# Patient Record
Sex: FEMALE | Race: WHITE | Employment: FULL TIME | URBAN - METROPOLITAN AREA
[De-identification: names, ages, dates, MRNs, and addresses within clinical notes are randomized per-mention and may not be internally consistent; named-entity substitution may affect disease eponyms.]

---

## 2017-01-19 ENCOUNTER — OFFICE VISIT (OUTPATIENT)
Dept: ORTHOPEDICS | Facility: CLINIC | Age: 52
End: 2017-01-19
Payer: COMMERCIAL

## 2017-01-19 VITALS — BODY MASS INDEX: 35.16 KG/M2 | RESPIRATION RATE: 12 BRPM | HEIGHT: 65 IN | WEIGHT: 211 LBS

## 2017-01-19 DIAGNOSIS — Z98.890 S/P CARPAL TUNNEL RELEASE: ICD-10-CM

## 2017-01-19 DIAGNOSIS — G56.03 BILATERAL CARPAL TUNNEL SYNDROME: Primary | ICD-10-CM

## 2017-01-19 PROCEDURE — 99024 POSTOP FOLLOW-UP VISIT: CPT | Performed by: ORTHOPAEDIC SURGERY

## 2017-01-19 ASSESSMENT — PAIN SCALES - GENERAL: PAINLEVEL: MILD PAIN (3)

## 2017-01-19 NOTE — MR AVS SNAPSHOT
"              After Visit Summary   1/19/2017    Bria Haddad    MRN: [id number]           Patient Information     Date Of Birth          1965        Visit Information        Provider Department      1/19/2017 8:45 AM Colby Nobles MD [facility] [place]         Follow-ups after your visit        Who to contact     If you have questions or need follow up information about today's clinic visit or your schedule please contact [facility] [place] directly at [phone].  Normal or non-critical lab and imaging results will be communicated to you by ArcMailhart, letter or phone within 4 business days after the clinic has received the results. If you do not hear from us within 7 days, please contact the clinic through ArcMailhart or phone. If you have a critical or abnormal lab result, we will notify you by phone as soon as possible.  Submit refill requests through Streak or call your pharmacy and they will forward the refill request to us. Please allow 3 business days for your refill to be completed.          Additional Information About Your Visit        MyChart Information     Streak gives you secure access to your electronic health record. If you see a primary care provider, you can also send messages to your care team and make appointments. If you have questions, please call your primary care clinic.  If you do not have a primary care provider, please call [phone] and they will assist you.        Care EveryWhere ID     This is your Care EveryWhere ID. This could be used by other organizations to access your [place] medical records  [id number]        Your Vitals Were     Respirations Height BMI (Body Mass Index)             12 1.638 m (5' 4.5\") 35.67 kg/m2          Blood Pressure from Last 3 Encounters:   12/29/16 124/65   12/16/16 125/65   12/05/16 115/69    Weight from Last 3 Encounters:   01/19/17 95.709 kg (211 lb)   12/05/16 95.255 kg (210 lb)   11/16/16 97.07 kg (214 lb)            "   Today, you had the following     No orders found for display       Primary Care Provider Office Phone # Fax #    Stephanie Saldana [phone]       [facility] [address] [facility] [address]        Thank you!     Thank you for choosing [facility]  for your care. Our goal is always to provide you with excellent care. Hearing back from our patients is one way we can continue to improve our services. Please take a few minutes to complete the written survey that you may receive in the mail after your visit with us. Thank you!             Your Updated Medication List - Protect others around you: Learn how to safely use, store and throw away your medicines at www.disposemymeds.org.          This list is accurate as of: 1/19/17  8:52 AM.  Always use your most recent med list.                   Brand Name Dispense Instructions for use    HYDROcodone-acetaminophen 5-325 MG per tablet    NORCO    15 tablet    Take 1-2 tablets by mouth every 4 hours as needed for other (Moderate to Severe Pain)       NO ACTIVE MEDICATIONS          order for DME     2 Units    Equipment being ordered: compression stockings 1 pair, at 30mm Hg, to be worn during the day. For Bilateral leg edema [R60.0]

## 2017-01-19 NOTE — PROGRESS NOTES
"Bria Haddad is here for follow up after left carpal tunnel release on 12/16/16.  Numbness and tingling have decreased since surgery.  Pain: mild.   She has been doing home exercise program.     She had right CTR on 10/12/16 and is doing well.     Resp 12  Ht 1.638 m (5' 4.5\")  Wt 95.709 kg (211 lb)  BMI 35.67 kg/m2    Exam:  Wound looks good, no evidence of infection.  Good capillary refill.  Mild tenderness at the based of the surgical scar  Able to make a full fist   strength: slight weakness still on the left compared with the right.    Assessment/ Plan:  1. Doing well status post left CTR on 12/16/16 and right CTR on 10/12/16    Work: Able to return to work full duty 1/23, as planned.     Return to clinic PRN.    NAOMI Nobles MD  Dept. Orthopedic Surgery  [facility]    This document serves as a record of the services and decisions personally performed and made by Dr. NAOMI Nobles MD. It was created on his behalf by Genny Nugent, a trained medical scribe. The creation of this record is based on the provider's personal observations and the statements of the patient. This document has been checked and approved by the attending provider.   Genny Nugent 8:57 AM 1/19/2017  "

## 2017-02-02 ENCOUNTER — OFFICE VISIT (OUTPATIENT)
Dept: ORTHOPEDICS | Facility: CLINIC | Age: 52
End: 2017-02-02
Payer: COMMERCIAL

## 2017-02-02 VITALS — WEIGHT: 210.2 LBS | HEIGHT: 65 IN | BODY MASS INDEX: 35.02 KG/M2 | RESPIRATION RATE: 12 BRPM

## 2017-02-02 DIAGNOSIS — Z98.890 STATUS POST CARPAL TUNNEL RELEASE: Primary | ICD-10-CM

## 2017-02-02 PROCEDURE — 99024 POSTOP FOLLOW-UP VISIT: CPT | Performed by: ORTHOPAEDIC SURGERY

## 2017-02-02 ASSESSMENT — PAIN SCALES - GENERAL: PAINLEVEL: MODERATE PAIN (4)

## 2017-02-02 NOTE — MR AVS SNAPSHOT
After Visit Summary   2/2/2017    Bria Haddad    MRN: [id number]           Patient Information     Date Of Birth          1965        Visit Information        Provider Department      2/2/2017 10:45 AM Colby Nobles MD [facility] [place]        Care Instructions    Pain and Tenderness after Carpal Tunnel Surgery  Paresthesias and scar tenderness are common in the subacute recovery phase following carpal tunnel release. A flare or aggravation of symptoms is common in the period of two to six weeks after surgery. Several different situations may contribute, including the following, listed roughly in most-to-least common:   Normal scar tenderness with anxiety / awareness.   Normal scar adhesions to the perineural tissues. This may result in a sudden, brief electrical paresthesia, typically shooting from the palm out the middle finger tip. It may occur while reaching, gripping, or at rest. It may be alarming, but does not necessarily mean that there is a technical problem with the surgery or with the healing process. Adhesions by themselves would not explain constant pain.     Pillar pain (tenderness adjacent to the actual ligament release, where the prominences of the trapezial ridge and the hook of the hamate are closest to the skin. The transverse retinacular ligament, divided during carpal tunnel release, attaches to these structures, and the inflammatory reaction of normal wound healing is most obvious at these points, often more than the central area of the actual release.    Aggravation of preexisting asymptomatic basal joint, pisotriquetral or triquetrohamate arthritis due to altered isometric stresses on these joints.   Reinnervation hypersensitivity - most often occurs if there was constant tingling, numbness or altered sensibility before surgery.   Reflex sympathetic dystrophy.   Coexisting neruritis from cervical radiculopathy, pronator syndrome, diabetic or other peripheral  "neuropathy.   Direct nerve irritation of one of the palmar cutaneous sensory branches to the palm or of the median nerve itself.          Follow-ups after your visit        Who to contact     If you have questions or need follow up information about today's clinic visit or your schedule please contact [facility] directly at [phone].  Normal or non-critical lab and imaging results will be communicated to you by MyChart, letter or phone within 4 business days after the clinic has received the results. If you do not hear from us within 7 days, please contact the clinic through Chartboosthart or phone. If you have a critical or abnormal lab result, we will notify you by phone as soon as possible.  Submit refill requests through MetaFLO or call your pharmacy and they will forward the refill request to us. Please allow 3 business days for your refill to be completed.          Additional Information About Your Visit        MyChart Information     MetaFLO gives you secure access to your electronic health record. If you see a primary care provider, you can also send messages to your care team and make appointments. If you have questions, please call your primary care clinic.  If you do not have a primary care provider, please call [phone] and they will assist you.        Care EveryWhere ID     This is your Care EveryWhere ID. This could be used by other organizations to access your [place] medical records  [id number]        Your Vitals Were     Respirations Height BMI (Body Mass Index)             12 1.638 m (5' 4.5\") 35.54 kg/m2          Blood Pressure from Last 3 Encounters:   12/29/16 124/65   12/16/16 125/65   12/05/16 115/69    Weight from Last 3 Encounters:   02/02/17 95.346 kg (210 lb 3.2 oz)   01/19/17 95.709 kg (211 lb)   12/05/16 95.255 kg (210 lb)              Today, you had the following     No orders found for display       Primary Care Provider Office Phone # Fax #    Stephanie Kebede " MD [place] [phone]       [facility] [address] [address] [facility] [address]        Thank you!     Thank you for choosing [facility]  for your care. Our goal is always to provide you with excellent care. Hearing back from our patients is one way we can continue to improve our services. Please take a few minutes to complete the written survey that you may receive in the mail after your visit with us. Thank you!             Your Updated Medication List - Protect others around you: Learn how to safely use, store and throw away your medicines at www.disposemymeds.org.          This list is accurate as of: 2/2/17 10:55 AM.  Always use your most recent med list.                   Brand Name Dispense Instructions for use    HYDROcodone-acetaminophen 5-325 MG per tablet    NORCO    15 tablet    Take 1-2 tablets by mouth every 4 hours as needed for other (Moderate to Severe Pain)       NO ACTIVE MEDICATIONS          order for DME     2 Units    Equipment being ordered: compression stockings 1 pair, at 30mm Hg, to be worn during the day. For Bilateral leg edema [R60.0]

## 2017-02-02 NOTE — PROGRESS NOTES
"Bria Haddad is here for follow up after left carpal tunnel release on 12/16/16. Numbness and tingling have decreased since surgery. She has been having sharp pains around the incision site that has been present for 1 week. She returned to work 2 weeks ago. No fever or chills. No numbness or tingling.     Job description: keyboarding     Treatments: gel pad and brace wear for the last week at work.     She had right CTR on 10/12/16 and is doing well.     Resp 12  Ht 1.638 m (5' 4.5\")  Wt 95.346 kg (210 lb 3.2 oz)  BMI 35.54 kg/m2    Exam:  No evidence of infection.   Residual swelling in the carpal tunnel area  Tender: proximal aspect of the incision site, hypothenar area     Assessment:  1. Status post left CTR with inflammation at the incision site. It does not appear to be infected but it may not be declaring itself completely yet. I feel more likely that it is more normal pillar pain.      Plan:  I recommended hand therapy  for scar desensitization. We also discussed alternative ways to desensitize the scar.   Hand therapy ordered.   We discussed the signs to monitor for signs of infection. Return to the clinic/ER as soon as there are signs of infection.   Antiinflammatories as needed.     Return to clinic PRN.     NAOMI Nobles MD  Dept. Orthopedic Surgery  [facility]    This document serves as a record of the services and decisions personally performed and made by Dr. NAOMI Nobles MD. It was created on his behalf by Genny Nugent, a trained medical scribe. The creation of this record is based on the provider's personal observations and the statements of the patient. This document has been checked and approved by the attending provider.   Genny Nugent 11:09 AM 2/2/2017  "

## 2017-02-02 NOTE — PATIENT INSTRUCTIONS
Pain and Tenderness after Carpal Tunnel Surgery  Paresthesias and scar tenderness are common in the subacute recovery phase following carpal tunnel release. A flare or aggravation of symptoms is common in the period of two to six weeks after surgery. Several different situations may contribute, including the following, listed roughly in most-to-least common:   Normal scar tenderness with anxiety / awareness.   Normal scar adhesions to the perineural tissues. This may result in a sudden, brief electrical paresthesia, typically shooting from the palm out the middle finger tip. It may occur while reaching, gripping, or at rest. It may be alarming, but does not necessarily mean that there is a technical problem with the surgery or with the healing process. Adhesions by themselves would not explain constant pain.     Pillar pain (tenderness adjacent to the actual ligament release, where the prominences of the trapezial ridge and the hook of the hamate are closest to the skin. The transverse retinacular ligament, divided during carpal tunnel release, attaches to these structures, and the inflammatory reaction of normal wound healing is most obvious at these points, often more than the central area of the actual release.    Aggravation of preexisting asymptomatic basal joint, pisotriquetral or triquetrohamate arthritis due to altered isometric stresses on these joints.   Reinnervation hypersensitivity - most often occurs if there was constant tingling, numbness or altered sensibility before surgery.   Reflex sympathetic dystrophy.   Coexisting neruritis from cervical radiculopathy, pronator syndrome, diabetic or other peripheral neuropathy.   Direct nerve irritation of one of the palmar cutaneous sensory branches to the palm or of the median nerve itself.

## 2017-02-07 ENCOUNTER — THERAPY VISIT (OUTPATIENT)
Dept: OCCUPATIONAL THERAPY | Facility: CLINIC | Age: 52
End: 2017-02-07
Payer: COMMERCIAL

## 2017-02-07 DIAGNOSIS — G56.02 CARPAL TUNNEL SYNDROME OF LEFT WRIST: ICD-10-CM

## 2017-02-07 DIAGNOSIS — Z47.89 AFTERCARE FOLLOWING SURGERY OF THE MUSCULOSKELETAL SYSTEM: ICD-10-CM

## 2017-02-07 DIAGNOSIS — M25.632 STIFFNESS OF LEFT WRIST JOINT: Primary | ICD-10-CM

## 2017-02-07 PROCEDURE — 97110 THERAPEUTIC EXERCISES: CPT | Mod: GO | Performed by: OCCUPATIONAL THERAPIST

## 2017-02-07 PROCEDURE — 97165 OT EVAL LOW COMPLEX 30 MIN: CPT | Mod: GO | Performed by: OCCUPATIONAL THERAPIST

## 2017-02-07 PROCEDURE — 97140 MANUAL THERAPY 1/> REGIONS: CPT | Mod: GO | Performed by: OCCUPATIONAL THERAPIST

## 2017-02-07 NOTE — PROGRESS NOTES
Hand Therapy Initial Evaluation    Current Date:  2/7/2017    Subjective:  Bria Haddad is a 51 year old right hand dominant female.    Diagnosis:   Left CTS  DOS:  12/16/16  Procedure:  Left CTR  Post:  7w 4d    Patient reports symptoms of pain, stiffness/loss of motion, weakness/loss of strength and edema of the left wrist and which occurred due to gradual onset due to repetitive use over the past several years.  Patient also reports fall on left hand today on ice but was wearing prefab wrist splint. Since onset symptoms are gradually getting better.  Special tests:  EMG.  Previous treatment: See for therapy after right CTR November 2016.  General health as reported by patient is good.  Pertinent medical history includes:numbness/tingling, calf pain, swelling, warmth  Medical allergies:Aspirin.  Surgical history: orthopedic: B CTR.  Medication history: none.    Current occupation is    Currently working in normal job without restrictions  Job Tasks: prolonged sitting, repetitive tasks, computer work  Barriers include:none  Prior functional level:  no limitations    Additional Occupational Profile Information (patterns of daily living, interests, values and needs): None    Functional Outcome Measure:  Upper Extremity Functional Index  SCORE:   Column Totals: 64/80  (A lower score indicates greater disability.)    Objective:  ROM:  Wrist  2/7/17   AROM(PROM) Right Left   Extension 65 65   Flexion 55 55   RD 25 20   UD 40 35       Strength:   (Measured in pounds)    2/7/17   Trials Right Left   1  2  3 45  45  43 30+  35+  34+   Average: 44 33     Lat Pinch  2/7/17   Trials Right Left   1  2  3 14  14  14 10-  10-  11-   Average: 14 11     3 Pt Pinch  2/7/17   Trials Right Left   1  2  3 10  11  13 7-  10-  11-   Average: 11 9     Edema:  Mild to moderate volar wrist    Scar:  Sensitivity: mild to moderate hypersensitivity Quality:  Moderately adherent    Sensation:  WNL throughout all  nerve distributions; per patient report    Pain Report:  VAS(0-10) 2/7/17   At Rest: 0/10   With Use: 3-4/10   Location: volar wrist and hand  Pain Quality:  Shooting  Frequency: intermittent    Pain is worst:  daytime  Exacerbated by:  Overuse  Relieved by:  rest  Progression:  Gradually improving  Assessment:  Patient presents with symptoms consistent with diagnosis of Carpal Tunnel Syndrome, with surgical  intervention.   Patient's limitations or Problem List includes:  Pain, Decreased ROM/motion, Increased edema, Weakness, Adherent scarring, Decreased  and Decreased pinch of the left wrist and hand which interferes with the patient's ability to perform Self Care Tasks (eating, bathing), Work Tasks, Recreational Activities and Household Chores as compared to previous level of function.    Rehab Potential:  Excellent - Return to full activity, no limitations    Patient will benefit from skilled Occupational Therapy to increase ROM, flexibility, overall strength,  strength and pinch strength and decrease pain, edema and adherence of scarring to return to previous activity level and resume normal daily tasks and to reach their rehab potential.    Barriers to Learning:  No barrier    Communication Issues:  Patient appears to be able to clearly communicate and understand verbal and written communication and follow directions correctly.    Assessment of Occupational Performance:  3-5 Performance Deficits  Identified Performance Deficits: bathing/showering, home establishment and management, meal preparation and cleanup, work and leisure activities      Clinical Decision Making (Complexity): Low complexity    Treatment Explanation:  The following has been discussed with the patient:    RX ordered/plan of care  Anticipated outcomes  Possible risks and side effects    Plan:  Frequency:  1 X week, once daily  Duration:  for 6 weeks  Treatment Plan:  Modalities:  US, Fluidotherapy or Paraffin  Therapeutic Exercise:   AROM, Tendon Gliding and /Pinch Strengthening  Neuromuscular re-education:  Nerve Gliding, Desensitization and Kinesiotaping  Manual Techniques:  Scar mobilization  Orthotic Fabrication:  Static forearm based wrist orthotic  Self Care: Diagnostic education    Avoid activities that exacerbate median nerve symptoms    Avoid prolonged flexion or extension of the wrist    Discharge Plan:    Achieve all LTG.  Independent in home treatment program.  Reach maximal therapeutic benefit.    Home Exercise Program:  Warmth for stiffness  AROM wrist all planes  Tendon gliding with 3 fists and FDS  Distal median nerve gliding  Scar mobs circular and 3 vector  Scar pad with tubigrip sleeve sleeping  Wrist orthosis sleeping as needed    Next Visit:  See in 1 week  Continue US for scar softening  Assess response to HEP  Recommend MD f/u if increase in pain or swelling after FOOSH today

## 2017-02-13 ENCOUNTER — THERAPY VISIT (OUTPATIENT)
Dept: OCCUPATIONAL THERAPY | Facility: CLINIC | Age: 52
End: 2017-02-13
Payer: COMMERCIAL

## 2017-02-13 DIAGNOSIS — M25.632 STIFFNESS OF LEFT WRIST JOINT: ICD-10-CM

## 2017-02-13 DIAGNOSIS — G56.02 CARPAL TUNNEL SYNDROME OF LEFT WRIST: ICD-10-CM

## 2017-02-13 DIAGNOSIS — Z47.89 AFTERCARE FOLLOWING SURGERY OF THE MUSCULOSKELETAL SYSTEM: ICD-10-CM

## 2017-02-13 PROCEDURE — 97140 MANUAL THERAPY 1/> REGIONS: CPT | Mod: GO | Performed by: OCCUPATIONAL THERAPIST

## 2017-02-13 PROCEDURE — 97110 THERAPEUTIC EXERCISES: CPT | Mod: GO | Performed by: OCCUPATIONAL THERAPIST

## 2017-02-13 PROCEDURE — 97035 APP MDLTY 1+ULTRASOUND EA 15: CPT | Mod: GO | Performed by: OCCUPATIONAL THERAPIST

## 2017-02-13 NOTE — MR AVS SNAPSHOT
After Visit Summary   2/13/2017    Bria Haddad    MRN: [id number]           Patient Information     Date Of Birth          1965        Visit Information        Provider Department      2/13/2017 9:00 AM Mary Anne Diaz [facility] [place]        Today's Diagnoses     Stiffness of left wrist joint        Carpal tunnel syndrome of left wrist        Aftercare following surgery of the musculoskeletal system           Follow-ups after your visit        Your next 10 appointments already scheduled     Feb 22, 2017  9:30 AM CST   [facility] with Mary Anne Diaz   [facility] [place] ([facility] [place] [facility])    [address]  [place] [id number] [phone]              Who to contact     If you have questions or need follow up information about today's clinic visit or your schedule please contact [facility] directly at [phone].  Normal or non-critical lab and imaging results will be communicated to you by TrustRadiushart, letter or phone within 4 business days after the clinic has received the results. If you do not hear from us within 7 days, please contact the clinic through TrustRadiushart or phone. If you have a critical or abnormal lab result, we will notify you by phone as soon as possible.  Submit refill requests through PubliAtis or call your pharmacy and they will forward the refill request to us. Please allow 3 business days for your refill to be completed.          Additional Information About Your Visit        TrustRadiushart Information     PubliAtis gives you secure access to your electronic health record. If you see a primary care provider, you can also send messages to your care team and make appointments. If you have questions, please call your primary care clinic.  If you do not have a primary care provider, please call [phone] and they will assist you.        Care EveryWhere  ID     This is your Care EveryWhere ID. This could be used by other organizations to access your [place] medical records  [id number]         Blood Pressure from Last 3 Encounters:   12/29/16 124/65   12/16/16 125/65   12/05/16 115/69    Weight from Last 3 Encounters:   02/02/17 95.3 kg (210 lb 3.2 oz)   01/19/17 95.7 kg (211 lb)   12/05/16 95.3 kg (210 lb)              We Performed the Following     MANUAL THER TECH,1+REGIONS,EA 15 MIN     THERAPEUTIC EXERCISES     ULTRASOUND THERAPY        Primary Care Provider Office Phone # Fax #    Stephanie Saldana [phone]       [facility] [id number] [place] [address]        Thank you!     Thank you for choosing [place] [facility] [place]  for your care. Our goal is always to provide you with excellent care. Hearing back from our patients is one way we can continue to improve our services. Please take a few minutes to complete the written survey that you may receive in the mail after your visit with us. Thank you!             Your Updated Medication List - Protect others around you: Learn how to safely use, store and throw away your medicines at www.disposemymeds.org.          This list is accurate as of: 2/13/17  9:21 AM.  Always use your most recent med list.                   Brand Name Dispense Instructions for use    HYDROcodone-acetaminophen 5-325 MG per tablet    NORCO    15 tablet    Take 1-2 tablets by mouth every 4 hours as needed for other (Moderate to Severe Pain)       NO ACTIVE MEDICATIONS          order for DME     2 Units    Equipment being ordered: compression stockings 1 pair, at 30mm Hg, to be worn during the day. For Bilateral leg edema [R60.0]

## 2017-02-13 NOTE — PROGRESS NOTES
SOAP note objective information for 2/13/2017:    ROM:  Wrist  2/7/17 2/13/17   AROM(PROM) Right Left Left   Extension 65 65 70   Flexion 55 55 60   RD 25 20 25   UD 40 35 35       Strength:   (Measured in pounds)    2/7/17   Trials Right Left   1  2  3 45  45  43 30+  35+  34+   Average: 44 33     Pain Report:  VAS(0-10) 2/7/17 2/13/17   At Rest: 0/10    With Use: 3-4/10 1/10   Location: volar wrist and hand    Home Exercise Program:  Warmth for stiffness  AROM wrist all planes  Tendon gliding with 3 fists and FDS  Distal median nerve gliding  Gentle  and pinch strengthening  Scar mobs circular and 3 vector  Scar pad with tubigrip sleeve sleeping  Wrist orthosis sleeping as needed    Next Visit:  Continue US for scar softening    Please refer to the daily flowsheet for treatment today, total treatment time and time spent performing 1:1 timed codes.

## 2017-03-02 ENCOUNTER — THERAPY VISIT (OUTPATIENT)
Dept: OCCUPATIONAL THERAPY | Facility: CLINIC | Age: 52
End: 2017-03-02
Payer: COMMERCIAL

## 2017-03-02 DIAGNOSIS — M25.632 STIFFNESS OF LEFT WRIST JOINT: ICD-10-CM

## 2017-03-02 DIAGNOSIS — G56.02 CARPAL TUNNEL SYNDROME OF LEFT WRIST: ICD-10-CM

## 2017-03-02 DIAGNOSIS — Z47.89 AFTERCARE FOLLOWING SURGERY OF THE MUSCULOSKELETAL SYSTEM: ICD-10-CM

## 2017-03-02 PROCEDURE — 97110 THERAPEUTIC EXERCISES: CPT | Mod: GO | Performed by: OCCUPATIONAL THERAPIST

## 2017-03-02 PROCEDURE — 97035 APP MDLTY 1+ULTRASOUND EA 15: CPT | Mod: GO | Performed by: OCCUPATIONAL THERAPIST

## 2017-03-02 PROCEDURE — 97140 MANUAL THERAPY 1/> REGIONS: CPT | Mod: GO | Performed by: OCCUPATIONAL THERAPIST

## 2017-03-02 NOTE — PROGRESS NOTES
SOAP note objective information for 3/2/2017:    ROM:  Wrist  2/7/17 3/2/17   AROM(PROM) Right Left Left   Extension 65 65 65   Flexion 55 55 60   RD 25 20 25   UD 40 35 35     Strength:   (Measured in pounds)    2/7/17 3/2/17   Trials Right Left Left   1  2  3 45  45  43 30+  35+  34+ 37  42  43   Average: 44 33 41     Home Exercise Program:  Warmth for stiffness  AROM wrist all planes  Tendon gliding with 3 fists and FDS  Distal median nerve gliding   and pinch strengthening  Scar mobs circular and 3 vector  Scar pad with tubigrip sleeve sleeping  Wrist orthosis sleeping as needed    Next Visit:  Continue US for scar softening x 3 more sessions    Please refer to the daily flowsheet for treatment today, total treatment time and time spent performing 1:1 timed codes.

## 2017-03-02 NOTE — MR AVS SNAPSHOT
After Visit Summary   3/2/2017    Bria Haddad    MRN: [id number]           Patient Information     Date Of Birth          1965        Visit Information        Provider Department      3/2/2017 1:30 PM Mary Anne Diaz [facility] [place]        Today's Diagnoses     Stiffness of left wrist joint        Carpal tunnel syndrome of left wrist        Aftercare following surgery of the musculoskeletal system           Follow-ups after your visit        Your next 10 appointments already scheduled     Mar 10, 2017 10:00 AM CST   ETHAN Hand with Argelia Stallworth, OT   [facility] [place] ([facility]AN [facility] [place] [facility])    [address] [address]  [place] [address] [phone]              Who to contact     If you have questions or need follow up information about today's clinic visit or your schedule please contact [facility] directly at [phone].  Normal or non-critical lab and imaging results will be communicated to you by Spectrum[employer]hart, letter or phone within 4 business days after the clinic has received the results. If you do not hear from us within 7 days, please contact the clinic through [employer] or phone. If you have a critical or abnormal lab result, we will notify you by phone as soon as possible.  Submit refill requests through [employer] or call your pharmacy and they will forward the refill request to us. Please allow 3 business days for your refill to be completed.          Additional Information About Your Visit        MyChart Information     [employer] gives you secure access to your electronic health record. If you see a primary care provider, you can also send messages to your care team and make appointments. If you have questions, please call your primary care clinic.  If you do not have a primary care provider, please call [phone] and they will assist you.        Care  EveryWhere ID     This is your Care EveryWhere ID. This could be used by other organizations to access your [place] medical records  [id number]         Blood Pressure from Last 3 Encounters:   12/29/16 124/65   12/16/16 125/65   12/05/16 115/69    Weight from Last 3 Encounters:   02/02/17 95.3 kg (210 lb 3.2 oz)   01/19/17 95.7 kg (211 lb)   12/05/16 95.3 kg (210 lb)              We Performed the Following     MANUAL THER TECH,1+REGIONS,EA 15 MIN     THERAPEUTIC EXERCISES     ULTRASOUND THERAPY        Primary Care Provider Office Phone # Fax #    Stephanie Saldana [phone]       [facility] [address] [facility] [address]        Thank you!     Thank you for choosing [place] [facility] [place]  for your care. Our goal is always to provide you with excellent care. Hearing back from our patients is one way we can continue to improve our services. Please take a few minutes to complete the written survey that you may receive in the mail after your visit with us. Thank you!             Your Updated Medication List - Protect others around you: Learn how to safely use, store and throw away your medicines at www.disposemymeds.org.          This list is accurate as of: 3/2/17  1:55 PM.  Always use your most recent med list.                   Brand Name Dispense Instructions for use    HYDROcodone-acetaminophen 5-325 MG per tablet    NORCO    15 tablet    Take 1-2 tablets by mouth every 4 hours as needed for other (Moderate to Severe Pain)       NO ACTIVE MEDICATIONS          order for DME     2 Units    Equipment being ordered: compression stockings 1 pair, at 30mm Hg, to be worn during the day. For Bilateral leg edema [R60.0]

## 2017-03-10 ENCOUNTER — THERAPY VISIT (OUTPATIENT)
Dept: OCCUPATIONAL THERAPY | Facility: CLINIC | Age: 52
End: 2017-03-10
Payer: COMMERCIAL

## 2017-03-10 DIAGNOSIS — M25.632 STIFFNESS OF LEFT WRIST JOINT: ICD-10-CM

## 2017-03-10 DIAGNOSIS — G56.02 CARPAL TUNNEL SYNDROME OF LEFT WRIST: ICD-10-CM

## 2017-03-10 DIAGNOSIS — Z47.89 AFTERCARE FOLLOWING SURGERY OF THE MUSCULOSKELETAL SYSTEM: ICD-10-CM

## 2017-03-10 PROCEDURE — 97140 MANUAL THERAPY 1/> REGIONS: CPT | Mod: GO | Performed by: OCCUPATIONAL THERAPIST

## 2017-03-10 PROCEDURE — 97110 THERAPEUTIC EXERCISES: CPT | Mod: GO | Performed by: OCCUPATIONAL THERAPIST

## 2017-03-10 PROCEDURE — 97035 APP MDLTY 1+ULTRASOUND EA 15: CPT | Mod: GO | Performed by: OCCUPATIONAL THERAPIST

## 2017-03-10 NOTE — PROGRESS NOTES
SOAP note objective information for 3/10/2017:    ROM:  Wrist  2/7/17 3/2/17 3/10/17   AROM(PROM) Right Left Left Left   Extension 65 65 65 65   Flexion 55 55 60 62   RD 25 20 25 25   UD 40 35 35 39     Strength:   (Measured in pounds)    2/7/17 3/2/17 3/10/17   Trials Right Left Left Left   1  2  3 45  45  43 30+  35+  34+ 37  42  43 37  40  43   Average: 44 33 41 40     Home Exercise Program:  Warmth for stiffness  AROM wrist all planes  Tendon gliding with 3 fists and FDS  Distal median nerve gliding   and pinch strengthening  Scar mobs circular and 3 vector  Scar pad with tubigrip sleeve sleeping  Wrist orthosis sleeping as needed    Next Visit:  Continue US for scar softening x 3 more sessions    Please refer to the daily flowsheet for treatment today, total treatment time and time spent performing 1:1 timed codes.

## 2017-03-10 NOTE — MR AVS SNAPSHOT
After Visit Summary   3/10/2017    Bria Haddad    MRN: [id number]           Patient Information     Date Of Birth          1965        Visit Information        Provider Department      3/10/2017 10:00 AM Argelia Stallworth OT [facility] [place]        Today's Diagnoses     Stiffness of left wrist joint        Carpal tunnel syndrome of left wrist        Aftercare following surgery of the musculoskeletal system           Follow-ups after your visit        Your next 10 appointments already scheduled     Mar 20, 2017  8:30 AM CDT   ETHAN Hand with Mary Anne Diaz   [facility] [place] ([facility] [place] [facility])    [address] [address]  [place] [address] [phone]              Who to contact     If you have questions or need follow up information about today's clinic visit or your schedule please contact [facility] directly at [phone].  Normal or non-critical lab and imaging results will be communicated to you by TutorialTabhart, letter or phone within 4 business days after the clinic has received the results. If you do not hear from us within 7 days, please contact the clinic through [employer] or phone. If you have a critical or abnormal lab result, we will notify you by phone as soon as possible.  Submit refill requests through [employer] or call your pharmacy and they will forward the refill request to us. Please allow 3 business days for your refill to be completed.          Additional Information About Your Visit        MyChart Information     [employer] gives you secure access to your electronic health record. If you see a primary care provider, you can also send messages to your care team and make appointments. If you have questions, please call your primary care clinic.  If you do not have a primary care provider, please call [phone] and they will assist you.        Care  EveryWhere ID     This is your Care EveryWhere ID. This could be used by other organizations to access your [place] medical records  [id number]         Blood Pressure from Last 3 Encounters:   12/29/16 124/65   12/16/16 125/65   12/05/16 115/69    Weight from Last 3 Encounters:   02/02/17 95.3 kg (210 lb 3.2 oz)   01/19/17 95.7 kg (211 lb)   12/05/16 95.3 kg (210 lb)              We Performed the Following     MANUAL THER TECH,1+REGIONS,EA 15 MIN     THERAPEUTIC EXERCISES     ULTRASOUND THERAPY        Primary Care Provider Office Phone # Fax #    Stephanie Saldana [phone]       [facility] [id number] [place] [address]        Thank you!     Thank you for choosing [place] [facility] [place]  for your care. Our goal is always to provide you with excellent care. Hearing back from our patients is one way we can continue to improve our services. Please take a few minutes to complete the written survey that you may receive in the mail after your visit with us. Thank you!             Your Updated Medication List - Protect others around you: Learn how to safely use, store and throw away your medicines at www.disposemymeds.org.          This list is accurate as of: 3/10/17 10:31 AM.  Always use your most recent med list.                   Brand Name Dispense Instructions for use    HYDROcodone-acetaminophen 5-325 MG per tablet    NORCO    15 tablet    Take 1-2 tablets by mouth every 4 hours as needed for other (Moderate to Severe Pain)       NO ACTIVE MEDICATIONS          order for DME     2 Units    Equipment being ordered: compression stockings 1 pair, at 30mm Hg, to be worn during the day. For Bilateral leg edema [R60.0]

## 2017-03-15 ENCOUNTER — TELEPHONE (OUTPATIENT)
Dept: INTERNAL MEDICINE | Facility: CLINIC | Age: 52
End: 2017-03-15

## 2017-03-20 ENCOUNTER — THERAPY VISIT (OUTPATIENT)
Dept: OCCUPATIONAL THERAPY | Facility: CLINIC | Age: 52
End: 2017-03-20
Payer: COMMERCIAL

## 2017-03-20 DIAGNOSIS — M25.632 STIFFNESS OF LEFT WRIST JOINT: ICD-10-CM

## 2017-03-20 DIAGNOSIS — Z47.89 AFTERCARE FOLLOWING SURGERY OF THE MUSCULOSKELETAL SYSTEM: ICD-10-CM

## 2017-03-20 DIAGNOSIS — G56.02 CARPAL TUNNEL SYNDROME OF LEFT WRIST: ICD-10-CM

## 2017-03-20 PROCEDURE — 97035 APP MDLTY 1+ULTRASOUND EA 15: CPT | Mod: GO | Performed by: OCCUPATIONAL THERAPIST

## 2017-03-20 PROCEDURE — 97110 THERAPEUTIC EXERCISES: CPT | Mod: GO | Performed by: OCCUPATIONAL THERAPIST

## 2017-03-20 PROCEDURE — 97140 MANUAL THERAPY 1/> REGIONS: CPT | Mod: GO | Performed by: OCCUPATIONAL THERAPIST

## 2017-03-20 NOTE — PROGRESS NOTES
Hand Therapy Progress/Discharge Note    Current Date:  3/20/2017    Reporting period is 2/7/2017 to 3/20/2017    Diagnosis:   Left CTS  DOS:  12/16/16  Procedure:  Left CTR    Subjective:   Subjective changes noted by patient:  My hand is much better and the scar is much less sensitive.  Functional changes noted by patient:  Improvement in Household Chores  Patient has noted adverse reaction to:  None    Functional Outcome Measure:  Upper Extremity Functional Index  SCORE:   Column Totals: 74/80  (A lower score indicates greater disability.)    Objective:  ROM:  Wrist  2/7/17 3/20/17   AROM(PROM) Right Left Left   Extension 65 65 75   Flexion 55 55 60   RD 25 20 20   UD 40 35 35       Strength:   (Measured in pounds)    2/7/17 3/20/17   Trials Right Left Left   1  2  3 45  45  43 30+  35+  34+ 38  42  43   Average: 44 33 41     Lat Pinch  2/7/17 3/20/17   Trials Right Left Left   1  2  3 14  14  14 10-  10-  11- 13  14  13   Average: 14 11 13     3 Pt Pinch  2/7/17 3/20/17   Trials Right Left Left   1  2  3 10  11  13 7-  10-  11- 9  10  10   Average: 11 9 10     Edema:  Minimal volar wrist    Scar:  Sensitivity: mild hypersensitivity Quality:  Mildly adherent    Sensation:  WNL throughout all nerve distributions; per patient report    Pain Report:  VAS(0-10) 2/7/17 3/20/17   At Rest: 0/10    With Use: 3-4/10 1/10   Location: volar wrist and hand    Please refer to the daily flowsheet for treatment provided today.     Assessment:  Response to therapy has been improvement to:  ROM of Wrist:  Ext  and Flex  Flexibility:  improved excursion of involved muscles and scar less adherent   Strength:   and pinch  Edema:  Decreased volar wrist  Pain:  intensity of pain is decreased    Overall Assessment:  Patient's symptoms are resolving and patient is independent in home exercise program.  STG/LTG:  STGoals have been reviewed and progress or achievement has occurred;  see goal sheet for details and updates.  I have  re-evaluated this patient and find that the nature, scope, duration and intensity of the therapy is appropriate for the medical condition of the patient.    Plan:  Frequency/Duration:  Discharge from Hand Therapy; continue home program.    Recommendations for Continued Therapy  Home Exercise Program:  Warmth for stiffness  AROM wrist all planes  Tendon gliding with 3 fists and FDS  Distal median nerve gliding   and pinch strengthening  Scar mobs circular and 3 vector  Scar pad with tubigrip sleeve sleeping

## 2017-03-20 NOTE — MR AVS SNAPSHOT
After Visit Summary   3/20/2017    Bria Haddad    MRN: [id number]           Patient Information     Date Of Birth          1965        Visit Information        Provider Department      3/20/2017 8:30 AM Mary Anne Diaz [facility] [place]        Today's Diagnoses     Stiffness of left wrist joint        Carpal tunnel syndrome of left wrist        Aftercare following surgery of the musculoskeletal system           Follow-ups after your visit        Who to contact     If you have questions or need follow up information about today's clinic visit or your schedule please contact [facility] directly at [phone].  Normal or non-critical lab and imaging results will be communicated to you by [employer], letter or phone within 4 business days after the clinic has received the results. If you do not hear from us within 7 days, please contact the clinic through [employer] or phone. If you have a critical or abnormal lab result, we will notify you by phone as soon as possible.  Submit refill requests through [employer] or call your pharmacy and they will forward the refill request to us. Please allow 3 business days for your refill to be completed.          Additional Information About Your Visit        MyChart Information     [employer] gives you secure access to your electronic health record. If you see a primary care provider, you can also send messages to your care team and make appointments. If you have questions, please call your primary care clinic.  If you do not have a primary care provider, please call [phone] and they will assist you.        Care EveryWhere ID     This is your Care EveryWhere ID. This could be used by other organizations to access your [place] medical records  [id number]         Blood Pressure from Last 3 Encounters:   12/29/16 124/65   12/16/16 125/65   12/05/16 115/69    Weight from Last 3 Encounters:    02/02/17 95.3 kg (210 lb 3.2 oz)   01/19/17 95.7 kg (211 lb)   12/05/16 95.3 kg (210 lb)              We Performed the Following     MANUAL THER TECH,1+REGIONS,EA 15 MIN     THERAPEUTIC EXERCISES     ULTRASOUND THERAPY        Primary Care Provider Office Phone # Fax #    Stephanie Saldana [phone]       [address] [address]        Thank you!     Thank you for choosing [place] [facility] [place]  for your care. Our goal is always to provide you with excellent care. Hearing back from our patients is one way we can continue to improve our services. Please take a few minutes to complete the written survey that you may receive in the mail after your visit with us. Thank you!             Your Updated Medication List - Protect others around you: Learn how to safely use, store and throw away your medicines at www.disposemymeds.org.          This list is accurate as of: 3/20/17  9:10 AM.  Always use your most recent med list.                   Brand Name Dispense Instructions for use    HYDROcodone-acetaminophen 5-325 MG per tablet    NORCO    15 tablet    Take 1-2 tablets by mouth every 4 hours as needed for other (Moderate to Severe Pain)       NO ACTIVE MEDICATIONS          order for DME     2 Units    Equipment being ordered: compression stockings 1 pair, at 30mm Hg, to be worn during the day. For Bilateral leg edema [R60.0]

## 2017-06-19 ENCOUNTER — OFFICE VISIT (OUTPATIENT)
Dept: INTERNAL MEDICINE | Facility: CLINIC | Age: 52
End: 2017-06-19
Payer: COMMERCIAL

## 2017-06-19 VITALS
SYSTOLIC BLOOD PRESSURE: 115 MMHG | TEMPERATURE: 98.3 F | WEIGHT: 212.8 LBS | OXYGEN SATURATION: 97 % | DIASTOLIC BLOOD PRESSURE: 66 MMHG | HEART RATE: 75 BPM | BODY MASS INDEX: 35.96 KG/M2

## 2017-06-19 DIAGNOSIS — R05.8 DRY COUGH: ICD-10-CM

## 2017-06-19 DIAGNOSIS — R60.0 BILATERAL LEG EDEMA: Primary | ICD-10-CM

## 2017-06-19 PROCEDURE — 99214 OFFICE O/P EST MOD 30 MIN: CPT | Performed by: INTERNAL MEDICINE

## 2017-06-19 NOTE — PROGRESS NOTES
"  SUBJECTIVE:                                                    Bria Haddad is a 52 year old female who presents to clinic today for the following health issues:        Musculoskeletal problem/pain      Duration: ***    Description  Location: ***    Intensity:  {mild,moderate,severe:127280}    Accompanying signs and symptoms: {OTHER MS SYMPTOMS:290196::\"none\"}    History  Previous similar problem: { :926685}  Previous evaluation:  {PREVIOUS ms evaluation:273684::\"none\"}    Precipitating or alleviating factors:  Trauma or overuse: { :124889}  Aggravating factors include: {AGGRAVATING MS FACTORS CHRONIC PROB:993599::\"none\"}    Therapies tried and outcome: {MS RELIEF ITEMS:641486::\"nothing\"}       {additional problems for provider to add:437370}    Problem list and histories reviewed & adjusted, as indicated.  Additional history: {NONE - AS DOCUMENTED:204518::\"as documented\"}    {HIST REVIEW/ LINKS 2:806758}    Reviewed and updated as needed this visit by clinical staff       Reviewed and updated as needed this visit by Provider         {PROVIDER CHARTING PREFERENCE:580920}  "

## 2017-06-19 NOTE — NURSING NOTE
"Chief Complaint   Patient presents with     Edema     both ankles       Initial There were no vitals taken for this visit. Estimated body mass index is 35.52 kg/(m^2) as calculated from the following:    Height as of 2/2/17: 5' 4.5\" (1.638 m).    Weight as of 2/2/17: 210 lb 3.2 oz (95.3 kg).  Medication Reconciliation: complete    Angelica Azar CMA  "

## 2017-06-19 NOTE — PROGRESS NOTES
SUBJECTIVE:                                                    Bria Haddad is a 52 year old female who presents to clinic today for the following health issues:      Edema      Duration: has had edema for years but the last 2 weeks have been really bad.    Description (location/character/radiation): bilateral ankels    Intensity:  Legs get fatigued    Accompanying signs and symptoms: has had some shortness of breaths and coughing    History (similar episodes/previous evaluation): yes has had for years    Precipitating or alleviating factors: nothing that she noticed brings it on    Therapies tried and outcome: rest and elevation.     Edema appears to get worse at the end of the day, is especially around the ankles-she has photos on her smartphone which she shows today.  She wears compressions stockings pretty consistently in the daytime-they seemed to help more before, but appear to be less effective now.  No recent increase in prolonged periods of standing.  No recent increase in salt intake (pt reports limiting her salt always) or in fluid intake-drinks 70 oz fluids per day.  Current weight is ~210 pounds-stable over the past few months. Was around 206 two years ago and was 190 in 12/2014.  CMP and TSH in the past have been within normal limits.  Patient had a positive ROSEMARY in the Fall of 2015, along with an episode of rash of her legs-she was seen by Rheumatology at that time, had more labwork completed (as well as a focused exam) and deemed to not have current e/o AI, though she is at risk of developing AI disease.   She feels a pruritic sensation on the inner aspect of her left foot and points to two small, 0.3cm in diameter red macular lesions, spaced 5 cm apart-she feels that this may be reminiscent of how that previous rash started.  She denies over f/c. She denies arthralgias (other than her chronic, intermittent left knee pain).  Previous EKG with RBBB. Patient denies snoring. ECHO 2 years ago within  normal limits (but again, this was 2 yrs ago).  No chest pain, no orthopnea or PND, but the patient reports intermittent shortness of breath and fatigue, as well as a dry cough for the past 6 months. Sometimes has night sweats, but they sound as if they are relatively mild in intensity and the patient attributes them to her hot flushes. She does endorse intermittent heartburn which she treats with prn Tums. Rare post-nasal drip symptoms.    I curbsided our general surgeon half a year ago, who advised an U/S-based venous competence study. The patient has not yet completed this.             Problem list and histories reviewed & adjusted, as indicated.  Additional history: as documented    Patient Active Problem List   Diagnosis     CARDIOVASCULAR SCREENING; LDL GOAL LESS THAN 160     Endometriosis of uterus     Bilateral carpal tunnel syndrome     S/P carpal tunnel release     Past Surgical History:   Procedure Laterality Date     bunions       COLONOSCOPY N/A 11/9/2015    Procedure: COLONOSCOPY;  Surgeon: Andrew Adamson MD;  Location: [facility] OR     COLONOSCOPY WITH CO2 INSUFFLATION N/A 11/9/2015    Procedure: COLONOSCOPY WITH CO2 INSUFFLATION;  Surgeon: Andrew Adamson MD;  Location: [facility] OR     GYN SURGERY      endometryosis     RELEASE CARPAL TUNNEL Right 10/12/2016    Procedure: RELEASE CARPAL TUNNEL;  Surgeon: Colby Nobles MD;  Location: [facility] OR     RELEASE CARPAL TUNNEL Left 12/16/2016    Procedure: RELEASE CARPAL TUNNEL;  Surgeon: Colby Nobles MD;  Location: [facility] OR     TONSILLECTOMY & ADENOIDECTOMY         Social History   Substance Use Topics     Smoking status: Never Smoker     Smokeless tobacco: Never Used     Alcohol use No     Family History   Problem Relation Age of Onset     OSTEOPOROSIS Mother      Respiratory Mother      Thyroid Disease Mother      HEART DISEASE Father      Cancer - colorectal Maternal Grandmother      DIABETES Maternal Grandfather      HEART DISEASE Maternal  Grandfather      CANCER Paternal Grandmother      HEART DISEASE Paternal Grandfather      Respiratory Paternal Grandfather      Hypertension Brother      Thyroid Disease Sister      Thyroid Disease Sister      Neurologic Disorder Brother          Current Outpatient Prescriptions   Medication Sig Dispense Refill     order for DME Equipment being ordered: compression stockings 1 pair, at 30mm Hg, to be worn during the day. For Bilateral leg edema [R60.0] 2 Units 0     NO ACTIVE MEDICATIONS          Reviewed and updated as needed this visit by clinical staff  Tobacco  Allergies  Meds  Med Hx  Surg Hx  Fam Hx  Soc Hx      Reviewed and updated as needed this visit by Provider         ==============================================================  ROS:  Constitutional, HEENT, cardiovascular, pulmonary, GI, , musculoskeletal, neuro, skin, endocrine and psych systems are negative, except as otherwise noted.  .    OBJECTIVE:                                                    /66 (BP Location: Right arm, Patient Position: Chair, Cuff Size: Adult Large)  Pulse 75  Temp 98.3  F (36.8  C) (Oral)  Wt 212 lb 12.8 oz (96.5 kg)  SpO2 97%  BMI 35.96 kg/m2  Body mass index is 35.96 kg/(m^2).     GEN: not in acute distress  SKIN;  lower extremities:currently +1 pitting and non-pitting b/l lower extremity edema; medial lower aspect of the left leg with two 0.3cm in diameter red papular lesions, spaced 5cm apart. No NV compromise.      ASSESSMENT/PLAN:                                                        ICD-10-CM    1. Bilateral leg edema R60.0 US venous competency and vein lower ext bilateral (vascular lab)   2. Dry cough R05      Time spent coordinating care was approximately 30 minutes out of a total of approximately 40 minutes (which was the total duration of the appointment) including the diagnosis, prognosis and treatment of the above medical conditions.     (R60.0) Bilateral leg edema  (primary encounter  diagnosis)  ASSESSMENT: most c/w venous insufficiency made worse by gradual weight gain and perhaps the recent warm, humid weather.  Also on the differential diagnosis: development of autoimmune disease (given a history of + ROSEMARY and of a one-time episode of rash); vs right-sided heart dysfunction/Pulm hypertension (given the history of RBBB on an EKG, subacute dry cough and fatigue).  PLAN:  (aside from continuing regular exercise, trying to keep a healthy weight, judicious salt and fluid intake and compression stockings):   will do the U/S venous competence study. (for the dry cough and history of reflux, trial of an H2 blocker).  If the above is nonrevealing/noneffective, would repeat an ECHO, and repeat basic labwork (ROSEMARY, CBC, CMP,TSH).   If the ECHO+labs are nonrevealing/noneffective, would consider a Sleep Study.    US venous competency and vein lower ext         bilateral (vascular lab)            (R05) Dry cough  Comment: differential diagnosis: GERD vs postnasal drip vs right-sided heart dsfxn/pulm HTN  Plan:   As per orders above and patient instructions below.      Patient Instructions   Please call [phone] to schedule the ultrasound of the veins in your legs.    For your dry cough:   Please try taking Zantac 300mg at bedtime for at least 2 weeks and for up to 2 months and let us know if your dry cough is not not any better after that time (we will likely order another ultrasound of the heart at that point).    For your possible rash on the left foot:  Please let us know if the rash gets bigger or if you develop more joint aches than usual in the next few weeks-we will then repeat the blood test for autoimmune disease.    Please call in a few weeks to schedule your mammogram.                    Stephanie Saldana MD  [facility]

## 2017-06-19 NOTE — PATIENT INSTRUCTIONS
Please call [phone] to schedule the ultrasound of the veins in your legs.    For your dry cough:   Please try taking Zantac 300mg at bedtime for at least 2 weeks and for up to 2 months and let us know if your dry cough is not not any better after that time (we will likely order another ultrasound of the heart at that point).    For your possible rash on the left foot:  Please let us know if the rash gets bigger or if you develop more joint aches than usual in the next few weeks-we will then repeat the blood test for autoimmune disease.    Please call in a few weeks to schedule your mammogram.

## 2017-06-19 NOTE — MR AVS SNAPSHOT
After Visit Summary   6/19/2017    Bria Haddad    MRN: [id number]           Patient Information     Date Of Birth          1965        Visit Information        Provider Department      6/19/2017 7:30 AM Stephanie Saldana MD [facility]        Today's Diagnoses     Bilateral leg edema    -  1      Care Instructions    Please call [phone] to schedule the ultrasound of the veins in your legs.    For your dry cough:   Please try taking Zantac 300mg at bedtime for at least 2 weeks and for up to 2 months and let us know if your dry cough is not not any better after that time (we will likely order another ultrasound of the heart at that point).    For your possible rash on the left foot:  Please let us know if the rash gets bigger or if you develop more joint aches than usual in the next few weeks-we will then repeat the blood test for autoimmune disease.    Please call in a few weeks to schedule your mammogram.           Follow-ups after your visit        Future tests that were ordered for you today     Open Future Orders        Priority Expected Expires Ordered    US venous competency and vein lower ext bilateral (vascular lab) Routine  6/19/2018 6/19/2017            Who to contact     If you have questions or need follow up information about today's clinic visit or your schedule please contact [facility] directly at [phone].  Normal or non-critical lab and imaging results will be communicated to you by MyChart, letter or phone within 4 business days after the clinic has received the results. If you do not hear from us within 7 days, please contact the clinic through MyChart or phone. If you have a critical or abnormal lab result, we will notify you by phone as soon as possible.  Submit refill requests through [employer] or call your pharmacy and they will forward the refill request to us. Please allow 3 business days for your refill to be completed.           Additional Information About Your Visit        MyChart Information     [employer] gives you secure access to your electronic health record. If you see a primary care provider, you can also send messages to your care team and make appointments. If you have questions, please call your primary care clinic.  If you do not have a primary care provider, please call [phone] and they will assist you.        Care EveryWhere ID     This is your Care EveryWhere ID. This could be used by other organizations to access your [facility] medical records  [id number]        Your Vitals Were     Pulse Temperature Pulse Oximetry BMI (Body Mass Index)          75 98.3  F (36.8  C) (Oral) 97% 35.96 kg/m2         Blood Pressure from Last 3 Encounters:   06/19/17 115/66   12/29/16 124/65   12/16/16 125/65    Weight from Last 3 Encounters:   06/19/17 212 lb 12.8 oz (96.5 kg)   02/02/17 210 lb 3.2 oz (95.3 kg)   01/19/17 211 lb (95.7 kg)               Primary Care Provider Office Phone # Fax #    Stephanie Saldana [phone]       [facility] [address] [facility] [address]        Thank you!     Thank you for choosing [facility]  for your care. Our goal is always to provide you with excellent care. Hearing back from our patients is one way we can continue to improve our services. Please take a few minutes to complete the written survey that you may receive in the mail after your visit with us. Thank you!             Your Updated Medication List - Protect others around you: Learn how to safely use, store and throw away your medicines at www.disposemymeds.org.          This list is accurate as of: 6/19/17  8:25 AM.  Always use your most recent med list.                   Brand Name Dispense Instructions for use    NO ACTIVE MEDICATIONS          order for DME     2 Units    Equipment being ordered: compression stockings 1 pair, at 30mm Hg, to be worn during the day. For Bilateral  leg edema [R60.0]

## 2017-06-23 ENCOUNTER — RADIANT APPOINTMENT (OUTPATIENT)
Dept: ULTRASOUND IMAGING | Facility: CLINIC | Age: 52
End: 2017-06-23
Attending: INTERNAL MEDICINE
Payer: COMMERCIAL

## 2017-06-23 DIAGNOSIS — R60.0 BILATERAL LEG EDEMA: ICD-10-CM

## 2017-06-23 PROCEDURE — 93970 EXTREMITY STUDY: CPT

## 2017-06-27 DIAGNOSIS — R60.0 BILATERAL LEG EDEMA: Primary | ICD-10-CM

## 2017-06-27 NOTE — PROGRESS NOTES
Dear Bria,     Your test results are attached.    Your leg ultrasound did not show any significant incompetence of the veins in your legs.  I would advise you to repeat an ultrasound of the heart by calling [phone] and a lab test for autoimmune disease by using [employer] to make a (non-fasting) lab appointment.    If these tests are not helpful, we may consider referring you to a Vascular Surgeon at that time, to consider less common causes of leg edema.      Please notify me via [employer] or contact the clinic at [phone] if you have any questions.    Stephanie Saldana MD

## 2017-06-29 ENCOUNTER — TELEPHONE (OUTPATIENT)
Dept: INTERNAL MEDICINE | Facility: CLINIC | Age: 52
End: 2017-06-29

## 2017-06-29 NOTE — TELEPHONE ENCOUNTER
Patient has not logged on to her [employer] account.     Entered by Stephanie Saldana MD at 6/27/2017  3:29 PM   Dear Bria,     Your test results are attached.     Your leg ultrasound did not show any significant incompetence of the veins in your legs.   I would advise you to repeat an ultrasound of the heart by calling [phone] and a lab test for autoimmune disease by using [employer] to make a (non-fasting) lab appointment.     If these tests are not helpful, we may consider referring you to a Vascular Surgeon at that time, to consider less common causes of leg edema.       Please notify me via [employer] or contact the clinic at [phone] if you have any questions.       Stephanie Saldana MD

## 2017-06-30 NOTE — TELEPHONE ENCOUNTER
Called patient, informed pt of providers note as written below, pt verbalized good understanding.    She declined making lab only appointment. She will call to make this appointment.  CHEYANNE Stern RN

## 2017-07-03 ENCOUNTER — RADIANT APPOINTMENT (OUTPATIENT)
Dept: CARDIOLOGY | Facility: CLINIC | Age: 52
End: 2017-07-03
Attending: INTERNAL MEDICINE
Payer: COMMERCIAL

## 2017-07-03 DIAGNOSIS — R60.0 BILATERAL LEG EDEMA: ICD-10-CM

## 2017-07-03 PROCEDURE — 93306 TTE W/DOPPLER COMPLETE: CPT

## 2017-07-03 PROCEDURE — 86038 ANTINUCLEAR ANTIBODIES: CPT | Performed by: INTERNAL MEDICINE

## 2017-07-03 PROCEDURE — 36415 COLL VENOUS BLD VENIPUNCTURE: CPT | Performed by: INTERNAL MEDICINE

## 2017-07-05 LAB — ANA SER QL IA: 4.9

## 2017-07-06 NOTE — PROGRESS NOTES
Dear Bria,     Your test results are attached.    The ROSEMARY value (the test for autoimmune disease) has increased from its previous value 2 years ago.  I would advise you to contact Dr Resendiz's office and report your increased leg swelling (despite using compression stocking) and the increased value of the ROSEMARY test, to see if you need a reassessment by him.      Please notify me via [employer] or contact the clinic at [phone] if you have any questions.    Stephanie Saldana MD

## 2017-07-11 ENCOUNTER — TELEPHONE (OUTPATIENT)
Dept: INTERNAL MEDICINE | Facility: CLINIC | Age: 52
End: 2017-07-11

## 2017-07-11 NOTE — TELEPHONE ENCOUNTER
"Per patient for 2.5 days, patient has had a \"prickly\" rash on the tops of her thighs into her inner thighs. Please call to advise.   "

## 2017-07-11 NOTE — TELEPHONE ENCOUNTER
"Bria Haddad is a 52 year old female who calls with a rash.    NURSING ASSESSMENT:   The rash began  3 days ago.   Patient's rash is located on top of thigh and inner thighs. Few spots on arms, and spots from feet to the knees  Rash is described as raised and vesicles, with a color of red with Serous drainage.  Rash is itching and spreading. Feels like \"prickles\"   Patient is wondering if this is related to her leg swelling.   Advised patient to make appointment with provider. Patient hesitant. She wants Dr. Stephanie Saldana to review message and comment if this is worthy of an appointment.   Made an appointment tomorrow. Patient wants a call back if she Dr. Stephanie Saldana can advise on information here or needs appointment.  Writer informed patient that rashes are best assessed in clinic.  Had dermatitis on top of foot years ago. Has since resolved.   Now, leg and ankle swelling is not going down at night. Before last week, all leg swelling was relieved at night with legs elevated.   Associated symptoms: denies headache, fever, or new body aches.   Patient denies exposure to anything new.  Patient is not on any new medications.  Patient has not tried new soaps, detergents, perfumes or lotions.  Guideline used: Telephone Triage Protocols for Nurses, Fifth Edition, Bria Stevenson RN    "

## 2017-07-12 ENCOUNTER — OFFICE VISIT (OUTPATIENT)
Dept: INTERNAL MEDICINE | Facility: CLINIC | Age: 52
End: 2017-07-12
Payer: COMMERCIAL

## 2017-07-12 VITALS
SYSTOLIC BLOOD PRESSURE: 126 MMHG | DIASTOLIC BLOOD PRESSURE: 70 MMHG | HEIGHT: 65 IN | HEART RATE: 83 BPM | TEMPERATURE: 98.4 F | OXYGEN SATURATION: 97 % | BODY MASS INDEX: 35.75 KG/M2 | WEIGHT: 214.6 LBS

## 2017-07-12 DIAGNOSIS — R21 RASH: Primary | ICD-10-CM

## 2017-07-12 PROCEDURE — 99213 OFFICE O/P EST LOW 20 MIN: CPT | Performed by: INTERNAL MEDICINE

## 2017-07-12 RX ORDER — TRIAMCINOLONE ACETONIDE 1 MG/G
CREAM TOPICAL
Qty: 80 G | Refills: 0 | Status: SHIPPED | OUTPATIENT
Start: 2017-07-12 | End: 2017-08-14

## 2017-07-12 NOTE — PATIENT INSTRUCTIONS
Please use the steroid cream and let us know if the rash is not gone in 4 weeks, sooner if it gets worse.

## 2017-07-12 NOTE — NURSING NOTE
"Chief Complaint   Patient presents with     Rash       Initial /70 (BP Location: Right arm, Patient Position: Chair, Cuff Size: Adult Large)  Pulse 83  Temp 98.4  F (36.9  C) (Oral)  Ht 5' 4.75\" (1.645 m)  Wt 214 lb 9.6 oz (97.3 kg)  SpO2 97%  BMI 35.99 kg/m2 Estimated body mass index is 35.99 kg/(m^2) as calculated from the following:    Height as of this encounter: 5' 4.75\" (1.645 m).    Weight as of this encounter: 214 lb 9.6 oz (97.3 kg).  Medication Reconciliation: complete    Angelica Azar CMA  "

## 2017-07-12 NOTE — MR AVS SNAPSHOT
After Visit Summary   7/12/2017    Bria Haddad    MRN: [id number]           Patient Information     Date Of Birth          1965        Visit Information        Provider Department      7/12/2017 1:40 PM Stephanie Saldana MD [facility]        Today's Diagnoses     Rash    -  1      Care Instructions    Please use the steroid cream and let us know if the rash is not gone in 4 weeks, sooner if it gets worse.           Follow-ups after your visit        Your next 10 appointments already scheduled     Sep 18, 2017  8:20 AM CDT   Return Visit with Bill Resendiz MD   [facility] ([facility])    [address] [facility] [address] [phone]              Who to contact     If you have questions or need follow up information about today's clinic visit or your schedule please contact [facility] directly at [phone].  Normal or non-critical lab and imaging results will be communicated to you by MyChart, letter or phone within 4 business days after the clinic has received the results. If you do not hear from us within 7 days, please contact the clinic through [facility] or phone. If you have a critical or abnormal lab result, we will notify you by phone as soon as possible.  Submit refill requests through [employer] or call your pharmacy and they will forward the refill request to us. Please allow 3 business days for your refill to be completed.          Additional Information About Your Visit        MyChart Information     [employer] gives you secure access to your electronic health record. If you see a primary care provider, you can also send messages to your care team and make appointments. If you have questions, please call your primary care clinic.  If you do not have a primary care provider, please call [phone] and they will assist you.        Care EveryWhere ID     This is your Care EveryWhere ID. This could be used by  "other organizations to access your [facility] medical records  [id number]        Your Vitals Were     Pulse Temperature Height Pulse Oximetry BMI (Body Mass Index)       83 98.4  F (36.9  C) (Oral) 5' 4.75\" (1.645 m) 97% 35.99 kg/m2        Blood Pressure from Last 3 Encounters:   07/12/17 126/70   06/19/17 115/66   12/29/16 124/65    Weight from Last 3 Encounters:   07/12/17 214 lb 9.6 oz (97.3 kg)   06/19/17 212 lb 12.8 oz (96.5 kg)   02/02/17 210 lb 3.2 oz (95.3 kg)              Today, you had the following     No orders found for display         Today's Medication Changes          These changes are accurate as of: 7/12/17  2:19 PM.  If you have any questions, ask your nurse or doctor.               Start taking these medicines.        Dose/Directions    triamcinolone 0.1 % cream   Commonly known as:  KENALOG   Used for:  Rash   Started by:  Stephanie Saldana MD        Apply sparingly to affected area three times daily as needed for 2-4 weeks, until rash is gone.   Quantity:  80 g   Refills:  0            Where to get your medicines      These medications were sent to [facility] [address] [place] [address] [address] [facility] [address] [facility]  [address] [facility] [address]     Phone:  [phone]     triamcinolone 0.1 % cream                Primary Care Provider Office Phone # Fax #    Stephanie Saldana [phone]       [facility] [address] [facility] [address]        Equal Access to Services     DEBBIE VELIZ AH: Star Collins, heriberto foster, jaky kaalmada nhi, milton bourgeois. So [facility] [phone].    ATENCIÓN: Si habla español, tiene a lopez disposición servicios gratuitos de asistencia lingüística. Llame al [phone].    We comply with applicable federal civil rights laws and [place] laws. We do not discriminate on the basis of race, color, national origin, " age, disability sex, sexual orientation or gender identity.            Thank you!     Thank you for choosing [facility]  [facility]. Our goal is always to provide you with excellent care. Hearing back from our patients is one way we can continue to improve our services. Please take a few minutes to complete the written survey that you may receive in the mail after your visit with us. Thank you!             Your Updated Medication List - Protect others around you: Learn how to safely use, store and throw away your medicines at www.disposemymeds.org.          This list is accurate as of: 7/12/17  2:19 PM.  Always use your most recent med list.                   Brand Name Dispense Instructions for use Diagnosis    order for DME     2 Units    Equipment being ordered: compression stockings 1 pair, at 30mm Hg, to be worn during the day. For Bilateral leg edema [R60.0]    Bilateral leg edema       triamcinolone 0.1 % cream    KENALOG    80 g    Apply sparingly to affected area three times daily as needed for 2-4 weeks, until rash is gone.    Rash       ZANTAC 300 MG tablet   Generic drug:  ranitidine      Take 300 mg by mouth At Bedtime

## 2017-07-12 NOTE — PROGRESS NOTES
SUBJECTIVE:                                                    Bria Haddad is a 52 year old female who presents to clinic today for the following health issues:        Rash      Duration: 4 days    Description  Location: thighs, legs, arms  Itching: mild    Intensity:  moderate    Accompanying signs and symptoms: None    History (similar episodes/previous evaluation): had a rash a few years ago with edema of the feet-at that point, it was assessed by Dr Salter as contact dermatitis and resolved after several weeks on triamcinolone cream; the current rash is somewhat similar.    Precipitating or alleviating factors:  New exposures:  None  Recent travel: no      Therapies tried and outcome: none    Pruritic, and non-painful.  No noted new exposures.  No f/c, no recent UTIs.     New medications: she has been using Zantac for the last few weeks.   Recent ROSEMARY was increased as compared with previous ROSEMARY  >1 yr ago.               Problem list and histories reviewed & adjusted, as indicated.  Additional history: as documented    Patient Active Problem List   Diagnosis     CARDIOVASCULAR SCREENING; LDL GOAL LESS THAN 160     Endometriosis of uterus     Bilateral carpal tunnel syndrome     S/P carpal tunnel release     Past Surgical History:   Procedure Laterality Date     bunions       COLONOSCOPY N/A 11/9/2015    Procedure: COLONOSCOPY;  Surgeon: Andrew Adamson MD;  Location: [facility] OR     COLONOSCOPY WITH CO2 INSUFFLATION N/A 11/9/2015    Procedure: COLONOSCOPY WITH CO2 INSUFFLATION;  Surgeon: Andrew Adamson MD;  Location: [facility] OR     GYN SURGERY      endometryosis     RELEASE CARPAL TUNNEL Right 10/12/2016    Procedure: RELEASE CARPAL TUNNEL;  Surgeon: Colby Nobles MD;  Location: [facility] OR     RELEASE CARPAL TUNNEL Left 12/16/2016    Procedure: RELEASE CARPAL TUNNEL;  Surgeon: Colby Nobles MD;  Location: [facility] OR     TONSILLECTOMY & ADENOIDECTOMY         Social History   Substance Use Topics      "Smoking status: Never Smoker     Smokeless tobacco: Never Used     Alcohol use No     Family History   Problem Relation Age of Onset     OSTEOPOROSIS Mother      Respiratory Mother      Thyroid Disease Mother      HEART DISEASE Father      Cancer - colorectal Maternal Grandmother      DIABETES Maternal Grandfather      HEART DISEASE Maternal Grandfather      CANCER Paternal Grandmother      HEART DISEASE Paternal Grandfather      Respiratory Paternal Grandfather      Hypertension Brother      Thyroid Disease Sister      Thyroid Disease Sister      Neurologic Disorder Brother          Current Outpatient Prescriptions   Medication Sig Dispense Refill     ranitidine (ZANTAC) 300 MG tablet Take 300 mg by mouth At Bedtime       triamcinolone (KENALOG) 0.1 % cream Apply sparingly to affected area three times daily as needed for 2-4 weeks, until rash is gone. 80 g 0     order for DME Equipment being ordered: compression stockings 1 pair, at 30mm Hg, to be worn during the day. For Bilateral leg edema [R60.0] 2 Units 0       Reviewed and updated as needed this visit by clinical staff       Reviewed and updated as needed this visit by Provider           ==============================================================  ROS:  Constitutional, HEENT, cardiovascular, pulmonary, GI, , musculoskeletal, neuro, skin, endocrine and psych systems are negative, except as otherwise noted.       OBJECTIVE:                                                    /70 (BP Location: Right arm, Patient Position: Chair, Cuff Size: Adult Large)  Pulse 83  Temp 98.4  F (36.9  C) (Oral)  Ht 5' 4.75\" (1.645 m)  Wt 214 lb 9.6 oz (97.3 kg)  SpO2 97%  BMI 35.99 kg/m2  Body mass index is 35.99 kg/(m^2).     GEN: not in acute distress  SKIN:  there is a 0.6cm in diameter, papular, red, non-vesicular rash of the thighs, legs and of the arms; nonvasculitic-appearing rash.     ASSESSMENT/PLAN:                                                        " ICD-10-CM    1. Rash R21 triamcinolone (KENALOG) 0.1 % cream     ASSESSMENT: not a typical AI-associate rash-may well be a recurrence of contact dermatitis.  PLAN:  Patient Instructions   Please use the steroid cream and let us know if the rash is not gone in 4 weeks, sooner if it gets worse.                  Stephanie Saldana MD  [facility]

## 2017-07-12 NOTE — TELEPHONE ENCOUNTER
Called patient, informed pt of providers note as written below, pt verbalized good understanding.  Confirmed her appointment time.  CHEYANNE Stern RN

## 2017-07-12 NOTE — TELEPHONE ENCOUNTER
I would advise the patient to keep her appointment, as it's difficult to diagnosed a rash without seeing it.    Thank you,  Stephanie Saldana MD

## 2017-08-14 ENCOUNTER — OFFICE VISIT (OUTPATIENT)
Dept: URGENT CARE | Facility: URGENT CARE | Age: 52
End: 2017-08-14
Payer: COMMERCIAL

## 2017-08-14 ENCOUNTER — TELEPHONE (OUTPATIENT)
Dept: INTERNAL MEDICINE | Facility: CLINIC | Age: 52
End: 2017-08-14

## 2017-08-14 VITALS — WEIGHT: 217 LBS | OXYGEN SATURATION: 98 % | BODY MASS INDEX: 36.39 KG/M2 | TEMPERATURE: 97.6 F | HEART RATE: 72 BPM

## 2017-08-14 DIAGNOSIS — L30.9 DERMATITIS: Primary | ICD-10-CM

## 2017-08-14 PROCEDURE — 99213 OFFICE O/P EST LOW 20 MIN: CPT | Performed by: FAMILY MEDICINE

## 2017-08-14 RX ORDER — MOMETASONE FUROATE 1 MG/G
CREAM TOPICAL
Qty: 45 G | Refills: 0 | Status: SHIPPED | OUTPATIENT
Start: 2017-08-14 | End: 2018-09-13

## 2017-08-14 NOTE — MR AVS SNAPSHOT
After Visit Summary   8/14/2017    Bria Haddad    MRN: [id number]           Patient Information     Date Of Birth          1965        Visit Information        Provider Department      8/14/2017 6:45 PM Rigoberto Ferreira MD [facility]        Today's Diagnoses     Dermatitis    -  1      Care Instructions      Nonspecific Dermatitis  Dermatitis is a skin rash caused by something that touches the skin and makes it irritated and inflamed.  Your skin may be red, swollen, dry, and may be cracked. Blisters may form and ooze. The rash will itch.  Dermatitis can form on the face and neck, backs of hands, forearms, genitals, and lower legs. Dermatitis is not passed from person to person.  Talk with your health care provider about what may have caused the rash. Common things that cause skin allergies are metal in jewelry, plants like poison ivy or poison oak, and certain skin care products. You will need to avoid the source of your rash in the future to prevent it from coming back. In some cases, the cause of the dermatitis may not be found.  Treatment is done to relieve itching and prevent the rash from coming back. The rash should go away in a few days to a few weeks.  Home care  The health care provider may prescribe medications to relieve swelling and itching. Follow all instructions when using these medications.    Avoid anything that heats up your skin, such as hot showers or baths, or direct sunlight. This can make itching worse.    Stay away from whatever you think caused the rash.    Bathe in warm, not hot, water. Apply a moisturizing lotion after bathing to prevent dry skin.    Avoid skin irritants such as wool or silk clothing, grease, oils, harsh soaps, and detergents.    Apply cold compresses to soothe your sores to help relieve your symptoms. Do this for 30 minutes 3 to 4 times a day. You can make a cold compress by soaking a cloth in cold water. Squeeze out excess water.  You can add colloidal oatmeal to the water to help reduce itching. For severe itching in a small area, apply an ice pack wrapped in a thin towel. Do this for 20 minutes 3 to 4 times a day.    You can also help relieve large areas of itching by taking a lukewarm bath with colloidal oatmeal added to the water.    Use hydrocortisone cream for redness and irritation, unless another medicine was prescribed. You can also use benzocaine anesthetic cream or spray.    Use oral diphenhydramine to help reduce itching. This is an antihistamine you can buy at drug and grocery stores. It can make you sleepy, so use lower doses during the daytime. Or you can use loratadine. This is an antihistamine that will not make you sleepy. Don t use diphenhydramine if you have glaucoma or have trouble urinating because of an enlarged prostate.    Wash your hands or use an antibacterial gel often to prevent the spread of the rash.  Follow-up care  Follow up with your health care provider. Make an appointment with your health care provider if your symptoms do not get better in the next 1 to 2 weeks.  When to seek medical advice  Call your health care provider right away if any of these occur:    Spreading of the rash to other parts of your body    Severe swelling of your face, eyelids, mouth, throat or tongue    Trouble urinating due to swelling in the genital area    Fever of 100.4 F (38 C) or higher    Redness or swelling that gets worse    Pain that gets worse    Foul-smelling fluid leaking from the skin    Yellow-brown crusts on the open blisters    Joint pain   Date Last Reviewed: 7/23/2014 2000-2017 [employer] [address] [place] [address], [place] [address]. All rights reserved. This information is not intended as a substitute for professional medical care. Always follow your healthcare professional's instructions.                Follow-ups after your visit        Your next 10 appointments already scheduled     Sep 18, 2017   8:20 AM CDT   Return Visit with Bill Resendiz MD   [facility] [place] ([facility] [place]    [address]  [place] [address] [phone]              Who to contact     If you have questions or need follow up information about today's clinic visit or your schedule please contact [facility] directly at [phone].  Normal or non-critical lab and imaging results will be communicated to you by Media Redefinedhart, letter or phone within 4 business days after the clinic has received the results. If you do not hear from us within 7 days, please contact the clinic through Style Juke[employer] or phone. If you have a critical or abnormal lab result, we will notify you by phone as soon as possible.  Submit refill requests through [employer] or call your pharmacy and they will forward the refill request to us. Please allow 3 business days for your refill to be completed.          Additional Information About Your Visit        Media RedefinedharIzooble Information     [employer] gives you secure access to your electronic health record. If you see a primary care provider, you can also send messages to your care team and make appointments. If you have questions, please call your primary care clinic.  If you do not have a primary care provider, please call [phone] and they will assist you.        Care EveryWhere ID     This is your Care EveryWhere ID. This could be used by other organizations to access your [facility] medical records  [id number]        Your Vitals Were     Pulse Temperature Pulse Oximetry BMI (Body Mass Index)          72 97.6  F (36.4  C) (Oral) 98% 36.39 kg/m2         Blood Pressure from Last 3 Encounters:   07/12/17 126/70   06/19/17 115/66   12/29/16 124/65    Weight from Last 3 Encounters:   08/14/17 217 lb (98.4 kg)   07/12/17 214 lb 9.6 oz (97.3 kg)   06/19/17 212 lb 12.8 oz (96.5 kg)              Today, you had the following     No orders found for display         Today's Medication Changes           These changes are accurate as of: 8/14/17  7:17 PM.  If you have any questions, ask your nurse or doctor.               Start taking these medicines.        Dose/Directions    mometasone 0.1 % cream   Commonly known as:  ELOCON   Used for:  Dermatitis   Started by:  Rigoberto Ferreira MD        Apply sparingly to affected area twice daily as needed.  Do not apply to face.   Quantity:  45 g   Refills:  0            Where to get your medicines      These medications were sent to [facility] [id number] [place] [address] [address] [place] [address] AT SEC of [facility]  [address] [address]     Phone:  [phone]     mometasone 0.1 % cream                Primary Care Provider Office Phone # Fax #    Stephanie Saldana [phone] [address] [facility] [address]        Equal Access to Services     DEBBIE [employer] : Hadii niall ku hadasho Soomaali, waaxda luqadaha, qaybta kaalmada adeegyada, waxay mohiniin haychristofer nicholson . So [facility] [phone].    ATENCIÓN: Si allie mendez, tiene a lopez disposición servicios gratuitos de asistencia lingüística. Llame al [phone].    We comply with applicable federal civil rights laws and [place] laws. We do not discriminate on the basis of race, color, national origin, age, disability sex, sexual orientation or gender identity.            Thank you!     Thank you for choosing [facility]  for your care. Our goal is always to provide you with excellent care. Hearing back from our patients is one way we can continue to improve our services. Please take a few minutes to complete the written survey that you may receive in the mail after your visit with us. Thank you!             Your Updated Medication List - Protect others around you: Learn how to safely use, store and throw away your medicines at www.disposemymeds.org.          This list is accurate as of: 8/14/17  7:17 PM.  Always use  your most recent med list.                   Brand Name Dispense Instructions for use Diagnosis    mometasone 0.1 % cream    ELOCON    45 g    Apply sparingly to affected area twice daily as needed.  Do not apply to face.    Dermatitis       order for DME     2 Units    Equipment being ordered: compression stockings 1 pair, at 30mm Hg, to be worn during the day. For Bilateral leg edema [R60.0]    Bilateral leg edema

## 2017-08-14 NOTE — TELEPHONE ENCOUNTER
Reason for call:  Patient reporting a symptom    Symptom or request: Rash on both of her feet    Duration (how long have symptoms been present): 2 days    Have you been treated for this before? Yes    Additional comments: Cream that was given to her before isn't working, rash is on both feet and is super itchy.      Phone Number patient can be reached at:  Home number on file [phone] (home)    Best Time:  any    Can we leave a detailed message on this number:  YES    Call taken on 8/14/2017 at 2:55 PM by Lillie Urbina

## 2017-08-14 NOTE — TELEPHONE ENCOUNTER
Left message on answering machine for patient to call back.  direct line # [phone], until 7 pm  Tomorrow, [place] [phone]   Alie Wasserman RN

## 2017-08-14 NOTE — TELEPHONE ENCOUNTER
"Per patient, went to see Dr TAMMY Saldana 1 mos ago, was prescribed Triamcinolone for rash on both of her thighs and legs.  Took a long time for rash to go away, medication was ineffective.  About 4 years ago had a rash on her feet was prescribe Triamcinolone, which was effective at that time.  Now developed a rash on bilateral dorsal feet and ankles.  With itch.  Started yesterday am. Rash, right ankle, size of half dollar, not raised, top of feet red, raised bumps, random places, \"itch like crazy\".  Not in any new areas, .   Tried the Triamcinolone which was ineffective.  Has not tried Benadryl and Hydrocortisone.   Advise evaluation to rule out poisonous substance. The patient/parent agrees with the plan and verbalized good understanding.  Patient enroute to [facility].     Patient expressed frustration with .  Phone number to supervisor is provided.  Alie Wasserman RN        "

## 2017-08-15 NOTE — PROGRESS NOTES
SUBJECTIVE:  Bria Haddad is a 52 year old female who presents to the clinic today for a rash.  Onset of rash was 2 day(s) ago.   Rash is sudden onset.  Location of the rash: foot.  Quality/symptoms of rash: itching   Symptoms are moderate and rash seems to be worsening.  Previous history of a similar rash? Yes: a few years ago  Recent exposure history: none known    Associated symptoms include: Edema of feet.  This has been a long standing problem for her and she is going to be seeing rheum for further work up.  Last time her feet swelled like this she also developed small blistery rash that was very similar.    Denies any new exposures, topical factors    Past Medical History:   Diagnosis Date     NO ACTIVE PROBLEMS      Current Outpatient Prescriptions   Medication Sig Dispense Refill     mometasone (ELOCON) 0.1 % cream Apply sparingly to affected area twice daily as needed.  Do not apply to face. 45 g 0     order for DME Equipment being ordered: compression stockings 1 pair, at 30mm Hg, to be worn during the day. For Bilateral leg edema [R60.0] 2 Units 0     Social History   Substance Use Topics     Smoking status: Never Smoker     Smokeless tobacco: Never Used     Alcohol use No       ROS:  Review of systems negative except as stated above.    EXAM:   Pulse 72  Temp 97.6  F (36.4  C) (Oral)  Wt 217 lb (98.4 kg)  SpO2 98%  BMI 36.39 kg/m2  GENERAL: alert, no acute distress.  SKIN: Rash description:    Distribution: localized  Location: foot   bilat.  Color: skin color,  Lesion type: vesicular, scattered discrete lesions with no other findings  GENERAL APPEARANCE: healthy, alert and no distress  EYES: EOMI,  PERRL, conjunctiva clear  CV: bilat LE edema    ASSESSMENT:  Dermatitis -- unclear etiology;  Doesn't fit with insect bites.  Not good history for contact.  Could be due to stasis dermatitis developing.  Rheumatological cause also possible.    PLAN:  1) See today's orders.  2) Follow-up with primary  clinic if not improving  3) elevate legs with toes higher than nose to reduce edema.

## 2017-08-15 NOTE — PATIENT INSTRUCTIONS
Nonspecific Dermatitis  Dermatitis is a skin rash caused by something that touches the skin and makes it irritated and inflamed.  Your skin may be red, swollen, dry, and may be cracked. Blisters may form and ooze. The rash will itch.  Dermatitis can form on the face and neck, backs of hands, forearms, genitals, and lower legs. Dermatitis is not passed from person to person.  Talk with your health care provider about what may have caused the rash. Common things that cause skin allergies are metal in jewelry, plants like poison ivy or poison oak, and certain skin care products. You will need to avoid the source of your rash in the future to prevent it from coming back. In some cases, the cause of the dermatitis may not be found.  Treatment is done to relieve itching and prevent the rash from coming back. The rash should go away in a few days to a few weeks.  Home care  The health care provider may prescribe medications to relieve swelling and itching. Follow all instructions when using these medications.    Avoid anything that heats up your skin, such as hot showers or baths, or direct sunlight. This can make itching worse.    Stay away from whatever you think caused the rash.    Bathe in warm, not hot, water. Apply a moisturizing lotion after bathing to prevent dry skin.    Avoid skin irritants such as wool or silk clothing, grease, oils, harsh soaps, and detergents.    Apply cold compresses to soothe your sores to help relieve your symptoms. Do this for 30 minutes 3 to 4 times a day. You can make a cold compress by soaking a cloth in cold water. Squeeze out excess water. You can add colloidal oatmeal to the water to help reduce itching. For severe itching in a small area, apply an ice pack wrapped in a thin towel. Do this for 20 minutes 3 to 4 times a day.    You can also help relieve large areas of itching by taking a lukewarm bath with colloidal oatmeal added to the water.    Use hydrocortisone cream for redness  and irritation, unless another medicine was prescribed. You can also use benzocaine anesthetic cream or spray.    Use oral diphenhydramine to help reduce itching. This is an antihistamine you can buy at drug and grocery stores. It can make you sleepy, so use lower doses during the daytime. Or you can use loratadine. This is an antihistamine that will not make you sleepy. Don t use diphenhydramine if you have glaucoma or have trouble urinating because of an enlarged prostate.    Wash your hands or use an antibacterial gel often to prevent the spread of the rash.  Follow-up care  Follow up with your health care provider. Make an appointment with your health care provider if your symptoms do not get better in the next 1 to 2 weeks.  When to seek medical advice  Call your health care provider right away if any of these occur:    Spreading of the rash to other parts of your body    Severe swelling of your face, eyelids, mouth, throat or tongue    Trouble urinating due to swelling in the genital area    Fever of 100.4 F (38 C) or higher    Redness or swelling that gets worse    Pain that gets worse    Foul-smelling fluid leaking from the skin    Yellow-brown crusts on the open blisters    Joint pain   Date Last Reviewed: 7/23/2014 2000-2017 [employer]. [address] [place] [address], [place] [address]. All rights reserved. This information is not intended as a substitute for professional medical care. Always follow your healthcare professional's instructions.

## 2017-09-18 ENCOUNTER — OFFICE VISIT (OUTPATIENT)
Dept: RHEUMATOLOGY | Facility: CLINIC | Age: 52
End: 2017-09-18
Payer: COMMERCIAL

## 2017-09-18 ENCOUNTER — RADIANT APPOINTMENT (OUTPATIENT)
Dept: GENERAL RADIOLOGY | Facility: CLINIC | Age: 52
End: 2017-09-18
Attending: INTERNAL MEDICINE
Payer: COMMERCIAL

## 2017-09-18 VITALS
DIASTOLIC BLOOD PRESSURE: 84 MMHG | SYSTOLIC BLOOD PRESSURE: 136 MMHG | WEIGHT: 215 LBS | TEMPERATURE: 97.6 F | OXYGEN SATURATION: 96 % | HEART RATE: 71 BPM | HEIGHT: 65 IN | BODY MASS INDEX: 35.82 KG/M2

## 2017-09-18 DIAGNOSIS — R76.8 ANA POSITIVE: ICD-10-CM

## 2017-09-18 DIAGNOSIS — R21 RASH: ICD-10-CM

## 2017-09-18 DIAGNOSIS — M06.4 INFLAMMATORY POLYARTHROPATHY (H): Primary | ICD-10-CM

## 2017-09-18 DIAGNOSIS — M06.4 INFLAMMATORY POLYARTHROPATHY (H): ICD-10-CM

## 2017-09-18 DIAGNOSIS — Z79.899 HIGH RISK MEDICATION USE: ICD-10-CM

## 2017-09-18 DIAGNOSIS — M25.562 LEFT KNEE PAIN, UNSPECIFIED CHRONICITY: ICD-10-CM

## 2017-09-18 LAB
ALBUMIN SERPL-MCNC: 3.7 G/DL (ref 3.4–5)
ALBUMIN UR-MCNC: NEGATIVE MG/DL
ALP SERPL-CCNC: 71 U/L (ref 40–150)
ALT SERPL W P-5'-P-CCNC: 23 U/L (ref 0–50)
AMORPH CRY #/AREA URNS HPF: ABNORMAL /HPF
ANION GAP SERPL CALCULATED.3IONS-SCNC: 5 MMOL/L (ref 3–14)
APPEARANCE UR: ABNORMAL
AST SERPL W P-5'-P-CCNC: 16 U/L (ref 0–45)
BACTERIA #/AREA URNS HPF: ABNORMAL /HPF
BASOPHILS # BLD AUTO: 0 10E9/L (ref 0–0.2)
BASOPHILS NFR BLD AUTO: 0.5 %
BILIRUB SERPL-MCNC: 0.4 MG/DL (ref 0.2–1.3)
BILIRUB UR QL STRIP: NEGATIVE
BUN SERPL-MCNC: 14 MG/DL (ref 7–30)
CALCIUM SERPL-MCNC: 8.8 MG/DL (ref 8.5–10.1)
CHLORIDE SERPL-SCNC: 107 MMOL/L (ref 94–109)
CK SERPL-CCNC: 92 U/L (ref 30–225)
CO2 SERPL-SCNC: 29 MMOL/L (ref 20–32)
COLOR UR AUTO: YELLOW
CREAT SERPL-MCNC: 0.76 MG/DL (ref 0.52–1.04)
CREAT UR-MCNC: 129 MG/DL
CRP SERPL-MCNC: 4.8 MG/L (ref 0–8)
DIFFERENTIAL METHOD BLD: NORMAL
EOSINOPHIL # BLD AUTO: 0.2 10E9/L (ref 0–0.7)
EOSINOPHIL NFR BLD AUTO: 2.5 %
ERYTHROCYTE [DISTWIDTH] IN BLOOD BY AUTOMATED COUNT: 13.2 % (ref 10–15)
ERYTHROCYTE [SEDIMENTATION RATE] IN BLOOD BY WESTERGREN METHOD: 8 MM/H (ref 0–30)
GFR SERPL CREATININE-BSD FRML MDRD: 79 ML/MIN/1.7M2
GLUCOSE SERPL-MCNC: 96 MG/DL (ref 70–99)
GLUCOSE UR STRIP-MCNC: NEGATIVE MG/DL
HCT VFR BLD AUTO: 41.5 % (ref 35–47)
HGB BLD-MCNC: 14 G/DL (ref 11.7–15.7)
HGB UR QL STRIP: ABNORMAL
KETONES UR STRIP-MCNC: NEGATIVE MG/DL
LEUKOCYTE ESTERASE UR QL STRIP: NEGATIVE
LYMPHOCYTES # BLD AUTO: 1.7 10E9/L (ref 0.8–5.3)
LYMPHOCYTES NFR BLD AUTO: 28.2 %
MCH RBC QN AUTO: 30 PG (ref 26.5–33)
MCHC RBC AUTO-ENTMCNC: 33.7 G/DL (ref 31.5–36.5)
MCV RBC AUTO: 89 FL (ref 78–100)
MONOCYTES # BLD AUTO: 0.6 10E9/L (ref 0–1.3)
MONOCYTES NFR BLD AUTO: 9.8 %
NEUTROPHILS # BLD AUTO: 3.5 10E9/L (ref 1.6–8.3)
NEUTROPHILS NFR BLD AUTO: 59 %
NITRATE UR QL: NEGATIVE
NON-SQ EPI CELLS #/AREA URNS LPF: ABNORMAL /LPF
PH UR STRIP: 7.5 PH (ref 5–7)
PLATELET # BLD AUTO: 337 10E9/L (ref 150–450)
POTASSIUM SERPL-SCNC: 4 MMOL/L (ref 3.4–5.3)
PROT SERPL-MCNC: 7.4 G/DL (ref 6.8–8.8)
PROT UR-MCNC: 0.13 G/L
PROT/CREAT 24H UR: 0.1 G/G CR (ref 0–0.2)
RBC # BLD AUTO: 4.67 10E12/L (ref 3.8–5.2)
RBC #/AREA URNS AUTO: ABNORMAL /HPF
SODIUM SERPL-SCNC: 141 MMOL/L (ref 133–144)
SOURCE: ABNORMAL
SP GR UR STRIP: 1.01 (ref 1–1.03)
UROBILINOGEN UR STRIP-ACNC: 0.2 EU/DL (ref 0.2–1)
WBC # BLD AUTO: 6 10E9/L (ref 4–11)
WBC #/AREA URNS AUTO: ABNORMAL /HPF

## 2017-09-18 PROCEDURE — 86160 COMPLEMENT ANTIGEN: CPT | Performed by: INTERNAL MEDICINE

## 2017-09-18 PROCEDURE — 73130 X-RAY EXAM OF HAND: CPT | Mod: LT

## 2017-09-18 PROCEDURE — 99214 OFFICE O/P EST MOD 30 MIN: CPT | Performed by: INTERNAL MEDICINE

## 2017-09-18 PROCEDURE — 80053 COMPREHEN METABOLIC PANEL: CPT | Performed by: INTERNAL MEDICINE

## 2017-09-18 PROCEDURE — 86140 C-REACTIVE PROTEIN: CPT | Performed by: INTERNAL MEDICINE

## 2017-09-18 PROCEDURE — 85652 RBC SED RATE AUTOMATED: CPT | Performed by: INTERNAL MEDICINE

## 2017-09-18 PROCEDURE — 86235 NUCLEAR ANTIGEN ANTIBODY: CPT | Performed by: INTERNAL MEDICINE

## 2017-09-18 PROCEDURE — 86200 CCP ANTIBODY: CPT | Performed by: INTERNAL MEDICINE

## 2017-09-18 PROCEDURE — 86225 DNA ANTIBODY NATIVE: CPT | Performed by: INTERNAL MEDICINE

## 2017-09-18 PROCEDURE — 82550 ASSAY OF CK (CPK): CPT | Performed by: INTERNAL MEDICINE

## 2017-09-18 PROCEDURE — 86431 RHEUMATOID FACTOR QUANT: CPT | Performed by: INTERNAL MEDICINE

## 2017-09-18 PROCEDURE — 84156 ASSAY OF PROTEIN URINE: CPT | Performed by: INTERNAL MEDICINE

## 2017-09-18 PROCEDURE — 85025 COMPLETE CBC W/AUTO DIFF WBC: CPT | Performed by: INTERNAL MEDICINE

## 2017-09-18 PROCEDURE — 81001 URINALYSIS AUTO W/SCOPE: CPT | Performed by: INTERNAL MEDICINE

## 2017-09-18 PROCEDURE — 36415 COLL VENOUS BLD VENIPUNCTURE: CPT | Performed by: INTERNAL MEDICINE

## 2017-09-18 RX ORDER — HYDROXYCHLOROQUINE SULFATE 200 MG/1
200 TABLET, FILM COATED ORAL 2 TIMES DAILY
Qty: 60 TABLET | Refills: 3 | Status: SHIPPED | OUTPATIENT
Start: 2017-09-18 | End: 2017-11-08

## 2017-09-18 NOTE — NURSING NOTE
"Chief Complaint   Patient presents with     RECHECK     patient states she has swelling in her ankles, right hand ring finger is swollen and left knee feels like it wants to give out. Legs have been feeling tired. Also has been having unexplained rashes       Initial /84 (BP Location: Left arm, Patient Position: Chair, Cuff Size: Adult Large)  Pulse 71  Temp 97.6  F (36.4  C) (Oral)  Ht 1.645 m (5' 4.75\")  Wt 97.5 kg (215 lb)  SpO2 96%  BMI 36.05 kg/m2 Estimated body mass index is 36.05 kg/(m^2) as calculated from the following:    Height as of this encounter: 1.645 m (5' 4.75\").    Weight as of this encounter: 97.5 kg (215 lb).  BP completed using cuff size: large         RAPID3 (0-30) Cumulative Score  5.3          RAPID3 Weighted Score (divide #4 by 3 and that is the weighted score)  1.77         "

## 2017-09-18 NOTE — MR AVS SNAPSHOT
After Visit Summary   9/18/2017    Bria Haddad    MRN: [id number]           Patient Information     Date Of Birth          1965        Visit Information        Provider Department      9/18/2017 8:20 AM Bill Resendiz MD [facility]        Today's Diagnoses     Inflammatory polyarthropathy (H)    -  1    Rash        ROSEMARY positive        Left knee pain, unspecified chronicity        High risk medication use           Follow-ups after your visit        Additional Services     DERMATOLOGY REFERRAL       Your provider has referred you to: FMG: [facility] [phone]   [email or web address]/  FMG: [facility] [phone]   [email or web address]/[email or web address]/  UMP: [facility] [phone]   [email or web address]  UMP: [facility] [phone]   [email or web address]/[email or web address]-[place]/    Please be aware that coverage of these services is subject to the terms and limitations of your health insurance plan.  Call member services at your health plan with any benefit or coverage questions.      Please bring the following with you to your appointment:    (1) Any X-Rays, CTs or MRIs which have been performed.  Contact the facility where they were done to arrange for  prior to your scheduled appointment.    (2) List of current medications  (3) This referral request   (4) Any documents/labs given to you for this referral            [facility] PT, HAND, AND CHIROPRACTIC REFERRAL       **This order will print in the [facility] Scheduling Office**    Physical Therapy is available through:    *[facility]  *[facility]  *[facility]    Call one number to schedule at any of the above locations: [phone].    Your provider has referred you  to: Physical Therapy at [facility] or [facility]    Indication/Reason for Referral: Knee Pain, left  Onset of Illness: months  Therapy Orders: Evaluate and Treat  Special Programs: None  Special Request: None    Kvng Calderon      Additional Comments for the Therapist or Chiropractor: none    Please be aware that coverage of these services is subject to the terms and limitations of your health insurance plan.  Call member services at your health plan with any benefit or coverage questions.      Please bring the following to your appointment:    *Your personal calendar for scheduling future appointments  *Comfortable clothing            OPHTHALMOLOGY ADULT REFERRAL       Your provider has referred you to:    FMG: [facility] [phone]   [email or web address]/  UMP: [facility] [phone]   [email or web address]/    Reason for referral: Hydroxychloroquine (plaquenil) toxicity monitoring.     Please be aware that coverage of these services is subject to the terms and limitations of your health insurance plan.  Call member services at your health plan with any benefit or coverage questions.      Please bring the following to your appointment:  >>   Any x-rays, CTs or MRIs which have been performed.  Contact the facility where they were done to arrange for  prior to your scheduled appointment.  Any new CT, MRI or other procedures ordered by your specialist must be performed at a [place] facility or coordinated by your clinic's referral office.    >>   List of current medications   >>   This referral request   >>   Any documents/labs given to you for this referral                  Your next 10 appointments already scheduled     Dec 18, 2017  7:20 AM CST   [facility]   [facility]    [address]  [place] [address]  "  [phone]              Future tests that were ordered for you today     Open Future Orders        Priority Expected Expires Ordered    XR Hand Bilateral G/E 3 Views Routine 9/18/2017 9/18/2018 9/18/2017            Who to contact     If you have questions or need follow up information about today's clinic visit or your schedule please contact [facility] directly at [phone].  Normal or non-critical lab and imaging results will be communicated to you by EdPuzzlehart, letter or phone within 4 business days after the clinic has received the results. If you do not hear from us within 7 days, please contact the clinic through [employer] or phone. If you have a critical or abnormal lab result, we will notify you by phone as soon as possible.  Submit refill requests through [employer] or call your pharmacy and they will forward the refill request to us. Please allow 3 business days for your refill to be completed.          Additional Information About Your Visit        EdPuzzleharNext Jump Information     [employer] gives you secure access to your electronic health record. If you see a primary care provider, you can also send messages to your care team and make appointments. If you have questions, please call your primary care clinic.  If you do not have a primary care provider, please call [phone] and they will assist you.        Care EveryWhere ID     This is your Care EveryWhere ID. This could be used by other organizations to access your [facility] medical records  [id number]        Your Vitals Were     Pulse Temperature Height Pulse Oximetry BMI (Body Mass Index)       71 97.6  F (36.4  C) (Oral) 1.645 m (5' 4.75\") 96% 36.05 kg/m2        Blood Pressure from Last 3 Encounters:   09/18/17 136/84   07/12/17 126/70   06/19/17 115/66    Weight from Last 3 Encounters:   09/18/17 97.5 kg (215 lb)   08/14/17 98.4 kg (217 lb)   07/12/17 97.3 kg (214 lb 9.6 oz)              We Performed the Following     CBC with platelets " differential     CK total     Complement C3     Complement C4     Comprehensive metabolic panel     CRP inflammation     Cyclic Citrullinated Peptide Antibody IgG     DERMATOLOGY REFERRAL     DNA double stranded antibodies     JORGE antibody panel     Erythrocyte sedimentation rate auto     [facility] PT, HAND, AND CHIROPRACTIC REFERRAL     OPHTHALMOLOGY ADULT REFERRAL     Protein  random urine with Creat Ratio     Rheumatoid factor     UA with Microscopic reflex to Culture          Today's Medication Changes          These changes are accurate as of: 9/18/17  9:11 AM.  If you have any questions, ask your nurse or doctor.               Start taking these medicines.        Dose/Directions    hydroxychloroquine 200 MG tablet   Commonly known as:  PLAQUENIL   Used for:  Inflammatory polyarthropathy (H)   Started by:  Bill Resendiz MD        Dose:  200 mg   Take 1 tablet (200 mg) by mouth 2 times daily   Quantity:  60 tablet   Refills:  3            Where to get your medicines      These medications were sent to [facility] [id number] [place] [address] [address]  AT [facility]  [address] [address]     Phone:  [phone]     hydroxychloroquine 200 MG tablet                Primary Care Provider Office Phone # Fax #    Stephanie Saldana [phone] [address] [facility] [address] [facility] [address]        Equal Access to Services     FRANKI VELIZ AH: Hadii niall méndez hadasho Soomaali, waaxda luqadaha, qaybta kaalmada adeegyada, milton bourgeois. So [facility] [phone].    ATENCIÓN: Si habla español, tiene a lopez disposición servicios gratuitos de asistencia lingüística. Llame al [phone].    We comply with applicable federal civil rights laws and [place] laws. We do not discriminate on the basis of race, color, national origin, age, disability sex, sexual orientation or gender identity.            Thank you!     Thank you for  [facility]. Our goal is always to provide you with excellent care. Hearing back from our patients is one way we can continue to improve our services. Please take a few minutes to complete the written survey that you may receive in the mail after your visit with us. Thank you!             Your Updated Medication List - Protect others around you: Learn how to safely use, store and throw away your medicines at www.disposemymeds.org.          This list is accurate as of: 9/18/17  9:11 AM.  Always use your most recent med list.                   Brand Name Dispense Instructions for use Diagnosis    hydroxychloroquine 200 MG tablet    PLAQUENIL    60 tablet    Take 1 tablet (200 mg) by mouth 2 times daily    Inflammatory polyarthropathy (H)       mometasone 0.1 % cream    ELOCON    45 g    Apply sparingly to affected area twice daily as needed.  Do not apply to face.    Dermatitis       order for DME     2 Units    Equipment being ordered: compression stockings 1 pair, at 30mm Hg, to be worn during the day. For Bilateral leg edema [R60.0]    Bilateral leg edema

## 2017-09-18 NOTE — PROGRESS NOTES
Rheumatology Clinic Visit      Bria Haddad MRN# [id number]   YOB: 1965 Age: 52 year old      Date of visit: 9/18/17   PCP: Dr. Stephanie Saldana    Chief Complaint   Patient presents with:  RECHECK: patient states she has swelling in her ankles, right hand ring finger is swollen and left knee feels like it wants to give out. Legs have been feeling tired. Also has been having unexplained rashes      Assessment and Plan     1. Inflammatory Polyarthropathy: Ms. Haddad initially presented to this clinic in 2015 with dependent edema of the bilateral lower extremities, fatigue, and a one time episode of right toe pain in the setting of a positive ROSEMARY.  Today, she presents with synovitis of her bilateral PIPs, persistent fatigue, intermittent rash, and dependent edema. In the setting of a positive ROSEMARY, additional ROSEMARY associated labs will be repeated today in addition to RF, CCP, and hand x-rays.  HCQ will be started for the arthritis. I am referring to dermatology for evaluation of the rash.  - Start hydroxychloroquine 200mg BID  - Ophthalmology referral for hydroxychloroquine toxicity monitoring  - Dermatology referral for evaluation of rash  - X-rays today: bilateral hands  - Labs: CBC, CMP, ESR, CRP, JORGE, dsDNA, C3, C4, CK, RF, CCP, UA, Uprotein:creatinine    # Hydroxychloroquine (Plaquenil) Risks and Benefits:  The risks and benefits of hydroxychloroquine were discussed in detail and the patient verbalized understanding; the patient also verbalized agreement to get the required ophthalmologic toxicity monitoring.  The risks discussed include, but are not limited to, the risk for hypersensitivity, anaphylaxis, anaphylactoid reactions, irreversible retinal damage, rare hematologic reactions, and rare cardiomyopathy.  I encouraged reviewing the package insert and asking any questions about the medication.      2. Left knee pain: mechanical in nature.  Refer to PT.  - PT referral     Ms. Haddad verbalized  agreement with and understanding of the rational for the diagnosis and treatment plan.  All questions were answered to best of my ability and the patient's satisfaction. Ms. Haddad was advised to contact the clinic with any questions that may arise after the clinic visit.      Thank you for involving me in the care of the patient    Return to clinic: 3 months      HPI   Bria Haddad is a 52 year old female with a past medical history significant for endometriosis, s/p carpal tunnel release surgery who present for follow up of positive ROSEMARY.    Today, Ms. Haddad reports that she had a rash and was seen by her PCP who gave her a topical medication that was ineffective.  ROSEMARY was rechecked and positive and higher than the past so she says she was told to be re-evaluated here.      August 1, 2017: red raised rash on the medial aspect of her left ankles; and red dots on the top of her left foot; resolved after 3-4 weeks.  July thigh rash that resolved spontaneously.  Rashes were itchy and warm.    Foot swelling is worse at the end of the day.  Improved with compression stockings at first, but not any more. Symptoms stable for years.     All fingers feel stiff for the past few months; worse in the AM; resolves by mid-day.  She types at work.  Same stiffness on the weekdays as weekends.  Swelling and pain of her right 4th PIP.     Left knee pain occurs randomly.  Feels weak sometimes.  Occurs with activity. Sometimes associated with a  Sharp pain on the medial and lateral joint lines; may occur with activity or inactivity.     Right foot pain across the 2nd-4th MTPs based on where she was pointing.      Right 1st toenail discoloration without know preceding trauma - started August 29th.  She said that she woke up with the abnormality.     Denies fevers, chills, nausea, vomiting, constipation, diarrhea. No abdominal pain. No chest pain/pressure, palpitations, or shortness of breath. No neck pain. No oral or nasal sores. No sicca  symptoms. No photosensitivity or photophobia. No eye pain or redness. No history of inflammatory eye disease.  No history of DVT or pulmonary embolism.  No history of serositis.  No history of Raynaud's Phenomenon.  No seizure disorder.  No known renal disorder.      She had previously presented to this clinic in 2015 and at that time she had bilateral ankle swelling, dermatitis, and one episode of right 2nd and 3rd toe pain that resolved spontaneously after several minutes.  No joint stiffness.     Tobacco: none  EtOH: none  Drugs: none    ROS   GEN: No fevers, chills, night sweats, or weight change  SKIN: See HPI  HEENT: No epistaxis. No oral or nasal ulcers.  CV: No chest pain, pressure, palpitations, or dyspnea on exertion.  PULM: No SOB, wheeze, cough.  GI: No nausea, vomiting, constipation, diarrhea. No blood in stool. No abdominal pain.  : No blood in urine.  MSK: See HPI.  NEURO: No numbness or tingling.  ENDO: No heat/cold intolerance.  EXT: See HPI  PSYCH: Negative    Active Problem List     Patient Active Problem List   Diagnosis     CARDIOVASCULAR SCREENING; LDL GOAL LESS THAN 160     Endometriosis of uterus     Bilateral carpal tunnel syndrome     S/P carpal tunnel release     Past Medical History     Past Medical History:   Diagnosis Date     NO ACTIVE PROBLEMS      Past Surgical History     Past Surgical History:   Procedure Laterality Date     bunions       COLONOSCOPY N/A 11/9/2015    Procedure: COLONOSCOPY;  Surgeon: Andrew Adamson MD;  Location:  OR     COLONOSCOPY WITH CO2 INSUFFLATION N/A 11/9/2015    Procedure: COLONOSCOPY WITH CO2 INSUFFLATION;  Surgeon: Andrew Adamson MD;  Location:  OR     GYN SURGERY      endometryosis     RELEASE CARPAL TUNNEL Right 10/12/2016    Procedure: RELEASE CARPAL TUNNEL;  Surgeon: Colby Nobles MD;  Location: [facility] OR     RELEASE CARPAL TUNNEL Left 12/16/2016    Procedure: RELEASE CARPAL TUNNEL;  Surgeon: Colby Nobles MD;   "Location: [facility] OR     TONSILLECTOMY & ADENOIDECTOMY       Allergy     Allergies   Allergen Reactions     Asa [Aspirin] Hives     Current Medication List     Current Outpatient Prescriptions   Medication Sig     mometasone (ELOCON) 0.1 % cream Apply sparingly to affected area twice daily as needed.  Do not apply to face. (Patient not taking: Reported on 9/18/2017)     order for DME Equipment being ordered: compression stockings 1 pair, at 30mm Hg, to be worn during the day. For Bilateral leg edema [R60.0]     No current facility-administered medications for this visit.          Social History   See HPI    Family History     Family History   Problem Relation Age of Onset     OSTEOPOROSIS Mother      Respiratory Mother      Thyroid Disease Mother      HEART DISEASE Father      Cancer - colorectal Maternal Grandmother      DIABETES Maternal Grandfather      HEART DISEASE Maternal Grandfather      CANCER Paternal Grandmother      HEART DISEASE Paternal Grandfather      Respiratory Paternal Grandfather      Hypertension Brother      Thyroid Disease Sister      Thyroid Disease Sister      Neurologic Disorder Brother      Physical Exam     Temp Readings from Last 3 Encounters:   09/18/17 97.6  F (36.4  C) (Oral)   08/14/17 97.6  F (36.4  C) (Oral)   07/12/17 98.4  F (36.9  C) (Oral)     BP Readings from Last 5 Encounters:   09/18/17 136/84   07/12/17 126/70   06/19/17 115/66   12/29/16 124/65   12/16/16 125/65     Pulse Readings from Last 1 Encounters:   09/18/17 71     Resp Readings from Last 1 Encounters:   02/02/17 12     Estimated body mass index is 36.05 kg/(m^2) as calculated from the following:    Height as of this encounter: 1.645 m (5' 4.75\").    Weight as of this encounter: 97.5 kg (215 lb).    GEN: NAD, overweight  HEENT: MMM. No oral lesions. EOMI. Anicteric, noninjected sclera  CV: S1, S2. RRR. No m/r/g.  PULM: CTA bilaterally. No w/c.  ABD: +BS.  MSK: bilateral 2nd-5th MCPs tender to palpation but without " swelling.  Right 2nd and 4th PIP with swelling and tenderness to palpation.  Left 2nd-4th PIPs with swelling and tenderness to palpation.  Wrists, elbows, and shoulders without swelling or tenderness to palpation.  Bilateral hips nontender to palpation.  Knees, ankles, and MTPs without swelling or tenderness to palpation.    NEURO: UE and LE strengths 5/5 and equal bilaterally.   SKIN: blanchable erythematous lesion about 0.2cm in diameter on dorsal aspect of the proximal left foot  EXT: 1+ pitting edema of the bilateral lower extremities  PSYCH: Alert. Appropriate.    Labs / Imaging (select studies)     ROSEMARY/RNP/Sm/SSA/SSB  Recent Labs   Lab Test  07/03/17   1614  10/12/15   0923  10/02/15   0820   MONROE  4.9*   --   2.6*   ANAIGG   --   1:320  Reference range: <1:40  (Note)  Anti-Centromere pattern observed.  INTERPRETIVE INFORMATION: ROSEMARY by IFA, IgG  Anti-nuclear antibodies (ROSEMARY) are seen in a variety of  systemic rheumatic diseases and are determined by indirect  fluorescence assay (IFA) using HEp-2 substrate with an  IgG-specific conjugate. ROSEMARY titers less than or equal to  1:80 have variable relevance while titers greater than or  equal to 1:160 are considered clinically significant. These  antibodies may precede clinical disease onset; however,  healthy individuals and those with advanced age have been  reported to be positive for ROSEMARY. When observed, one of the  five basic patterns is reported: homogeneous,  peripheral/rim, speckled, centromere, or nucleolar. If  cytoplasmic fluorescence is observed, it is noted. IFA  methodology is subjective and has occasionally been shown  to lack sensitivity for anti-SSA/Ro antibodies.  Negative results do not necessarily rule out the presence  of SSc. If clinical suspicion r emains, consider further  testing for U3-RNP, PM/Scl, or Th/To antibodies associated  with SSc.  Performed by "[facility] [facility]  [address] [place] [address] [phone]  [email or web address], Ilan NARAYAN  MD Mariusz, Lab. Director  *   --    RNPIGG   --   <0.2  Negative   Antibody index (AI) values reflect qualitative changes in antibody   concentration that cannot be directly associated with clinical condition or   disease state.     --    SMIGG   --   <0.2  Negative   Antibody index (AI) values reflect qualitative changes in antibody   concentration that cannot be directly associated with clinical condition or   disease state.     --    SSAIGG   --   <0.2  Negative   Antibody index (AI) values reflect qualitative changes in antibody   concentration that cannot be directly associated with clinical condition or   disease state.     --    SSBIGG   --   <0.2  Negative   Antibody index (AI) values reflect qualitative changes in antibody   concentration that cannot be directly associated with clinical condition or   disease state.     --    SCLIGG   --   <0.2  Negative   Antibody index (AI) values reflect qualitative changes in antibody   concentration that cannot be directly associated with clinical condition or   disease state.     --      dsDNA  Recent Labs   Lab Test  10/12/15   0923   DNA  <15  Interpretation:  Negative       C3/C4  Recent Labs   Lab Test  10/12/15   0923   W3OALLK  124   W8FQSKO  24     Antiphospholipid Antibodies  Recent Labs   Lab Test  10/12/15   0923   B2IGG  1   B2IGM  4   CARG  <15.0  Interpretation:  Negative     JOSELITO  <12.5  Interpretation:  Negative     LUPINT  Negative  (Note)  COMMENTS:  The INR is normal.  APTT ratio is normal.  DRVVT Screen ratio is normal.  Thrombin time is normal.  NEGATIVE TEST; A LUPUS ANTICOAGULANT WAS NOT DETECTED IN THIS  SPECIMEN WITHIN THE LIMITS OF THE TESTING REPERTOIRE.  If the clinical picture is strongly suggestive of an antiphospholipid  syndrome, recommend anticardiolipin and beta-2-glycoprotein (IgG and  IgM) antibody tests.  Barbara Lerma M.D.  [phone]  10/13/2015    INR =  1.05    Reference range: 0.86-1.14  Thrombin Time= 15.4     Reference range: 13.0-19.0 sec    APTT:       Ratio  Patient  =  0.97  1:2 Mix  =  N/A  Reference:  Negative: Less than or equal to 1.16  Positive: Greater than or equal to 1.17     DILUTE BELKIS VIPER VENOM TEST:  Screen Ratio = 0.70   Normal is less than 1.21         CBC  Recent Labs   Lab Test  12/05/16   0811  10/07/16   0912  10/12/15   0923 09/25/12   0810   WBC   --    --   6.0  4.8   RBC   --    --   4.68  4.54   HGB  14.3  14.1  14.0  13.6   HCT   --    --   41.7  40.9   MCV   --    --   89  90   RDW   --    --   13.3  13.2   PLT   --    --   298  266   MCH   --    --   29.9  30.0   MCHC   --    --   33.6  33.4   NEUTROPHIL   --    --   58.2  52.2   LYMPH   --    --   30.5  36.3   MONOCYTE   --    --   8.7  9.7   EOSINOPHIL   --    --   2.3  1.3   BASOPHIL   --    --   0.3  0.5   ANEU   --    --   3.5  2.5   ALYM   --    --   1.8  1.7   MIKE   --    --   0.5  0.5   AEOS   --    --   0.1  0.1   ABAS   --    --   0.0  0.0     CMP  Recent Labs   Lab Test  12/05/16   0811  10/07/16   0912  04/27/15   1834   NA  142  143  139   POTASSIUM  4.0  4.2  3.7   CHLORIDE  105  107  106   CO2  29  29  29   ANIONGAP  8  7  4   GLC  104*  75  86   BUN  10  16  13   CR  0.73  0.74  0.77   GFRESTIMATED  83  82  79   GFRESTBLACK  >90   GFR Calc    >90   GFR Calc    >90   GFR Calc     AGUILAR  9.2  9.3  8.9   BILITOTAL   --    --   0.2   ALBUMIN   --    --   4.1   PROTTOTAL   --    --   7.5   ALKPHOS   --    --   67   AST   --    --   23   ALT   --    --   36     Calcium/VitaminD  Recent Labs   Lab Test  12/05/16   0811  10/07/16   0912  04/27/15   1834   AGUILAR  9.2  9.3  8.9     ESR/CRP  Recent Labs   Lab Test  10/12/15   0923   SED  7   CRP  <2.9     CK/Aldolase  Recent Labs   Lab Test  10/12/15   0923  10/02/15   0820   CKT   --   66   ALDOLASE  5.8   --      TSH/T4  Recent Labs   Lab Test  04/27/15   1834   TSH  1.79     Hepatitis B  Recent Labs   Lab Test  10/12/15   0923    AUSAB  0.18   HBCAB  Nonreactive   HEPBANG  Nonreactive     Hepatitis C  Recent Labs   Lab Test  10/07/16   0912   HCVAB  Nonreactive   Assay performance characteristics have not been established for newborns,   infants, and children       HIV Screening  Recent Labs   Lab Test  10/12/15   0923   HIAGAB  Nonreactive   HIV-1 p24 Ag & HIV-1/HIV-2 Ab Not Detected       UA  Recent Labs   Lab Test  10/02/15   0838   COLOR  Yellow   APPEARANCE  Slightly Cloudy   URINEGLC  Negative   URINEBILI  Negative   SG  1.025   URINEPH  5.0   PROTEIN  Negative   UROBILINOGEN  0.2   NITRITE  Negative   UBLD  Negative   LEUKEST  Negative   WBCU  O - 2   RBCU  O - 2   SQUAMOUSEPI  Moderate*     Urine Microscopic  Recent Labs   Lab Test  10/02/15   0838   WBCU  O - 2   RBCU  O - 2   SQUAMOUSEPI  Moderate*     PATH  Recent Labs   Lab Test  11/25/15   0000  11/09/15   1013  09/25/12   0845   PATH    Patient Name: TRENT GARNER  MR#: [id number]  Specimen #: [id number]  Collected: 11/25/2015  Received: 11/27/2015  Reported: 11/30/2015 15:57  Ordering Phy(s): NEYDA FISHMAN    SPECIMEN/STAIN PROCESS:  Pap imaged thin layer prep screening (Surepath, FocalPoint with guided  screening)       Pap-Cyto x 1, Reflex HPV if NIL/ASCUS/LSIL x 1    SOURCE: Cervical, endocervical  ----------------------------------------------------------------   Pap imaged thin layer prep screening (Surepath, FocalPoint with guided  screening)  SPECIMEN ADEQUACY:  Satisfactory for evaluation.  -Transformation zone component absent.    CYTOLOGIC INTERPRETATION:    Negative for Intraepithelial Lesion or Malignancy    Electronically signed out by:  Anastasiia Barbour    Processed and screened at [facility]    CLINICAL HISTORY:  LMP: 11/09/15  Previous normal pap  Date of Last Pap: 9/25/12,    Papanicolaou Test Limitations:  Cervical cytology is a scr eening test  with limited sensitivity; regular screening is  "critical for cancer  prevention; Pap tests are primarily effective for the  diagnosis/prevention of squamous cell carcinoma, not adenocarcinomas or  other cancers.    TESTING LAB LOCATION:  [address]  [phone]    COLLECTION SITE:  Client:  [facility]  Location: [facility] (B)    Patient Name: TRENT GARNER  MR#: [id number]  Specimen #: [id number]  Collected: 11/9/2015  Received: 11/10/2015  Reported: 11/11/2015 15:58  Ordering Phy(s): ELBA MARY    SPECIMEN(S):  Mid ascending polyp    FINAL DIAGNOSIS:  Colon, midascending, polypectomy:  -Sessile serrated adenoma  -No evidence of cytological dysplasia or invasive malignancy    I have personally reviewed all specimens and or slides, including the  listed special stains, and used them with my medical judgement to  determine the final diagnosis.    Electronically signed out by:    Dylon Grey M.D., [facility]    CLINICAL HISTORY:  50-year-old female, screening colonoscopy    GROSS:  The specimen is received in formalin with proper patient's  identification, labeled \"mid ascending colon polyp x1\".  The specimen  consists of four tan soft tissue fragments measuring in aggregate 1.0 x  0.3 x 0.2 cm.  Entirely submitted in one cassette. (Dictated by: Lia Rodriguez 11/10/2015 12:55 PM)    MICROSCOPIC:  M icroscopic examination is performed    CPT Codes:  A: 62134-EH1    TESTING LAB LOCATION:  [facility], [address]   [address]  [phone]    COLLECTION SITE:  Client: [facility]  Location: [facility] (B)     Patient Name: TRENT GARNER MR#: [id number] Specimen #: [id number] Collected: 9/25/2012 Received: 9/26/2012 Reported: 9/27/2012 11:52 Ordering Phy(s): SUSHILA HAMMOND     SPECIMEN/STAIN PROCESS: Pap imaged thin layer prep " screening (Surepath, FocalPoint with guided screening)      Pap-Cyto x 1, Reflex HPV x 1  SOURCE: Cervical, endocervical ----------------------------------------------------------------  Pap imaged thin layer prep screening (Surepath, FocalPoint with guided screening) SPECIMEN ADEQUACY: Satisfactory for evaluation. -Transformation zone component present.  CYTOLOGIC INTERPRETATION:  Negative for Intraepithelial Lesion or Malignancy       Electronically signed out by: GIANLUCA Mccord (ASCP)  Processed and screened at [facility]  CLINICAL HISTORY: LMP: 9/8/12 Abnormal Bleeding,   Papanicolaou Test Limitations:  Cervical cytology is a screening test with limited sensitivity; regular screening is critical for cancer prevention; Pap tests are primarily effective for the diagnosis/prevention of squamous cell carcinoma, not adenocarcinomas or other cancers.  TESTING LAB LOCATION: [address]  [address] [phone]  COLLECTION SITE: Client:  [facility] Location: [facility] (B)     Immunization History     Immunization History   Administered Date(s) Administered     TDAP Vaccine (Adacel) 09/25/2012          Chart documentation done in part with Dragon Voice recognition Software. Although reviewed after completion, some word and grammatical error may remain.    Bill Resendiz MD

## 2017-09-19 LAB
C3 SERPL-MCNC: 137 MG/DL (ref 76–169)
C4 SERPL-MCNC: 31 MG/DL (ref 15–50)
CCP AB SER IA-ACNC: 1 U/ML
DSDNA AB SER-ACNC: 1 IU/ML
ENA RNP IGG SER IA-ACNC: <0.2 AI (ref 0–0.9)
ENA SCL70 IGG SER IA-ACNC: <0.2 AI (ref 0–0.9)
ENA SM IGG SER-ACNC: <0.2 AI (ref 0–0.9)
ENA SS-A IGG SER IA-ACNC: <0.2 AI (ref 0–0.9)
ENA SS-B IGG SER IA-ACNC: <0.2 AI (ref 0–0.9)
RHEUMATOID FACT SER NEPH-ACNC: <20 IU/ML (ref 0–20)

## 2017-09-22 DIAGNOSIS — M06.4 INFLAMMATORY POLYARTHROPATHY (H): Primary | ICD-10-CM

## 2017-09-22 NOTE — PROGRESS NOTES
"[employer]Soft[employer] message sent:  \"Ms. Haddad,    Labs and x-rays are normal. This does not change the treatment plan.    Sincerely,  Bill Resendiz MD  9/22/2017 11:54 AM\""

## 2017-09-29 ENCOUNTER — THERAPY VISIT (OUTPATIENT)
Dept: PHYSICAL THERAPY | Facility: CLINIC | Age: 52
End: 2017-09-29
Payer: COMMERCIAL

## 2017-09-29 DIAGNOSIS — M25.562 ACUTE PAIN OF LEFT KNEE: Primary | ICD-10-CM

## 2017-09-29 PROCEDURE — 97161 PT EVAL LOW COMPLEX 20 MIN: CPT | Mod: GP | Performed by: PHYSICAL THERAPIST

## 2017-09-29 PROCEDURE — 97110 THERAPEUTIC EXERCISES: CPT | Mod: GP | Performed by: PHYSICAL THERAPIST

## 2017-09-29 ASSESSMENT — ACTIVITIES OF DAILY LIVING (ADL)
AS_A_RESULT_OF_YOUR_KNEE_INJURY,_HOW_WOULD_YOU_RATE_YOUR_CURRENT_LEVEL_OF_DAILY_ACTIVITY?: ABNORMAL
SQUAT: ACTIVITY IS MINIMALLY DIFFICULT
WEAKNESS: THE SYMPTOM AFFECTS MY ACTIVITY MODERATELY
STIFFNESS: THE SYMPTOM AFFECTS MY ACTIVITY MODERATELY
HOW_WOULD_YOU_RATE_THE_OVERALL_FUNCTION_OF_YOUR_KNEE_DURING_YOUR_USUAL_DAILY_ACTIVITIES?: ABNORMAL
RAW_SCORE: 45
HOW_WOULD_YOU_RATE_THE_CURRENT_FUNCTION_OF_YOUR_KNEE_DURING_YOUR_USUAL_DAILY_ACTIVITIES_ON_A_SCALE_FROM_0_TO_100_WITH_100_BEING_YOUR_LEVEL_OF_KNEE_FUNCTION_PRIOR_TO_YOUR_INJURY_AND_0_BEING_THE_INABILITY_TO_PERFORM_ANY_OF_YOUR_USUAL_DAILY_ACTIVITIES?: 80
STAND: ACTIVITY IS MINIMALLY DIFFICULT
GIVING WAY, BUCKLING OR SHIFTING OF KNEE: THE SYMPTOM AFFECTS MY ACTIVITY MODERATELY
PAIN: THE SYMPTOM AFFECTS MY ACTIVITY SLIGHTLY
SWELLING: THE SYMPTOM AFFECTS MY ACTIVITY MODERATELY
WALK: ACTIVITY IS MINIMALLY DIFFICULT
GO DOWN STAIRS: ACTIVITY IS SOMEWHAT DIFFICULT
GO UP STAIRS: ACTIVITY IS SOMEWHAT DIFFICULT
RISE FROM A CHAIR: ACTIVITY IS MINIMALLY DIFFICULT
LIMPING: THE SYMPTOM AFFECTS MY ACTIVITY MODERATELY
KNEEL ON THE FRONT OF YOUR KNEE: ACTIVITY IS NOT DIFFICULT
KNEE_ACTIVITY_OF_DAILY_LIVING_SCORE: 64.29
SIT WITH YOUR KNEE BENT: ACTIVITY IS NOT DIFFICULT
KNEE_ACTIVITY_OF_DAILY_LIVING_SUM: 45

## 2017-09-29 NOTE — MR AVS SNAPSHOT
After Visit Summary   9/29/2017    Bria Haddad    MRN: [id number]           Patient Information     Date Of Birth          1965        Visit Information        Provider Department      9/29/2017 7:40 AM Bill Perez PT [facility] [place] [facility]        Today's Diagnoses     Acute pain of left knee    -  1       Follow-ups after your visit        Your next 10 appointments already scheduled     Oct 06, 2017  7:40 AM CDT   [facility] Extremity with [facility] [place] [facility] ([facility] [place])    [address]  [address]  [place] [address]   [phone]            Oct 13, 2017  7:40 AM CDT   ETHAN Extremity with Bill Perez PT   [facility] [place] [facility] ([facility] [place])    [address]  [address]  [place] [address]   [phone]            Oct 27, 2017  7:00 AM CDT   ETHAN Extremity with Bill Perez PT   [facility] [place] [facility] ([facility] [place])    [address]  [address]  [place] [address]   [phone]            Dec 18, 2017  7:20 AM CST   Return Visit with Bill Resendiz MD   [facility] [place] ([facility] [place])    [address]  [place] [address]   [phone]              Who to contact     If you have questions or need follow up information about today's clinic visit or your schedule please contact [facility] directly at [phone].  Normal or non-critical lab and imaging results will be communicated to you by MyChart, letter or phone within 4 business days after the clinic has received the results. If you do not hear from us within 7 days, please contact the clinic through MyChart or phone. If you have a critical or abnormal lab result, we will notify you by phone as soon as possible.  Submit refill requests through [facility] or call your pharmacy and they will forward the refill request to us. Please  allow 3 business days for your refill to be completed.          Additional Information About Your Visit        MyChart Information     [employer] gives you secure access to your electronic health record. If you see a primary care provider, you can also send messages to your care team and make appointments. If you have questions, please call your primary care clinic.  If you do not have a primary care provider, please call [phone] and they will assist you.        Care EveryWhere ID     This is your Care EveryWhere ID. This could be used by other organizations to access your [place] medical records  [id number]         Blood Pressure from Last 3 Encounters:   09/18/17 136/84   07/12/17 126/70   06/19/17 115/66    Weight from Last 3 Encounters:   09/18/17 97.5 kg (215 lb)   08/14/17 98.4 kg (217 lb)   07/12/17 97.3 kg (214 lb 9.6 oz)              We Performed the Following     HC PT EVAL, LOW COMPLEXITY     ETHAN INITIAL EVAL REPORT     THERAPEUTIC EXERCISES        Primary Care Provider Office Phone # Fax #    Stephanie Saldana [phone] [address] [facility] [address]        Equal Access to Services     FRANKI VELIZ AH: Hadii niall lathamo Sogeovanna, waaxda luqadaha, qaybta kaalmada adeegyada, milton bourgeois. So [facility] [phone].    ATENCIÓN: Si habla español, tiene a lopez disposición servicios gratuitos de asistencia lingüística. Llame al [phone].    We comply with applicable federal civil rights laws and [place] laws. We do not discriminate on the basis of race, color, national origin, age, disability, sex, sexual orientation, or gender identity.            Thank you!     Thank you for choosing [facility]  for your care. Our goal is always to provide you with excellent care. Hearing back from our patients is one way we can continue to improve our services. Please take a few minutes to complete the written survey  that you may receive in the mail after your visit with us. Thank you!             Your Updated Medication List - Protect others around you: Learn how to safely use, store and throw away your medicines at www.disposemymeds.org.          This list is accurate as of: 9/29/17 12:43 PM.  Always use your most recent med list.                   Brand Name Dispense Instructions for use Diagnosis    hydroxychloroquine 200 MG tablet    PLAQUENIL    60 tablet    Take 1 tablet (200 mg) by mouth 2 times daily    Inflammatory polyarthropathy (H)       mometasone 0.1 % cream    ELOCON    45 g    Apply sparingly to affected area twice daily as needed.  Do not apply to face.    Dermatitis       order for DME     2 Units    Equipment being ordered: compression stockings 1 pair, at 30mm Hg, to be worn during the day. For Bilateral leg edema [R60.0]    Bilateral leg edema

## 2017-09-29 NOTE — PROGRESS NOTES
[facility] Initial Evaluation    Subjective:    Patient is a 52 year old female presenting with rehab left knee hpi. The history is provided by the patient. No  was used.   Bria Haddad is a 52 year old female with a left knee condition.  Condition occurred with:  Insidious onset.  Condition occurred: for unknown reasons.  This is a new condition  Patient reports onset of left knee pain beginning 3 months ago with no precipitating injury at onset. She reports she just started feeling the pain walking down steps. Most recently saw MD for this problem on 9/18/17  .    Patient reports pain:  Anterior and in the joint.    Pain is described as sharp and aching and is intermittent and reported as 2/10.  Associated symptoms:  Loss of strength. Pain is worse during the day.  Symptoms are exacerbated by ascending stairs, descending stairs and walking and relieved by rest.  Since onset symptoms are unchanged.        General health as reported by patient is fair.  Pertinent medical history includes:  Overweight.  Medical allergies: yes (Aspirin).  Other surgeries include:  None reported.  Current medications:  Anti-inflammatory.  Current occupation is   .  Patient is working in normal job without restrictions.  Primary job tasks include:  Prolonged sitting, repetitive tasks and other ([employer] work).    Barriers include:  None as reported by the patient.    Red flags:  None as reported by the patient.                        Objective:          Flexibility/Screens:       Lower Extremity:  Decreased left lower extremity flexibility:Quadriceps    Decreased right lower extremity flexibility:  Quadriceps                                                 Hip Evaluation    Hip Strength:    Flexion:   Left: 5-/5   -  Pain:  Right: 5-/5   -  Pain:                    Extension:  Left: 3+/5  -  Pain:Right: 3+/5    -  Pain:    Abduction:  Left: 4-/5    -   Pain:Right: 4-/5   -    Pain:                           Knee Evaluation:  ROM:    AROM    Hyperextension:  Left:  10    Right: 10  Extension:  Left: 0    Right:  0  Flexion: Left: 132    Right: 132        Strength:     Extension:  Left: 4+/5    Pain:-      Right: 5-/5    Pain:-  Flexion:  Left: 5-/5    Pain:-      Right: 5/5    Pain:-    Quad Set Left: Good    Pain:   Quad Set Right: Good    Pain:  Ligament Testing:  Normal                Special Tests:     Left knee negative for the following special tests:  Meninscal    Right knee negative for the following special tests:  Meniscal  Palpation:      Right knee tenderness present at:  Medial Joint Line  Right knee tenderness not present at:  Lateral Joint Line and Patellar Tendon      Functional Testing:          Quad:    Single Leg Squat:  Left:      Right:        Bilateral Leg Squat:   Mild loss of control and excessive anterior knee excursion              General     ROS    Assessment/Plan:      Patient is a 52 year old female with left side knee complaints.    Patient has the following significant findings with corresponding treatment plan.                Diagnosis 1:  Left knee pain    Pain -  self management, education, directional preference exercise and home program  Decreased ROM/flexibility - manual therapy, therapeutic exercise and home program  Decreased strength - therapeutic exercise, therapeutic activities and home program  Impaired muscle performance - neuro re-education and home program  Decreased function - therapeutic activities and home program    Therapy Evaluation Codes:   1) History comprised of:   Personal factors that impact the plan of care:      None.    Comorbidity factors that impact the plan of care are:      Overweight.     Medications impacting care: None.  2) Examination of Body Systems comprised of:   Body structures and functions that impact the plan of care:      Knee.   Activity limitations that impact the plan of care are:      Stairs and  Walking.  3) Clinical presentation characteristics are:   Stable/Uncomplicated.  4) Decision-Making    Low complexity using standardized patient assessment instrument and/or measureable assessment of functional outcome.  Cumulative Therapy Evaluation is: Low complexity.    Previous and current functional limitations:  (See Goal Flow Sheet for this information)    Short term and Long term goals: (See Goal Flow Sheet for this information)     Communication ability:  Patient appears to be able to clearly communicate and understand verbal and written communication and follow directions correctly.  Treatment Explanation - The following has been discussed with the patient:   RX ordered/plan of care  Anticipated outcomes  Possible risks and side effects  This patient would benefit from PT intervention to resume normal activities.   Rehab potential is good.    Frequency:  1 X week, once daily  Duration:  for 6 weeks  Discharge Plan:  Achieve all LTG.  Independent in home treatment program.  Reach maximal therapeutic benefit.    Please refer to the daily flowsheet for treatment today, total treatment time and time spent performing 1:1 timed codes.

## 2017-10-06 ENCOUNTER — THERAPY VISIT (OUTPATIENT)
Dept: PHYSICAL THERAPY | Facility: CLINIC | Age: 52
End: 2017-10-06
Payer: COMMERCIAL

## 2017-10-06 DIAGNOSIS — M25.562 ACUTE PAIN OF LEFT KNEE: ICD-10-CM

## 2017-10-06 PROCEDURE — 97530 THERAPEUTIC ACTIVITIES: CPT | Mod: GP | Performed by: PHYSICAL THERAPIST

## 2017-10-06 PROCEDURE — 97110 THERAPEUTIC EXERCISES: CPT | Mod: GP | Performed by: PHYSICAL THERAPIST

## 2017-10-30 ENCOUNTER — MYC MEDICAL ADVICE (OUTPATIENT)
Dept: RHEUMATOLOGY | Facility: CLINIC | Age: 52
End: 2017-10-30

## 2017-11-02 NOTE — TELEPHONE ENCOUNTER
Rheumatology team: please call pt.  She needs a sooner appointment to discuss other medications.    Bill Resendiz MD  11/2/2017 5:10 PM

## 2017-11-03 NOTE — TELEPHONE ENCOUNTER
Spoke with patient she is scheduled for 11/8/17 at 2:20 with Dr. Resendiz.  Zuleyma Liu, [facility] [address] 11/3/2017 11:58 AM

## 2017-11-08 ENCOUNTER — RADIANT APPOINTMENT (OUTPATIENT)
Dept: GENERAL RADIOLOGY | Facility: CLINIC | Age: 52
End: 2017-11-08
Attending: INTERNAL MEDICINE
Payer: COMMERCIAL

## 2017-11-08 ENCOUNTER — OFFICE VISIT (OUTPATIENT)
Dept: RHEUMATOLOGY | Facility: CLINIC | Age: 52
End: 2017-11-08
Payer: COMMERCIAL

## 2017-11-08 VITALS
WEIGHT: 218.4 LBS | SYSTOLIC BLOOD PRESSURE: 124 MMHG | HEIGHT: 65 IN | BODY MASS INDEX: 36.39 KG/M2 | OXYGEN SATURATION: 96 % | HEART RATE: 93 BPM | DIASTOLIC BLOOD PRESSURE: 88 MMHG

## 2017-11-08 DIAGNOSIS — Z79.899 HIGH RISK MEDICATIONS (NOT ANTICOAGULANTS) LONG-TERM USE: ICD-10-CM

## 2017-11-08 DIAGNOSIS — M06.4 INFLAMMATORY POLYARTHROPATHY (H): ICD-10-CM

## 2017-11-08 DIAGNOSIS — M06.4 INFLAMMATORY POLYARTHROPATHY (H): Primary | ICD-10-CM

## 2017-11-08 PROCEDURE — 71020 XR CHEST 2 VW: CPT

## 2017-11-08 PROCEDURE — 99214 OFFICE O/P EST MOD 30 MIN: CPT | Performed by: INTERNAL MEDICINE

## 2017-11-08 RX ORDER — METHOTREXATE 2.5 MG/1
15 TABLET ORAL WEEKLY
Qty: 24 TABLET | Refills: 3 | Status: SHIPPED | OUTPATIENT
Start: 2017-11-08 | End: 2018-02-07

## 2017-11-08 RX ORDER — FOLIC ACID 1 MG/1
1 TABLET ORAL DAILY
Qty: 100 TABLET | Refills: 3 | Status: SHIPPED | OUTPATIENT
Start: 2017-11-08 | End: 2018-08-06

## 2017-11-08 NOTE — MR AVS SNAPSHOT
After Visit Summary   11/8/2017    Bria Haddad    MRN: [id number]           Patient Information     Date Of Birth          1965        Visit Information        Provider Department      11/8/2017 2:20 PM Bill Resendiz MD [facility]        Today's Diagnoses     Inflammatory polyarthropathy (H)    -  1    High risk medications (not anticoagulants) long-term use           Follow-ups after your visit        Follow-up notes from your care team     Return in about 3 months (around 2/8/2018) for f/u arthritis.      Your next 10 appointments already scheduled     Dec 04, 2017  8:00 AM CST   LAB with AN LAB   [facility]    [address] [place] [address]   [phone]           Please do not eat 10-12 hours before your appointment if you are coming in fasting for labs on lipids, cholesterol, or glucose (sugar). This does not apply to pregnant women. Water, hot tea and black coffee (with nothing added) are okay. Do not drink other fluids, diet soda or chew gum.            Jan 08, 2018  7:45 AM CST   LAB with AN LAB   [facility]    [address] [place] [address]   [phone]           Please do not eat 10-12 hours before your appointment if you are coming in fasting for labs on lipids, cholesterol, or glucose (sugar). This does not apply to pregnant women. Water, hot tea and black coffee (with nothing added) are okay. Do not drink other fluids, diet soda or chew gum.            Feb 05, 2018  8:00 AM CST   LAB with AN LAB   [facility]    [address] [place] [address]   [phone]           Please do not eat 10-12 hours before your appointment if you are coming in fasting for labs on lipids, cholesterol, or glucose (sugar). This does not apply to pregnant women. Water, hot tea and black coffee (with nothing added) are okay. Do not  drink other fluids, diet soda or chew gum.            Feb 07, 2018  7:20 AM CST   Return Visit with Bill Resendiz MD   [facility] [place] [facility] [place]    [address]  [place] [address] [phone]              Future tests that were ordered for you today     Open Standing Orders        Priority Remaining Interval Expires Ordered    CBC with platelets differential Routine 2/2 Every 4 Weeks 5/7/2018 11/8/2017    Creatinine Routine 2/2 Every 4 Weeks 5/7/2018 11/8/2017    Hepatic panel Routine 2/2 Every 4 Weeks 5/7/2018 11/8/2017          Open Future Orders        Priority Expected Expires Ordered    CBC with platelets differential Routine 2/2/2018 2/21/2018 11/8/2017    Creatinine Routine 2/2/2018 2/21/2018 11/8/2017    Erythrocyte sedimentation rate auto Routine 2/2/2018 2/21/2018 11/8/2017    CRP inflammation Routine 2/2/2018 2/21/2018 11/8/2017    Hepatic panel Routine 2/2/2018 2/21/2018 11/8/2017    XR Chest 2 Views Routine 11/8/2017 11/8/2018 11/8/2017            Who to contact     If you have questions or need follow up information about today's clinic visit or your schedule please contact [facility] directly at [phone].  Normal or non-critical lab and imaging results will be communicated to you by PEERhart, letter or phone within 4 business days after the clinic has received the results. If you do not hear from us within 7 days, please contact the clinic through PEERhart or phone. If you have a critical or abnormal lab result, we will notify you by phone as soon as possible.  Submit refill requests through Armor5 or call your pharmacy and they will forward the refill request to us. Please allow 3 business days for your refill to be completed.          Additional Information About Your Visit        PEERharLocqus Information     [employer]or[employer] gives you secure access to your electronic health record. If you see a primary care provider, you can also send messages to your  "care team and make appointments. If you have questions, please call your primary care clinic.  If you do not have a primary care provider, please call [phone] and they will assist you.        Care EveryWhere ID     This is your Care EveryWhere ID. This could be used by other organizations to access your [facility] medical records  [id number]        Your Vitals Were     Pulse Height Pulse Oximetry BMI (Body Mass Index)          93 1.645 m (5' 4.75\") 96% 36.62 kg/m2         Blood Pressure from Last 3 Encounters:   11/08/17 124/88   09/18/17 136/84   07/12/17 126/70    Weight from Last 3 Encounters:   11/08/17 99.1 kg (218 lb 6.4 oz)   09/18/17 97.5 kg (215 lb)   08/14/17 98.4 kg (217 lb)                 Today's Medication Changes          These changes are accurate as of: 11/8/17  2:54 PM.  If you have any questions, ask your nurse or doctor.               Start taking these medicines.        Dose/Directions    folic acid 1 MG tablet   Commonly known as:  FOLVITE   Used for:  Inflammatory polyarthropathy (H)   Started by:  Bill Resendiz MD        Dose:  1 mg   Take 1 tablet (1 mg) by mouth daily   Quantity:  100 tablet   Refills:  3       methotrexate sodium 2.5 MG Tabs   Used for:  Inflammatory polyarthropathy (H)   Started by:  Bill Resendiz MD        Dose:  15 mg   Take 15 mg by mouth once a week . Take all 6 tablets on the same day of each week.   Quantity:  24 tablet   Refills:  3         Stop taking these medicines if you haven't already. Please contact your care team if you have questions.     hydroxychloroquine 200 MG tablet   Commonly known as:  PLAQUENIL   Stopped by:  Bill Resendiz MD                Where to get your medicines      These medications were sent to [facility] [id number] [place] [address] [address] [place] [address] AT [address]  [address] [address]     Phone:  [phone]     folic acid 1 MG tablet    methotrexate sodium 2.5 MG Tabs    "             Primary Care Provider Office Phone # Fax #    Stephanie Saldana [phone] [address] [address] [facility] [address]        Equal Access to Services     FRANKI VELIZ : Star méndez cristiano Sogeovanna, waaxda luqadaha, qaybta kaalmada adelisayada, milton matta donlisa melendez bharat bourgeois. So [facility] [phone].    ATENCIÓN: Si habla español, tiene a lopez disposición servicios gratuitos de asistencia lingüística. Llame al [phone].    We comply with applicable federal civil rights laws and [place] laws. We do not discriminate on the basis of race, color, national origin, age, disability, sex, sexual orientation, or gender identity.            Thank you!     Thank you for choosing [facility]  for your care. Our goal is always to provide you with excellent care. Hearing back from our patients is one way we can continue to improve our services. Please take a few minutes to complete the written survey that you may receive in the mail after your visit with us. Thank you!             Your Updated Medication List - Protect others around you: Learn how to safely use, store and throw away your medicines at www.disposemymeds.org.          This list is accurate as of: 11/8/17  2:54 PM.  Always use your most recent med list.                   Brand Name Dispense Instructions for use Diagnosis    folic acid 1 MG tablet    FOLVITE    100 tablet    Take 1 tablet (1 mg) by mouth daily    Inflammatory polyarthropathy (H)       methotrexate sodium 2.5 MG Tabs     24 tablet    Take 15 mg by mouth once a week . Take all 6 tablets on the same day of each week.    Inflammatory polyarthropathy (H)       mometasone 0.1 % cream    ELOCON    45 g    Apply sparingly to affected area twice daily as needed.  Do not apply to face.    Dermatitis       order for DME     2 Units    Equipment being ordered: compression stockings 1 pair, at 30mm Hg, to be worn during the day. For Bilateral leg  edema [R60.0]    Bilateral leg edema

## 2017-11-08 NOTE — NURSING NOTE
"Chief Complaint   Patient presents with     RECHECK     Patient states physical therapy has really helped. Here for new medication, stopped hydroxychloroquine       Initial /88 (BP Location: Left arm, Patient Position: Chair, Cuff Size: Adult Regular)  Pulse 93  Ht 1.645 m (5' 4.75\")  Wt 99.1 kg (218 lb 6.4 oz)  SpO2 96%  BMI 36.62 kg/m2 Estimated body mass index is 36.62 kg/(m^2) as calculated from the following:    Height as of this encounter: 1.645 m (5' 4.75\").    Weight as of this encounter: 99.1 kg (218 lb 6.4 oz).  BP completed using cuff size: regular         RAPID3 (0-30) Cumulative Score            RAPID3 Weighted Score (divide #4 by 3 and that is the weighted score)           "

## 2017-11-08 NOTE — PROGRESS NOTES
Rheumatology Clinic Visit      Bria Haddad MRN# [id number]   YOB: 1965 Age: 52 year old      Date of visit: 11/08/17   PCP: Dr. Stephanie Saldana    Chief Complaint   Patient presents with:  RECHECK: Patient states physical therapy has really helped. Here for new medication, stopped hydroxychloroquine      Assessment and Plan     1. Inflammatory Polyarthropathy: Ms. Haddad initially presented to this clinic in 2015 with dependent edema of the bilateral lower extremities, fatigue, and a one time episode of right toe pain in the setting of a positive ROSEMARY.  On 9/18/2017 she presented with synovitis of her bilateral PIPs, persistent fatigue, intermittent rash, and dependent edema. ROSEMARY positive but additional studies negative.  HCQ was started previously but stopped because of bloating.  She is here to discuss alternative therapies.  We discussed MTX and leflunomide.    - Start methotrexate 15mg once weekly  - Start folic acid 1mg daily  - Remove HCQ from the medication list  -- X-rays today: Chest  - Labs monthly ×2 months: CBC, creatinine, hepatic panel  - Labs 2-3 days prior to the next rheumatology clinic visit: CBC, Creatinine, Hepatic Panel, ESR, CRP    # Methotrexate Risks and Benefits: The risks and benefits of methotrexate were discussed in detail and the patient verbalized understanding.  The risks discussed include, but are not limited to, the risk for hypersensitivity, anaphylaxis, anaphylactoid reactions, infections, bone marrow suppression, renal toxicity, hepatotoxicity, pulmonary toxicity, malignancy, impaired fertility, GI upset, alopecia, and oral and nasal sores.  Folic acid supplementation is recommended during methotrexate therapy to help prevent some of the side effects. Pregnancy prevention and planning was discussed; it is recommended that women of childbearing potential use reliable contraception during therapy.  The risks of taking both methotrexate and alcohol were reviewed; complete  alcohol avoidance was discussed.  Routine laboratory monitoring is required during methotrexate therapy. Taking MTX once weekly, all within a 24 hour period was stressed and the patient verbalized this instruction back to me.  I encouraged reviewing the package insert and asking any questions about the medication.    # Leflunomide (Arava) Risks and Benefits: The risks and benefits of leflunomide were discussed in detail and the patient verbalized understanding.  The risks discussed include, but are not limited to, the risk for hypersensitivity, anaphylaxis, anaphylactoid reactions, hepatotoxicity, infections, interstitial lung disease, alopecia, rash, nausea, and diarrhea.  The impact on malignancies is not fully defined.   Alcohol should be avoided while taking leflunomide.  Pregnancy prevention and planning was discussed; it is recommended that women of childbearing potential use reliable contraception before, during, and for a period of 2 years after treatment with leflunomide; if pregnancy is planned within 2 years of discontinuing medication then women should undergo drug elimination procedures (i.e. cholestyramine). Routine laboratory monitoring is required during leflunomide therapy. I encouraged reviewing the package insert and asking any questions about the medication.      2. Left knee pain: mechanical in nature.  Significant improvement with PT     Ms. Haddad verbalized agreement with and understanding of the rational for the diagnosis and treatment plan.  All questions were answered to best of my ability and the patient's satisfaction. Ms. Haddad was advised to contact the clinic with any questions that may arise after the clinic visit.      Thank you for involving me in the care of the patient    Return to clinic: 3 months      HPI   Bria Haddad is a 52 year old female with a past medical history significant for endometriosis, s/p carpal tunnel release surgery who present for follow up of positive  ROSEMARY.    Today, Ms. Haddad reports that she went to PT for the left knee pain and had significant improvement.  Bloating with HCQ so she is here today to discuss a change in medication.  Still with pain in her PIPs and MCPs that is worse in the AM and better throughout the day and with more activity.      Denies fevers, chills, nausea, vomiting, constipation, diarrhea. No abdominal pain. No chest pain/pressure, palpitations, or shortness of breath. No neck pain. No oral or nasal sores. No sicca symptoms. No photosensitivity or photophobia. No eye pain or redness. No history of inflammatory eye disease.  No history of DVT or pulmonary embolism.  No history of serositis.  No history of Raynaud's Phenomenon.  No seizure disorder.  No known renal disorder.      Tobacco: none  EtOH: none  Drugs: none    ROS   GEN: No fevers, chills, night sweats, or weight change  SKIN: See HPI  HEENT: No epistaxis. No oral or nasal ulcers.  CV: No chest pain, pressure, palpitations, or dyspnea on exertion.  PULM: No SOB, wheeze, cough.  GI: No nausea, vomiting, constipation, diarrhea. No blood in stool. No abdominal pain.  : No blood in urine.  MSK: See HPI.  NEURO: No numbness or tingling.  ENDO: No heat/cold intolerance.  EXT: See HPI  PSYCH: Negative    Active Problem List     Patient Active Problem List   Diagnosis     CARDIOVASCULAR SCREENING; LDL GOAL LESS THAN 160     Endometriosis of uterus     Bilateral carpal tunnel syndrome     S/P carpal tunnel release     Inflammatory polyarthropathy (H)     Acute pain of left knee     Past Medical History     Past Medical History:   Diagnosis Date     NO ACTIVE PROBLEMS      Past Surgical History     Past Surgical History:   Procedure Laterality Date     bunions       COLONOSCOPY N/A 11/9/2015    Procedure: COLONOSCOPY;  Surgeon: Andrew Adamson MD;  Location: [facility]     COLONOSCOPY WITH CO2 INSUFFLATION N/A 11/9/2015    Procedure: COLONOSCOPY WITH CO2 INSUFFLATION;  Surgeon:  Andrew Adamson MD;  Location: [facility]     GYN SURGERY      endometryosis     RELEASE CARPAL TUNNEL Right 10/12/2016    Procedure: RELEASE CARPAL TUNNEL;  Surgeon: Colby Nobles MD;  Location: [facility]     RELEASE CARPAL TUNNEL Left 12/16/2016    Procedure: RELEASE CARPAL TUNNEL;  Surgeon: Colby Nobles MD;  Location: [facility]     TONSILLECTOMY & ADENOIDECTOMY       Allergy     Allergies   Allergen Reactions     Asa [Aspirin] Hives     Current Medication List     Current Outpatient Prescriptions   Medication Sig     order for DME Equipment being ordered: compression stockings 1 pair, at 30mm Hg, to be worn during the day. For Bilateral leg edema [R60.0]     hydroxychloroquine (PLAQUENIL) 200 MG tablet Take 1 tablet (200 mg) by mouth 2 times daily (Patient not taking: Reported on 11/8/2017)     mometasone (ELOCON) 0.1 % cream Apply sparingly to affected area twice daily as needed.  Do not apply to face. (Patient not taking: Reported on 9/18/2017)     No current facility-administered medications for this visit.          Social History   See HPI    Family History     Family History   Problem Relation Age of Onset     OSTEOPOROSIS Mother      Respiratory Mother      Thyroid Disease Mother      HEART DISEASE Father      Cancer - colorectal Maternal Grandmother      DIABETES Maternal Grandfather      HEART DISEASE Maternal Grandfather      CANCER Paternal Grandmother      HEART DISEASE Paternal Grandfather      Respiratory Paternal Grandfather      Hypertension Brother      Thyroid Disease Sister      Thyroid Disease Sister      Neurologic Disorder Brother      Physical Exam     Temp Readings from Last 3 Encounters:   09/18/17 97.6  F (36.4  C) (Oral)   08/14/17 97.6  F (36.4  C) (Oral)   07/12/17 98.4  F (36.9  C) (Oral)     BP Readings from Last 5 Encounters:   11/08/17 124/88   09/18/17 136/84   07/12/17 126/70   06/19/17 115/66   12/29/16 124/65     Pulse Readings from Last 1 Encounters:   11/08/17 93  "    Resp Readings from Last 1 Encounters:   02/02/17 12     Estimated body mass index is 36.62 kg/(m^2) as calculated from the following:    Height as of this encounter: 1.645 m (5' 4.75\").    Weight as of this encounter: 99.1 kg (218 lb 6.4 oz).    GEN: NAD, overweight  HEENT: MMM. No oral lesions. EOMI. Anicteric, noninjected sclera  CV: S1, S2. RRR. No m/r/g.  PULM: CTA bilaterally. No w/c.  MSK: bilateral 2nd-5th MCPs tender to palpation but without swelling.  Right 2nd and 4th PIP with swelling and tenderness to palpation.  Left 2nd-4th PIPs with swelling and tenderness to palpation.  Wrists, elbows, and shoulders without swelling or tenderness to palpation.  Bilateral hips nontender to palpation.  Knees, ankles, and MTPs without swelling or tenderness to palpation.    NEURO: UE and LE strengths 5/5 and equal bilaterally.   SKIN: No rash  PSYCH: Alert. Appropriate.    Labs / Imaging (select studies)   RF/CCP  Recent Labs   Lab Test  09/18/17   0913   CCPIGG  1   RHF  <20     ROSEMARY  Recent Labs   Lab Test  07/03/17   1614  10/12/15   0923  10/02/15   0820   MONROE  4.9*   --   2.6*   ANAIGG   --   1:320  Reference range: <1:40  (Note)  Anti-Centromere pattern observed.  INTERPRETIVE INFORMATION: ROSEMARY by IFA, IgG  Anti-nuclear antibodies (ROSEMARY) are seen in a variety of  systemic rheumatic diseases and are determined by indirect  fluorescence assay (IFA) using HEp-2 substrate with an  IgG-specific conjugate. ROSEMARY titers less than or equal to  1:80 have variable relevance while titers greater than or  equal to 1:160 are considered clinically significant. These  antibodies may precede clinical disease onset; however,  healthy individuals and those with advanced age have been  reported to be positive for ROSEMARY. When observed, one of the  five basic patterns is reported: homogeneous,  peripheral/rim, speckled, centromere, or nucleolar. If  cytoplasmic fluorescence is observed, it is noted. IFA  methodology is subjective and " has occasionally been shown  to lack sensitivity for anti-SSA/Ro antibodies.  Negative results do not necessarily rule out the presence  of SSc. If clinical suspicion r emains, consider further  testing for U3-RNP, PM/Scl, or Th/To antibodies associated  with SSc.  Performed by [employer],  [facility] [place] [address] [phone]  www."[email or web address]", Ilan Vee MD, Lab. Director  *   --      RNP/Sm/SSA/SSB  Recent Labs   Lab Test  09/18/17   0913  10/12/15   0923   RNPIGG  <0.2  <0.2  Negative   Antibody index (AI) values reflect qualitative changes in antibody   concentration that cannot be directly associated with clinical condition or   disease state.     SMIGG  <0.2  <0.2  Negative   Antibody index (AI) values reflect qualitative changes in antibody   concentration that cannot be directly associated with clinical condition or   disease state.     SSAIGG  <0.2  <0.2  Negative   Antibody index (AI) values reflect qualitative changes in antibody   concentration that cannot be directly associated with clinical condition or   disease state.     SSBIGG  <0.2  <0.2  Negative   Antibody index (AI) values reflect qualitative changes in antibody   concentration that cannot be directly associated with clinical condition or   disease state.     SCLIGG  <0.2  <0.2  Negative   Antibody index (AI) values reflect qualitative changes in antibody   concentration that cannot be directly associated with clinical condition or   disease state.       dsDNA  Recent Labs   Lab Test  09/18/17   0913  10/12/15   0923   DNA  1  <15  Interpretation:  Negative       C3/C4  Recent Labs   Lab Test  09/18/17   0913  10/12/15   0923   R1FOOFZ  137  124   H5FXXYC  31  24     Antiphospholipid Antibodies  Recent Labs   Lab Test  10/12/15   0923   B2IGG  1   B2IGM  4   CARG  <15.0  Interpretation:  Negative     JOSELITO  <12.5  Interpretation:  Negative     LUPINT  Negative  (Note)  COMMENTS:  The INR is normal.  APTT ratio is  normal.  DRVVT Screen ratio is normal.  Thrombin time is normal.  NEGATIVE TEST; A LUPUS ANTICOAGULANT WAS NOT DETECTED IN THIS  SPECIMEN WITHIN THE LIMITS OF THE TESTING REPERTOIRE.  If the clinical picture is strongly suggestive of an antiphospholipid  syndrome, recommend anticardiolipin and beta-2-glycoprotein (IgG and  IgM) antibody tests.  Barbara Lerma M.D.  [phone]  10/13/2015    INR =  1.05    Reference range: 0.86-1.14  Thrombin Time= 15.4    Reference range: 13.0-19.0 sec    APTT:       Ratio  Patient  =  0.97  1:2 Mix  =  N/A  Reference:  Negative: Less than or equal to 1.16  Positive: Greater than or equal to 1.17     DILUTE BELKIS VIPER VENOM TEST:  Screen Ratio = 0.70   Normal is less than 1.21         CBC  Recent Labs   Lab Test  09/18/17   0913  12/05/16   0811  10/07/16   0912  10/12/15   0923  09/25/12   0810   WBC  6.0   --    --   6.0  4.8   RBC  4.67   --    --   4.68  4.54   HGB  14.0  14.3  14.1  14.0  13.6   HCT  41.5   --    --   41.7  40.9   MCV  89   --    --   89  90   RDW  13.2   --    --   13.3  13.2   PLT  337   --    --   298  266   MCH  30.0   --    --   29.9  30.0   MCHC  33.7   --    --   33.6  33.4   NEUTROPHIL  59.0   --    --   58.2  52.2   LYMPH  28.2   --    --   30.5  36.3   MONOCYTE  9.8   --    --   8.7  9.7   EOSINOPHIL  2.5   --    --   2.3  1.3   BASOPHIL  0.5   --    --   0.3  0.5   ANEU  3.5   --    --   3.5  2.5   ALYM  1.7   --    --   1.8  1.7   MIKE  0.6   --    --   0.5  0.5   AEOS  0.2   --    --   0.1  0.1   ABAS  0.0   --    --   0.0  0.0     CMP  Recent Labs   Lab Test  09/18/17 0913 12/05/16   0811  10/07/16   0912  04/27/15   1834   NA  141  142  143  139   POTASSIUM  4.0  4.0  4.2  3.7   CHLORIDE  107  105  107  106   CO2  29  29  29  29   ANIONGAP  5  8  7  4   GLC  96  104*  75  86   BUN  14  10  16  13   CR  0.76  0.73  0.74  0.77   GFRESTIMATED  79  83  82  79   GFRESTBLACK  >90  >90  African American GFR Calc    >90    GFR Calc    >90   GFR Calc     AGUILAR  8.8  9.2  9.3  8.9   BILITOTAL  0.4   --    --   0.2   ALBUMIN  3.7   --    --   4.1   PROTTOTAL  7.4   --    --   7.5   ALKPHOS  71   --    --   67   AST  16   --    --   23   ALT  23   --    --   36       Calcium/VitaminD  Recent Labs   Lab Test  09/18/17   0913  12/05/16   0811  10/07/16   0912   AGUILAR  8.8  9.2  9.3     ESR/CRP  Recent Labs   Lab Test  09/18/17   0913  10/12/15   0923   SED  8  7   CRP  4.8  <2.9     CK/Aldolase  Recent Labs   Lab Test  09/18/17   0913  10/12/15   0923  10/02/15   0820   CKT  92   --   66   ALDOLASE   --   5.8   --      TSH/T4  Recent Labs   Lab Test  04/27/15   1834   TSH  1.79     Hepatitis B  Recent Labs   Lab Test  10/12/15   0923   AUSAB  0.18   HBCAB  Nonreactive   HEPBANG  Nonreactive     Hepatitis C  Recent Labs   Lab Test  10/07/16   0912   HCVAB  Nonreactive   Assay performance characteristics have not been established for newborns,   infants, and children       HIV Screening  Recent Labs   Lab Test  10/12/15   0923   HIAGAB  Nonreactive   HIV-1 p24 Ag & HIV-1/HIV-2 Ab Not Detected       UA  Recent Labs   Lab Test  09/18/17   0924  10/02/15   0838   COLOR  Yellow  Yellow   APPEARANCE  Slightly Cloudy  Slightly Cloudy   URINEGLC  Negative  Negative   URINEBILI  Negative  Negative   SG  1.015  1.025   URINEPH  7.5*  5.0   PROTEIN  Negative  Negative   UROBILINOGEN  0.2  0.2   NITRITE  Negative  Negative   UBLD  Large*  Negative   LEUKEST  Negative  Negative   WBCU  O - 2  O - 2   RBCU  2-5*  O - 2   SQUAMOUSEPI  Few  Moderate*   BACTERIA  Few*   --      Urine Microscopic  Recent Labs   Lab Test  09/18/17   0924  10/02/15   0838   WBCU  O - 2  O - 2   RBCU  2-5*  O - 2   SQUAMOUSEPI  Few  Moderate*   BACTERIA  Few*   --      Urine Protein  Recent Labs   Lab Test  09/18/17   0924   UTP  0.13   UTPG  0.10   UCRR  129     Immunization History     Immunization History   Administered Date(s) Administered     TDAP Vaccine  (Adacel) 09/25/2012          Chart documentation done in part with Dragon Voice recognition Software. Although reviewed after completion, some word and grammatical error may remain.    Bill Resendiz MD

## 2017-11-16 ENCOUNTER — TELEPHONE (OUTPATIENT)
Dept: INTERNAL MEDICINE | Facility: CLINIC | Age: 52
End: 2017-11-16

## 2017-12-04 DIAGNOSIS — M06.4 INFLAMMATORY POLYARTHROPATHY (H): ICD-10-CM

## 2017-12-04 LAB
ALBUMIN SERPL-MCNC: 3.7 G/DL (ref 3.4–5)
ALP SERPL-CCNC: 69 U/L (ref 40–150)
ALT SERPL W P-5'-P-CCNC: 27 U/L (ref 0–50)
AST SERPL W P-5'-P-CCNC: 16 U/L (ref 0–45)
BASOPHILS # BLD AUTO: 0 10E9/L (ref 0–0.2)
BASOPHILS NFR BLD AUTO: 0.3 %
BILIRUB DIRECT SERPL-MCNC: <0.1 MG/DL (ref 0–0.2)
BILIRUB SERPL-MCNC: 0.4 MG/DL (ref 0.2–1.3)
CREAT SERPL-MCNC: 0.8 MG/DL (ref 0.52–1.04)
DIFFERENTIAL METHOD BLD: NORMAL
EOSINOPHIL # BLD AUTO: 0.1 10E9/L (ref 0–0.7)
EOSINOPHIL NFR BLD AUTO: 1.7 %
ERYTHROCYTE [DISTWIDTH] IN BLOOD BY AUTOMATED COUNT: 13.7 % (ref 10–15)
GFR SERPL CREATININE-BSD FRML MDRD: 74 ML/MIN/1.7M2
HCT VFR BLD AUTO: 42.8 % (ref 35–47)
HGB BLD-MCNC: 14.2 G/DL (ref 11.7–15.7)
LYMPHOCYTES # BLD AUTO: 1.9 10E9/L (ref 0.8–5.3)
LYMPHOCYTES NFR BLD AUTO: 29 %
MCH RBC QN AUTO: 29.2 PG (ref 26.5–33)
MCHC RBC AUTO-ENTMCNC: 33.2 G/DL (ref 31.5–36.5)
MCV RBC AUTO: 88 FL (ref 78–100)
MONOCYTES # BLD AUTO: 0.5 10E9/L (ref 0–1.3)
MONOCYTES NFR BLD AUTO: 6.9 %
NEUTROPHILS # BLD AUTO: 4.1 10E9/L (ref 1.6–8.3)
NEUTROPHILS NFR BLD AUTO: 62.1 %
PLATELET # BLD AUTO: 307 10E9/L (ref 150–450)
PROT SERPL-MCNC: 7.1 G/DL (ref 6.8–8.8)
RBC # BLD AUTO: 4.87 10E12/L (ref 3.8–5.2)
WBC # BLD AUTO: 6.5 10E9/L (ref 4–11)

## 2017-12-04 PROCEDURE — 80076 HEPATIC FUNCTION PANEL: CPT | Performed by: INTERNAL MEDICINE

## 2017-12-04 PROCEDURE — 85025 COMPLETE CBC W/AUTO DIFF WBC: CPT | Performed by: INTERNAL MEDICINE

## 2017-12-04 PROCEDURE — 82565 ASSAY OF CREATININE: CPT | Performed by: INTERNAL MEDICINE

## 2017-12-04 PROCEDURE — 36415 COLL VENOUS BLD VENIPUNCTURE: CPT | Performed by: INTERNAL MEDICINE

## 2018-01-08 DIAGNOSIS — M06.4 INFLAMMATORY POLYARTHROPATHY (H): ICD-10-CM

## 2018-01-08 LAB
ALBUMIN SERPL-MCNC: 3.4 G/DL (ref 3.4–5)
ALP SERPL-CCNC: 62 U/L (ref 40–150)
ALT SERPL W P-5'-P-CCNC: 35 U/L (ref 0–50)
AST SERPL W P-5'-P-CCNC: 21 U/L (ref 0–45)
BASOPHILS # BLD AUTO: 0 10E9/L (ref 0–0.2)
BASOPHILS NFR BLD AUTO: 0.3 %
BILIRUB DIRECT SERPL-MCNC: 0.1 MG/DL (ref 0–0.2)
BILIRUB SERPL-MCNC: 0.5 MG/DL (ref 0.2–1.3)
CREAT SERPL-MCNC: 0.74 MG/DL (ref 0.52–1.04)
DIFFERENTIAL METHOD BLD: NORMAL
EOSINOPHIL # BLD AUTO: 0.2 10E9/L (ref 0–0.7)
EOSINOPHIL NFR BLD AUTO: 2.4 %
ERYTHROCYTE [DISTWIDTH] IN BLOOD BY AUTOMATED COUNT: 14.6 % (ref 10–15)
GFR SERPL CREATININE-BSD FRML MDRD: 81 ML/MIN/1.7M2
HCT VFR BLD AUTO: 42 % (ref 35–47)
HGB BLD-MCNC: 14.1 G/DL (ref 11.7–15.7)
LYMPHOCYTES # BLD AUTO: 1.9 10E9/L (ref 0.8–5.3)
LYMPHOCYTES NFR BLD AUTO: 25.1 %
MCH RBC QN AUTO: 30.1 PG (ref 26.5–33)
MCHC RBC AUTO-ENTMCNC: 33.6 G/DL (ref 31.5–36.5)
MCV RBC AUTO: 90 FL (ref 78–100)
MONOCYTES # BLD AUTO: 0.5 10E9/L (ref 0–1.3)
MONOCYTES NFR BLD AUTO: 6.6 %
NEUTROPHILS # BLD AUTO: 4.8 10E9/L (ref 1.6–8.3)
NEUTROPHILS NFR BLD AUTO: 65.6 %
PLATELET # BLD AUTO: 303 10E9/L (ref 150–450)
PROT SERPL-MCNC: 6.9 G/DL (ref 6.8–8.8)
RBC # BLD AUTO: 4.68 10E12/L (ref 3.8–5.2)
WBC # BLD AUTO: 7.4 10E9/L (ref 4–11)

## 2018-01-08 PROCEDURE — 36415 COLL VENOUS BLD VENIPUNCTURE: CPT | Performed by: INTERNAL MEDICINE

## 2018-01-08 PROCEDURE — 80076 HEPATIC FUNCTION PANEL: CPT | Performed by: INTERNAL MEDICINE

## 2018-01-08 PROCEDURE — 85025 COMPLETE CBC W/AUTO DIFF WBC: CPT | Performed by: INTERNAL MEDICINE

## 2018-01-08 PROCEDURE — 82565 ASSAY OF CREATININE: CPT | Performed by: INTERNAL MEDICINE

## 2018-02-05 DIAGNOSIS — M06.4 INFLAMMATORY POLYARTHROPATHY (H): ICD-10-CM

## 2018-02-05 LAB
ALBUMIN SERPL-MCNC: 3.5 G/DL (ref 3.4–5)
ALP SERPL-CCNC: 63 U/L (ref 40–150)
ALT SERPL W P-5'-P-CCNC: 24 U/L (ref 0–50)
AST SERPL W P-5'-P-CCNC: 20 U/L (ref 0–45)
BASOPHILS # BLD AUTO: 0 10E9/L (ref 0–0.2)
BASOPHILS NFR BLD AUTO: 0.3 %
BILIRUB DIRECT SERPL-MCNC: <0.1 MG/DL (ref 0–0.2)
BILIRUB SERPL-MCNC: 0.4 MG/DL (ref 0.2–1.3)
CREAT SERPL-MCNC: 0.6 MG/DL (ref 0.52–1.04)
CRP SERPL-MCNC: <2.9 MG/L (ref 0–8)
DIFFERENTIAL METHOD BLD: NORMAL
EOSINOPHIL # BLD AUTO: 0.2 10E9/L (ref 0–0.7)
EOSINOPHIL NFR BLD AUTO: 2.3 %
ERYTHROCYTE [DISTWIDTH] IN BLOOD BY AUTOMATED COUNT: 14.2 % (ref 10–15)
ERYTHROCYTE [SEDIMENTATION RATE] IN BLOOD BY WESTERGREN METHOD: 7 MM/H (ref 0–30)
GFR SERPL CREATININE-BSD FRML MDRD: >90 ML/MIN/1.7M2
HCT VFR BLD AUTO: 39.8 % (ref 35–47)
HGB BLD-MCNC: 13.4 G/DL (ref 11.7–15.7)
LYMPHOCYTES # BLD AUTO: 1.8 10E9/L (ref 0.8–5.3)
LYMPHOCYTES NFR BLD AUTO: 26.5 %
MCH RBC QN AUTO: 30.3 PG (ref 26.5–33)
MCHC RBC AUTO-ENTMCNC: 33.7 G/DL (ref 31.5–36.5)
MCV RBC AUTO: 90 FL (ref 78–100)
MONOCYTES # BLD AUTO: 0.5 10E9/L (ref 0–1.3)
MONOCYTES NFR BLD AUTO: 6.8 %
NEUTROPHILS # BLD AUTO: 4.4 10E9/L (ref 1.6–8.3)
NEUTROPHILS NFR BLD AUTO: 64.1 %
PLATELET # BLD AUTO: 285 10E9/L (ref 150–450)
PROT SERPL-MCNC: 6.8 G/DL (ref 6.8–8.8)
RBC # BLD AUTO: 4.42 10E12/L (ref 3.8–5.2)
WBC # BLD AUTO: 6.9 10E9/L (ref 4–11)

## 2018-02-05 PROCEDURE — 80076 HEPATIC FUNCTION PANEL: CPT | Performed by: INTERNAL MEDICINE

## 2018-02-05 PROCEDURE — 85652 RBC SED RATE AUTOMATED: CPT | Performed by: INTERNAL MEDICINE

## 2018-02-05 PROCEDURE — 36415 COLL VENOUS BLD VENIPUNCTURE: CPT | Performed by: INTERNAL MEDICINE

## 2018-02-05 PROCEDURE — 82565 ASSAY OF CREATININE: CPT | Performed by: INTERNAL MEDICINE

## 2018-02-05 PROCEDURE — 85025 COMPLETE CBC W/AUTO DIFF WBC: CPT | Performed by: INTERNAL MEDICINE

## 2018-02-05 PROCEDURE — 86140 C-REACTIVE PROTEIN: CPT | Performed by: INTERNAL MEDICINE

## 2018-02-07 ENCOUNTER — OFFICE VISIT (OUTPATIENT)
Dept: RHEUMATOLOGY | Facility: CLINIC | Age: 53
End: 2018-02-07
Payer: COMMERCIAL

## 2018-02-07 VITALS
SYSTOLIC BLOOD PRESSURE: 130 MMHG | HEART RATE: 81 BPM | TEMPERATURE: 97.3 F | WEIGHT: 226.8 LBS | HEIGHT: 65 IN | DIASTOLIC BLOOD PRESSURE: 72 MMHG | BODY MASS INDEX: 37.79 KG/M2 | OXYGEN SATURATION: 97 %

## 2018-02-07 DIAGNOSIS — M06.4 INFLAMMATORY POLYARTHROPATHY (H): Primary | ICD-10-CM

## 2018-02-07 DIAGNOSIS — Z79.899 HIGH RISK MEDICATIONS (NOT ANTICOAGULANTS) LONG-TERM USE: ICD-10-CM

## 2018-02-07 PROCEDURE — 99214 OFFICE O/P EST MOD 30 MIN: CPT | Performed by: INTERNAL MEDICINE

## 2018-02-07 RX ORDER — SULFASALAZINE 500 MG/1
500 TABLET, DELAYED RELEASE ORAL 2 TIMES DAILY
Qty: 60 TABLET | Refills: 3 | Status: SHIPPED | OUTPATIENT
Start: 2018-02-07 | End: 2018-05-23

## 2018-02-07 RX ORDER — METHOTREXATE 2.5 MG/1
20 TABLET ORAL WEEKLY
Qty: 32 TABLET | Refills: 3 | Status: SHIPPED | OUTPATIENT
Start: 2018-02-07 | End: 2018-05-23

## 2018-02-07 NOTE — PROGRESS NOTES
Rheumatology Clinic Visit      Bria Haddad MRN# [id number]   YOB: 1965 Age: 52 year old      Date of visit: 2/07/18   PCP: Dr. Stephanie Saldana    Chief Complaint   Patient presents with:  RECHECK: Patient states she is doing ok, except for swelling in her ankles, left is worse      Assessment and Plan     1. Inflammatory Polyarthropathy: Ms. Haddad initially presented to this clinic in 2015 with dependent edema of the bilateral lower extremities, fatigue, and a one time episode of right toe pain in the setting of a positive ROSEMARY.  On 9/18/2017 she presented with synovitis of her bilateral PIPs and pain without swelling of her MCPs, persistent fatigue, intermittent rash, and dependent edema. ROSEMARY positive but additional studies negative/normal including complement C3, complement C4, beta-2 glycoprotein IgM and IgG, cardiolipin IgM and IgG, lupus anticoagulant, RNP, Stuart, SSA, SSB, SCL 70.  Previously on HCQ (bloating).  Now on MTX 15mg once weekly (started 11/2017) with significant improvement of the synovitis.  Still having morning stiffness and pain in the PIPs.  Therefore, increase methotrexate to 20 mg once weekly and start low-dose sulfasalazine.  Note that she had hives with aspirin as a child but she does that she has taken aspirin since that time and done okay.    - Increase methotrexate to 20mg once weekly  - Continue folic acid 1mg daily  - Start sulfasalazine 500mg twice daily  - Labs monthly ×2 months: CBC, creatinine, hepatic panel  - Labs 2-3 days prior to the next rheumatology clinic visit: CBC, Creatinine, Hepatic Panel, ESR, CRP    # Sulfasalazine Risks and Benefits: The risks and benefits of sulfasalazine were discussed in detail and the patient verbalized understanding.  The risks discussed include, but are not limited to, the risk for hypersensitivity, anaphylaxis, anaphylactoid reactions, infections, bone marrow suppression,  hepatotoxicity, nausea, vomiting, and GI upset.   Oligospermia may occur in males.  I encouraged reviewing the package insert and asking any questions about the medication.      2. Left knee pain: mechanical in nature.  Significant improvement with PT and I encouraged her to do the physical therapy exercises on a daily basis    3.  Dependent edema: Worse in the evening and improves with leg elevation.  I encouraged her to continue wearing compression stockings and to follow-up with her PCP for this issue     Ms. Haddad verbalized agreement with and understanding of the rational for the diagnosis and treatment plan.  All questions were answered to best of my ability and the patient's satisfaction. Ms. Haddad was advised to contact the clinic with any questions that may arise after the clinic visit.      Thank you for involving me in the care of the patient    Return to clinic: 3 months      HPI   Bria Haddad is a 52 year old female with a past medical history significant for endometriosis, s/p carpal tunnel release surgery who present for follow up of positive ROSEMARY.    Today, Ms. Haddad reports that she still has bilateral ankle swelling, worse on the left, that is worse at the end of the day and improves with leg elevation.  Minimal to no leg swelling when she wakes up in the morning.  She wears compression stockings.  Has some pain in her knees, primarily left knee, that is worse with activities such as going up and down stairs or walking for longer duration.  Hand pain has significantly improved with less swelling except for her right fourth PIP that she says is still mildly swollen.  Overall she thinks that the medication is helping with her hands but not the rest of her body.       Denies fevers, chills, nausea, vomiting, constipation, diarrhea. No abdominal pain. No chest pain/pressure, palpitations, or shortness of breath. No neck pain. No oral or nasal sores. No sicca symptoms. No photosensitivity or photophobia. No eye pain or redness. No history of inflammatory  eye disease.  No history of DVT or pulmonary embolism.  No history of serositis.  No history of Raynaud's Phenomenon.  No seizure disorder.  No known renal disorder.      Tobacco: none  EtOH: none  Drugs: none    ROS   GEN: No fevers, chills, night sweats, or weight change  SKIN: See HPI  HEENT: No oral or nasal ulcers.  CV: No chest pain, pressure, palpitations, or dyspnea on exertion.  PULM: No SOB, wheeze, cough.  GI: No nausea, vomiting, constipation, diarrhea. No blood in stool. No abdominal pain.  : No blood in urine.  MSK: See HPI.  NEURO: No numbness or tingling.  EXT: See HPI  PSYCH: Negative    Active Problem List     Patient Active Problem List   Diagnosis     CARDIOVASCULAR SCREENING; LDL GOAL LESS THAN 160     Endometriosis of uterus     Bilateral carpal tunnel syndrome     S/P carpal tunnel release     Inflammatory polyarthropathy (H)     Acute pain of left knee     Past Medical History     Past Medical History:   Diagnosis Date     NO ACTIVE PROBLEMS      Past Surgical History     Past Surgical History:   Procedure Laterality Date     bunions       COLONOSCOPY N/A 11/9/2015    Procedure: COLONOSCOPY;  Surgeon: Andrew Adamson MD;  Location: [facility]     COLONOSCOPY WITH CO2 INSUFFLATION N/A 11/9/2015    Procedure: COLONOSCOPY WITH CO2 INSUFFLATION;  Surgeon: Andrew Adamson MD;  Location: [facility]     GYN SURGERY      endometryosis     RELEASE CARPAL TUNNEL Right 10/12/2016    Procedure: RELEASE CARPAL TUNNEL;  Surgeon: Colby Nobles MD;  Location: [facility]     RELEASE CARPAL TUNNEL Left 12/16/2016    Procedure: RELEASE CARPAL TUNNEL;  Surgeon: Colby Nobles MD;  Location: [facility]     TONSILLECTOMY & ADENOIDECTOMY       Allergy     Allergies   Allergen Reactions     Asa [Aspirin] Hives     Current Medication List     Current Outpatient Prescriptions   Medication Sig     methotrexate sodium 2.5 MG TABS Take 20 mg by mouth once a week . Take all 8 tablets on the same day of each  "week.     sulfaSALAzine ER (AZULFIDINE EN) 500 MG EC tablet Take 1 tablet (500 mg) by mouth 2 times daily     folic acid (FOLVITE) 1 MG tablet Take 1 tablet (1 mg) by mouth daily     [DISCONTINUED] methotrexate sodium 2.5 MG TABS Take 15 mg by mouth once a week . Take all 6 tablets on the same day of each week.     mometasone (ELOCON) 0.1 % cream Apply sparingly to affected area twice daily as needed.  Do not apply to face. (Patient not taking: Reported on 9/18/2017)     order for DME Equipment being ordered: compression stockings 1 pair, at 30mm Hg, to be worn during the day. For Bilateral leg edema [R60.0]     No current facility-administered medications for this visit.          Social History   See HPI    Family History     Family History   Problem Relation Age of Onset     OSTEOPOROSIS Mother      Respiratory Mother      Thyroid Disease Mother      HEART DISEASE Father      Cancer - colorectal Maternal Grandmother      DIABETES Maternal Grandfather      HEART DISEASE Maternal Grandfather      CANCER Paternal Grandmother      HEART DISEASE Paternal Grandfather      Respiratory Paternal Grandfather      Hypertension Brother      Thyroid Disease Sister      Thyroid Disease Sister      Neurologic Disorder Brother      Physical Exam     Temp Readings from Last 3 Encounters:   02/07/18 97.3  F (36.3  C) (Oral)   09/18/17 97.6  F (36.4  C) (Oral)   08/14/17 97.6  F (36.4  C) (Oral)     BP Readings from Last 5 Encounters:   02/07/18 130/72   11/08/17 124/88   09/18/17 136/84   07/12/17 126/70   06/19/17 115/66     Pulse Readings from Last 1 Encounters:   02/07/18 81     Resp Readings from Last 1 Encounters:   02/02/17 12     Estimated body mass index is 38.03 kg/(m^2) as calculated from the following:    Height as of this encounter: 1.645 m (5' 4.75\").    Weight as of this encounter: 102.9 kg (226 lb 12.8 oz).    GEN: NAD, overweight  HEENT: MMM. No oral lesions. EOMI. Anicteric, noninjected sclera  CV: S1, S2. RRR. No " m/r/g.  PULM: CTA bilaterally. No w/c.  MSK: MCPs without swelling or tenderness to palpation.  Mild swelling and tenderness palpation of the bilateral second-fourth PIPs, worse at the right fourth PIP.  Wrists, elbows, shoulders, knees, ankles, and MTPs without swelling or tenderness to palpation.  No dactylitis.  Hips nontender to direct palpation.     NEURO: UE and LE strengths 5/5 and equal bilaterally.   SKIN: No rash  PSYCH: Alert. Appropriate.    Labs / Imaging (select studies)   RF/CCP  Recent Labs   Lab Test  09/18/17   0913   CCPIGG  1   RHF  <20     ROSEMARY  Recent Labs   Lab Test  07/03/17   1614  10/12/15   0923  10/02/15   0820   MONROE  4.9*   --   2.6*   ANAIGG   --   1:320  Reference range: <1:40  (Note)  Anti-Centromere pattern observed.  INTERPRETIVE INFORMATION: ROSEMARY by IFA, IgG  Anti-nuclear antibodies (ROSEMARY) are seen in a variety of  systemic rheumatic diseases and are determined by indirect  fluorescence assay (IFA) using HEp-2 substrate with an  IgG-specific conjugate. ROSEMARY titers less than or equal to  1:80 have variable relevance while titers greater than or  equal to 1:160 are considered clinically significant. These  antibodies may precede clinical disease onset; however,  healthy individuals and those with advanced age have been  reported to be positive for ROSEMARY. When observed, one of the  five basic patterns is reported: homogeneous,  peripheral/rim, speckled, centromere, or nucleolar. If  cytoplasmic fluorescence is observed, it is noted. IFA  methodology is subjective and has occasionally been shown  to lack sensitivity for anti-SSA/Ro antibodies.  Negative results do not necessarily rule out the presence  of SSc. If clinical suspicion r emains, consider further  testing for U3-RNP, PM/Scl, or Th/To antibodies associated  with SSc.  Performed by [facility],  [address] [place] [id number] [phone]  [email or web address], Ilan Vee MD, Lab. Director  *   --       RNP/Sm/SSA/SSB  Recent Labs   Lab Test  09/18/17   0913  10/12/15   0923   RNPIGG  <0.2  <0.2  Negative   Antibody index (AI) values reflect qualitative changes in antibody   concentration that cannot be directly associated with clinical condition or   disease state.     SMIGG  <0.2  <0.2  Negative   Antibody index (AI) values reflect qualitative changes in antibody   concentration that cannot be directly associated with clinical condition or   disease state.     SSAIGG  <0.2  <0.2  Negative   Antibody index (AI) values reflect qualitative changes in antibody   concentration that cannot be directly associated with clinical condition or   disease state.     SSBIGG  <0.2  <0.2  Negative   Antibody index (AI) values reflect qualitative changes in antibody   concentration that cannot be directly associated with clinical condition or   disease state.     SCLIGG  <0.2  <0.2  Negative   Antibody index (AI) values reflect qualitative changes in antibody   concentration that cannot be directly associated with clinical condition or   disease state.       dsDNA  Recent Labs   Lab Test  09/18/17   0913  10/12/15   0923   DNA  1  <15  Interpretation:  Negative       C3/C4  Recent Labs   Lab Test  09/18/17   0913  10/12/15   0923   [id number]  137  124   [id number]  31  24     Antiphospholipid Antibodies  Recent Labs   Lab Test  10/12/15   0923   B2IGG  1   B2IGM  4   CARG  <15.0  Interpretation:  Negative     JOSELITO  <12.5  Interpretation:  Negative     LUPINT  Negative  (Note)  COMMENTS:  The INR is normal.  APTT ratio is normal.  DRVVT Screen ratio is normal.  Thrombin time is normal.  NEGATIVE TEST; A LUPUS ANTICOAGULANT WAS NOT DETECTED IN THIS  SPECIMEN WITHIN THE LIMITS OF THE TESTING REPERTOIRE.  If the clinical picture is strongly suggestive of an antiphospholipid  syndrome, recommend anticardiolipin and beta-2-glycoprotein (IgG and  IgM) antibody tests.  Barbara Lerma M.D.  [phone]  10/13/2015    INR =  1.05     Reference range: 0.86-1.14  Thrombin Time= 15.4    Reference range: 13.0-19.0 sec    APTT:       Ratio  Patient  =  0.97  1:2 Mix  =  N/A  Reference:  Negative: Less than or equal to 1.16  Positive: Greater than or equal to 1.17     DILUTE BELKIS VIPER VENOM TEST:  Screen Ratio = 0.70   Normal is less than 1.21         CBC  Recent Labs   Lab Test  02/05/18 0759 01/08/18 0753 12/04/17   0805   WBC  6.9  7.4  6.5   RBC  4.42  4.68  4.87   HGB  13.4  14.1  14.2   HCT  39.8  42.0  42.8   MCV  90  90  88   RDW  14.2  14.6  13.7   PLT  285  303  307   MCH  30.3  30.1  29.2   MCHC  33.7  33.6  33.2   NEUTROPHIL  64.1  65.6  62.1   LYMPH  26.5  25.1  29.0   MONOCYTE  6.8  6.6  6.9   EOSINOPHIL  2.3  2.4  1.7   BASOPHIL  0.3  0.3  0.3   ANEU  4.4  4.8  4.1   ALYM  1.8  1.9  1.9   MIKE  0.5  0.5  0.5   AEOS  0.2  0.2  0.1   ABAS  0.0  0.0  0.0     CMP  Recent Labs   Lab Test  02/05/18 0759 01/08/18 0753 12/04/17   0805  09/18/17   0913  12/05/16   0811  10/07/16   0912  04/27/15   1834   NA   --    --    --   141  142  143  139   POTASSIUM   --    --    --   4.0  4.0  4.2  3.7   CHLORIDE   --    --    --   107  105  107  106   CO2   --    --    --   29  29  29  29   ANIONGAP   --    --    --   5  8  7  4   GLC   --    --    --   96  104*  75  86   BUN   --    --    --   14  10  16  13   CR  0.60  0.74  0.80  0.76  0.73  0.74  0.77   GFRESTIMATED  >90  81  74  79  83  82  79   GFRESTBLACK  >90  >90  90  >90  >90   GFR Calc    >90   GFR Calc    >90   GFR Calc     AGUILAR   --    --    --   8.8  9.2  9.3  8.9   BILITOTAL  0.4  0.5  0.4  0.4   --    --   0.2   ALBUMIN  3.5  3.4  3.7  3.7   --    --   4.1   PROTTOTAL  6.8  6.9  7.1  7.4   --    --   7.5   ALKPHOS  63  62  69  71   --    --   67   AST  20  21  16  16   --    --   23   ALT  24  35  27  23   --    --   36     Calcium/VitaminD  Recent Labs   Lab Test  09/18/17   0913  12/05/16   0811  10/07/16   0912   AGUILAR  8.8   9.2  9.3     ESR/CRP  Recent Labs   Lab Test  02/05/18   0759  09/18/17   0913  10/12/15   0923   SED  7  8  7   CRP  <2.9  4.8  <2.9     CK/Aldolase  Recent Labs   Lab Test  09/18/17   0913  10/12/15   0923  10/02/15   0820   CKT  92   --   66   ALDOLASE   --   5.8   --      TSH/T4  Recent Labs   Lab Test  04/27/15   1834   TSH  1.79     Hepatitis B  Recent Labs   Lab Test  10/12/15   0923   AUSAB  0.18   HBCAB  Nonreactive   HEPBANG  Nonreactive     Hepatitis C  Recent Labs   Lab Test  10/07/16   0912   HCVAB  Nonreactive   Assay performance characteristics have not been established for newborns,   infants, and children       HIV Screening  Recent Labs   Lab Test  10/12/15   0923   HIAGAB  Nonreactive   HIV-1 p24 Ag & HIV-1/HIV-2 Ab Not Detected       UA  Recent Labs   Lab Test  09/18/17   0924  10/02/15   0838   COLOR  Yellow  Yellow   APPEARANCE  Slightly Cloudy  Slightly Cloudy   URINEGLC  Negative  Negative   URINEBILI  Negative  Negative   SG  1.015  1.025   URINEPH  7.5*  5.0   PROTEIN  Negative  Negative   UROBILINOGEN  0.2  0.2   NITRITE  Negative  Negative   UBLD  Large*  Negative   LEUKEST  Negative  Negative   WBCU  O - 2  O - 2   RBCU  2-5*  O - 2   SQUAMOUSEPI  Few  Moderate*   BACTERIA  Few*   --      Urine Microscopic  Recent Labs   Lab Test  09/18/17   0924  10/02/15   0838   WBCU  O - 2  O - 2   RBCU  2-5*  O - 2   SQUAMOUSEPI  Few  Moderate*   BACTERIA  Few*   --      Urine Protein  Recent Labs   Lab Test  09/18/17   0924   UTP  0.13   UTPG  0.10   UCRR  129       Chart documentation done in part with Dragon Voice recognition Software. Although reviewed after completion, some word and grammatical error may remain.    Bill Resendiz MD

## 2018-02-07 NOTE — MR AVS SNAPSHOT
After Visit Summary   2/7/2018    Bria Haddad    MRN: [id number]           Patient Information     Date Of Birth          1965        Visit Information        Provider Department      2/7/2018 7:20 AM Bill Resendiz MD [facility]        Today's Diagnoses     Inflammatory polyarthropathy (H)           Follow-ups after your visit        Your next 10 appointments already scheduled     Mar 05, 2018  7:30 AM CST   [facility]    [address] [facility] [address]   [phone]           Please do not eat 10-12 hours before your appointment if you are coming in fasting for labs on lipids, cholesterol, or glucose (sugar). This does not apply to pregnant women. Water, hot tea and black coffee (with nothing added) are okay. Do not drink other fluids, diet soda or chew gum.            Apr 09, 2018  7:45 AM CDT   [facility] with [facility] LAB   [facility]    [address] [address]   [phone]           Please do not eat 10-12 hours before your appointment if you are coming in fasting for labs on lipids, cholesterol, or glucose (sugar). This does not apply to pregnant women. Water, hot tea and black coffee (with nothing added) are okay. Do not drink other fluids, diet soda or chew gum.            May 21, 2018  7:30 AM CDT   LAB with AN LAB   [facility]    [address] [facility] [address]   [phone]           Please do not eat 10-12 hours before your appointment if you are coming in fasting for labs on lipids, cholesterol, or glucose (sugar). This does not apply to pregnant women. Water, hot tea and black coffee (with nothing added) are okay. Do not drink other fluids, diet soda or chew gum.            May 23, 2018  7:20 AM CDT   Return Visit with MD Jerrell Wild ([facility] [place])     [address] [facility]  [place] [address]   [phone]              Future tests that were ordered for you today     Open Standing Orders        Priority Remaining Interval Expires Ordered    CBC with platelets differential Routine 2/2 Every 4 Weeks 8/6/2018 2/7/2018    Creatinine Routine 2/2 Every 4 Weeks 8/6/2018 2/7/2018    Hepatic panel Routine 2/2 Every 4 Weeks 8/6/2018 2/7/2018          Open Future Orders        Priority Expected Expires Ordered    Creatinine Routine 5/4/2018 5/23/2018 2/7/2018    Erythrocyte sedimentation rate auto Routine 5/4/2018 5/23/2018 2/7/2018    CRP inflammation Routine 5/4/2018 5/23/2018 2/7/2018    Hepatic panel Routine 5/4/2018 5/23/2018 2/7/2018    CBC with platelets differential Routine 5/4/2018 5/23/2018 2/7/2018            Who to contact     If you have questions or need follow up information about today's clinic visit or your schedule please contact [facility] directly at [phone].  Normal or non-critical lab and imaging results will be communicated to you by [employer], letter or phone within 4 business days after the clinic has received the results. If you do not hear from us within 7 days, please contact the clinic through [employer] or phone. If you have a critical or abnormal lab result, we will notify you by phone as soon as possible.  Submit refill requests through [employer] or call your pharmacy and they will forward the refill request to us. Please allow 3 business days for your refill to be completed.          Additional Information About Your Visit        [employer] Information     [employer] gives you secure access to your electronic health record. If you see a primary care provider, you can also send messages to your care team and make appointments. If you have questions, please call your primary care clinic.  If you do not have a primary care provider, please call [phone] and they will assist you.        Care EveryWhere ID     This is your Care  "EveryWhere ID. This could be used by other organizations to access your [facility] medical records  [id number]        Your Vitals Were     Pulse Temperature Height Pulse Oximetry BMI (Body Mass Index)       81 97.3  F (36.3  C) (Oral) 1.645 m (5' 4.75\") 97% 38.03 kg/m2        Blood Pressure from Last 3 Encounters:   02/07/18 130/72   11/08/17 124/88   09/18/17 136/84    Weight from Last 3 Encounters:   02/07/18 102.9 kg (226 lb 12.8 oz)   11/08/17 99.1 kg (218 lb 6.4 oz)   09/18/17 97.5 kg (215 lb)                 Today's Medication Changes          These changes are accurate as of 2/7/18  8:18 AM.  If you have any questions, ask your nurse or doctor.               Start taking these medicines.        Dose/Directions    sulfaSALAzine  MG EC tablet   Commonly known as:  AZULFIDINE EN   Used for:  Inflammatory polyarthropathy (H)   Started by:  Bill Resendiz MD        Dose:  500 mg   Take 1 tablet (500 mg) by mouth 2 times daily   Quantity:  60 tablet   Refills:  3         These medicines have changed or have updated prescriptions.        Dose/Directions    methotrexate sodium 2.5 MG Tabs   This may have changed:    - how much to take  - additional instructions   Used for:  Inflammatory polyarthropathy (H)   Changed by:  Bill Resendiz MD        Dose:  20 mg   Take 20 mg by mouth once a week . Take all 8 tablets on the same day of each week.   Quantity:  32 tablet   Refills:  3            Where to get your medicines      These medications were sent to [id number] [address]  [place]  [address] [facility] [address]     Phone:  [phone]     methotrexate sodium 2.5 MG Tabs    sulfaSALAzine  MG EC tablet                Primary Care Provider Office Phone # Fax #    Stephanie Saldana [phone] [address] [place] [address]        Equal Access to Services     FRANKI VELIZ AH: Hadii " niall Collins, waazaredilia pérezlisa, qabrooketa kalidia hankins, milton leslie maheraleklin nicholson christelle. [facility] [phone].    ATENCIÓN: Si manjeetla andrea, tiene a lopez disposición servicios gratuitos de asistencia lingüística. Keoame al [phone].    We comply with applicable federal civil rights laws and [place] laws. We do not discriminate on the basis of race, color, national origin, age, disability, sex, sexual orientation, or gender identity.            Thank you!     Thank you for choosing [facility] [place]  for your care. Our goal is always to provide you with excellent care. Hearing back from our patients is one way we can continue to improve our services. Please take a few minutes to complete the written survey that you may receive in the mail after your visit with us. Thank you!             Your Updated Medication List - Protect others around you: Learn how to safely use, store and throw away your medicines at www.disposemymeds.org.          This list is accurate as of 2/7/18  8:18 AM.  Always use your most recent med list.                   Brand Name Dispense Instructions for use Diagnosis    folic acid 1 MG tablet    FOLVITE    100 tablet    Take 1 tablet (1 mg) by mouth daily    Inflammatory polyarthropathy (H)       methotrexate sodium 2.5 MG Tabs     32 tablet    Take 20 mg by mouth once a week . Take all 8 tablets on the same day of each week.    Inflammatory polyarthropathy (H)       mometasone 0.1 % cream    ELOCON    45 g    Apply sparingly to affected area twice daily as needed.  Do not apply to face.    Dermatitis       order for DME     2 Units    Equipment being ordered: compression stockings 1 pair, at 30mm Hg, to be worn during the day. For Bilateral leg edema [R60.0]    Bilateral leg edema       sulfaSALAzine  MG EC tablet    AZULFIDINE EN    60 tablet    Take 1 tablet (500 mg) by mouth 2 times daily    Inflammatory polyarthropathy (H)

## 2018-02-07 NOTE — NURSING NOTE
"Chief Complaint   Patient presents with     RECHECK     Patient states she is doing ok, except for swelling in her ankles, left is worse       Initial /72 (BP Location: Left arm, Patient Position: Chair, Cuff Size: Adult Large)  Pulse 81  Temp 97.3  F (36.3  C) (Oral)  Ht 1.645 m (5' 4.75\")  Wt 102.9 kg (226 lb 12.8 oz)  SpO2 97%  BMI 38.03 kg/m2 Estimated body mass index is 38.03 kg/(m^2) as calculated from the following:    Height as of this encounter: 1.645 m (5' 4.75\").    Weight as of this encounter: 102.9 kg (226 lb 12.8 oz).  BP completed using cuff size: large         RAPID3 (0-30) Cumulative Score  2.5          RAPID3 Weighted Score (divide #4 by 3 and that is the weighted score)  0.83         "

## 2018-02-15 PROBLEM — M25.562 ACUTE PAIN OF LEFT KNEE: Status: RESOLVED | Noted: 2017-09-29 | Resolved: 2018-02-15

## 2018-02-15 NOTE — PROGRESS NOTES
Subjective:  HPI                    Objective:  System    Physical Exam    General     ROS    Assessment/Plan:    DISCHARGE REPORT    Progress reporting period is from 9/29/17 to 2/15/18.     SUBJECTIVE  Subjective: Pt reports her knee has been ok the past week, but did have dififculty with quad stretch because she didn't have a long enough belt   Current Pain level: 2/10   Initial Pain level: 2/10   Changes in function: No changes noted in function since last SOAP note   Adverse reactions: None;   ,     Patient has failed to return to therapy so current objective findings are unknown.  The subjective and objective information are from the last SOAP note on this patient.    OBJECTIVE  Objective: decreased flexibility remains in left quads; pt demonstrates anterior knee excursion with partial squat      ASSESSMENT/PLAN  Updated problem list and treatment plan: Diagnosis 1:  Left knee pain      Pain -  home program  Decreased function - home program  STG/LTGs have been met or progress has been made towards goals:  Yes (See Goal flow sheet completed today.)  Assessment of Progress: The patient has not returned to therapy. Current status is unknown.  Self Management Plans:  Patient has been instructed in a home treatment program.  Patient is independent in a home treatment program.  Patient  has been instructed in self management of symptoms.  Patient is independent in self management of symptoms.  I have re-evaluated this patient and find that the nature, scope, duration and intensity of the therapy is appropriate for the medical condition of the patient.  Bria continues to require the following intervention to meet STG and LTG's: PT intervention is no longer required to meet STG/LTG.  The patient failed to complete scheduled/ordered appointments so current information is unknown.  We will discharge this patient from PT.    Recommendations:  This patient is ready to be discharged from therapy and continue their home  treatment program.    Please refer to the daily flowsheet for treatment today, total treatment time and time spent performing 1:1 timed codes.

## 2018-03-05 DIAGNOSIS — M06.4 INFLAMMATORY POLYARTHROPATHY (H): ICD-10-CM

## 2018-03-05 LAB
ALBUMIN SERPL-MCNC: 3.6 G/DL (ref 3.4–5)
ALP SERPL-CCNC: 70 U/L (ref 40–150)
ALT SERPL W P-5'-P-CCNC: 31 U/L (ref 0–50)
AST SERPL W P-5'-P-CCNC: 26 U/L (ref 0–45)
BASOPHILS # BLD AUTO: 0 10E9/L (ref 0–0.2)
BASOPHILS NFR BLD AUTO: 0.5 %
BILIRUB DIRECT SERPL-MCNC: <0.1 MG/DL (ref 0–0.2)
BILIRUB SERPL-MCNC: 0.3 MG/DL (ref 0.2–1.3)
CREAT SERPL-MCNC: 0.69 MG/DL (ref 0.52–1.04)
DIFFERENTIAL METHOD BLD: NORMAL
EOSINOPHIL # BLD AUTO: 0.1 10E9/L (ref 0–0.7)
EOSINOPHIL NFR BLD AUTO: 2.3 %
ERYTHROCYTE [DISTWIDTH] IN BLOOD BY AUTOMATED COUNT: 14 % (ref 10–15)
GFR SERPL CREATININE-BSD FRML MDRD: 88 ML/MIN/1.7M2
HCT VFR BLD AUTO: 42.8 % (ref 35–47)
HGB BLD-MCNC: 14.4 G/DL (ref 11.7–15.7)
LYMPHOCYTES # BLD AUTO: 1.8 10E9/L (ref 0.8–5.3)
LYMPHOCYTES NFR BLD AUTO: 29.3 %
MCH RBC QN AUTO: 31 PG (ref 26.5–33)
MCHC RBC AUTO-ENTMCNC: 33.6 G/DL (ref 31.5–36.5)
MCV RBC AUTO: 92 FL (ref 78–100)
MONOCYTES # BLD AUTO: 0.4 10E9/L (ref 0–1.3)
MONOCYTES NFR BLD AUTO: 6.9 %
NEUTROPHILS # BLD AUTO: 3.8 10E9/L (ref 1.6–8.3)
NEUTROPHILS NFR BLD AUTO: 61 %
PLATELET # BLD AUTO: 342 10E9/L (ref 150–450)
PROT SERPL-MCNC: 7.4 G/DL (ref 6.8–8.8)
RBC # BLD AUTO: 4.64 10E12/L (ref 3.8–5.2)
WBC # BLD AUTO: 6.2 10E9/L (ref 4–11)

## 2018-03-05 PROCEDURE — 82565 ASSAY OF CREATININE: CPT | Performed by: INTERNAL MEDICINE

## 2018-03-05 PROCEDURE — 80076 HEPATIC FUNCTION PANEL: CPT | Performed by: INTERNAL MEDICINE

## 2018-03-05 PROCEDURE — 85025 COMPLETE CBC W/AUTO DIFF WBC: CPT | Performed by: INTERNAL MEDICINE

## 2018-03-05 PROCEDURE — 36415 COLL VENOUS BLD VENIPUNCTURE: CPT | Performed by: INTERNAL MEDICINE

## 2018-04-09 DIAGNOSIS — M06.4 INFLAMMATORY POLYARTHROPATHY (H): ICD-10-CM

## 2018-04-09 LAB
ALBUMIN SERPL-MCNC: 4 G/DL (ref 3.4–5)
ALP SERPL-CCNC: 65 U/L (ref 40–150)
ALT SERPL W P-5'-P-CCNC: 28 U/L (ref 0–50)
AST SERPL W P-5'-P-CCNC: 24 U/L (ref 0–45)
BASOPHILS # BLD AUTO: 0 10E9/L (ref 0–0.2)
BASOPHILS NFR BLD AUTO: 0.5 %
BILIRUB DIRECT SERPL-MCNC: <0.1 MG/DL (ref 0–0.2)
BILIRUB SERPL-MCNC: 0.3 MG/DL (ref 0.2–1.3)
CREAT SERPL-MCNC: 0.8 MG/DL (ref 0.52–1.04)
DIFFERENTIAL METHOD BLD: NORMAL
EOSINOPHIL # BLD AUTO: 0.2 10E9/L (ref 0–0.7)
EOSINOPHIL NFR BLD AUTO: 2.7 %
ERYTHROCYTE [DISTWIDTH] IN BLOOD BY AUTOMATED COUNT: 13.9 % (ref 10–15)
GFR SERPL CREATININE-BSD FRML MDRD: 75 ML/MIN/1.7M2
HCT VFR BLD AUTO: 42.4 % (ref 35–47)
HGB BLD-MCNC: 14.2 G/DL (ref 11.7–15.7)
LYMPHOCYTES # BLD AUTO: 2.2 10E9/L (ref 0.8–5.3)
LYMPHOCYTES NFR BLD AUTO: 34.6 %
MCH RBC QN AUTO: 31.4 PG (ref 26.5–33)
MCHC RBC AUTO-ENTMCNC: 33.5 G/DL (ref 31.5–36.5)
MCV RBC AUTO: 94 FL (ref 78–100)
MONOCYTES # BLD AUTO: 0.4 10E9/L (ref 0–1.3)
MONOCYTES NFR BLD AUTO: 6.5 %
NEUTROPHILS # BLD AUTO: 3.5 10E9/L (ref 1.6–8.3)
NEUTROPHILS NFR BLD AUTO: 55.7 %
PLATELET # BLD AUTO: 315 10E9/L (ref 150–450)
PROT SERPL-MCNC: 7.4 G/DL (ref 6.8–8.8)
RBC # BLD AUTO: 4.52 10E12/L (ref 3.8–5.2)
WBC # BLD AUTO: 6.3 10E9/L (ref 4–11)

## 2018-04-09 PROCEDURE — 82565 ASSAY OF CREATININE: CPT | Performed by: INTERNAL MEDICINE

## 2018-04-09 PROCEDURE — 80076 HEPATIC FUNCTION PANEL: CPT | Performed by: INTERNAL MEDICINE

## 2018-04-09 PROCEDURE — 36415 COLL VENOUS BLD VENIPUNCTURE: CPT | Performed by: INTERNAL MEDICINE

## 2018-04-09 PROCEDURE — 85025 COMPLETE CBC W/AUTO DIFF WBC: CPT | Performed by: INTERNAL MEDICINE

## 2018-05-21 DIAGNOSIS — M06.4 INFLAMMATORY POLYARTHROPATHY (H): ICD-10-CM

## 2018-05-21 LAB
ALBUMIN SERPL-MCNC: 3.6 G/DL (ref 3.4–5)
ALP SERPL-CCNC: 62 U/L (ref 40–150)
ALT SERPL W P-5'-P-CCNC: 46 U/L (ref 0–50)
AST SERPL W P-5'-P-CCNC: 28 U/L (ref 0–45)
BASOPHILS # BLD AUTO: 0 10E9/L (ref 0–0.2)
BASOPHILS NFR BLD AUTO: 0.5 %
BILIRUB DIRECT SERPL-MCNC: <0.1 MG/DL (ref 0–0.2)
BILIRUB SERPL-MCNC: 0.3 MG/DL (ref 0.2–1.3)
CREAT SERPL-MCNC: 0.82 MG/DL (ref 0.52–1.04)
CRP SERPL-MCNC: 5.2 MG/L (ref 0–8)
DIFFERENTIAL METHOD BLD: NORMAL
EOSINOPHIL # BLD AUTO: 0.3 10E9/L (ref 0–0.7)
EOSINOPHIL NFR BLD AUTO: 5 %
ERYTHROCYTE [DISTWIDTH] IN BLOOD BY AUTOMATED COUNT: 13.9 % (ref 10–15)
ERYTHROCYTE [SEDIMENTATION RATE] IN BLOOD BY WESTERGREN METHOD: 8 MM/H (ref 0–30)
GFR SERPL CREATININE-BSD FRML MDRD: 72 ML/MIN/1.7M2
HCT VFR BLD AUTO: 41 % (ref 35–47)
HGB BLD-MCNC: 13.9 G/DL (ref 11.7–15.7)
LYMPHOCYTES # BLD AUTO: 1.8 10E9/L (ref 0.8–5.3)
LYMPHOCYTES NFR BLD AUTO: 31.1 %
MCH RBC QN AUTO: 32 PG (ref 26.5–33)
MCHC RBC AUTO-ENTMCNC: 33.9 G/DL (ref 31.5–36.5)
MCV RBC AUTO: 95 FL (ref 78–100)
MONOCYTES # BLD AUTO: 0.6 10E9/L (ref 0–1.3)
MONOCYTES NFR BLD AUTO: 10.5 %
NEUTROPHILS # BLD AUTO: 3.1 10E9/L (ref 1.6–8.3)
NEUTROPHILS NFR BLD AUTO: 52.9 %
PLATELET # BLD AUTO: 335 10E9/L (ref 150–450)
PROT SERPL-MCNC: 7.1 G/DL (ref 6.8–8.8)
RBC # BLD AUTO: 4.34 10E12/L (ref 3.8–5.2)
WBC # BLD AUTO: 5.8 10E9/L (ref 4–11)

## 2018-05-21 PROCEDURE — 85025 COMPLETE CBC W/AUTO DIFF WBC: CPT | Performed by: INTERNAL MEDICINE

## 2018-05-21 PROCEDURE — 36415 COLL VENOUS BLD VENIPUNCTURE: CPT | Performed by: INTERNAL MEDICINE

## 2018-05-21 PROCEDURE — 80076 HEPATIC FUNCTION PANEL: CPT | Performed by: INTERNAL MEDICINE

## 2018-05-21 PROCEDURE — 85652 RBC SED RATE AUTOMATED: CPT | Performed by: INTERNAL MEDICINE

## 2018-05-21 PROCEDURE — 86140 C-REACTIVE PROTEIN: CPT | Performed by: INTERNAL MEDICINE

## 2018-05-21 PROCEDURE — 82565 ASSAY OF CREATININE: CPT | Performed by: INTERNAL MEDICINE

## 2018-05-23 ENCOUNTER — OFFICE VISIT (OUTPATIENT)
Dept: RHEUMATOLOGY | Facility: CLINIC | Age: 53
End: 2018-05-23
Payer: COMMERCIAL

## 2018-05-23 VITALS
TEMPERATURE: 96.9 F | DIASTOLIC BLOOD PRESSURE: 74 MMHG | HEART RATE: 83 BPM | OXYGEN SATURATION: 95 % | BODY MASS INDEX: 37.49 KG/M2 | HEIGHT: 65 IN | WEIGHT: 225 LBS | RESPIRATION RATE: 14 BRPM | SYSTOLIC BLOOD PRESSURE: 126 MMHG

## 2018-05-23 DIAGNOSIS — M06.4 INFLAMMATORY POLYARTHROPATHY (H): Primary | ICD-10-CM

## 2018-05-23 DIAGNOSIS — Z79.899 HIGH RISK MEDICATIONS (NOT ANTICOAGULANTS) LONG-TERM USE: ICD-10-CM

## 2018-05-23 PROCEDURE — 99213 OFFICE O/P EST LOW 20 MIN: CPT | Performed by: INTERNAL MEDICINE

## 2018-05-23 RX ORDER — SULFASALAZINE 500 MG/1
500 TABLET, DELAYED RELEASE ORAL 2 TIMES DAILY
Qty: 60 TABLET | Refills: 3 | Status: SHIPPED | OUTPATIENT
Start: 2018-05-23 | End: 2018-09-19

## 2018-05-23 RX ORDER — METHOTREXATE 2.5 MG/1
20 TABLET ORAL WEEKLY
Qty: 32 TABLET | Refills: 3 | Status: SHIPPED | OUTPATIENT
Start: 2018-05-23 | End: 2018-09-19

## 2018-05-23 NOTE — NURSING NOTE
"Chief Complaint   Patient presents with     RECHECK     Patient has swelling in her ankles. Also has had a couple flares.       Initial /74  Pulse 83  Temp 96.9  F (36.1  C) (Oral)  Resp 14  Ht 1.645 m (5' 4.75\")  Wt 102.1 kg (225 lb)  SpO2 95%  BMI 37.73 kg/m2 Estimated body mass index is 37.73 kg/(m^2) as calculated from the following:    Height as of this encounter: 1.645 m (5' 4.75\").    Weight as of this encounter: 102.1 kg (225 lb).  BP completed using cuff size: large         RAPID3 (0-30) Cumulative Score  5.8          RAPID3 Weighted Score (divide #4 by 3 and that is the weighted score)  1.93         "

## 2018-05-23 NOTE — MR AVS SNAPSHOT
After Visit Summary   5/23/2018    Bria Haddad    MRN: [id number]           Patient Information     Date Of Birth          1965        Visit Information        Provider Department      5/23/2018 7:20 AM Bill Resendiz MD [facility]        Today's Diagnoses     Inflammatory polyarthropathy (H)          Care Instructions    Rheumatology    Dr. Bill Chu [facility]   (Monday)  [address]  [place] [address]       [facility]   (Tuesday)  [address] [place] [address]    [facility]   (Wed., Thurs., and Friday)  [address] [place] [address]    Phone number: [phone]  Thank you for choosing [facility].  Jolynn Angulo CMA            Follow-ups after your visit        Your next 10 appointments already scheduled     Sep 12, 2018  7:15 AM CDT   [facility]   [facility]    [address] [facility] [address] [facility] [address] [phone]           Please do not eat 10-12 hours before your appointment if you are coming in fasting for labs on lipids, cholesterol, or glucose (sugar). This does not apply to pregnant women. Water, hot tea and black coffee (with nothing added) are okay. Do not drink other fluids, diet soda or chew gum.            Sep 19, 2018  7:40 AM CDT   [facility]    [address] [facility] [id number] [phone]              Future tests that were ordered for you today     Open Future Orders        Priority Expected Expires Ordered    CBC with platelets differential Routine 8/17/2018 9/20/2018 5/23/2018    Creatinine Routine 8/17/2018 9/20/2018 5/23/2018    Erythrocyte sedimentation rate auto Routine 8/17/2018 9/20/2018 5/23/2018    CRP inflammation Routine 8/17/2018 9/20/2018 5/23/2018    Hepatic panel Routine 8/17/2018 9/20/2018 5/23/2018            Who to contact     If you have  "questions or need follow up information about today's clinic visit or your schedule please contact [facility] directly at [phone].  Normal or non-critical lab and imaging results will be communicated to you by MyChart, letter or phone within 4 business days after the clinic has received the results. If you do not hear from us within 7 days, please contact the clinic through [facility] or phone. If you have a critical or abnormal lab result, we will notify you by phone as soon as possible.  Submit refill requests through [facility] or call your pharmacy and they will forward the refill request to us. Please allow 3 business days for your refill to be completed.          Additional Information About Your Visit        HALKARharIRIS-RFID Information     [facility] gives you secure access to your electronic health record. If you see a primary care provider, you can also send messages to your care team and make appointments. If you have questions, please call your primary care clinic.  If you do not have a primary care provider, please call [phone] and they will assist you.        Care EveryWhere ID     This is your Care EveryWhere ID. This could be used by other organizations to access your [facility] medical records  [id number]        Your Vitals Were     Pulse Temperature Respirations Height Pulse Oximetry BMI (Body Mass Index)    83 96.9  F (36.1  C) (Oral) 14 1.645 m (5' 4.75\") 95% 37.73 kg/m2       Blood Pressure from Last 3 Encounters:   05/23/18 126/74   02/07/18 130/72   11/08/17 124/88    Weight from Last 3 Encounters:   05/23/18 102.1 kg (225 lb)   02/07/18 102.9 kg (226 lb 12.8 oz)   11/08/17 99.1 kg (218 lb 6.4 oz)                 Where to get your medicines      These medications were sent to [facility] [address] [facility] [address]  [address] [facility] [address]     Phone:  [phone]     methotrexate sodium 2.5 MG Tabs    " sulfaSALAzine  MG EC tablet          Primary Care Provider Office Phone # Fax #    Stephanie Saldana [phone] [address] [facility] [address]        Equal Access to Services     FRANKI VLEIZ : Hadjeanna niall méndez yeisono Soheberali, waaxda luqadaha, qaybta kaalmada adelisayada, milton mohiniin hayaan donlisa varelalin bourgeois. So [facility] [phone].    ATENCIÓN: Si habla español, tiene a lopez disposición servicios gratuitos de asistencia lingüística. Llame al [phone].    We comply with applicable federal civil rights laws and [place] laws. We do not discriminate on the basis of race, color, national origin, age, disability, sex, sexual orientation, or gender identity.            Thank you!     Thank you for choosing [facility]  for your care. Our goal is always to provide you with excellent care. Hearing back from our patients is one way we can continue to improve our services. Please take a few minutes to complete the written survey that you may receive in the mail after your visit with us. Thank you!             Your Updated Medication List - Protect others around you: Learn how to safely use, store and throw away your medicines at www.disposemymeds.org.          This list is accurate as of 5/23/18  7:58 AM.  Always use your most recent med list.                   Brand Name Dispense Instructions for use Diagnosis    folic acid 1 MG tablet    FOLVITE    100 tablet    Take 1 tablet (1 mg) by mouth daily    Inflammatory polyarthropathy (H)       methotrexate sodium 2.5 MG Tabs     32 tablet    Take 20 mg by mouth once a week . Take all 8 tablets on the same day of each week.    Inflammatory polyarthropathy (H)       mometasone 0.1 % cream    ELOCON    45 g    Apply sparingly to affected area twice daily as needed.  Do not apply to face.    Dermatitis       order for DME     2 Units    Equipment being ordered: compression stockings 1 pair, at 30mm Hg, to be worn during  the day. For Bilateral leg edema [R60.0]    Bilateral leg edema       sulfaSALAzine  MG EC tablet    AZULFIDINE EN    60 tablet    Take 1 tablet (500 mg) by mouth 2 times daily    Inflammatory polyarthropathy (H)

## 2018-05-23 NOTE — PROGRESS NOTES
Rheumatology Clinic Visit      Bria Haddad MRN# [id number]   YOB: 1965 Age: 53 year old      Date of visit: 5/23/18   PCP: Dr. Stephanie Saldana    Chief Complaint   Patient presents with:  RECHECK: Patient has swelling in her ankles. Also has had a couple flares.      Assessment and Plan     1. Inflammatory Polyarthropathy: Ms. Haddad initially presented to this clinic in 2015 with dependent edema of the bilateral lower extremities, fatigue, and a one time episode of right toe pain in the setting of a positive ROSEMARY.  On 9/18/2017 she presented with synovitis of her bilateral PIPs and pain without swelling of her MCPs, persistent fatigue, intermittent rash, and dependent edema. ROSEMARY positive but additional studies negative/normal including complement C3, complement C4, beta-2 glycoprotein IgM and IgG, cardiolipin IgM and IgG, lupus anticoagulant, RNP, Stuart, SSA, SSB, SCL 70.  Previously on HCQ (bloating).  Now on MTX 20mg once weekly (started 11/2017) and sulfasalazine 500 mg twice daily.  She is doing well today with no synovitis on exam.  Maintain on her current medications.      - Continue methotrexate 20mg once weekly  - Continue folic acid 1mg daily  - Continue sulfasalazine 500mg twice daily  - Labs in 3 months: CBC, Creatinine, Hepatic Panel, ESR, CRP    2. Left knee pain: mechanical in nature.  Significant improvement with PT and I encouraged her to do the physical therapy exercises on a daily basis.  More pain recently but she went on vacation stopped doing her physical therapy exercises.  Pain is manageable and she does not want to do anything more for it then restart her exercises.    3.  Left shoulder pain, impingement syndrome: Symptoms present for about 4 days and the pain has been improving.  We reviewed the diagnosis and other treatment options.  Monitor for now given the short duration of symptoms that are already improving.    4.  Dependent edema: Worse in the evening and improves with  leg elevation.  She has several questions about this and I advised that she follow-up with her PCP for this issue.      Ms. Haddad verbalized agreement with and understanding of the rational for the diagnosis and treatment plan.  All questions were answered to best of my ability and the patient's satisfaction. Ms. Haddad was advised to contact the clinic with any questions that may arise after the clinic visit.      Thank you for involving me in the care of the patient    Return to clinic: 3 months      HPI   Bria Haddad is a 53 year old female with a past medical history significant for endometriosis, s/p carpal tunnel release surgery who present for follow up of positive ROSEMARY.    Today, Ms. Haddad reports that her inflammatory arthritis is doing well.  No pain or swelling in her hands.   strength is normal.  Morning stiffness in her hands for no more than 5 minutes if at all.  Still with some dependent edema in her lower extremities that she has had for years and did not improve with arthritis treatment.  Some left shoulder pain for the past 4 days that is already improving, started when she was mowing the lawn, and hurts more when she does any over the activity.    Denies fevers, chills, nausea, vomiting, constipation, diarrhea. No abdominal pain. No chest pain/pressure, palpitations, or shortness of breath. No neck pain. No oral or nasal sores. No sicca symptoms. No photosensitivity or photophobia. No eye pain or redness. No history of inflammatory eye disease.  No history of DVT or pulmonary embolism.  No history of serositis.  No history of Raynaud's Phenomenon.  No seizure disorder.  No known renal disorder.      Tobacco: none  EtOH: none  Drugs: none    ROS   GEN: No fevers, chills, night sweats, or weight change  SKIN: See HPI  HEENT: No oral or nasal ulcers.  CV: No chest pain, pressure, palpitations, or dyspnea on exertion.  PULM: No SOB, wheeze, cough.  GI: No nausea, vomiting, constipation, diarrhea. No  blood in stool. No abdominal pain.  : No blood in urine.  MSK: See HPI.  NEURO: No numbness or tingling.  EXT: See HPI  PSYCH: Negative    Active Problem List     Patient Active Problem List   Diagnosis     CARDIOVASCULAR SCREENING; LDL GOAL LESS THAN 160     Endometriosis of uterus     Bilateral carpal tunnel syndrome     S/P carpal tunnel release     Inflammatory polyarthropathy (H)     Past Medical History     Past Medical History:   Diagnosis Date     NO ACTIVE PROBLEMS      Past Surgical History     Past Surgical History:   Procedure Laterality Date     bunions       COLONOSCOPY N/A 11/9/2015    Procedure: COLONOSCOPY;  Surgeon: Andrew Adamson MD;  Location: [facility]     COLONOSCOPY WITH CO2 INSUFFLATION N/A 11/9/2015    Procedure: COLONOSCOPY WITH CO2 INSUFFLATION;  Surgeon: Andrew Adamson MD;  Location: [facility]     GYN SURGERY      endometryosis     RELEASE CARPAL TUNNEL Right 10/12/2016    Procedure: RELEASE CARPAL TUNNEL;  Surgeon: Colby Nobles MD;  Location: [facility]     RELEASE CARPAL TUNNEL Left 12/16/2016    Procedure: RELEASE CARPAL TUNNEL;  Surgeon: Colby Nobles MD;  Location: [facility]     TONSILLECTOMY & ADENOIDECTOMY       Allergy     Allergies   Allergen Reactions     Asa [Aspirin] Hives     Current Medication List     Current Outpatient Prescriptions   Medication Sig     folic acid (FOLVITE) 1 MG tablet Take 1 tablet (1 mg) by mouth daily     methotrexate sodium 2.5 MG TABS Take 20 mg by mouth once a week . Take all 8 tablets on the same day of each week.     sulfaSALAzine ER (AZULFIDINE EN) 500 MG EC tablet Take 1 tablet (500 mg) by mouth 2 times daily     mometasone (ELOCON) 0.1 % cream Apply sparingly to affected area twice daily as needed.  Do not apply to face. (Patient not taking: Reported on 9/18/2017)     order for DME Equipment being ordered: compression stockings 1 pair, at 30mm Hg, to be worn during the day. For Bilateral leg edema [R60.0]     No current  "facility-administered medications for this visit.          Social History   See HPI    Family History     Family History   Problem Relation Age of Onset     OSTEOPOROSIS Mother      Respiratory Mother      Thyroid Disease Mother      HEART DISEASE Father      Cancer - colorectal Maternal Grandmother      DIABETES Maternal Grandfather      HEART DISEASE Maternal Grandfather      CANCER Paternal Grandmother      HEART DISEASE Paternal Grandfather      Respiratory Paternal Grandfather      Hypertension Brother      Thyroid Disease Sister      Thyroid Disease Sister      Neurologic Disorder Brother      Physical Exam     Temp Readings from Last 3 Encounters:   05/23/18 96.9  F (36.1  C) (Oral)   02/07/18 97.3  F (36.3  C) (Oral)   09/18/17 97.6  F (36.4  C) (Oral)     BP Readings from Last 5 Encounters:   05/23/18 126/74   02/07/18 130/72   11/08/17 124/88   09/18/17 136/84   07/12/17 126/70     Pulse Readings from Last 1 Encounters:   05/23/18 83     Resp Readings from Last 1 Encounters:   05/23/18 14     Estimated body mass index is 37.73 kg/(m^2) as calculated from the following:    Height as of this encounter: 1.645 m (5' 4.75\").    Weight as of this encounter: 102.1 kg (225 lb).    GEN: NAD  HEENT: MMM. No oral lesions. Anicteric, noninjected sclera  CV: S1, S2. RRR. No m/r/g.  PULM: CTA bilaterally. No w/c.  MSK: MCPs, PIPs, wrists, elbows, knees, and ankles without swelling or tenderness to palpation.  Right shoulder without swelling or tenderness palpation.  Left shoulder without swelling or tenderness palpation; no increased warmth; mild pain with abduction above approximately 110 .  Negative MCP and MTP squeeze.  Hips nontender to direct palpation.     NEURO: UE and LE strengths 5/5 and equal bilaterally.   SKIN: No rash  PSYCH: Alert. Appropriate.    Labs / Imaging (select studies)   RF/CCP  Recent Labs   Lab Test  09/18/17   0913   CCPIGG  1   RHF  <20     ROSEMARY  Recent Labs   Lab Test  07/03/17   1614  " 10/12/15   0923  10/02/15   0820   MONROE  4.9*   --   2.6*   ANAIGG   --   1:320  Reference range: <1:40  (Note)  Anti-Centromere pattern observed.  INTERPRETIVE INFORMATION: ROSEMARY by IFA, IgG  Anti-nuclear antibodies (ROSEMARY) are seen in a variety of  systemic rheumatic diseases and are determined by indirect  fluorescence assay (IFA) using HEp-2 substrate with an  IgG-specific conjugate. ROSEMARY titers less than or equal to  1:80 have variable relevance while titers greater than or  equal to 1:160 are considered clinically significant. These  antibodies may precede clinical disease onset; however,  healthy individuals and those with advanced age have been  reported to be positive for ROSEMARY. When observed, one of the  five basic patterns is reported: homogeneous,  peripheral/rim, speckled, centromere, or nucleolar. If  cytoplasmic fluorescence is observed, it is noted. IFA  methodology is subjective and has occasionally been shown  to lack sensitivity for anti-SSA/Ro antibodies.  Negative results do not necessarily rule out the presence  of SSc. If clinical suspicion r emains, consider further  testing for U3-RNP, PM/Scl, or Th/To antibodies associated  with SSc.  Performed by [facility],  [address] [place] [id number] [phone]  [email or web address], Ilan Vee MD, Lab. Director  *   --      RNP/Sm/SSA/SSB  Recent Labs   Lab Test  09/18/17   0913  10/12/15   0923   RNPIGG  <0.2  <0.2  Negative   Antibody index (AI) values reflect qualitative changes in antibody   concentration that cannot be directly associated with clinical condition or   disease state.     SMIGG  <0.2  <0.2  Negative   Antibody index (AI) values reflect qualitative changes in antibody   concentration that cannot be directly associated with clinical condition or   disease state.     SSAIGG  <0.2  <0.2  Negative   Antibody index (AI) values reflect qualitative changes in antibody   concentration that cannot be directly associated with clinical  condition or   disease state.     SSBIGG  <0.2  <0.2  Negative   Antibody index (AI) values reflect qualitative changes in antibody   concentration that cannot be directly associated with clinical condition or   disease state.     SCLIGG  <0.2  <0.2  Negative   Antibody index (AI) values reflect qualitative changes in antibody   concentration that cannot be directly associated with clinical condition or   disease state.       dsDNA  Recent Labs   Lab Test  09/18/17   0913  10/12/15   0923   DNA  1  <15  Interpretation:  Negative       C3/C4  Recent Labs   Lab Test  09/18/17   0913  10/12/15   0923   [id number]  137  124   [id number]  31  24     Antiphospholipid Antibodies  Recent Labs   Lab Test  10/12/15   0923   B2IGG  1   B2IGM  4   CARG  <15.0  Interpretation:  Negative     JOSELITO  <12.5  Interpretation:  Negative     LUPINT  Negative  (Note)  COMMENTS:  The INR is normal.  APTT ratio is normal.  DRVVT Screen ratio is normal.  Thrombin time is normal.  NEGATIVE TEST; A LUPUS ANTICOAGULANT WAS NOT DETECTED IN THIS  SPECIMEN WITHIN THE LIMITS OF THE TESTING REPERTOIRE.  If the clinical picture is strongly suggestive of an antiphospholipid  syndrome, recommend anticardiolipin and beta-2-glycoprotein (IgG and  IgM) antibody tests.  Barbara Lerma M.D.  [phone]  10/13/2015    INR =  1.05    Reference range: 0.86-1.14  Thrombin Time= 15.4    Reference range: 13.0-19.0 sec    APTT:       Ratio  Patient  =  0.97  1:2 Mix  =  N/A  Reference:  Negative: Less than or equal to 1.16  Positive: Greater than or equal to 1.17     DILUTE BELKIS VIPER VENOM TEST:  Screen Ratio = 0.70   Normal is less than 1.21           CBC  Recent Labs   Lab Test  05/21/18   0731  04/09/18   0730  03/05/18   0737   WBC  5.8  6.3  6.2   RBC  4.34  4.52  4.64   HGB  13.9  14.2  14.4   HCT  41.0  42.4  42.8   MCV  95  94  92   RDW  13.9  13.9  14.0   PLT  335  315  342   MCH  32.0  31.4  31.0   MCHC  33.9  33.5  33.6   NEUTROPHIL  52.9  55.7   61.0   LYMPH  31.1  34.6  29.3   MONOCYTE  10.5  6.5  6.9   EOSINOPHIL  5.0  2.7  2.3   BASOPHIL  0.5  0.5  0.5   ANEU  3.1  3.5  3.8   ALYM  1.8  2.2  1.8   MIKE  0.6  0.4  0.4   AEOS  0.3  0.2  0.1   ABAS  0.0  0.0  0.0     CMP  Recent Labs   Lab Test  05/21/18   0731  04/09/18   0730  03/05/18   0737  02/05/18   0759   09/18/17 0913  12/05/16   0811  10/07/16   0912  04/27/15   1834   NA   --    --    --    --    --   141  142  143  139   POTASSIUM   --    --    --    --    --   4.0  4.0  4.2  3.7   CHLORIDE   --    --    --    --    --   107  105  107  106   CO2   --    --    --    --    --   29  29  29  29   ANIONGAP   --    --    --    --    --   5  8  7  4   GLC   --    --    --    --    --   96  104*  75  86   BUN   --    --    --    --    --   14  10  16  13   CR  0.82  0.80  0.69  0.60   < >  0.76  0.73  0.74  0.77   GFRESTIMATED  72  75  88  >90   < >  79  83  82  79   GFRESTBLACK  87  >90  >90  >90   < >  >90  >90  African American GFR Calc    >90   GFR Calc    >90   GFR Calc     AGUILAR   --    --    --    --    --   8.8  9.2  9.3  8.9   BILITOTAL  0.3  0.3  0.3  0.4   < >  0.4   --    --   0.2   ALBUMIN  3.6  4.0  3.6  3.5   < >  3.7   --    --   4.1   PROTTOTAL  7.1  7.4  7.4  6.8   < >  7.4   --    --   7.5   ALKPHOS  62  65  70  63   < >  71   --    --   67   AST  28  24  26  20   < >  16   --    --   23   ALT  46  28  31  24   < >  23   --    --   36    < > = values in this interval not displayed.     Calcium/VitaminD  Recent Labs   Lab Test  09/18/17   0913  12/05/16   0811  10/07/16   0912   AGUILAR  8.8  9.2  9.3     ESR/CRP  Recent Labs   Lab Test  05/21/18   0731  02/05/18   0759  09/18/17   0913   SED  8  7  8   CRP  5.2  <2.9  4.8     CK/Aldolase  Recent Labs   Lab Test  09/18/17   0913  10/12/15   0923  10/02/15   0820   CKT  92   --   66   ALDOLASE   --   5.8   --      TSH/T4  Recent Labs   Lab Test  04/27/15   1834   TSH  1.79     Hepatitis B  Recent Labs   Lab  Test  10/12/15   0923   AUSAB  0.18   HBCAB  Nonreactive   HEPBANG  Nonreactive     Hepatitis C  Recent Labs   Lab Test  10/07/16   0912   HCVAB  Nonreactive   Assay performance characteristics have not been established for newborns,   infants, and children       HIV Screening  Recent Labs   Lab Test  10/12/15   0923   HIAGAB  Nonreactive   HIV-1 p24 Ag & HIV-1/HIV-2 Ab Not Detected       UA  Recent Labs   Lab Test  09/18/17   0924  10/02/15   0838   COLOR  Yellow  Yellow   APPEARANCE  Slightly Cloudy  Slightly Cloudy   URINEGLC  Negative  Negative   URINEBILI  Negative  Negative   SG  1.015  1.025   URINEPH  7.5*  5.0   PROTEIN  Negative  Negative   UROBILINOGEN  0.2  0.2   NITRITE  Negative  Negative   UBLD  Large*  Negative   LEUKEST  Negative  Negative   WBCU  O - 2  O - 2   RBCU  2-5*  O - 2   SQUAMOUSEPI  Few  Moderate*   BACTERIA  Few*   --      Urine Microscopic  Recent Labs   Lab Test  09/18/17   0924  10/02/15   0838   WBCU  O - 2  O - 2   RBCU  2-5*  O - 2   SQUAMOUSEPI  Few  Moderate*   BACTERIA  Few*   --      Urine Protein  Recent Labs   Lab Test  09/18/17   0924   UTP  0.13   UTPG  0.10   UCRR  129         Chart documentation done in part with Dragon Voice recognition Software. Although reviewed after completion, some word and grammatical error may remain.    Bill Resendiz MD

## 2018-05-29 DIAGNOSIS — M06.4 INFLAMMATORY POLYARTHROPATHY (H): ICD-10-CM

## 2018-05-29 RX ORDER — METHOTREXATE 2.5 MG/1
20 TABLET ORAL WEEKLY
Qty: 32 TABLET | Refills: 3 | Status: CANCELLED | OUTPATIENT
Start: 2018-05-29

## 2018-05-29 NOTE — TELEPHONE ENCOUNTER
Prescription was sent 5/23/2018, patient had called in wrong RX number. Pharmacy notified.  Jolynn Angulo CMA  5/29/2018 3:08 PM

## 2018-05-31 NOTE — PROGRESS NOTES
SUBJECTIVE:   Bria Haddad is a 53 year old female who presents to clinic today for the following health issues:      Edema in feet follow up , continues to swell    Bilateral leg edema, pitting, L>R: used to be improved with compression stockings (30mm Hg), but not anymore, not for the past month. The stockings are not old at all.   Also more shortness of breath in the past month, with walking.  Patient has not been standing up more.  No increase in salt intake.   She has gained ~ 4 pounds in the past 6 months (perhaps some of it is water wt/vs other wt gain).   No hypothyroid symptoms.   The swelling is present even in the morning and gets a bit worse at the end of the day.   No palpitations.    Patient has been on MTX since 11/2017 (coincides with the modest wt gain). Mtx increases the chances of a VTE/DVT.  And has been on sulfasalazine since 2/2018.   Mtx and sulfasalazine are not associated with BLE.        Reviewed and updated as needed this visit by clinical staff  Tobacco  Allergies  Meds  Problems  Med Hx  Surg Hx  Fam Hx  Soc Hx        Reviewed and updated as needed this visit by Provider  Meds  Problems         Patient Active Problem List   Diagnosis     CARDIOVASCULAR SCREENING; LDL GOAL LESS THAN 160     Endometriosis of uterus     Bilateral carpal tunnel syndrome     S/P carpal tunnel release     Inflammatory polyarthropathy (H)       Past Medical History:   Diagnosis Date     NO ACTIVE PROBLEMS        Past Surgical History:   Procedure Laterality Date     bunions       COLONOSCOPY N/A 11/9/2015    Procedure: COLONOSCOPY;  Surgeon: Andrew Adamson MD;  Location:  OR     COLONOSCOPY WITH CO2 INSUFFLATION N/A 11/9/2015    Procedure: COLONOSCOPY WITH CO2 INSUFFLATION;  Surgeon: Andrew Adamson MD;  Location:  OR     GYN SURGERY      endometryosis     RELEASE CARPAL TUNNEL Right 10/12/2016    Procedure: RELEASE CARPAL TUNNEL;  Surgeon: Colby Nobles MD;  Location:   "OR     RELEASE CARPAL TUNNEL Left 12/16/2016    Procedure: RELEASE CARPAL TUNNEL;  Surgeon: Colby Nobles MD;  Location: [facility]     TONSILLECTOMY & ADENOIDECTOMY         Family History   Problem Relation Age of Onset     OSTEOPOROSIS Mother      Respiratory Mother      Thyroid Disease Mother      HEART DISEASE Father      Cancer - colorectal Maternal Grandmother      DIABETES Maternal Grandfather      HEART DISEASE Maternal Grandfather      CANCER Paternal Grandmother      HEART DISEASE Paternal Grandfather      Respiratory Paternal Grandfather      Hypertension Brother      Thyroid Disease Sister      Thyroid Disease Sister      Neurologic Disorder Brother        Social History   Substance Use Topics     Smoking status: Never Smoker     Smokeless tobacco: Never Used     Alcohol use No       Current Outpatient Prescriptions   Medication     folic acid (FOLVITE) 1 MG tablet     methotrexate sodium 2.5 MG TABS     order for DME     sulfaSALAzine ER (AZULFIDINE EN) 500 MG EC tablet     mometasone (ELOCON) 0.1 % cream     No current facility-administered medications for this visit.          ROS:  Constitutional, HEENT, cardiovascular, pulmonary, GI, , musculoskeletal, neuro, skin, endocrine and psych systems are negative, except as otherwise noted.     OBJECTIVE:                                                    /67  Pulse 66  Temp 97.6  F (36.4  C) (Oral)  Resp 17  Ht 5' 4\" (1.626 m)  Wt 222 lb (100.7 kg)  LMP 05/30/2018  SpO2 97%  BMI 38.11 kg/m2     GENERAL APPEARANCE: healthy, alert and in no distress  EYES: Eyes grossly normal to inspection, and conjunctivae and sclerae normal  HENT: head normocephalic and atraumatic and mouth without ulcers or lesions, oropharynx clear and oral mucous membranes moist  NECK: no noticeable adenopathy, no asymmetry, masses, or scars   RESP: lungs clear to auscultation - no rales, rhonchi or wheezes  CV: regular rate and rhythm, normal S1 S2, no S3 or S4, no " murmur, click or rub, there is +2 pitting b/l leg edema.   ABDOMEN: soft, nontender, no hepatosplenomegaly, no masses and bowel sounds normal  MS: no musculoskeletal defects are noted and gait is age appropriate without ataxia  SKIN: no suspicious lesions or rashes  NEURO: mentation intact and speech normal  PSYCH: mentation appears normal and affect normal/bright.    Results for orders placed or performed in visit on 06/01/18   BNP-N terminal pro   Result Value Ref Range    N-Terminal Pro Bnp 27 0 - 125 pg/mL       No results found for this or any previous visit (from the past 744 hour(s)).      ASSESSMENT/PLAN:                                                        ICD-10-CM    1. Bilateral leg edema R60.0 BNP-N terminal pro     US Venous Competency Bilateral   2. Visit for screening mammogram Z12.31      BLE: apparently not related to the patient's arthritis. Neither of the new medications are associated with leg edema-but MTX can increase the chances of a DVT.  PLAN:      Patient Instructions   We will let you know your test results by [facility].  If your lab is within normal limits, we will order an ultrasound of your legs.  If the ultrasound does not show a clear reason for your worsening leg swelling, I would at that point refer you to a Vascular Surgeon for further workup.        Stephanie Saldana MD    [facility]  [address] [facility] [address] [phone]

## 2018-06-01 ENCOUNTER — OFFICE VISIT (OUTPATIENT)
Dept: INTERNAL MEDICINE | Facility: CLINIC | Age: 53
End: 2018-06-01
Payer: COMMERCIAL

## 2018-06-01 VITALS
WEIGHT: 222 LBS | DIASTOLIC BLOOD PRESSURE: 67 MMHG | OXYGEN SATURATION: 97 % | RESPIRATION RATE: 17 BRPM | HEART RATE: 66 BPM | TEMPERATURE: 97.6 F | HEIGHT: 64 IN | BODY MASS INDEX: 37.9 KG/M2 | SYSTOLIC BLOOD PRESSURE: 119 MMHG

## 2018-06-01 DIAGNOSIS — R60.0 BILATERAL LEG EDEMA: Primary | ICD-10-CM

## 2018-06-01 DIAGNOSIS — Z12.31 VISIT FOR SCREENING MAMMOGRAM: ICD-10-CM

## 2018-06-01 LAB — NT-PROBNP SERPL-MCNC: 27 PG/ML (ref 0–125)

## 2018-06-01 PROCEDURE — 36415 COLL VENOUS BLD VENIPUNCTURE: CPT | Performed by: INTERNAL MEDICINE

## 2018-06-01 PROCEDURE — 99213 OFFICE O/P EST LOW 20 MIN: CPT | Performed by: INTERNAL MEDICINE

## 2018-06-01 PROCEDURE — 83880 ASSAY OF NATRIURETIC PEPTIDE: CPT | Performed by: INTERNAL MEDICINE

## 2018-06-01 NOTE — PATIENT INSTRUCTIONS
We will [facility].  If your lab is within normal limits, we will order an ultrasound of your legs.  If the ultrasound does not show a clear reason for your worsening leg swelling, I would at that point refer you to a Vascular Surgeon for further workup.

## 2018-06-01 NOTE — MR AVS SNAPSHOT
After Visit Summary   6/1/2018    Bria Haddad    MRN: [id number]           Patient Information     Date Of Birth          1965        Visit Information        Provider Department      6/1/2018 7:30 AM Stephanie Saldana MD [facility]        Today's Diagnoses     Visit for screening mammogram    -  1    Bilateral leg edema          Care Instructions    We will let you know your test results by MyChart.  If your lab is within normal limits, we will order an ultrasound of your legs.  If the ultrasound does not show a clear reason for your worsening leg swelling, I would at that point refer you to a Vascular Surgeon for further workup.            Follow-ups after your visit        Your next 10 appointments already scheduled     Sep 12, 2018  7:15 AM CDT   LAB with AN LAB   [facility] ([facility])    [address] [facility] [address] [phone]           Please do not eat 10-12 hours before your appointment if you are coming in fasting for labs on lipids, cholesterol, or glucose (sugar). This does not apply to pregnant women. Water, hot tea and black coffee (with nothing added) are okay. Do not drink other fluids, diet soda or chew gum.            Sep 19, 2018  7:40 AM CDT   Return Visit with Bill Resendiz MD   [facility] [place] ([facility])    [address] [place] [address] [id number] [phone]              Who to contact     If you have questions or need follow up information about today's clinic visit or your schedule please contact [facility] directly at [phone].  Normal or non-critical lab and imaging results will be communicated to you by MyChart, letter or phone within 4 business days after the clinic has received the results. If you do not hear from us within 7 days, please contact the clinic through MyChart or phone. If you have a critical or abnormal lab result, we will notify  "you by phone as soon as possible.  Submit refill requests through [employer] or call your pharmacy and they will forward the refill request to us. Please allow 3 business days for your refill to be completed.          Additional Information About Your Visit        [employer] Information     [employer] gives you secure access to your electronic health record. If you see a primary care provider, you can also send messages to your care team and make appointments. If you have questions, please call your primary care clinic.  If you do not have a primary care provider, please call [phone] and they will assist you.        Care EveryWhere ID     This is your Care EveryWhere ID. This could be used by other organizations to access your [place] medical records  [id number]        Your Vitals Were     Pulse Temperature Respirations Height Last Period Pulse Oximetry    66 97.6  F (36.4  C) (Oral) 17 5' 4\" (1.626 m) 05/30/2018 97%    BMI (Body Mass Index)                   38.11 kg/m2            Blood Pressure from Last 3 Encounters:   06/01/18 119/67   05/23/18 126/74   02/07/18 130/72    Weight from Last 3 Encounters:   06/01/18 222 lb (100.7 kg)   05/23/18 225 lb (102.1 kg)   02/07/18 226 lb 12.8 oz (102.9 kg)              We Performed the Following     BNP-N terminal pro        Primary Care Provider Office Phone # Fax #    Stephanie Saldana [phone] [address] [facility] [address]        Equal Access to Services     [facility]: Hadii aad ku hadasho Soomaali, waaxda luqadaha, qaybta kaalmada adeegyada, milton nicholson . So [facility] [phone].    ATENCIÓN: Si habla español, tiene a lopez disposición servicios gratuitos de asistencia lingüística. Llame al [phone].    We comply with applicable federal civil rights laws and [place] laws. We do not discriminate on the basis of race, color, national origin, age, disability, sex, sexual orientation, or gender " identity.            Thank you!     Thank you for choosing [facility]. Our goal is always to provide you with excellent care. Hearing back from our patients is one way we can continue to improve our services. Please take a few minutes to complete the written survey that you may receive in the mail after your visit with us. Thank you!             Your Updated Medication List - Protect others around you: Learn how to safely use, store and throw away your medicines at www.disposemymeds.org.          This list is accurate as of 6/1/18  8:27 AM.  Always use your most recent med list.                   Brand Name Dispense Instructions for use Diagnosis    folic acid 1 MG tablet    FOLVITE    100 tablet    Take 1 tablet (1 mg) by mouth daily    Inflammatory polyarthropathy (H)       methotrexate sodium 2.5 MG Tabs     32 tablet    Take 20 mg by mouth once a week . Take all 8 tablets on the same day of each week.    Inflammatory polyarthropathy (H)       mometasone 0.1 % cream    ELOCON    45 g    Apply sparingly to affected area twice daily as needed.  Do not apply to face.    Dermatitis       order for DME     2 Units    Equipment being ordered: compression stockings 1 pair, at 30mm Hg, to be worn during the day. For Bilateral leg edema [R60.0]    Bilateral leg edema       sulfaSALAzine  MG EC tablet    AZULFIDINE EN    60 tablet    Take 1 tablet (500 mg) by mouth 2 times daily    Inflammatory polyarthropathy (H)

## 2018-06-03 NOTE — PROGRESS NOTES
Dear Bria,     Your test results are attached and your lab test does not show evidence of any heart failure.  Please call [phone] to schedule the ultrasound of your legs soon.    You may notify me via [employer] or contact the clinic at [phone] if you have any questions.    Stephanie Saldana MD

## 2018-06-13 ENCOUNTER — RADIANT APPOINTMENT (OUTPATIENT)
Dept: ULTRASOUND IMAGING | Facility: CLINIC | Age: 53
End: 2018-06-13
Attending: INTERNAL MEDICINE
Payer: COMMERCIAL

## 2018-06-13 DIAGNOSIS — R60.0 BILATERAL LEG EDEMA: ICD-10-CM

## 2018-06-13 PROCEDURE — 93970 EXTREMITY STUDY: CPT

## 2018-06-14 NOTE — PROGRESS NOTES
Sandra Fraser,       Your recent test results are attached, if you have any questions or concerns please feel free to contact me via e-mail or call [phone].  I am covering for Dr. Saldana while she is away.  Ultrasound showed no DVT.  You do have venous insufficiency shown on you right side, which means your veins are not pumping blood well back to your heart in that location. A copy of this will be sent to Dr. Saldana who will follow up with a plan when she returns, nothing of immediate concern is seen.       Sincerely,  Mary Anne Yee PA-C

## 2018-06-25 ENCOUNTER — MYC MEDICAL ADVICE (OUTPATIENT)
Dept: INTERNAL MEDICINE | Facility: CLINIC | Age: 53
End: 2018-06-25

## 2018-06-25 NOTE — TELEPHONE ENCOUNTER
To ANTHONY Yee PA-C to advise please.  Per 6/14 US Venous:    Notes Recorded by Mary Anne Yee PA-C on 6/14/2018 at 2:40 PM  Sandra Fraser,       Your recent test results are attached, if you have any questions or concerns please feel free to contact me via e-mail or call [phone].  I am covering for Dr. Saldana while she is away.  Ultrasound showed no DVT.  You do have venous insufficiency shown on you right side, which means your veins are not pumping blood well back to your heart in that location. A copy of this will be sent to Dr. Saldana who will follow up with a plan when she returns, nothing of immediate concern is seen.       Sincerely,  Mary Anne Yee PA-C

## 2018-06-27 DIAGNOSIS — I87.2 VENOUS (PERIPHERAL) INSUFFICIENCY: Primary | ICD-10-CM

## 2018-06-28 ENCOUNTER — TELEPHONE (OUTPATIENT)
Dept: OTHER | Facility: CLINIC | Age: 53
End: 2018-06-28

## 2018-06-28 NOTE — PROGRESS NOTES
Dear Bria,     Your test results are attached and shows venous insufficiency of the right leg. Please call [phone] to schedule an appointment with a Vascular surgeon, for optimal management.    Please notify me via cdream networkt or contact the clinic at [phone] if you have any questions.    Stephanie Saldana MD

## 2018-06-28 NOTE — TELEPHONE ENCOUNTER
Pt referred to [facility] by Dr. Saldana for venous insufficiency and bilateral leg edema.     Pt needs to be scheduled for consult with vascular medicine.  Will route to scheduling to coordinate an appointment at next available.    GAYLE ArayaN, RN

## 2018-06-28 NOTE — TELEPHONE ENCOUNTER
I have sent the patient a message with the next steps, as a results message (ie, Vascular surgery referral).

## 2018-07-05 ENCOUNTER — TELEPHONE (OUTPATIENT)
Dept: INTERNAL MEDICINE | Facility: CLINIC | Age: 53
End: 2018-07-05

## 2018-07-05 NOTE — TELEPHONE ENCOUNTER
7/5/2018    Call Regarding Preventive Health Screening Mammogram    Attempt 1    Message on voicemail    Comments:       Outreach   Dasia May

## 2018-07-05 NOTE — TELEPHONE ENCOUNTER
7/5/2018      Patient return call and declined scheduling due to other issues. She will give us a call when she is ready to schedule.             Outreach ,  Vale Conway

## 2018-09-13 ENCOUNTER — OFFICE VISIT (OUTPATIENT)
Dept: INTERNAL MEDICINE | Facility: CLINIC | Age: 53
End: 2018-09-13
Payer: COMMERCIAL

## 2018-09-13 ENCOUNTER — TELEPHONE (OUTPATIENT)
Dept: FAMILY MEDICINE | Facility: CLINIC | Age: 53
End: 2018-09-13

## 2018-09-13 VITALS
BODY MASS INDEX: 38.62 KG/M2 | OXYGEN SATURATION: 97 % | SYSTOLIC BLOOD PRESSURE: 131 MMHG | WEIGHT: 225 LBS | DIASTOLIC BLOOD PRESSURE: 74 MMHG | TEMPERATURE: 98 F | HEART RATE: 95 BPM

## 2018-09-13 DIAGNOSIS — M25.512 LEFT SHOULDER PAIN, UNSPECIFIED CHRONICITY: Primary | ICD-10-CM

## 2018-09-13 DIAGNOSIS — M06.4 INFLAMMATORY POLYARTHROPATHY (H): ICD-10-CM

## 2018-09-13 DIAGNOSIS — M25.512 LEFT SHOULDER PAIN, UNSPECIFIED CHRONICITY: ICD-10-CM

## 2018-09-13 LAB
ALBUMIN SERPL-MCNC: 3.7 G/DL (ref 3.4–5)
ALP SERPL-CCNC: 65 U/L (ref 40–150)
ALT SERPL W P-5'-P-CCNC: 51 U/L (ref 0–50)
AST SERPL W P-5'-P-CCNC: 29 U/L (ref 0–45)
BASOPHILS # BLD AUTO: 0 10E9/L (ref 0–0.2)
BASOPHILS NFR BLD AUTO: 0.4 %
BILIRUB DIRECT SERPL-MCNC: <0.1 MG/DL (ref 0–0.2)
BILIRUB SERPL-MCNC: 0.3 MG/DL (ref 0.2–1.3)
CREAT SERPL-MCNC: 0.89 MG/DL (ref 0.52–1.04)
CRP SERPL-MCNC: 5.7 MG/L (ref 0–8)
DIFFERENTIAL METHOD BLD: NORMAL
EOSINOPHIL # BLD AUTO: 0.2 10E9/L (ref 0–0.7)
EOSINOPHIL NFR BLD AUTO: 2.5 %
ERYTHROCYTE [DISTWIDTH] IN BLOOD BY AUTOMATED COUNT: 13.8 % (ref 10–15)
ERYTHROCYTE [SEDIMENTATION RATE] IN BLOOD BY WESTERGREN METHOD: 8 MM/H (ref 0–30)
GFR SERPL CREATININE-BSD FRML MDRD: 66 ML/MIN/1.7M2
HCT VFR BLD AUTO: 41.5 % (ref 35–47)
HGB BLD-MCNC: 13.8 G/DL (ref 11.7–15.7)
LYMPHOCYTES # BLD AUTO: 2.4 10E9/L (ref 0.8–5.3)
LYMPHOCYTES NFR BLD AUTO: 30.8 %
MCH RBC QN AUTO: 31.6 PG (ref 26.5–33)
MCHC RBC AUTO-ENTMCNC: 33.3 G/DL (ref 31.5–36.5)
MCV RBC AUTO: 95 FL (ref 78–100)
MONOCYTES # BLD AUTO: 0.6 10E9/L (ref 0–1.3)
MONOCYTES NFR BLD AUTO: 8.1 %
NEUTROPHILS # BLD AUTO: 4.6 10E9/L (ref 1.6–8.3)
NEUTROPHILS NFR BLD AUTO: 58.2 %
PLATELET # BLD AUTO: 332 10E9/L (ref 150–450)
PROT SERPL-MCNC: 7.2 G/DL (ref 6.8–8.8)
RBC # BLD AUTO: 4.37 10E12/L (ref 3.8–5.2)
WBC # BLD AUTO: 7.9 10E9/L (ref 4–11)

## 2018-09-13 PROCEDURE — 80076 HEPATIC FUNCTION PANEL: CPT | Performed by: INTERNAL MEDICINE

## 2018-09-13 PROCEDURE — 86140 C-REACTIVE PROTEIN: CPT | Performed by: INTERNAL MEDICINE

## 2018-09-13 PROCEDURE — 82565 ASSAY OF CREATININE: CPT | Performed by: INTERNAL MEDICINE

## 2018-09-13 PROCEDURE — 85652 RBC SED RATE AUTOMATED: CPT | Performed by: INTERNAL MEDICINE

## 2018-09-13 PROCEDURE — 36415 COLL VENOUS BLD VENIPUNCTURE: CPT | Performed by: INTERNAL MEDICINE

## 2018-09-13 PROCEDURE — 85025 COMPLETE CBC W/AUTO DIFF WBC: CPT | Performed by: INTERNAL MEDICINE

## 2018-09-13 PROCEDURE — 99207 ZZC NO CHARGE LOS: CPT | Performed by: INTERNAL MEDICINE

## 2018-09-13 RX ORDER — METHYLPREDNISOLONE 4 MG
TABLET, DOSE PACK ORAL
Qty: 21 TABLET | Refills: 0 | Status: SHIPPED | OUTPATIENT
Start: 2018-09-13 | End: 2018-09-13

## 2018-09-13 RX ORDER — METHYLPREDNISOLONE 4 MG
TABLET, DOSE PACK ORAL
Qty: 21 TABLET | Refills: 0 | Status: SHIPPED | OUTPATIENT
Start: 2018-09-13 | End: 2018-09-19

## 2018-09-13 ASSESSMENT — PAIN SCALES - GENERAL: PAINLEVEL: SEVERE PAIN (7)

## 2018-09-13 NOTE — PATIENT INSTRUCTIONS
For your left shoulder pain:  Take the medrol dosepack as per prescription instructions. Take in the morning, with food.   Use cooling or ice packs.  Contact the clinic if not better after a week of the above treatment (at that point, we will refer you to a sports medicine specialist, for further assessment and consideration of shoulder injections).  Avoid particularly aggravating activities for at least 2 weeks.  If further weakness of the affected area develops, please let us know.  If numbness of affected area develops or gets worse, please let us know.    Please complete your mammogram soon.

## 2018-09-13 NOTE — MR AVS SNAPSHOT
After Visit Summary   9/13/2018    Bria Haddad    MRN: [id number]           Patient Information     Date Of Birth          1965        Visit Information        Provider Department      9/13/2018 7:50 AM Stephanie Saldana MD [facility]        Today's Diagnoses     Left shoulder pain, unspecified chronicity    -  1      Care Instructions    For your left shoulder pain:  Take the medrol dosepack as per prescription instructions. Take in the morning, with food.   Use cooling or ice packs.  Contact the clinic if not better after a week of the above treatment (at that point, we will refer you to a sports medicine specialist, for further assessment and consideration of shoulder injections).  Avoid particularly aggravating activities for at least 2 weeks.  If further weakness of the affected area develops, please let us know.  If numbness of affected area develops or gets worse, please let us know.    Please complete your mammogram soon.             Follow-ups after your visit        Your next 10 appointments already scheduled     Sep 13, 2018  9:30 AM CDT   LAB with AN LAB   [facility]    [address] [facility] [address] [phone]           Please do not eat 10-12 hours before your appointment if you are coming in fasting for labs on lipids, cholesterol, or glucose (sugar). This does not apply to pregnant women. Water, hot tea and black coffee (with nothing added) are okay. Do not drink other fluids, diet soda or chew gum.            Sep 19, 2018  7:40 AM CDT   Return Visit with MD [facility] ([facility] [place])    [address]  [place] [address] [phone]              Who to contact     If you have questions or need follow up information about today's clinic visit or your schedule please contact [facility] directly at [phone].  Normal or  non-critical lab and imaging results will be communicated to you by [employer], letter or phone within 4 business days after the clinic has received the results. If you do not hear from us within 7 days, please contact the clinic through [employer] or phone. If you have a critical or abnormal lab result, we will notify you by phone as soon as possible.  Submit refill requests through [employer] or call your pharmacy and they will forward the refill request to us. Please allow 3 business days for your refill to be completed.          Additional Information About Your Visit        [employer] Information     [employer] gives you secure access to your electronic health record. If you see a primary care provider, you can also send messages to your care team and make appointments. If you have questions, please call your primary care clinic.  If you do not have a primary care provider, please call [phone] and they will assist you.        Care EveryWhere ID     This is your Care EveryWhere ID. This could be used by other organizations to access your [place] medical records  [id number]        Your Vitals Were     Pulse Temperature Pulse Oximetry Breastfeeding? BMI (Body Mass Index)       95 98  F (36.7  C) (Oral) 97% No 38.62 kg/m2        Blood Pressure from Last 3 Encounters:   09/13/18 131/74   06/01/18 119/67   05/23/18 126/74    Weight from Last 3 Encounters:   09/13/18 225 lb (102.1 kg)   06/01/18 222 lb (100.7 kg)   05/23/18 225 lb (102.1 kg)              Today, you had the following     No orders found for display         Today's Medication Changes          These changes are accurate as of 9/13/18  8:36 AM.  If you have any questions, ask your nurse or doctor.               Start taking these medicines.        Dose/Directions    methylPREDNISolone 4 MG tablet   Commonly known as:  MEDROL DOSEPAK   Used for:  Left shoulder pain, unspecified chronicity   Started by:  Stephanie Saldana MD        Follow package  instructions   Quantity:  21 tablet   Refills:  0         These medicines have changed or have updated prescriptions.        Dose/Directions    folic acid 1 MG tablet   Commonly known as:  FOLVITE   This may have changed:  how much to take   Used for:  Inflammatory polyarthropathy (H)        Dose:  3 mg   Take 3 tablets (3 mg) by mouth daily   Quantity:  300 tablet   Refills:  1            Where to get your medicines      These medications were sent to [facility] [place] - [place], [place] - [address], [address]  [address], [address], [facility] [address]     Phone:  [phone]     methylPREDNISolone 4 MG tablet                Primary Care Provider Office Phone # Fax #    Stephanie Saldana [phone] [address] [facility] [address]        Equal Access to Services     FRANKI VELIZ : Hadii aad ku hadasho Sogeovanna, waaxda luqadaha, qaybta kaalmada adelisayada, milton nicholson . So [facility] [phone].    ATENCIÓN: Si habla español, tiene a lopez disposición servicios gratuitos de asistencia lingüística. [facility] [phone].    We comply with applicable federal civil rights laws and [place] laws. We do not discriminate on the basis of race, color, national origin, age, disability, sex, sexual orientation, or gender identity.            Thank you!     Thank you for choosing [facility]  for your care. Our goal is always to provide you with excellent care. Hearing back from our patients is one way we can continue to improve our services. Please take a few minutes to complete the written survey that you may receive in the mail after your visit with us. Thank you!             Your Updated Medication List - Protect others around you: Learn how to safely use, store and throw away your medicines at www.disposemymeds.org.          This list is accurate as of 9/13/18  8:36 AM.  Always use your most recent med list.                    Brand Name Dispense Instructions for use Diagnosis    folic acid 1 MG tablet    FOLVITE    300 tablet    Take 3 tablets (3 mg) by mouth daily    Inflammatory polyarthropathy (H)       methotrexate sodium 2.5 MG Tabs     32 tablet    Take 20 mg by mouth once a week . Take all 8 tablets on the same day of each week.    Inflammatory polyarthropathy (H)       methylPREDNISolone 4 MG tablet    MEDROL DOSEPAK    21 tablet    Follow package instructions    Left shoulder pain, unspecified chronicity       order for DME     2 Units    Equipment being ordered: compression stockings 1 pair, at 30mm Hg, to be worn during the day. For Bilateral leg edema [R60.0]    Bilateral leg edema       sulfaSALAzine  MG EC tablet    AZULFIDINE EN    60 tablet    Take 1 tablet (500 mg) by mouth 2 times daily    Inflammatory polyarthropathy (H)

## 2018-09-13 NOTE — PROGRESS NOTES
SUBJECTIVE:   Bria Haddad is a 53 year old female who presents to clinic today for the following health issues:      Musculoskeletal problem/pain      Duration: 5 months, progressive    Description  Location: left shoulder pain     Intensity:  moderate, 8/10    Accompanying signs and symptoms: weakness of left arm; no numbness.    History  Previous similar problem: no   Previous evaluation:  none    Precipitating or alleviating factors:  Trauma or overuse: no   Aggravating factors include: lifting, exercise and overuse    Therapies tried and outcome: rest/inactivity, ice and chiropractor    No particular inciting event.   Patient has had this from mod-may brought this up to Rheum, Dr Resendiz, who advised to monitor as it was improving and had been for a short duration at that time (a few days).  Then she did about 9 sessions with chiropractor without a noted benefit.   Patient has a history of CTS b/l s/p CTS surgery 2 years ago.   Patient is on methotrexate and sulfaSALAzine for her inflammatory arthritis.       phys:   left upper extremity:      Shoulder ROM with pain on elevation above 90 degrees in all directions except towards the front.  Negative AC joint compression sign, no subacromial bursa tenderness, arm drop test within normal limits, but triggered some discomfort  There was preserved strength but there was pain on external rotation of the shoulder, with the patient's arms at their sides and the elbows in 90 degrees of flexion. Spurling's test negative.  biceps tendon tenderness-pushing on the prox aspect causing pain in the distal aspect.    Biceps inflammation.  Rotator cuff tendinopathy.

## 2018-09-13 NOTE — NURSING NOTE
"Chief Complaint   Patient presents with     Shoulder Pain       Initial /74  Pulse 95  Temp 98  F (36.7  C) (Oral)  Wt 225 lb (102.1 kg)  SpO2 97%  Breastfeeding? No  BMI 38.62 kg/m2 Estimated body mass index is 38.62 kg/(m^2) as calculated from the following:    Height as of 6/1/18: 5' 4\" (1.626 m).    Weight as of this encounter: 225 lb (102.1 kg).  Medication Reconciliation: complete    Michelle Altman CMA      "

## 2018-09-18 ENCOUNTER — OFFICE VISIT (OUTPATIENT)
Dept: OTHER | Facility: CLINIC | Age: 53
End: 2018-09-18
Attending: INTERNAL MEDICINE
Payer: COMMERCIAL

## 2018-09-18 VITALS
WEIGHT: 223.2 LBS | OXYGEN SATURATION: 96 % | SYSTOLIC BLOOD PRESSURE: 128 MMHG | BODY MASS INDEX: 38.31 KG/M2 | DIASTOLIC BLOOD PRESSURE: 75 MMHG | HEART RATE: 89 BPM

## 2018-09-18 DIAGNOSIS — M06.4 INFLAMMATORY POLYARTHROPATHY (H): ICD-10-CM

## 2018-09-18 DIAGNOSIS — M79.89 LEG SWELLING: Primary | ICD-10-CM

## 2018-09-18 DIAGNOSIS — E66.9 OBESITY, UNSPECIFIED CLASSIFICATION, UNSPECIFIED OBESITY TYPE, UNSPECIFIED WHETHER SERIOUS COMORBIDITY PRESENT: ICD-10-CM

## 2018-09-18 PROCEDURE — G0463 HOSPITAL OUTPT CLINIC VISIT: HCPCS

## 2018-09-18 PROCEDURE — 99204 OFFICE O/P NEW MOD 45 MIN: CPT | Mod: ZP | Performed by: INTERNAL MEDICINE

## 2018-09-18 NOTE — PROGRESS NOTES
[facility] INITIAL VASCULAR MEDICINE CONSULT      PRIMARY HEALTH CARE PROVIDER:  Stephanie Saldana MD      REFERRING HEALTH CARE PROVIDER;  Stephanie Gaines MD      REASON FOR CONSULT: Evaluation and management of bilateral lower extremity swelling left more than right side for more than 5 years with intermittent redness and erythema.  She is obese with known history of inflammatory polyarthritis currently taking methotrexate and also sulfasalazine.  She has a daytime somnolence, fatigue tiredness.  No improvement with compression stockings knee-high.      HPI: Bria Haddad is a 53 year old very pleasant female accompanied by her  today for this visit and complaining of bilateral lower extremity swelling worse and of the day she is  mostly sitting job she is also obese daytime somnolence fatigue and tiredness.  No history of a cellulitis in the legs.  Today her legs almost appears normal but she brought smart phone pictures of leg swelling with redness and erythema.  No previous history of DVT.  No known varicose veins and no leg surgeries in the past.  Recent laboratory tests are completely normal.  She underwent extensive rheumatological evaluation in the past ROSEMARY was positive but complement levels, a LATHA, RNP, Stuart, SSA, SSB, SCL 70 negative.  Recently increased methotrexate due to morning stiffness and pain in her PIP joints.  She underwent echocardiogram in July 2017 which showed normal LV function, RV function and ejection fraction was normal no PA pressure elevation.  She also underwent bilateral lower extremity venous competency studies only abnormality is right-sided femoral vein incompetence but no superficial vein incompetence in no deep venous thrombosis.      PAST MEDICAL HISTORY  Past Medical History:   Diagnosis Date     NO ACTIVE PROBLEMS      Obesity      Polyarthritis     inflammatory poly arthritis.       CURRENT  MEDICATIONS    Current Outpatient Prescriptions on File Prior to Visit:  folic acid (FOLVITE) 1 MG tablet Take 3 tablets (3 mg) by mouth daily (Patient taking differently: Take 2 mg by mouth daily )   methotrexate sodium 2.5 MG TABS Take 20 mg by mouth once a week . Take all 8 tablets on the same day of each week.   methylPREDNISolone (MEDROL DOSEPAK) 4 MG tablet Follow package instructions   order for DME Equipment being ordered: compression stockings 1 pair, at 30mm Hg, to be worn during the day. For Bilateral leg edema [R60.0]   sulfaSALAzine ER (AZULFIDINE EN) 500 MG EC tablet Take 1 tablet (500 mg) by mouth 2 times daily     No current facility-administered medications on file prior to visit.     PAST SURGICAL HISTORY:  Past Surgical History:   Procedure Laterality Date     bunions       COLONOSCOPY N/A 11/9/2015    Procedure: COLONOSCOPY;  Surgeon: Andrew Adamson MD;  Location: [facility] OR     COLONOSCOPY WITH CO2 INSUFFLATION N/A 11/9/2015    Procedure: COLONOSCOPY WITH CO2 INSUFFLATION;  Surgeon: Andrew Adamson MD;  Location: [facility] OR     GYN SURGERY      endometryosis     RELEASE CARPAL TUNNEL Right 10/12/2016    Procedure: RELEASE CARPAL TUNNEL;  Surgeon: Colby Nobles MD;  Location: [facility] OR     RELEASE CARPAL TUNNEL Left 12/16/2016    Procedure: RELEASE CARPAL TUNNEL;  Surgeon: Colby Nobles MD;  Location: [facility] OR     TONSILLECTOMY & ADENOIDECTOMY         ALLERGIES     Allergies   Allergen Reactions     Asa [Aspirin] Hives       FAMILY HISTORY  Family History   Problem Relation Age of Onset     Osteoporosis Mother      Respiratory Mother      Thyroid Disease Mother      HEART DISEASE Father      Cancer - colorectal Maternal Grandmother      Diabetes Maternal Grandfather      HEART DISEASE Maternal Grandfather      Cancer Paternal Grandmother      HEART DISEASE Paternal Grandfather      Respiratory Paternal Grandfather      Hypertension Brother      Thyroid Disease Sister      Thyroid  Disease Sister      Neurologic Disorder Brother        VASCULAR FAMILY HISTORY  1st order relative with atherosclerotic PAD: No  1st order relative with AAA: No  Family history of Familial Hyperlipidemia No  Family History of Hypercoagulable state:Yes ( PGM)  VASCULAR RISK FACTORS  1. Diabetes:No   2. Smoking: has never smoked.  3. HTN: normotensive  4.Hyperlipidemia: No      SOCIAL HISTORY  Social History     Social History     Marital status:      Spouse name: N/A     Number of children: N/A     Years of education: N/A     Occupational History           sitting job     Social History Main Topics     Smoking status: Never Smoker     Smokeless tobacco: Never Used     Alcohol use No     Drug use: No     Sexual activity: No     Other Topics Concern     Not on file     Social History Narrative       ROS:   General: No change in weight, sleep or appetite.  Normal energy.  No fever or chills  Eyes: Negative for vision changes or eye problems  ENT: No problems with ears, nose or throat.  No difficulty swallowing.  Resp: No coughing, wheezing or shortness of breath  CV: No chest pains or palpitations  GI: No nausea, vomiting,  heartburn, abdominal pain, diarrhea, constipation or change in bowel habits  : No urinary frequency or dysuria, bladder or kidney problems  Musculoskeletal: No significant muscle or joint pains  Neurologic: No headaches, numbness, tingling, weakness, problems with balance or coordination  Psychiatric: No problems with anxiety, depression or mental health  Heme/immune/allergy: No history of bleeding or clotting problems or anemia.  No allergies or immune system problems  Endocrine: No history of thyroid disease, diabetes or other endocrine disorders  Skin: No rashes,worrisome lesions or skin problems  Vascular: Bilateral lower extremity swelling worsened of the day with redness and not responding with compression stockings.  No claudication and no previous history of venous or  arterial intervention.  No foot ulcers or leg ulcers    EXAM:  /75 (BP Location: Left arm, Patient Position: Chair, Cuff Size: Adult Large)  Pulse 89  Wt 223 lb 3.2 oz (101.2 kg)  SpO2 96%  Breastfeeding? No  BMI 38.31 kg/m2  In general, the patient is a pleasant female in no apparent distress.    HEENT: NC/AT.  PERRLA.  EOMI.  Sclerae white, not injected.  Nares clear.  Pharynx without erythema or exudate.  Dentition intact.    Neck: No adenopathy.  No thyromegaly. Carotids +2/2 bilaterally without bruits.  No jugular venous distension.   Heart: RRR. Normal S1, S2 splits physiologically. No murmur, rub, click, or gallop. The PMI is in the 5th ICS in the midclavicular line. There is no heave.    Lungs: CTA.  No ronchi, wheezes, rales.  No dullness to percussion.   Abdomen: Soft, nontender, nondistended. No organomegaly. No AAA.  No bruits.   Extremities:Vascular:  She has a palpable bilateral pedal pulses and also dopplerable biphasic and triphasic DP and PT.  No visible varicose veins  Legs are normal today but reviewed her smart phone pictures which revealed red erythematous swollen both legs with dorsum of the foot was also swollen.  No foot ulcers or leg ulcers  Previous history of bilateral bunion repair when she was a teenager.      Labs:    LIVER ENZYME RESULTS:  Lab Results   Component Value Date    AST 29 09/13/2018    ALT 51 (H) 09/13/2018       CBC RESULTS:  Lab Results   Component Value Date    WBC 7.9 09/13/2018    RBC 4.37 09/13/2018    HGB 13.8 09/13/2018    HCT 41.5 09/13/2018    MCV 95 09/13/2018    MCH 31.6 09/13/2018    MCHC 33.3 09/13/2018    RDW 13.8 09/13/2018     09/13/2018       BMP RESULTS:  Lab Results   Component Value Date     09/18/2017    POTASSIUM 4.0 09/18/2017    CHLORIDE 107 09/18/2017    CO2 29 09/18/2017    ANIONGAP 5 09/18/2017    GLC 96 09/18/2017    BUN 14 09/18/2017    CR 0.89 09/13/2018    GFRESTIMATED 66 09/13/2018    GFRESTBLACK 79 09/13/2018    AGUILAR  "8.8 09/18/2017        A1C RESULTS:  No results found for: A1C    THYROID RESULTS:  Lab Results   Component Value Date    TSH 1.79 04/27/2015       Procedures:     ECHO 7/2017    \"Interpretation Summary     Global and regional left ventricular function is normal with an EF of 60-65%.  Right ventricular function, chamber size, wall motion, and thickness are  normal.  Both atria appear normal.  Pulmonary artery systolic pressure is normal.  The inferior vena cava is normal.  No cardiac cause for edema identified\"    VENOUS ULTRASOUND BILATERAL LEG(S)  6/13/2018 8:54 AM      HISTORY: Worsening bilateral leg edema. Please recheck for venous  incompetence and also check for deep vein thrombosis (patient recently  started methotrexate). Bilateral leg edema.     COMPARISON: 6/23/2017.     FINDINGS: Examination of the deep veins with graded compression and  color flow Doppler with spectral wave form analysis was performed.  Images show no evidence of thrombus in the bilateral common femoral  vein, femoral vein, popliteal vein or calf veins.     There is deep venous insufficiency in the right common femoral vein.  No left deep venous insufficiency.     No superficial venous insufficiency in either lower extremity.         IMPRESSION:  1. No deep vein thrombosis in either lower extremity.  2. Deep venous insufficiency at the level of the right common femoral  vein.  3. No left deep venous insufficiency.  4. No superficial venous insufficiency bilaterally.     CURTIS BRODY, DO      Assessment and Plan:    1. Leg swelling, BLE left >rt side, dependent worse end of the day.    2. Obesity    3. Inflammatory polyarthropathy (H)    This is a very pleasant female with bilateral lower extremity swelling for many years and lately it is worsening initially responded with compression stockings and not anymore mostly dependent edema with redness and erythema.  She has a known history of inflammatory polyarthropathy and currently " taking sulfasalazine and also methotrexate dose was adjusted and increased recently.  She denies any chest pain, shortness of breath.  No signs of heart failure on exam  She does give a history of snoring with fatigue tiredness and leg heaviness  She has no signs of lymphedema or lipedema today.  No varicose veins  Reviewed recent bilateral lower extremity venous competency studies no superficial venous insufficiency bilaterally no deep venous thrombosis only abnormality was right common femoral vein insufficiency.  Today she complained left calf pain leg pain.  Legs look symmetrical    I will arrange left lower extremity venous duplex to rule out DVT on the particular leg  As far as of bilateral lower extremity dependent edema with redness and edema concerned she is obese taking sulfasalazine which can cause some redness and erythema/hypersensitivity type reaction.  Avoid NSAIDs  She also gives history of leg fatigue with the snoring and obese consider seeing sleep clinic for evaluation of sleep apnea  Lose weight  Elevate the legs and take breaks every 2 hours and consider alternating with the sitting and standing desk  Utilize compression stockings 20-30 mmHg thigh-high daytime and elevate the leg at nighttime with 1-2 pillows    - US Lower Extremity Venous Duplex Left; Future    Return to clinic in 1 month or sooner if any problems  This note was dictated by utilizing dragon software  Thank you for the consultation    Total patient care time spent 60 minutes face-to-face and more than 50% of the time spent counseling of the above-mentioned multiple medical issues and she is new to this clinic reviewed outside records and updated chart.  She had a lot of questions and all of them were answered    Copy to primary care physician    Val Barrera MD,[facility]  Vascular Medicine

## 2018-09-18 NOTE — NURSING NOTE
"Bria Haddad is a 53 year old female who presents for:  Chief Complaint   Patient presents with     Consult     Pt referred by Dr. Saldana for venous insufficiency and bilateral leg edema. Patient had US Venous Comp done on 6/13/18, records in [facility].        Vitals:    Vitals:    09/18/18 1519 09/18/18 1520   BP: 131/83 128/75   BP Location: Right arm Left arm   Patient Position: Chair Chair   Cuff Size: Adult Large Adult Large   Pulse: 90 89   SpO2: 96%    Weight: 223 lb 3.2 oz (101.2 kg)        BMI:  Estimated body mass index is 38.31 kg/(m^2) as calculated from the following:    Height as of 6/1/18: 5' 4\" (1.626 m).    Weight as of this encounter: 223 lb 3.2 oz (101.2 kg).    Pain Score:  Data Unavailable        Sophie Chen MA       "

## 2018-09-18 NOTE — MR AVS SNAPSHOT
After Visit Summary   9/18/2018    Bria Haddad    MRN: [id number]           Patient Information     Date Of Birth          1965        Visit Information        Provider Department      9/18/2018 3:30 PM Val Barrera MD [facility]ascular [facility] at  Vascular Center      Today's Diagnoses     Leg swelling, BLE left >rt side, dependent worse end of the day.    -  1    Obesity        Inflammatory polyarthropathy (H)          Care Instructions    1. Please go for left leg ultrasound    2. Use thigh high compression 20-30 mm hg, elevate legs     3. Lose weight    4. Please ask your primary to refer to  sleep clinic.              Follow-ups after your visit        Your next 10 appointments already scheduled     Sep 19, 2018  7:40 AM CDT   Return Visit with Bill Resendiz MD   [facility]    [address] [place] [id number] [phone]              Future tests that were ordered for you today     Open Future Orders        Priority Expected Expires Ordered    US Lower Extremity Venous Duplex Left Routine 9/18/2018 10/18/2018 9/18/2018            Who to contact     If you have questions or need follow up information about today's clinic visit or your schedule please contact [facility] directly at [phone].  Normal or non-critical lab and imaging results will be communicated to you by MyChart, letter or phone within 4 business days after the clinic has received the results. If you do not hear from us within 7 days, please contact the clinic through MyChart or phone. If you have a critical or abnormal lab result, we will notify you by phone as soon as possible.  Submit refill requests through [employer] or call your pharmacy and they will forward the refill request to us. Please allow 3 business days for your refill to be completed.          Additional Information About Your Visit         [employer] Information     [employer] gives you secure access to your electronic health record. If you see a primary care provider, you can also send messages to your care team and make appointments. If you have questions, please call your primary care clinic.  If you do not have a primary care provider, please call [phone] and they will assist you.        Care EveryWhere ID     This is your Care EveryWhere ID. This could be used by other organizations to access your [facility] medical records  [id number]        Your Vitals Were     Pulse Pulse Oximetry Breastfeeding? BMI (Body Mass Index)          89 96% No 38.31 kg/m2         Blood Pressure from Last 3 Encounters:   09/18/18 128/75   09/13/18 131/74   06/01/18 119/67    Weight from Last 3 Encounters:   09/18/18 223 lb 3.2 oz (101.2 kg)   09/13/18 225 lb (102.1 kg)   06/01/18 222 lb (100.7 kg)                 Today's Medication Changes          These changes are accurate as of 9/18/18  4:43 PM.  If you have any questions, ask your nurse or doctor.               These medicines have changed or have updated prescriptions.        Dose/Directions    folic acid 1 MG tablet   Commonly known as:  FOLVITE   This may have changed:  how much to take   Used for:  Inflammatory polyarthropathy (H)        Dose:  3 mg   Take 3 tablets (3 mg) by mouth daily   Quantity:  300 tablet   Refills:  1                Primary Care Provider Office Phone # Fax #    Stephanie Saldana [phone] [id number] [facility] [address]        Equal Access to Services     FRANKI VELIZ : Hadii niall lathamo Sogeovanna, waaxda luqadaha, qaybta kaalmada nhi, milton bourgeois. So [facility] [phone].    ATENCIÓN: Si habla español, tiene a lopez disposición servicios gratuitos de asistencia lingüística. Llame al [phone].    We comply with applicable federal civil rights laws and [place] laws. We do not discriminate on the basis of  race, color, national origin, age, disability, sex, sexual orientation, or gender identity.            Thank you!     Thank you for choosing [facility] VASCULAR [place]  for your care. Our goal is always to provide you with excellent care. Hearing back from our patients is one way we can continue to improve our services. Please take a few minutes to complete the written survey that you may receive in the mail after your visit with us. Thank you!             Your Updated Medication List - Protect others around you: Learn how to safely use, store and throw away your medicines at www.disposemymeds.org.          This list is accurate as of 9/18/18  4:43 PM.  Always use your most recent med list.                   Brand Name Dispense Instructions for use Diagnosis    folic acid 1 MG tablet    FOLVITE    300 tablet    Take 3 tablets (3 mg) by mouth daily    Inflammatory polyarthropathy (H)       methotrexate sodium 2.5 MG Tabs     32 tablet    Take 20 mg by mouth once a week . Take all 8 tablets on the same day of each week.    Inflammatory polyarthropathy (H)       methylPREDNISolone 4 MG tablet    MEDROL DOSEPAK    21 tablet    Follow package instructions    Left shoulder pain, unspecified chronicity       order for DME     2 Units    Equipment being ordered: compression stockings 1 pair, at 30mm Hg, to be worn during the day. For Bilateral leg edema [R60.0]    Bilateral leg edema       sulfaSALAzine  MG EC tablet    AZULFIDINE EN    60 tablet    Take 1 tablet (500 mg) by mouth 2 times daily    Inflammatory polyarthropathy (H)

## 2018-09-18 NOTE — PATIENT INSTRUCTIONS
1. Please go for left leg ultrasound    2. Use thigh high compression 20-30 mm hg, elevate legs     3. Lose weight    4. Please ask your primary to refer to  sleep clinic.

## 2018-09-19 ENCOUNTER — OFFICE VISIT (OUTPATIENT)
Dept: RHEUMATOLOGY | Facility: CLINIC | Age: 53
End: 2018-09-19
Payer: COMMERCIAL

## 2018-09-19 ENCOUNTER — RADIANT APPOINTMENT (OUTPATIENT)
Dept: ULTRASOUND IMAGING | Facility: CLINIC | Age: 53
End: 2018-09-19
Attending: INTERNAL MEDICINE
Payer: COMMERCIAL

## 2018-09-19 VITALS
WEIGHT: 226 LBS | SYSTOLIC BLOOD PRESSURE: 131 MMHG | HEART RATE: 96 BPM | TEMPERATURE: 99 F | DIASTOLIC BLOOD PRESSURE: 73 MMHG | OXYGEN SATURATION: 96 % | BODY MASS INDEX: 38.79 KG/M2

## 2018-09-19 DIAGNOSIS — M75.42 IMPINGEMENT SYNDROME OF LEFT SHOULDER: ICD-10-CM

## 2018-09-19 DIAGNOSIS — M06.4 INFLAMMATORY POLYARTHROPATHY (H): Primary | ICD-10-CM

## 2018-09-19 DIAGNOSIS — M79.89 LEG SWELLING: ICD-10-CM

## 2018-09-19 PROCEDURE — 93971 EXTREMITY STUDY: CPT | Mod: LT

## 2018-09-19 PROCEDURE — 99214 OFFICE O/P EST MOD 30 MIN: CPT | Mod: 25 | Performed by: INTERNAL MEDICINE

## 2018-09-19 PROCEDURE — 20610 DRAIN/INJ JOINT/BURSA W/O US: CPT | Mod: LT | Performed by: INTERNAL MEDICINE

## 2018-09-19 RX ORDER — METHYLPREDNISOLONE ACETATE 40 MG/ML
40 INJECTION, SUSPENSION INTRA-ARTICULAR; INTRALESIONAL; INTRAMUSCULAR; SOFT TISSUE ONCE
Qty: 1 ML | Refills: 0 | OUTPATIENT
Start: 2018-09-19 | End: 2018-09-19

## 2018-09-19 RX ORDER — SULFASALAZINE 500 MG/1
500 TABLET, DELAYED RELEASE ORAL 2 TIMES DAILY
Qty: 60 TABLET | Refills: 3 | Status: SHIPPED | OUTPATIENT
Start: 2018-09-19 | End: 2019-01-31

## 2018-09-19 RX ORDER — LEFLUNOMIDE 20 MG/1
20 TABLET ORAL DAILY
Qty: 30 TABLET | Refills: 4 | Status: SHIPPED | OUTPATIENT
Start: 2018-09-19 | End: 2018-11-27

## 2018-09-19 ASSESSMENT — PAIN SCALES - GENERAL: PAINLEVEL: MILD PAIN (3)

## 2018-09-19 NOTE — NURSING NOTE
"Chief Complaint   Patient presents with     RECHECK     3 month follow up, Pt states her shoulder is still sore just finished up medication for the rotator cuff issue.       Initial /73 (BP Location: Right arm, Patient Position: Sitting, Cuff Size: Adult Large)  Pulse 96  Temp 99  F (37.2  C) (Oral)  Wt 102.5 kg (226 lb)  SpO2 96%  BMI 38.79 kg/m2 Estimated body mass index is 38.79 kg/(m^2) as calculated from the following:    Height as of 6/1/18: 1.626 m (5' 4\").    Weight as of this encounter: 102.5 kg (226 lb).  BP completed using cuff size: large         RAPID3 (0-30) Cumulative Score  9.5          RAPID3 Weighted Score (divide #4 by 3 and that is the weighted score)       Faye Soria CMA  9/19/2018  7:51 AM        "

## 2018-09-19 NOTE — PROGRESS NOTES
Rheumatology Clinic Visit      Bria Haddad MRN# [id number]   YOB: 1965 Age: 53 year old      Date of visit: 9/19/18   PCP: Dr. Stephanie Saldana    Chief Complaint   Patient presents with:  RECHECK: 3 month follow up, Pt states her shoulder is still sore just finished up medication for the rotator cuff issue.      Assessment and Plan     1. Inflammatory Polyarthropathy: Ms. Haddad initially presented to this clinic in 2015 with dependent edema of the bilateral lower extremities, fatigue, and a one time episode of right toe pain in the setting of a positive ROSEMARY.  On 9/18/2017 she presented with synovitis of her bilateral PIPs and pain without swelling of her MCPs, persistent fatigue, intermittent rash, and dependent edema. ROSEMARY positive but additional studies negative/normal including complement C3, complement C4, beta-2 glycoprotein IgM and IgG, cardiolipin IgM and IgG, lupus anticoagulant, RNP, Stuart, SSA, SSB, Scl-70.  Previously on HCQ (bloating).  Now on MTX 20mg once weekly (started 11/2017) and sulfasalazine 500 mg twice daily.  Liver enzymes trending up; headache and hair thinning with MTX; therefore will change MTX to alternative agent.  We discussed TNFi and leflunomide; she prefers to try leflunomide first. Note that her inflammatory arthritis appears well controlled at this time; medication change because of side effects.   - Discontinue methotrexate 20mg once weekly  - Discontinue folic acid 1mg daily  - Continue sulfasalazine 500mg twice daily  - Start Leflunomide 20mg daily  - Labs monthly [id number]: CBC, Cr, Hepatic Panel     # Leflunomide (Arava) Risks and Benefits: The risks and benefits of leflunomide were discussed in detail and the patient verbalized understanding.  The risks discussed include, but are not limited to, the risk for hypersensitivity, anaphylaxis, anaphylactoid reactions, hepatotoxicity, infections, interstitial lung disease, alopecia, rash, nausea, and diarrhea.  The  impact on malignancies is not fully defined.   Alcohol should be avoided while taking leflunomide.  Pregnancy prevention and planning was discussed; it is recommended that women of childbearing potential use reliable contraception before, during, and for a period of 2 years after treatment with leflunomide; if pregnancy is planned within 2 years of discontinuing medication then women should undergo drug elimination procedures (i.e. cholestyramine). Routine laboratory monitoring is required during leflunomide therapy. I encouraged reviewing the package insert and asking any questions about the medication.      # Adalimumab (Humira) Risks and Benefits: The risks and benefits of adalimumab were discussed in detail and the patient verbalized understanding.  The risks discussed include, but are not limited to, the risk for hypersensitivity, anaphylaxis, anaphylactoid reactions, an increased risk for serious infections leading to hospitalization or death, a possible increased risk for lymphoma and other malignancies, a possible worsening of demyelinating diseases, a possible worsening of heart failure, risk for cytopenias, risk for drug induced lupus, possible reactivation of hepatitis B, and possible reactivation of latent tuberculosis.  Subcutaneous injections may result in injection site reactions and/or pain at the site of injection.  The most common adverse reactions are infections, injection site reactions, headache, and rash.  It was discussed that the medication would need to be discontinued if a serious infection develops.  It was discussed that live vaccinations should not be received while using adalimumab or within 30 days prior to starting adalimumab.  I encouraged reviewing the package insert and asking any questions about the medication.      2. Left knee pain: mechanical in nature.  Significant improvement with PT and I encouraged her to do the physical therapy exercises on a daily basis.  More pain  recently but she went on vacation stopped doing her physical therapy exercises.  Pain is manageable and she does not want to do anything more for it than continue her exercises.     3.  Left shoulder pain, impingement syndrome: Symptoms present since May, ROM intact, consistent with impingement syndrome; medrol dosepak helped but did not completely relieve the pain. Chiropractor helped some.  Discussed additional diagnostic workup with MRI versus first trying steroid injection and PT; she opted for the later.  - Steroid injection as documented in the procedure section  - PT referral     4.  Dependent edema: Worse in the evening and improves with leg elevation. Has seen vascular surgery and also follows with her PCP for this issue.      Ms. Haddad verbalized agreement with and understanding of the rational for the diagnosis and treatment plan.  All questions were answered to best of my ability and the patient's satisfaction. Ms. Haddad was advised to contact the clinic with any questions that may arise after the clinic visit.      Thank you for involving me in the care of the patient    Return to clinic: 3-4 months      HPI   Bria Haddad is a 53 year old female with a past medical history significant for endometriosis, s/p carpal tunnel release surgery who present for follow up of positive ROSEMARY.    Today, Ms. Haddad reports that her arthritis is not so great.  Left shoulder issue persisting.  Went to a chiropractor (heat and adjustments) that was brief slight improvement.  Then went to Dr. Saldana who gave a medrol dosepack with improvement but not resolution at any point.  Other joints are doing well.  Left knee better but now with some hip pain.  Swelling reduced since using prednisone.  Went to vascular surgeon who is planning to get an ultrasound of her left leg but otherwise reportedly said to lose weight, elevate legs, and compression stockings. Headaches since on methotrexate.     Denies fevers, chills, nausea,  vomiting, constipation, diarrhea. No abdominal pain. No chest pain/pressure, palpitations, or shortness of breath. No neck pain. No oral or nasal sores. No sicca symptoms. No photosensitivity or photophobia. No eye pain or redness. No history of inflammatory eye disease.  No history of DVT or pulmonary embolism.  No history of serositis.  No history of Raynaud's Phenomenon.  No seizure disorder.  No known renal disorder.      Tobacco: none  EtOH: none  Drugs: none    ROS   GEN: No fevers, chills, night sweats, or weight change  SKIN: See HPI  HEENT: No oral or nasal ulcers.  CV: No chest pain, pressure, palpitations, or dyspnea on exertion.  PULM: No SOB, wheeze, cough.  GI: No nausea, vomiting, constipation, diarrhea. No blood in stool. No abdominal pain.  : No blood in urine.  MSK: See HPI.  NEURO: No numbness or tingling.  EXT: See HPI  PSYCH: Negative    Active Problem List     Patient Active Problem List   Diagnosis     CARDIOVASCULAR SCREENING; LDL GOAL LESS THAN 160     Endometriosis of uterus     Bilateral carpal tunnel syndrome     S/P carpal tunnel release     Inflammatory polyarthropathy (H)     Past Medical History     Past Medical History:   Diagnosis Date     NO ACTIVE PROBLEMS      Obesity      Polyarthritis     inflammatory poly arthritis.     Past Surgical History     Past Surgical History:   Procedure Laterality Date     bunions       COLONOSCOPY N/A 11/9/2015    Procedure: COLONOSCOPY;  Surgeon: Andrew Adamson MD;  Location:  OR     COLONOSCOPY WITH CO2 INSUFFLATION N/A 11/9/2015    Procedure: COLONOSCOPY WITH CO2 INSUFFLATION;  Surgeon: Andrew Adamson MD;  Location:  OR     GYN SURGERY      endometryosis     RELEASE CARPAL TUNNEL Right 10/12/2016    Procedure: RELEASE CARPAL TUNNEL;  Surgeon: Colby Nobles MD;  Location: [facility] OR     RELEASE CARPAL TUNNEL Left 12/16/2016    Procedure: RELEASE CARPAL TUNNEL;  Surgeon: Colby Nobles MD;  Location:  OR      "TONSILLECTOMY & ADENOIDECTOMY       Allergy     Allergies   Allergen Reactions     Asa [Aspirin] Hives     Current Medication List     Current Outpatient Prescriptions   Medication Sig     folic acid (FOLVITE) 1 MG tablet Take 3 tablets (3 mg) by mouth daily (Patient taking differently: Take 2 mg by mouth daily )     methotrexate sodium 2.5 MG TABS Take 20 mg by mouth once a week . Take all 8 tablets on the same day of each week.     methylPREDNISolone (MEDROL DOSEPAK) 4 MG tablet Follow package instructions     order for DME Equipment being ordered: compression stockings 1 pair, at 30mm Hg, to be worn during the day. For Bilateral leg edema [R60.0]     sulfaSALAzine ER (AZULFIDINE EN) 500 MG EC tablet Take 1 tablet (500 mg) by mouth 2 times daily     No current facility-administered medications for this visit.          Social History   See HPI    Family History     Family History   Problem Relation Age of Onset     Osteoporosis Mother      Respiratory Mother      Thyroid Disease Mother      HEART DISEASE Father      Cancer - colorectal Maternal Grandmother      Diabetes Maternal Grandfather      HEART DISEASE Maternal Grandfather      Cancer Paternal Grandmother      HEART DISEASE Paternal Grandfather      Respiratory Paternal Grandfather      Hypertension Brother      Thyroid Disease Sister      Thyroid Disease Sister      Neurologic Disorder Brother      Physical Exam     Temp Readings from Last 3 Encounters:   09/19/18 99  F (37.2  C) (Oral)   09/13/18 98  F (36.7  C) (Oral)   06/01/18 97.6  F (36.4  C) (Oral)     BP Readings from Last 5 Encounters:   09/19/18 131/73   09/18/18 128/75   09/13/18 131/74   06/01/18 119/67   05/23/18 126/74     Pulse Readings from Last 1 Encounters:   09/19/18 96     Resp Readings from Last 1 Encounters:   06/01/18 17     Estimated body mass index is 38.79 kg/(m^2) as calculated from the following:    Height as of 6/1/18: 1.626 m (5' 4\").    Weight as of this encounter: 102.5 kg " (226 lb).    GEN: NAD  HEENT: MMM. No oral lesions. Anicteric, noninjected sclera  CV: S1, S2. RRR. No m/r/g.  PULM: CTA bilaterally. No w/c.  MSK: MCPs, PIPs, wrists, elbows, knees, and ankles without swelling or tenderness to palpation.  Right shoulder without swelling or tenderness palpation.  Left shoulder without swelling, but was tender to palpation on the most lateral aspect; ROM intact; no swelling or increased warmth Negative MCP and MTP squeeze.  Hips nontender to direct palpation.     NEURO: UE and LE strengths 5/5 and equal bilaterally.   SKIN: No rash  LYMPH: 1+ pitting edema distal to the knees bilaterally  PSYCH: Alert. Appropriate.    Labs / Imaging (select studies)   RF/CCP  Recent Labs   Lab Test  09/18/17   0913   CCPIGG  1   RHF  <20     ROSEMARY  Recent Labs   Lab Test  07/03/17   1614  10/12/15   0923  10/02/15   0820   MONROE  4.9*   --   2.6*   ANAIGG   --   1:320  Reference range: <1:40  (Note)  Anti-Centromere pattern observed.  INTERPRETIVE INFORMATION: ROSEMARY by IFA, IgG  Anti-nuclear antibodies (ROSEMARY) are seen in a variety of  systemic rheumatic diseases and are determined by indirect  fluorescence assay (IFA) using HEp-2 substrate with an  IgG-specific conjugate. ROSEMARY titers less than or equal to  1:80 have variable relevance while titers greater than or  equal to 1:160 are considered clinically significant. These  antibodies may precede clinical disease onset; however,  healthy individuals and those with advanced age have been  reported to be positive for ROSEMARY. When observed, one of the  five basic patterns is reported: homogeneous,  peripheral/rim, speckled, centromere, or nucleolar. If  cytoplasmic fluorescence is observed, it is noted. IFA  methodology is subjective and has occasionally been shown  to lack sensitivity for anti-SSA/Ro antibodies.  Negative results do not necessarily rule out the presence  of SSc. If clinical suspicion r emains, consider further  testing for U3-RNP, PM/Scl, or  Th/To antibodies associated  with SSc.  Performed by [facility],  [address] [address] [phone]  [email or web address], Ilan Vee MD, Lab. Director  *   --      RNP/Sm/SSA/SSB  Recent Labs   Lab Test  09/18/17   0913  10/12/15   0923   RNPIGG  <0.2  <0.2  Negative   Antibody index (AI) values reflect qualitative changes in antibody   concentration that cannot be directly associated with clinical condition or   disease state.     SMIGG  <0.2  <0.2  Negative   Antibody index (AI) values reflect qualitative changes in antibody   concentration that cannot be directly associated with clinical condition or   disease state.     SSAIGG  <0.2  <0.2  Negative   Antibody index (AI) values reflect qualitative changes in antibody   concentration that cannot be directly associated with clinical condition or   disease state.     SSBIGG  <0.2  <0.2  Negative   Antibody index (AI) values reflect qualitative changes in antibody   concentration that cannot be directly associated with clinical condition or   disease state.     SCLIGG  <0.2  <0.2  Negative   Antibody index (AI) values reflect qualitative changes in antibody   concentration that cannot be directly associated with clinical condition or   disease state.       dsDNA  Recent Labs   Lab Test  09/18/17   0913  10/12/15   0923   DNA  1  <15  Interpretation:  Negative       C3/C4  Recent Labs   Lab Test  09/18/17   0913  10/12/15   0923   H2RCOMT  137  124   [id number]  31  24     Antiphospholipid Antibodies  Recent Labs   Lab Test  10/12/15   0923   B2IGG  1   B2IGM  4   CARG  <15.0  Interpretation:  Negative     JOSELITO  <12.5  Interpretation:  Negative     LUPINT  Negative  (Note)  COMMENTS:  The INR is normal.  APTT ratio is normal.  DRVVT Screen ratio is normal.  Thrombin time is normal.  NEGATIVE TEST; A LUPUS ANTICOAGULANT WAS NOT DETECTED IN THIS  SPECIMEN WITHIN THE LIMITS OF THE TESTING REPERTOIRE.  If the clinical picture is strongly suggestive of an  antiphospholipid  syndrome, recommend anticardiolipin and beta-2-glycoprotein (IgG and  IgM) antibody tests.  Barbara Lerma M.D.  [phone]  10/13/2015    INR =  1.05    Reference range: 0.86-1.14  Thrombin Time= 15.4    Reference range: 13.0-19.0 sec    APTT:       Ratio  Patient  =  0.97  1:2 Mix  =  N/A  Reference:  Negative: Less than or equal to 1.16  Positive: Greater than or equal to 1.17     DILUTE BELKIS VIPER VENOM TEST:  Screen Ratio = 0.70   Normal is less than 1.21         CBC  Recent Labs   Lab Test  09/13/18   0741  05/21/18   0731  04/09/18   0730   WBC  7.9  5.8  6.3   RBC  4.37  4.34  4.52   HGB  13.8  13.9  14.2   HCT  41.5  41.0  42.4   MCV  95  95  94   RDW  13.8  13.9  13.9   PLT  332  335  315   MCH  31.6  32.0  31.4   MCHC  33.3  33.9  33.5   NEUTROPHIL  58.2  52.9  55.7   LYMPH  30.8  31.1  34.6   MONOCYTE  8.1  10.5  6.5   EOSINOPHIL  2.5  5.0  2.7   BASOPHIL  0.4  0.5  0.5   ANEU  4.6  3.1  3.5   ALYM  2.4  1.8  2.2   MIKE  0.6  0.6  0.4   AEOS  0.2  0.3  0.2   ABAS  0.0  0.0  0.0     CMP  Recent Labs   Lab Test  09/13/18   0741  05/21/18   0731  04/09/18   0730  03/05/18   0737   09/18/17   0913  12/05/16   0811  10/07/16   0912  04/27/15   1834   NA   --    --    --    --    --   141  142  143  139   POTASSIUM   --    --    --    --    --   4.0  4.0  4.2  3.7   CHLORIDE   --    --    --    --    --   107  105  107  106   CO2   --    --    --    --    --   29  29  29  29   ANIONGAP   --    --    --    --    --   5  8  7  4   GLC   --    --    --    --    --   96  104*  75  86   BUN   --    --    --    --    --   14  10  16  13   CR  0.89  0.82  0.80  0.69   < >  0.76  0.73  0.74  0.77   GFRESTIMATED  66  72  75  88   < >  79  83  82  79   GFRESTBLACK  79  87  >90  >90   < >  >90  >90  African American GFR Calc    >90   GFR Calc    >90   GFR Calc     AGUILAR   --    --    --    --    --   8.8  9.2  9.3  8.9   BILITOTAL  0.3  0.3  0.3  0.3   < >  0.4    --    --   0.2   ALBUMIN  3.7  3.6  4.0  3.6   < >  3.7   --    --   4.1   PROTTOTAL  7.2  7.1  7.4  7.4   < >  7.4   --    --   7.5   ALKPHOS  65  62  65  70   < >  71   --    --   67   AST  29  28  24  26   < >  16   --    --   23   ALT  51*  46  28  31   < >  23   --    --   36    < > = values in this interval not displayed.     Calcium/VitaminD  Recent Labs   Lab Test  09/18/17   0913  12/05/16   0811  10/07/16   0912   AGUILAR  8.8  9.2  9.3     ESR/CRP  Recent Labs   Lab Test  09/13/18   0741  05/21/18   0731  02/05/18   0759   SED  8  8  7   CRP  5.7  5.2  <2.9     CK/Aldolase  Recent Labs   Lab Test  09/18/17   0913  10/12/15   0923  10/02/15   0820   CKT  92   --   66   ALDOLASE   --   5.8   --      TSH/T4  Recent Labs   Lab Test  04/27/15   1834   TSH  1.79     Hepatitis B  Recent Labs   Lab Test  10/12/15   0923   AUSAB  0.18   HBCAB  Nonreactive   HEPBANG  Nonreactive     Hepatitis C  Recent Labs   Lab Test  10/07/16   0912   HCVAB  Nonreactive   Assay performance characteristics have not been established for newborns,   infants, and children       HIV Screening  Recent Labs   Lab Test  10/12/15   0923   HIAGAB  Nonreactive   HIV-1 p24 Ag & HIV-1/HIV-2 Ab Not Detected       UA  Recent Labs   Lab Test  09/18/17   0924  10/02/15   0838   COLOR  Yellow  Yellow   APPEARANCE  Slightly Cloudy  Slightly Cloudy   URINEGLC  Negative  Negative   URINEBILI  Negative  Negative   SG  1.015  1.025   URINEPH  7.5*  5.0   PROTEIN  Negative  Negative   UROBILINOGEN  0.2  0.2   NITRITE  Negative  Negative   UBLD  Large*  Negative   LEUKEST  Negative  Negative   WBCU  O - 2  O - 2   RBCU  2-5*  O - 2   SQUAMOUSEPI  Few  Moderate*   BACTERIA  Few*   --      Urine Microscopic  Recent Labs   Lab Test  09/18/17   0924  10/02/15   0838   WBCU  O - 2  O - 2   RBCU  2-5*  O - 2   SQUAMOUSEPI  Few  Moderate*   BACTERIA  Few*   --      Urine Protein  Recent Labs   Lab Test  09/18/17   0924   UTP  0.13   UTPG  0.10   UCRR  129        Procedure     Procedure: Steroid injections of the left shoulder  Indication: Pain, impingement syndrome of the left shoulder    The procedure was explained in detail. Risks including infection, pain, structural damage such as cartilage damage and tendon rupture, fat atrophy, skin hyper-/hypo-pigmentation, and medication reaction was explained. The need for rest of the affected joint for one week after the procedure was explained.  The option of not doing the procedure was also provided. All questions were answered and the patient consented to the procedure.     A time-out was performed and the correct patient, procedure, and laterality were verified.    The left shoulder was examined and location for injection was identified - posterior approach. The area was cleaned with chlorhexidine, twice.  Ethyl chloride was then used for topical anaesthetic.  Then a mixture of lidocaine 1% 2 mL and Depo-Medrol 40mg was injected into the intra-articular space.     The patient tolerated the procedure well. No complications.    MEDICATION: Depo-Medrol  SITE: left shoulder  DOSE: 40mg  LOT #: [id number]  :  CPM [employer]  EXPIRATION DATE:  02/2019  NDC#: 0208-4346-44    1% Lidocaine  : Hospira  Lot #: [id number]  EXPIRATION DATE: 1JUN2020  NDC: 8704-5662-16               Chart documentation done in part with Dragon Voice recognition Software. Although reviewed after completion, some word and grammatical error may remain.    Bill Resendiz MD

## 2018-09-19 NOTE — MR AVS SNAPSHOT
After Visit Summary   9/19/2018    Bria Haddad    MRN: [id number]           Patient Information     Date Of Birth          1965        Visit Information        Provider Department      9/19/2018 7:40 AM Bill Resendiz MD [facility]        Today's Diagnoses     Impingement syndrome of left shoulder    -  1    Inflammatory polyarthropathy (H)           Follow-ups after your visit        Additional Services     ETHAN PT, HAND, AND CHIROPRACTIC REFERRAL       Physical Therapy, Hand Therapy and Chiropractic Care are available through:  *[facility]  *Hand Therapy (Occupational Therapy or Physical Therapy)  *[facility]    Call one number to schedule at any of the above locations: [phone].    Physical therapy, Hand therapy and/or Chiropractic care has been recommended by your physician as an excellent treatment option to reduce pain and help people return to normal activities, including sports.  Therapy and/or chiropractic care services are a great complement or alternative to expensive and invasive surgery, injections, or long-term use of prescription medications. The primary goal is to identify the underlying problem and provide you the tools to manage your condition on your own.     Please be aware that coverage of these services is subject to the terms and limitations of your health insurance plan.  Call member services at your health plan with any benefit or coverage questions.      Please bring the following to your appointment:  *Your personal calendar for scheduling future appointments  *Comfortable clothing                  Your next 10 appointments already scheduled     Sep 19, 2018 10:30 AM CDT   US LOWER EXTREMITY VENOUS DUPLEX LEFT with MGUS1, MG US TECH   [facility]    [address] [place] [address] [phone]           How do I prepare for my exam? (Food  and drink instructions) No Food and Drink Restrictions.  How do I prepare for my exam? (Other instructions) You do not need to do anything special to prepare for your exam.  What should I wear: Wear comfortable clothes.  How long does the exam take: Most ultrasounds take 30 to 60 minutes.  What should I bring: Bring a list of your medicines, including vitamins, minerals and over-the-counter drugs. It is safest to leave personal items at home.  Do I need a :  No  is needed.  What do I need to tell my doctor: Tell your doctor about any allergies you may have.  What should I do after the exam: No restrictions, You may resume normal activities.  What is this test: An ultrasound uses sound waves to make pictures of the body. Sound waves do not cause pain. The only discomfort may be the pressure of the wand against your skin or full bladder.  Who should I call with questions: If you have any questions, please call the Imaging Department where you will have your exam. Directions, parking instructions, and other information is available on our website, [employer]/imaging.            Oct 16, 2018  7:30 AM CDT   LAB with AN LAB   [facility]    [address] [facility] [address]   [phone]           Please do not eat 10-12 hours before your appointment if you are coming in fasting for labs on lipids, cholesterol, or glucose (sugar). This does not apply to pregnant women. Water, hot tea and black coffee (with nothing added) are okay. Do not drink other fluids, diet soda or chew gum.            Nov 13, 2018  7:15 AM CST   LAB with AN LAB   [facility]    [address] [address]   [phone]           Please do not eat 10-12 hours before your appointment if you are coming in fasting for labs on lipids, cholesterol, or glucose (sugar). This does not apply to pregnant women. Water, hot tea and black  coffee (with nothing added) are okay. Do not drink other fluids, diet soda or chew gum.            Dec 18, 2018  7:15 AM CST   LAB with AN LAB   [facility]    [address] [facility] [address]   [phone]           Please do not eat 10-12 hours before your appointment if you are coming in fasting for labs on lipids, cholesterol, or glucose (sugar). This does not apply to pregnant women. Water, hot tea and black coffee (with nothing added) are okay. Do not drink other fluids, diet soda or chew gum.            Jan 22, 2019  7:15 AM CST   LAB with AN LAB   [facility] ([facility]    [address] [address]   [phone]           Please do not eat 10-12 hours before your appointment if you are coming in fasting for labs on lipids, cholesterol, or glucose (sugar). This does not apply to pregnant women. Water, hot tea and black coffee (with nothing added) are okay. Do not drink other fluids, diet soda or chew gum.            Jan 31, 2019  7:40 AM CST   Return Visit with Bill Resendiz MD   [facility] ([facility])    [address] [facility] [address] [phone]              Future tests that were ordered for you today     Open Standing Orders        Priority Remaining Interval Expires Ordered    CBC with platelets differential Routine 7/7 Every 4 Weeks 4/27/2019 9/19/2018    Creatinine Routine 7/7 Every 4 Weeks 4/27/2019 9/19/2018    Hepatic panel Routine 7/7 Every 4 Weeks 4/27/2019 9/19/2018          Open Future Orders        Priority Expected Expires Ordered    ETHAN PT, HAND, AND CHIROPRACTIC REFERRAL Routine  9/19/2019 9/19/2018     Lower Extremity Venous Duplex Left Routine 9/18/2018 10/18/2018 9/18/2018            Who to contact     If you have questions or need follow up information about today's clinic visit or your schedule please contact [facility]  directly at [phone].  Normal or non-critical lab and imaging results will be communicated to you by Luminescenthart, letter or phone within 4 business days after the clinic has received the results. If you do not hear from us within 7 days, please contact the clinic through Lumines[employer] or phone. If you have a critical or abnormal lab result, we will notify you by phone as soon as possible.  Submit refill requests through [employer] or call your pharmacy and they will forward the refill request to us. Please allow 3 business days for your refill to be completed.          Additional Information About Your Visit        Lumines[employer] Information     [employer] gives you secure access to your electronic health record. If you see a primary care provider, you can also send messages to your care team and make appointments. If you have questions, please call your primary care clinic.  If you do not have a primary care provider, please call [phone] and they will assist you.        Care EveryWhere ID     This is your Care EveryWhere ID. This could be used by other organizations to access your [facility] medical records  [id number]        Your Vitals Were     Pulse Temperature Pulse Oximetry BMI (Body Mass Index)          96 99  F (37.2  C) (Oral) 96% 38.79 kg/m2         Blood Pressure from Last 3 Encounters:   09/19/18 131/73   09/18/18 128/75   09/13/18 131/74    Weight from Last 3 Encounters:   09/19/18 102.5 kg (226 lb)   09/18/18 101.2 kg (223 lb 3.2 oz)   09/13/18 102.1 kg (225 lb)                 Today's Medication Changes          These changes are accurate as of 9/19/18  8:21 AM.  If you have any questions, ask your nurse or doctor.               Start taking these medicines.        Dose/Directions    leflunomide 20 MG tablet   Commonly known as:  ARAVA   Used for:  Inflammatory polyarthropathy (H)   Started by:  Bill Resendiz MD        Dose:  20 mg   Take 1 tablet (20 mg) by mouth daily   Quantity:  30 tablet   Refills:  4          Stop taking these medicines if you haven't already. Please contact your care team if you have questions.     folic acid 1 MG tablet   Commonly known as:  FOLVITE   Stopped by:  Bill Resendiz MD           methotrexate sodium 2.5 MG Tabs   Stopped by:  Bill Resendiz MD           methylPREDNISolone 4 MG tablet   Commonly known as:  MEDROL DOSEPAK   Stopped by:  Bill Resendiz MD                Where to get your medicines      These medications were sent to [facility] [id number] [place] [address] [address] [place] [address] AT [address]  [address] [facility] [address]     Phone:  [phone]     leflunomide 20 MG tablet    sulfaSALAzine  MG EC tablet                Primary Care Provider Office Phone # Fax #    Stephanie Saldana [phone] [address] [facility] [address]        Equal Access to Services     [facility]: Hadii niall méndez hadasho Soomaali, waaxda luqadaha, qaybta kaalmada adeegyada, milton nicholson . So [facility] [phone].    ATENCIÓN: Si allie mendez, tiene a lopez disposición servicios gratuitos de asistencia lingüística. [facility] [phone].    We comply with applicable federal civil rights laws and [place] laws. We do not discriminate on the basis of race, color, national origin, age, disability, sex, sexual orientation, or gender identity.            Thank you!     Thank you for choosing [facility]  for your care. Our goal is always to provide you with excellent care. Hearing back from our patients is one way we can continue to improve our services. Please take a few minutes to complete the written survey that you may receive in the mail after your visit with us. Thank you!             Your Updated Medication List - Protect others around you: Learn how to safely use, store and throw away your medicines at www.disposemymeds.org.          This list is accurate as of 9/19/18  8:21  AM.  Always use your most recent med list.                   Brand Name Dispense Instructions for use Diagnosis    leflunomide 20 MG tablet    ARAVA    30 tablet    Take 1 tablet (20 mg) by mouth daily    Inflammatory polyarthropathy (H)       order for DME     2 Units    Equipment being ordered: compression stockings 1 pair, at 30mm Hg, to be worn during the day. For Bilateral leg edema [R60.0]    Bilateral leg edema       sulfaSALAzine  MG EC tablet    AZULFIDINE EN    60 tablet    Take 1 tablet (500 mg) by mouth 2 times daily    Inflammatory polyarthropathy (H)

## 2018-09-21 ENCOUNTER — MYC MEDICAL ADVICE (OUTPATIENT)
Dept: INTERNAL MEDICINE | Facility: CLINIC | Age: 53
End: 2018-09-21

## 2018-09-21 DIAGNOSIS — R06.83 SNORING: Primary | ICD-10-CM

## 2018-09-21 DIAGNOSIS — E66.9 OBESITY: ICD-10-CM

## 2018-09-21 NOTE — TELEPHONE ENCOUNTER
Notes from vascular provider that patient saw at [facility] 9/18/18:    She also gives history of leg fatigue with the snoring and obese consider seeing sleep clinic for evaluation of sleep apnea  Lose weight  Elevate the legs and take breaks every 2 hours and consider alternating with the sitting and standing desk  Utilize compression stockings 20-30 mmHg thigh-high daytime and elevate the leg at nighttime with 1-2 pillows      RN signed order.  fabrooms message sent to patient.   See message for details.    GAYLE MuirN RN          CHEYANNE Muir RN

## 2018-09-25 ENCOUNTER — THERAPY VISIT (OUTPATIENT)
Dept: PHYSICAL THERAPY | Facility: CLINIC | Age: 53
End: 2018-09-25
Payer: COMMERCIAL

## 2018-09-25 ENCOUNTER — TELEPHONE (OUTPATIENT)
Dept: RHEUMATOLOGY | Facility: CLINIC | Age: 53
End: 2018-09-25

## 2018-09-25 DIAGNOSIS — M25.512 ACUTE PAIN OF LEFT SHOULDER: Primary | ICD-10-CM

## 2018-09-25 DIAGNOSIS — M75.42 IMPINGEMENT SYNDROME OF LEFT SHOULDER: ICD-10-CM

## 2018-09-25 PROCEDURE — 97161 PT EVAL LOW COMPLEX 20 MIN: CPT | Mod: GP | Performed by: PHYSICAL THERAPIST

## 2018-09-25 PROCEDURE — 97110 THERAPEUTIC EXERCISES: CPT | Mod: GP | Performed by: PHYSICAL THERAPIST

## 2018-09-25 NOTE — PROGRESS NOTES
[facility] Initial Evaluation  Subjective:  Patient is a 53 year old female presenting with rehab left shoulder hpi. The history is provided by the patient. No  was used.   Bria Haddad is a 53 year old female with a left shoulder condition.  Condition occurred with:  Unknown cause.  Condition occurred: for unknown reasons.  This is a new condition  Date of MD order for this episode was 9-19-18. Bria reports waking up with L shoulder pain May 2018, no known reason. Since that time she has seen a chiro without improvement(ROM ex). She returned to MD and tried the steroid pack without change. Last week had a cortisone shot. Today is sore again.  Bria is R handed. .    Patient reports pain:  Posterior and anterior.    Pain is described as aching and is constant and reported as 3/10.  Associated symptoms:  Loss of strength and loss of motion/stiffness (popping clicking). Pain is the same all the time.  Symptoms are exacerbated by lying on extremity, other, using arm behind back, using arm overhead and certain positions (pulling cover up) and relieved by rest.  Since onset symptoms are unchanged.        General health as reported by patient is fair.  Pertinent medical history includes:  Overweight and rheumatoid arthritis.  Medical allergies: yes (aspirin).  Other surgeries include:  Orthopedic surgery (B CTS).  Current medications:  Anti-inflammatory.  Current occupation is .  Patient is working in normal job without restrictions.  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    Barriers include:  None as reported by the patient.    Red flags:  None as reported by the patient.                        Objective:  Standing Alignment:                  General deviations alignment: elevated upper traps, tight upper back posture.      Flexibility/Screens:         Spine:  Decreased left spine flexibility:  Upper Trap and Levator    Decreased right spine flexibility:   Upper Trap and Levator                       Shoulder Evaluation:  ROM:  AROM:    Flexion:  Left:  150    Right:  153    Abduction:  Left: 75   Right:  Wnl    Internal Rotation:  Left:  75    Right:  Wnl  External Rotation:  Left:  77    Right:  Wnl                PROM:    Flexion:  Left:  155    Right: 158      Abduction:  Left:  Unable due to pain        Internal Rotation:  Left:  80      External Rotation:  Left:  80                        Strength:    Flexion: Left:/5 weak/painful   Pain:    Right: 4/5     Pain:   Extension:  Left: 5/5    Pain:    Right: 5/5    Pain:  Abduction:  Left:/5 Weak/painful  Pain:    Right: 4+/5     Pain:  Adduction:  Left: 5/5    Pain:    Right: 5/5     Pain:  Internal Rotation:  Left:5/5     Pain:    Right: 5/5     Pain:  External Rotation:   Left:4/5     Pain:   Right:4/5     Pain:        Elbow Flexion:  Left:5/5     Pain:    Right:5/5     Pain:  Elbow Extension:  Left:5/5     Pain:    Right:5/5     Pain:    Special Tests:  Special tests assessed shoulder: unable to assess due to pain.      Palpation:    Left shoulder tenderness present at:  Supraspinatus; Deltoid; Upper Trap and Bicipital Groove  Left shoulder tenderness not present at: Clavicle; Sternoclavicular; Acrimioclavicular; Biceps; Triceps; Infraspinatus; Teres Minor or Subscapularis  Right shoulder tenderness present at: Upper Trap  Mobility Tests:  Mobility wnl shoulder: did not tolerate GHJ assessment.          Scapulothoracic left:  Hypermobile  Scapulothoracic right:  Hypermobile                                       General     ROS    Assessment/Plan:    Patient is a 53 year old female with left side shoulder complaints.    Patient has the following significant findings with corresponding treatment plan.                Diagnosis 1:  L shoulder pain  Pain -  manual therapy, self management, education and home program  Decreased ROM/flexibility - manual therapy, therapeutic exercise and home program  Decreased joint  mobility(assumed, did not tolerate) - manual therapy, therapeutic exercise and home program  Decreased strength - therapeutic exercise, therapeutic activities and home program  Decreased proprioception(scap stabilizers and UT's) - neuro re-education, therapeutic activities and home program  Impaired muscle performance - neuro re-education and home program  Decreased function - therapeutic activities and home program  Impaired posture - neuro re-education and home program    Therapy Evaluation Codes:   1) History comprised of:   Personal factors that impact the plan of care:      Coping style, Profession and Time since onset of symptoms.    Comorbidity factors that impact the plan of care are:      Rheumatoid arthritis.     Medications impacting care: Anti-inflammatory.  2) Examination of Body Systems comprised of:   Body structures and functions that impact the plan of care:      Shoulder and upper back.   Activity limitations that impact the plan of care are:      Bathing, Cooking, Driving, Dressing, Lifting, Reading/Computer work, Sitting, Working, Sleeping, Laying down and reaching.  3) Clinical presentation characteristics are:   Stable/Uncomplicated.  4) Decision-Making    Low complexity using standardized patient assessment instrument and/or measureable assessment of functional outcome.  Cumulative Therapy Evaluation is: Low complexity.    Previous and current functional limitations:  (See Goal Flow Sheet for this information)    Short term and Long term goals: (See Goal Flow Sheet for this information)     Communication ability:  Patient appears to be able to clearly communicate and understand verbal and written communication and follow directions correctly.  Treatment Explanation - The following has been discussed with the patient:   RX ordered/plan of care  Anticipated outcomes  Possible risks and side effects  This patient would benefit from PT intervention to resume normal activities.   Rehab potential is  good.    Frequency:  1 X week, once daily  Duration:  for 6 weeks  Discharge Plan:  Achieve all LTG.  Independent in home treatment program.  Reach maximal therapeutic benefit.    Please refer to the daily flowsheet for treatment today, total treatment time and time spent performing 1:1 timed codes.

## 2018-09-25 NOTE — MR AVS SNAPSHOT
After Visit Summary   9/25/2018    Bria Haddad    MRN: [id number]           Patient Information     Date Of Birth          1965        Visit Information        Provider Department      9/25/2018 6:40 AM Rola Dasilva PT [facility] [place] [facility]        Today's Diagnoses     Acute pain of left shoulder    -  1    Impingement syndrome of left shoulder           Follow-ups after your visit        Your next 10 appointments already scheduled     Oct 02, 2018  6:40 AM CDT   ETHAN Extremity with Rola Dasilva PT   [facility] [place] [facility] ([facility] [place])    [address]  [place] [address]   [phone]            Oct 08, 2018  7:20 AM CDT   ETHAN Extremity with Rola Dasilva PT   [facility] ([facility] [place])    [address]  [place] [address]   [phone]            Oct 16, 2018  7:30 AM CDT   LAB with AN LAB   [facility]    [address] [facility] [address]   [phone]           Please do not eat 10-12 hours before your appointment if you are coming in fasting for labs on lipids, cholesterol, or glucose (sugar). This does not apply to pregnant women. Water, hot tea and black coffee (with nothing added) are okay. Do not drink other fluids, diet soda or chew gum.            Nov 13, 2018  7:15 AM CST   LAB with AN LAB   [facility] ([facility])    [address] [facility] [address]   [phone]           Please do not eat 10-12 hours before your appointment if you are coming in fasting for labs on lipids, cholesterol, or glucose (sugar). This does not apply to pregnant women. Water, hot tea and black coffee (with nothing added) are okay. Do not drink other fluids, diet soda or chew gum.            Dec 18, 2018  7:15 AM CST   LAB with AN LAB   [facility] ([facility])    [address] [facility] [address]   [phone]           Please do not eat 10-12 hours before your appointment if you are coming in fasting for labs on lipids, cholesterol, or glucose (sugar). This does not apply to pregnant women. Water, hot tea and black coffee (with nothing added) are okay. Do not drink other fluids, diet soda or chew gum.            Jan 22, 2019  7:15 AM CST   LAB with AN LAB   [facility]    [address] [facility] [facility] [address]   [phone]           Please do not eat 10-12 hours before your appointment if you are coming in fasting for labs on lipids, cholesterol, or glucose (sugar). This does not apply to pregnant women. Water, hot tea and black coffee (with nothing added) are okay. Do not drink other fluids, diet soda or chew gum.            Jan 31, 2019  7:40 AM CST   Return Visit with Bill Resendiz MD   [facility]    [address] [facility] [address] [phone]              Who to contact     If you have questions or need follow up information about today's clinic visit or your schedule please contact [facility] directly at [phone].  Normal or non-critical lab and imaging results will be communicated to you by Transporeonhart, letter or phone within 4 business days after the clinic has received the results. If you do not hear from us within 7 days, please contact the clinic through Transporeonhart or phone. If you have a critical or abnormal lab result, we will notify you by phone as soon as possible.  Submit refill requests through [employer] or call your pharmacy and they will forward the refill request to us. Please allow 3 business days for your refill to be completed.          Additional Information About Your Visit        Your[employer] Information     [employer] gives you secure access to your electronic health record. If you see a primary care  provider, you can also send messages to your care team and make appointments. If you have questions, please call your primary care clinic.  If you do not have a primary care provider, please call [phone] and they will assist you.        Care EveryWhere ID     This is your Care EveryWhere ID. This could be used by other organizations to access your [facility] medical records  [id number]         Blood Pressure from Last 3 Encounters:   09/19/18 131/73   09/18/18 128/75   09/13/18 131/74    Weight from Last 3 Encounters:   09/19/18 102.5 kg (226 lb)   09/18/18 101.2 kg (223 lb 3.2 oz)   09/13/18 102.1 kg (225 lb)              We Performed the Following     ETHAN Inital Eval Report     [facility] PT, HAND, AND CHIROPRACTIC REFERRAL     PT Eval, Low Complexity ([id number])     Therapeutic Exercises        Primary Care Provider Office Phone # Fax #    Stephanie Saldana [phone] [address] [facility] [address]        Equal Access to Services     : Hadii aad ku hadasho Soomaali, waaxda luqadaha, qaybta kaalmada nhi, milton nicholson . So [facility] [phone].    ATENCIÓN: Si habla español, tiene a lopez disposición servicios gratuitos de asistencia lingüística. [facility] [phone].    We comply with applicable federal civil rights laws and [place] laws. We do not discriminate on the basis of race, color, national origin, age, disability, sex, sexual orientation, or gender identity.            Thank you!     Thank you for choosing [facility]  for your care. Our goal is always to provide you with excellent care. Hearing back from our patients is one way we can continue to improve our services. Please take a few minutes to complete the written survey that you may receive in the mail after your visit with us. Thank you!             Your Updated Medication List - Protect others around you: Learn how to safely use, store and  throw away your medicines at www.disposemymeds.org.          This list is accurate as of 9/25/18  9:03 AM.  Always use your most recent med list.                   Brand Name Dispense Instructions for use Diagnosis    leflunomide 20 MG tablet    ARAVA    30 tablet    Take 1 tablet (20 mg) by mouth daily    Inflammatory polyarthropathy (H)       order for DME     2 Units    Equipment being ordered: compression stockings 1 pair, at 30mm Hg, to be worn during the day. For Bilateral leg edema [R60.0]    Bilateral leg edema       sulfaSALAzine  MG EC tablet    AZULFIDINE EN    60 tablet    Take 1 tablet (500 mg) by mouth 2 times daily    Inflammatory polyarthropathy (H)

## 2018-09-25 NOTE — TELEPHONE ENCOUNTER
Methotrexate       Last Written Prescription Date:  5/23/18  Last Fill Quantity: 32,   # refills: 3  Last Office Visit: 9/19/18  Future Office visit:       Routing refill request to provider for review/approval because:  Drug not active on patient's medication list    Judy Barakat RN

## 2018-09-26 NOTE — TELEPHONE ENCOUNTER
Rheumatology team: Methotrexate was discontinued at the last clinic visit.    Bill Resendiz MD  9/26/2018 6:10 AM

## 2018-09-26 NOTE — TELEPHONE ENCOUNTER
Spoke with patient who is aware that Methotrexate was discontinued and states it must have been an automated refill from the pharmacy. She also mentions that she has noticed a significant improvement with her symptoms on Leflunomide.    Miguel Loving RN....9/26/2018 11:47 AM

## 2018-10-02 ENCOUNTER — THERAPY VISIT (OUTPATIENT)
Dept: PHYSICAL THERAPY | Facility: CLINIC | Age: 53
End: 2018-10-02
Payer: COMMERCIAL

## 2018-10-02 DIAGNOSIS — M25.512 ACUTE PAIN OF LEFT SHOULDER: ICD-10-CM

## 2018-10-02 DIAGNOSIS — M75.42 IMPINGEMENT SYNDROME OF LEFT SHOULDER: ICD-10-CM

## 2018-10-02 PROCEDURE — 97112 NEUROMUSCULAR REEDUCATION: CPT | Mod: GP | Performed by: PHYSICAL THERAPIST

## 2018-10-02 PROCEDURE — 97140 MANUAL THERAPY 1/> REGIONS: CPT | Mod: GP | Performed by: PHYSICAL THERAPIST

## 2018-10-02 PROCEDURE — 97110 THERAPEUTIC EXERCISES: CPT | Mod: GP | Performed by: PHYSICAL THERAPIST

## 2018-10-03 ENCOUNTER — OFFICE VISIT (OUTPATIENT)
Dept: FAMILY MEDICINE | Facility: CLINIC | Age: 53
End: 2018-10-03
Payer: COMMERCIAL

## 2018-10-03 VITALS
WEIGHT: 224 LBS | SYSTOLIC BLOOD PRESSURE: 142 MMHG | RESPIRATION RATE: 18 BRPM | HEART RATE: 87 BPM | OXYGEN SATURATION: 97 % | TEMPERATURE: 98.6 F | DIASTOLIC BLOOD PRESSURE: 98 MMHG | BODY MASS INDEX: 38.45 KG/M2

## 2018-10-03 DIAGNOSIS — J01.90 ACUTE NON-RECURRENT SINUSITIS, UNSPECIFIED LOCATION: Primary | ICD-10-CM

## 2018-10-03 DIAGNOSIS — J40 BRONCHITIS: ICD-10-CM

## 2018-10-03 PROCEDURE — 99213 OFFICE O/P EST LOW 20 MIN: CPT | Performed by: FAMILY MEDICINE

## 2018-10-03 RX ORDER — CEFUROXIME AXETIL 500 MG/1
500 TABLET ORAL 2 TIMES DAILY
Qty: 20 TABLET | Refills: 0 | Status: SHIPPED | OUTPATIENT
Start: 2018-10-03 | End: 2018-10-17

## 2018-10-03 ASSESSMENT — PAIN SCALES - GENERAL: PAINLEVEL: NO PAIN (0)

## 2018-10-03 NOTE — NURSING NOTE
"Chief Complaint   Patient presents with     URI     cough x 7 day       Initial BP (!) 142/98  Pulse 87  Temp 98.6  F (37  C) (Oral)  Resp 18  Wt 224 lb (101.6 kg)  LMP 08/03/2018  SpO2 97%  BMI 38.45 kg/m2 Estimated body mass index is 38.45 kg/(m^2) as calculated from the following:    Height as of 6/1/18: 5' 4\" (1.626 m).    Weight as of this encounter: 224 lb (101.6 kg).  Medication Reconciliation: complete  Izzy Ledesma M.A.    "

## 2018-10-03 NOTE — PROGRESS NOTES
CHIEF COMPLAINT    Cough      HISTORY    She has developed cough X 8-9 days. Non productive. Hasn't noticed fever.  She does have some head congestion and some postnasal drainage.    She has h/o inflammatory arthritis. Switched from Methotrexate to Arava 9-19.  She wondered if possibly the Arava could be responsible but her rheumatologist did not think this was the case.    No history of asthma or other breathing problems.  Non-smoker.        Patient Active Problem List   Diagnosis     CARDIOVASCULAR SCREENING; LDL GOAL LESS THAN 160     Endometriosis of uterus     Bilateral carpal tunnel syndrome     S/P carpal tunnel release     Inflammatory polyarthropathy (H)     Impingement syndrome of left shoulder     Acute pain of left shoulder     Current Outpatient Prescriptions   Medication Sig Dispense Refill     leflunomide (ARAVA) 20 MG tablet Take 1 tablet (20 mg) by mouth daily 30 tablet 4     order for DME Equipment being ordered: compression stockings 1 pair, at 30mm Hg, to be worn during the day. For Bilateral leg edema [R60.0] 2 Units 0     sulfaSALAzine ER (AZULFIDINE EN) 500 MG EC tablet Take 1 tablet (500 mg) by mouth 2 times daily 60 tablet 3       REVIEW OF SYSTEMS    No fever.  No sore throat or ear pain.  No chest pain.  No S OB.  No edema.      Past Medical History:   Diagnosis Date     NO ACTIVE PROBLEMS      Obesity      Polyarthritis     inflammatory poly arthritis.       EXAM  BP (!) 142/98  Pulse 87  Temp 98.6  F (37  C) (Oral)  Resp 18  Wt 224 lb (101.6 kg)  LMP 08/03/2018  SpO2 97%  BMI 38.45 kg/m2    Pharynx appears normal.  Tympanic membranes normal.  No cervical adenopathy.  Chest clear.      (J01.90) Acute non-recurrent sinusitis, unspecified location  (primary encounter diagnosis)  Comment:   Plan: cefuroxime (CEFTIN) 500 MG tablet            (J40) Bronchitis  Comment:   Plan: cefuroxime (CEFTIN) 500 MG tablet    Patient advised to return for worsening or persistent symptoms.

## 2018-10-03 NOTE — MR AVS SNAPSHOT
After Visit Summary   10/3/2018    Bria Haddad    MRN: [id number]           Patient Information     Date Of Birth          1965        Visit Information        Provider Department      10/3/2018 3:40 PM Duncan Pham MD [facility]        Today's Diagnoses     Acute non-recurrent sinusitis, unspecified location    -  1    Bronchitis          Care Instructions      Take prescribed medication as directed.    Tea with lemon and honey.    Cough Drops.          Follow-ups after your visit        Your next 10 appointments already scheduled     Oct 08, 2018  7:20 AM CDT   ETHAN Extremity with Rola Dasilva PT   [facility] [place] [facility] ([facility] [place])    [address]  [place] [address]   [phone]            Oct 16, 2018  7:30 AM CDT   LAB with AN LAB   [facility] ([facility]    [address] [address] [phone]           Please do not eat 10-12 hours before your appointment if you are coming in fasting for labs on lipids, cholesterol, or glucose (sugar). This does not apply to pregnant women. Water, hot tea and black coffee (with nothing added) are okay. Do not drink other fluids, diet soda or chew gum.            Oct 16, 2018  8:00 AM CDT   ETHAN Extremity with Rola Dasilva PT   [facility] [place] [facility] ([facility] [place])    [address]  [place] [address]   [phone]            Oct 23, 2018  6:40 AM CDT   ETHAN Extremity with Rola Dasilva PT   [facility] [place] [facility] ([facility] [place])    [address]  [place] [address]   [phone]            Oct 30, 2018  8:00 AM CDT   ETHAN Extremity with Rola Dasilva PT   [facility] [place] [facility] ([facility] [place])    [address]  [place] [address]   [phone]            Nov 13, 2018  7:15 AM CST    LAB with AN LAB   [facility] ([facility]    [address] [address]   [phone]           Please do not eat 10-12 hours before your appointment if you are coming in fasting for labs on lipids, cholesterol, or glucose (sugar). This does not apply to pregnant women. Water, hot tea and black coffee (with nothing added) are okay. Do not drink other fluids, diet soda or chew gum.            Dec 18, 2018  7:15 AM CST   LAB with AN LAB   [facility] ([facility])    [address] [address]   [phone]           Please do not eat 10-12 hours before your appointment if you are coming in fasting for labs on lipids, cholesterol, or glucose (sugar). This does not apply to pregnant women. Water, hot tea and black coffee (with nothing added) are okay. Do not drink other fluids, diet soda or chew gum.            Jan 22, 2019  7:15 AM CST   LAB with AN LAB   [facility] ([facility])    [address] [address]   [phone]           Please do not eat 10-12 hours before your appointment if you are coming in fasting for labs on lipids, cholesterol, or glucose (sugar). This does not apply to pregnant women. Water, hot tea and black coffee (with nothing added) are okay. Do not drink other fluids, diet soda or chew gum.            Jan 31, 2019  7:40 AM CST   Return Visit with Bill Resendiz MD   [facility] [place] ([facility] [place])    [address]  [place] [address] [phone]              Who to contact     If you have questions or need follow up information about today's clinic visit or your schedule please contact [facility] directly at [phone].  Normal or non-critical lab and imaging results will be communicated to you by MyChart, letter or phone within 4 business days after the clinic has received the results. If you do not hear from  us within 7 days, please contact the clinic through [employer] or phone. If you have a critical or abnormal lab result, we will notify you by phone as soon as possible.  Submit refill requests through [employer] or call your pharmacy and they will forward the refill request to us. Please allow 3 business days for your refill to be completed.          Additional Information About Your Visit        [employer] Information     [employer] gives you secure access to your electronic health record. If you see a primary care provider, you can also send messages to your care team and make appointments. If you have questions, please call your primary care clinic.  If you do not have a primary care provider, please call [phone] and they will assist you.        Care EveryWhere ID     This is your Care EveryWhere ID. This could be used by other organizations to access your [facility] medical records  [id number]        Your Vitals Were     Pulse Temperature Respirations Last Period Pulse Oximetry BMI (Body Mass Index)    87 98.6  F (37  C) (Oral) 18 08/03/2018 97% 38.45 kg/m2       Blood Pressure from Last 3 Encounters:   10/03/18 (!) 142/98   09/19/18 131/73   09/18/18 128/75    Weight from Last 3 Encounters:   10/03/18 224 lb (101.6 kg)   09/19/18 226 lb (102.5 kg)   09/18/18 223 lb 3.2 oz (101.2 kg)              Today, you had the following     No orders found for display         Today's Medication Changes          These changes are accurate as of 10/3/18  4:11 PM.  If you have any questions, ask your nurse or doctor.               Start taking these medicines.        Dose/Directions    cefuroxime 500 MG tablet   Commonly known as:  CEFTIN   Used for:  Bronchitis, Acute non-recurrent sinusitis, unspecified location   Started by:  Duncan Pham MD        Dose:  500 mg   Take 1 tablet (500 mg) by mouth 2 times daily   Quantity:  20 tablet   Refills:  0            Where to get your medicines      These medications were sent to  [id number] - [facility] [address]  [address] [facility] [address]     Phone:  [phone]     cefuroxime 500 MG tablet                Primary Care Provider Fax #    Physician No Ref-Primary [phone]       No address on file        Equal Access to Services     [facility] : Hadii aad ku hadasho Soomaali, waaxda luqadaha, qaybta kaalmada adeegyada, waxay idiin hayaan adeeg khisabel lalayo . So [facility] [phone].    ATENCIÓN: Si habla español, tiene a lopez disposición servicios gratuitos de asistencia lingüística. Sly al [phone].    We comply with applicable federal civil rights laws and [place] laws. We do not discriminate on the basis of race, color, national origin, age, disability, sex, sexual orientation, or gender identity.            Thank you!     Thank you for choosing [facility]  for your care. Our goal is always to provide you with excellent care. Hearing back from our patients is one way we can continue to improve our services. Please take a few minutes to complete the written survey that you may receive in the mail after your visit with us. Thank you!             Your Updated Medication List - Protect others around you: Learn how to safely use, store and throw away your medicines at www.disposemymeds.org.          This list is accurate as of 10/3/18  4:11 PM.  Always use your most recent med list.                   Brand Name Dispense Instructions for use Diagnosis    cefuroxime 500 MG tablet    CEFTIN    20 tablet    Take 1 tablet (500 mg) by mouth 2 times daily    Bronchitis, Acute non-recurrent sinusitis, unspecified location       leflunomide 20 MG tablet    ARAVA    30 tablet    Take 1 tablet (20 mg) by mouth daily    Inflammatory polyarthropathy (H)       order for DME     2 Units    Equipment being ordered: compression stockings 1 pair, at 30mm Hg, to be worn during the day. For Bilateral leg  edema [R60.0]    Bilateral leg edema       sulfaSALAzine  MG EC tablet    AZULFIDINE EN    60 tablet    Take 1 tablet (500 mg) by mouth 2 times daily    Inflammatory polyarthropathy (H)

## 2018-10-08 ENCOUNTER — THERAPY VISIT (OUTPATIENT)
Dept: PHYSICAL THERAPY | Facility: CLINIC | Age: 53
End: 2018-10-08
Payer: COMMERCIAL

## 2018-10-08 DIAGNOSIS — M25.512 ACUTE PAIN OF LEFT SHOULDER: ICD-10-CM

## 2018-10-08 DIAGNOSIS — M75.42 IMPINGEMENT SYNDROME OF LEFT SHOULDER: ICD-10-CM

## 2018-10-08 PROCEDURE — 97112 NEUROMUSCULAR REEDUCATION: CPT | Mod: GP | Performed by: PHYSICAL THERAPIST

## 2018-10-08 PROCEDURE — 97110 THERAPEUTIC EXERCISES: CPT | Mod: GP | Performed by: PHYSICAL THERAPIST

## 2018-10-08 PROCEDURE — 97140 MANUAL THERAPY 1/> REGIONS: CPT | Mod: GP | Performed by: PHYSICAL THERAPIST

## 2018-10-16 ENCOUNTER — THERAPY VISIT (OUTPATIENT)
Dept: PHYSICAL THERAPY | Facility: CLINIC | Age: 53
End: 2018-10-16
Payer: COMMERCIAL

## 2018-10-16 DIAGNOSIS — M75.42 IMPINGEMENT SYNDROME OF LEFT SHOULDER: ICD-10-CM

## 2018-10-16 DIAGNOSIS — M25.512 ACUTE PAIN OF LEFT SHOULDER: ICD-10-CM

## 2018-10-16 DIAGNOSIS — M06.4 INFLAMMATORY POLYARTHROPATHY (H): ICD-10-CM

## 2018-10-16 LAB
ALBUMIN SERPL-MCNC: 3.5 G/DL (ref 3.4–5)
ALP SERPL-CCNC: 77 U/L (ref 40–150)
ALT SERPL W P-5'-P-CCNC: 40 U/L (ref 0–50)
AST SERPL W P-5'-P-CCNC: 25 U/L (ref 0–45)
BASOPHILS # BLD AUTO: 0 10E9/L (ref 0–0.2)
BASOPHILS NFR BLD AUTO: 0.3 %
BILIRUB DIRECT SERPL-MCNC: 0.1 MG/DL (ref 0–0.2)
BILIRUB SERPL-MCNC: 0.4 MG/DL (ref 0.2–1.3)
CREAT SERPL-MCNC: 0.8 MG/DL (ref 0.52–1.04)
DIFFERENTIAL METHOD BLD: NORMAL
EOSINOPHIL # BLD AUTO: 0.1 10E9/L (ref 0–0.7)
EOSINOPHIL NFR BLD AUTO: 1.7 %
ERYTHROCYTE [DISTWIDTH] IN BLOOD BY AUTOMATED COUNT: 13.1 % (ref 10–15)
GFR SERPL CREATININE-BSD FRML MDRD: 74 ML/MIN/1.7M2
HCT VFR BLD AUTO: 43.1 % (ref 35–47)
HGB BLD-MCNC: 14.3 G/DL (ref 11.7–15.7)
LYMPHOCYTES # BLD AUTO: 1.9 10E9/L (ref 0.8–5.3)
LYMPHOCYTES NFR BLD AUTO: 33.3 %
MCH RBC QN AUTO: 31.1 PG (ref 26.5–33)
MCHC RBC AUTO-ENTMCNC: 33.2 G/DL (ref 31.5–36.5)
MCV RBC AUTO: 94 FL (ref 78–100)
MONOCYTES # BLD AUTO: 0.6 10E9/L (ref 0–1.3)
MONOCYTES NFR BLD AUTO: 10.5 %
NEUTROPHILS # BLD AUTO: 3.1 10E9/L (ref 1.6–8.3)
NEUTROPHILS NFR BLD AUTO: 54.2 %
PLATELET # BLD AUTO: 297 10E9/L (ref 150–450)
PROT SERPL-MCNC: 7.1 G/DL (ref 6.8–8.8)
RBC # BLD AUTO: 4.6 10E12/L (ref 3.8–5.2)
WBC # BLD AUTO: 5.8 10E9/L (ref 4–11)

## 2018-10-16 PROCEDURE — 80076 HEPATIC FUNCTION PANEL: CPT | Performed by: INTERNAL MEDICINE

## 2018-10-16 PROCEDURE — 85025 COMPLETE CBC W/AUTO DIFF WBC: CPT | Performed by: INTERNAL MEDICINE

## 2018-10-16 PROCEDURE — 82565 ASSAY OF CREATININE: CPT | Performed by: INTERNAL MEDICINE

## 2018-10-16 PROCEDURE — 97110 THERAPEUTIC EXERCISES: CPT | Mod: GP | Performed by: PHYSICAL THERAPIST

## 2018-10-16 PROCEDURE — 36415 COLL VENOUS BLD VENIPUNCTURE: CPT | Performed by: INTERNAL MEDICINE

## 2018-10-16 PROCEDURE — 97112 NEUROMUSCULAR REEDUCATION: CPT | Mod: GP | Performed by: PHYSICAL THERAPIST

## 2018-10-16 NOTE — MR AVS SNAPSHOT
After Visit Summary   10/16/2018    Bria Haddad    MRN: [id number]           Patient Information     Date Of Birth          1965        Visit Information        Provider Department      10/16/2018 8:00 AM Rola Dasilva PT [facility] [place] [facility]        Today's Diagnoses     Impingement syndrome of left shoulder        Acute pain of left shoulder           Follow-ups after your visit        Your next 10 appointments already scheduled     Oct 23, 2018  6:40 AM CDT   ETHAN Extremity with Rola Dasilva PT   [facility] [place] [facility] ([facility] [place])    [address]  [place] [address]   [phone]            Oct 30, 2018  8:00 AM CDT   ETHAN Extremity with Rola Dasilva PT   [facility] [place] [facility] ([facility] [place])    [address]  [place] [address]   [phone]            Nov 13, 2018  7:15 AM CST   LAB with AN LAB   [facility] ([facility])    [address] [address]   [phone]           Please do not eat 10-12 hours before your appointment if you are coming in fasting for labs on lipids, cholesterol, or glucose (sugar). This does not apply to pregnant women. Water, hot tea and black coffee (with nothing added) are okay. Do not drink other fluids, diet soda or chew gum.            Dec 18, 2018  7:15 AM CST   LAB with AN LAB   [facility] ([facility])    [address] [address]   [phone]           Please do not eat 10-12 hours before your appointment if you are coming in fasting for labs on lipids, cholesterol, or glucose (sugar). This does not apply to pregnant women. Water, hot tea and black coffee (with nothing added) are okay. Do not drink other fluids, diet soda or chew gum.            Jan 22, 2019  7:15 AM CST   LAB with AN LAB   [facility] ([facility])    [address] [address] [phone]           Please do not eat 10-12 hours before your appointment if you are coming in fasting for labs on lipids, cholesterol, or glucose (sugar). This does not apply to pregnant women. Water, hot tea and black coffee (with nothing added) are okay. Do not drink other fluids, diet soda or chew gum.            Jan 31, 2019  7:40 AM CST   Return Visit with [place] Resendiz MD   [facility] ([facility])    [address] [address] [phone]              Who to contact     If you have questions or need follow up information about today's clinic visit or your schedule please contact [facility] directly at [phone].  Normal or non-critical lab and imaging results will be communicated to you by MyChart, letter or phone within 4 business days after the clinic has received the results. If you do not hear from us within 7 days, please contact the clinic through [employer] or phone. If you have a critical or abnormal lab result, we will notify you by phone as soon as possible.  Submit refill requests through [employer] or call your pharmacy and they will forward the refill request to us. Please allow 3 business days for your refill to be completed.          Additional Information About Your Visit        MyChart Information     [employer] gives you secure access to your electronic health record. If you see a primary care provider, you can also send messages to your care team and make appointments. If you have questions, please call your primary care clinic.  If you do not have a primary care provider, please call [phone] and they will assist you.        Care EveryWhere ID     This is your Care EveryWhere ID. This could be used by other organizations to access your [facility] medical records  [id number]         Blood Pressure from Last 3 Encounters:   10/03/18 (!) 142/98   09/19/18  131/73   09/18/18 128/75    Weight from Last 3 Encounters:   10/03/18 101.6 kg (224 lb)   09/19/18 102.5 kg (226 lb)   09/18/18 101.2 kg (223 lb 3.2 oz)              We Performed the Following     Neuromuscular Re-Education     Therapeutic Exercises        Primary Care Provider Fax #    Physician No Ref-Primary [phone]       No address on file        Equal Access to Services     DEBBIE VELIZ : Hadii aad ku hadasho Soomaali, waaxda luqadaha, qaybta kaalmada adeegyada, waxay mohiniin briannan adelisa melendez lajennaritesh . So [facility] [phone].    ATENCIÓN: Si allie mendez, tiene a lopez disposición servicios gratuitos de asistencia lingüística. Llame al [phone].    We comply with applicable federal civil rights laws and [place] laws. We do not discriminate on the basis of race, color, national origin, age, disability, sex, sexual orientation, or gender identity.            Thank you!     Thank you for choosing [facility]   for your care. Our goal is always to provide you with excellent care. Hearing back from our patients is one way we can continue to improve our services. Please take a few minutes to complete the written survey that you may receive in the mail after your visit with us. Thank you!             Your Updated Medication List - Protect others around you: Learn how to safely use, store and throw away your medicines at www.disposemymeds.org.          This list is accurate as of 10/16/18  8:36 AM.  Always use your most recent med list.                   Brand Name Dispense Instructions for use Diagnosis    cefuroxime 500 MG tablet    CEFTIN    20 tablet    Take 1 tablet (500 mg) by mouth 2 times daily    Bronchitis, Acute non-recurrent sinusitis, unspecified location       leflunomide 20 MG tablet    ARAVA    30 tablet    Take 1 tablet (20 mg) by mouth daily    Inflammatory polyarthropathy (H)       order for DME     2 Units    Equipment being ordered: compression stockings 1  pair, at 30mm Hg, to be worn during the day. For Bilateral leg edema [R60.0]    Bilateral leg edema       sulfaSALAzine  MG EC tablet    AZULFIDINE EN    60 tablet    Take 1 tablet (500 mg) by mouth 2 times daily    Inflammatory polyarthropathy (H)

## 2018-10-17 ENCOUNTER — OFFICE VISIT (OUTPATIENT)
Dept: FAMILY MEDICINE | Facility: CLINIC | Age: 53
End: 2018-10-17
Payer: COMMERCIAL

## 2018-10-17 VITALS
RESPIRATION RATE: 18 BRPM | DIASTOLIC BLOOD PRESSURE: 78 MMHG | SYSTOLIC BLOOD PRESSURE: 153 MMHG | BODY MASS INDEX: 38.45 KG/M2 | TEMPERATURE: 97.9 F | HEART RATE: 117 BPM | WEIGHT: 224 LBS | OXYGEN SATURATION: 97 %

## 2018-10-17 DIAGNOSIS — R05.9 COUGH: Primary | ICD-10-CM

## 2018-10-17 DIAGNOSIS — R09.82 POST-NASAL DRAINAGE: ICD-10-CM

## 2018-10-17 PROCEDURE — 99213 OFFICE O/P EST LOW 20 MIN: CPT | Performed by: FAMILY MEDICINE

## 2018-10-17 RX ORDER — PREDNISONE 10 MG/1
TABLET ORAL
Qty: 18 TABLET | Refills: 0 | Status: SHIPPED | OUTPATIENT
Start: 2018-10-17 | End: 2019-05-15

## 2018-10-17 NOTE — PROGRESS NOTES
CHIEF COMPLAINT    Persistent cough.      HISTORY    Bria took a course of Ceftin for presumed sinusitis.  At the time she presented with head congestion and postnasal drainage.  Unfortunately her symptoms have remained in the sense that she is still coughing.  She is not having significant sputum production however.  No fevers.  No headache.  No wheezing or S OB.      Patient Active Problem List   Diagnosis     CARDIOVASCULAR SCREENING; LDL GOAL LESS THAN 160     Endometriosis of uterus     Bilateral carpal tunnel syndrome     S/P carpal tunnel release     Inflammatory polyarthropathy (H)     Impingement syndrome of left shoulder     Acute pain of left shoulder       REVIEW OF SYSTEMS    No fever.  No earache or sore throat.  No chest pain.  No swelling.      Past Medical History:   Diagnosis Date     NO ACTIVE PROBLEMS      Obesity      Polyarthritis     inflammatory poly arthritis.       EXAM  /78  Pulse 117  Temp 97.9  F (36.6  C) (Oral)  Resp 18  Wt 224 lb (101.6 kg)  SpO2 97%  BMI 38.45 kg/m2    Nasal membranes are congested and somewhat thickened.  Pharynx no redness or swelling  Neck without adenopathy or thyromegaly.  Chest clear.      (R05) Cough  (primary encounter diagnosis)  Comment:     Probably unlikely that her symptoms are from her recent switch to leflunomide.  I am still suspicious of postnasal symptoms.  I do not believe additional antibiotics will be too helpful.  Recommended steroid and antihistamine/decongestant.    Plan: predniSONE (DELTASONE) 10 MG tablet            (R09.82) Post-nasal drainage  Comment:   Plan: predniSONE (DELTASONE) 10 MG tablet

## 2018-10-17 NOTE — MR AVS SNAPSHOT
After Visit Summary   10/17/2018    Bria Haddad    MRN: [id number]           Patient Information     Date Of Birth          1965        Visit Information        Provider Department      10/17/2018 2:20 PM Duncan Pham MD [facility]        Today's Diagnoses     Cough    -  1    Post-nasal drainage          Care Instructions      Take prescribed medication as directed.    Try generic Claritin D, Allegra D or Zyrtec D. Ask Pharmacist for these.            Follow-ups after your visit        Your next 10 appointments already scheduled     Oct 23, 2018  6:40 AM CDT   ETHAN Extremity with Rola Dasilva PT   [facility] [place] [facility] ([facility] [place])    [address]  [place] [address]   [phone]            Oct 30, 2018  8:00 AM CDT   ETHAN Extremity with Rola Dasilva PT   [facility] [place] [facility] ([facility] [place])    [address]  [place] [address]   [phone]            Nov 13, 2018  7:15 AM CST   LAB with AN LAB   [facility]    [address] [facility] [address] [address]   [phone]           Please do not eat 10-12 hours before your appointment if you are coming in fasting for labs on lipids, cholesterol, or glucose (sugar). This does not apply to pregnant women. Water, hot tea and black coffee (with nothing added) are okay. Do not drink other fluids, diet soda or chew gum.            Dec 18, 2018  7:15 AM CST   LAB with AN LAB   [facility]    [address] [facility] [address] [address]   [phone]           Please do not eat 10-12 hours before your appointment if you are coming in fasting for labs on lipids, cholesterol, or glucose (sugar). This does not apply to pregnant women. Water, hot tea and black coffee (with nothing added) are okay. Do not drink other fluids, diet soda or chew gum.             Jan 22, 2019  7:15 AM CST   LAB with AN LAB   [facility]    [address] [facility] [address] [phone]           Please do not eat 10-12 hours before your appointment if you are coming in fasting for labs on lipids, cholesterol, or glucose (sugar). This does not apply to pregnant women. Water, hot tea and black coffee (with nothing added) are okay. Do not drink other fluids, diet soda or chew gum.            Jan 31, 2019  7:40 AM CST   Return Visit with Bill Resendiz MD   [facility]    [address] [address] [phone]              Who to contact     If you have questions or need follow up information about today's clinic visit or your schedule please contact [facility] directly at [phone].  Normal or non-critical lab and imaging results will be communicated to you by [employer], letter or phone within 4 business days after the clinic has received the results. If you do not hear from us within 7 days, please contact the clinic through [employer] or phone. If you have a critical or abnormal lab result, we will notify you by phone as soon as possible.  Submit refill requests through [employer] or call your pharmacy and they will forward the refill request to us. Please allow 3 business days for your refill to be completed.          Additional Information About Your Visit        [employer] Information     [employer] gives you secure access to your electronic health record. If you see a primary care provider, you can also send messages to your care team and make appointments. If you have questions, please call your primary care clinic.  If you do not have a primary care provider, please call [phone] and they will assist you.        Care EveryWhere ID     This is your Care EveryWhere ID. This could be used by other organizations to access your [facility] medical records  [id number]         Your Vitals Were     Pulse Temperature Respirations Pulse Oximetry BMI (Body Mass Index)       117 97.9  F (36.6  C) (Oral) 18 97% 38.45 kg/m2        Blood Pressure from Last 3 Encounters:   10/17/18 153/78   10/03/18 (!) 142/98   09/19/18 131/73    Weight from Last 3 Encounters:   10/17/18 224 lb (101.6 kg)   10/03/18 224 lb (101.6 kg)   09/19/18 226 lb (102.5 kg)              Today, you had the following     No orders found for display         Today's Medication Changes          These changes are accurate as of 10/17/18  2:50 PM.  If you have any questions, ask your nurse or doctor.               Start taking these medicines.        Dose/Directions    predniSONE 10 MG tablet   Commonly known as:  DELTASONE   Used for:  Cough, Post-nasal drainage   Started by:  Duncan Pham MD        3 daily for 3 days, then 2 daily for 3 days, then 1 daily for 3 days. Take with food.   Quantity:  18 tablet   Refills:  0            Where to get your medicines      These medications were sent to [facility] [id number] [place] [address] [address] [place] [address] AT [address]  [address] [facility] [address]     Phone:  [phone]     predniSONE 10 MG tablet                Primary Care Provider Office Phone # Fax #    [facility] [phone] [address] [facility] [address]        Equal Access to Services     FRANKI VELIZ AH: Hadii niall méndez hadasho Soomaali, waaxda luqadaha, qaybta kaalmada adeegyada, waxay leslie bourgeois. So [facility] [phone].    ATENCIÓN: Si habla español, tiene a lopez disposición servicios gratuitos de asistencia lingüística. Llame al [phone].    We comply with applicable federal civil rights laws and [place] laws. We do not discriminate on the basis of race, color, national origin, age, disability, sex, sexual orientation, or gender identity.            Thank you!     Thank you for choosing [facility]  [facility]  for your care. Our goal is always to provide you with excellent care. Hearing back from our patients is one way we can continue to improve our services. Please take a few minutes to complete the written survey that you may receive in the mail after your visit with us. Thank you!             Your Updated Medication List - Protect others around you: Learn how to safely use, store and throw away your medicines at www.disposemymeds.org.          This list is accurate as of 10/17/18  2:50 PM.  Always use your most recent med list.                   Brand Name Dispense Instructions for use Diagnosis    leflunomide 20 MG tablet    ARAVA    30 tablet    Take 1 tablet (20 mg) by mouth daily    Inflammatory polyarthropathy (H)       order for DME     2 Units    Equipment being ordered: compression stockings 1 pair, at 30mm Hg, to be worn during the day. For Bilateral leg edema [R60.0]    Bilateral leg edema       predniSONE 10 MG tablet    DELTASONE    18 tablet    3 daily for 3 days, then 2 daily for 3 days, then 1 daily for 3 days. Take with food.    Cough, Post-nasal drainage       sulfaSALAzine  MG EC tablet    AZULFIDINE EN    60 tablet    Take 1 tablet (500 mg) by mouth 2 times daily    Inflammatory polyarthropathy (H)

## 2018-10-23 PROBLEM — M25.512 ACUTE PAIN OF LEFT SHOULDER: Status: RESOLVED | Noted: 2018-09-25 | Resolved: 2018-10-23

## 2018-10-23 PROBLEM — M75.42 IMPINGEMENT SYNDROME OF LEFT SHOULDER: Status: RESOLVED | Noted: 2018-09-25 | Resolved: 2018-10-23

## 2018-10-23 NOTE — PROGRESS NOTES
Subjective:  HPI                    Objective:  System    Physical Exam    General     ROS    Assessment/Plan:    DISCHARGE REPORT    Progress reporting period is from 9-28-18 to 10-16-18, 4 visits.       SUBJECTIVE  Subjective changes noted by patient:  The following is from last visit seen.  Subjective: States she is feeling better with less pain and more ROM in her shoulder Sleep is better and can lay on L side for a short time now.      Current Pain level: 1/10.      Initial Pain level:  (3/10, increases to 9/10).   Changes in function:  Yes (See Goal flowsheet attached for changes in current functional level)  Adverse reaction to treatment or activity: None    OBJECTIVE  Changes noted in objective findings:  Yes, the following is from last visit seen  Objective: L shoulder strength flx 4/5 with pain, abd 4+/5, others 5/5 with ER painful.  Supine L shoulder AROM flx 155, abd 140, IR/ER wnl. Progressed strength ex per flow and laureen well. Low tolerance for passive stretching     ASSESSMENT/PLAN  Updated problem list and treatment plan: Diagnosis 1:  L shoulder pain-continue with HEP    STG/LTGs have been met or progress has been made towards goals:  Yes (See Goal flow sheet completed today.)  Assessment of Progress: The patient's condition is improving.  Self Management Plans:  Patient has been instructed in a home treatment program.  Patient  has been instructed in self management of symptoms.  I have re-evaluated this patient and find that the nature, scope, duration and intensity of the therapy is appropriate for the medical condition of the patient.  Bria continues to require the following intervention to meet STG and LTG's:  PT    Recommendations:  This patient is ready to be discharged from therapy and continue their home treatment program.  Bria sent a my chart message cancelling her next PT visit as she was pain free and her ROM was good.     Please refer to the daily flowsheet for treatment today, total  treatment time and time spent performing 1:1 timed codes.

## 2018-11-11 ENCOUNTER — MYC MEDICAL ADVICE (OUTPATIENT)
Dept: RHEUMATOLOGY | Facility: CLINIC | Age: 53
End: 2018-11-11

## 2018-11-11 DIAGNOSIS — M06.4 INFLAMMATORY POLYARTHROPATHY (H): Primary | ICD-10-CM

## 2018-11-13 DIAGNOSIS — M06.4 INFLAMMATORY POLYARTHROPATHY (H): ICD-10-CM

## 2018-11-13 LAB
ALBUMIN SERPL-MCNC: 3.6 G/DL (ref 3.4–5)
ALP SERPL-CCNC: 99 U/L (ref 40–150)
ALT SERPL W P-5'-P-CCNC: 266 U/L (ref 0–50)
AST SERPL W P-5'-P-CCNC: 125 U/L (ref 0–45)
BASOPHILS # BLD AUTO: 0 10E9/L (ref 0–0.2)
BASOPHILS NFR BLD AUTO: 0.7 %
BILIRUB DIRECT SERPL-MCNC: 0.1 MG/DL (ref 0–0.2)
BILIRUB SERPL-MCNC: 0.5 MG/DL (ref 0.2–1.3)
CREAT SERPL-MCNC: 0.76 MG/DL (ref 0.52–1.04)
DIFFERENTIAL METHOD BLD: NORMAL
EOSINOPHIL # BLD AUTO: 0.2 10E9/L (ref 0–0.7)
EOSINOPHIL NFR BLD AUTO: 3.3 %
ERYTHROCYTE [DISTWIDTH] IN BLOOD BY AUTOMATED COUNT: 13 % (ref 10–15)
GFR SERPL CREATININE-BSD FRML MDRD: 79 ML/MIN/1.7M2
HCT VFR BLD AUTO: 44 % (ref 35–47)
HGB BLD-MCNC: 14.5 G/DL (ref 11.7–15.7)
LYMPHOCYTES # BLD AUTO: 1.6 10E9/L (ref 0.8–5.3)
LYMPHOCYTES NFR BLD AUTO: 33.8 %
MCH RBC QN AUTO: 30.8 PG (ref 26.5–33)
MCHC RBC AUTO-ENTMCNC: 33 G/DL (ref 31.5–36.5)
MCV RBC AUTO: 93 FL (ref 78–100)
MONOCYTES # BLD AUTO: 0.6 10E9/L (ref 0–1.3)
MONOCYTES NFR BLD AUTO: 12.1 %
NEUTROPHILS # BLD AUTO: 2.3 10E9/L (ref 1.6–8.3)
NEUTROPHILS NFR BLD AUTO: 50.1 %
PLATELET # BLD AUTO: 281 10E9/L (ref 150–450)
PROT SERPL-MCNC: 7.4 G/DL (ref 6.8–8.8)
RBC # BLD AUTO: 4.71 10E12/L (ref 3.8–5.2)
WBC # BLD AUTO: 4.6 10E9/L (ref 4–11)

## 2018-11-13 PROCEDURE — 85025 COMPLETE CBC W/AUTO DIFF WBC: CPT | Performed by: INTERNAL MEDICINE

## 2018-11-13 PROCEDURE — 36415 COLL VENOUS BLD VENIPUNCTURE: CPT | Performed by: INTERNAL MEDICINE

## 2018-11-13 PROCEDURE — 80076 HEPATIC FUNCTION PANEL: CPT | Performed by: INTERNAL MEDICINE

## 2018-11-13 PROCEDURE — 82565 ASSAY OF CREATININE: CPT | Performed by: INTERNAL MEDICINE

## 2018-11-27 NOTE — TELEPHONE ENCOUNTER
Rheumatology Telephone Note:    I called and spoke with Ms. Haddad.  Cough has persisted.  She has discontinued leflunomide since she associated the cough with leflunomide.  Additionally, liver enzymes are elevated and could be due to methotrexate but also leflunomide is likely contributing.  We discussed other treatment options.  Will start Humira if tuberculosis screening is negative.  Chest x-ray and QuantiFERON TB to be done on Monday when she returns to [place] from [place].  She says that she will make a lab appointment.  Recheck labs in 1 month to reassess for CBC, creatinine, and hepatic panel.    - Stop leflunomide  - Start Humira if TB screening is okay  - CXR and quantiferon TB lab on Monday  - Labs in 1 mo: CBC, Cr, Hepatic panel    # Adalimumab (Humira) Risks and Benefits: The risks and benefits of adalimumab were discussed in detail and the patient verbalized understanding.  The risks discussed include, but are not limited to, the risk for hypersensitivity, anaphylaxis, anaphylactoid reactions, an increased risk for serious infections leading to hospitalization or death, a possible increased risk for lymphoma and other malignancies, a possible worsening of demyelinating diseases, a possible worsening of heart failure, risk for cytopenias, risk for drug induced lupus, possible reactivation of hepatitis B, and possible reactivation of latent tuberculosis.  Subcutaneous injections may result in injection site reactions and/or pain at the site of injection.  The most common adverse reactions are infections, injection site reactions, headache, and rash.  It was discussed that the medication would need to be discontinued if a serious infection develops.  It was discussed that live vaccinations should not be received while using adalimumab or within 30 days prior to starting adalimumab.  I encouraged reviewing the package insert and asking any questions about the medication.      All questions were  answered and she thanked me for the call.     Bill Resendiz MD  11/27/2018 6:04 PM

## 2018-12-03 ENCOUNTER — RADIANT APPOINTMENT (OUTPATIENT)
Dept: GENERAL RADIOLOGY | Facility: CLINIC | Age: 53
End: 2018-12-03
Attending: INTERNAL MEDICINE
Payer: COMMERCIAL

## 2018-12-03 DIAGNOSIS — M06.4 INFLAMMATORY POLYARTHROPATHY (H): ICD-10-CM

## 2018-12-03 LAB
ALBUMIN SERPL-MCNC: 3.6 G/DL (ref 3.4–5)
ALP SERPL-CCNC: 78 U/L (ref 40–150)
ALT SERPL W P-5'-P-CCNC: 73 U/L (ref 0–50)
AST SERPL W P-5'-P-CCNC: 40 U/L (ref 0–45)
BASOPHILS # BLD AUTO: 0 10E9/L (ref 0–0.2)
BASOPHILS NFR BLD AUTO: 0.6 %
BILIRUB DIRECT SERPL-MCNC: 0.1 MG/DL (ref 0–0.2)
BILIRUB SERPL-MCNC: 0.5 MG/DL (ref 0.2–1.3)
CREAT SERPL-MCNC: 0.73 MG/DL (ref 0.52–1.04)
DIFFERENTIAL METHOD BLD: NORMAL
EOSINOPHIL # BLD AUTO: 0.3 10E9/L (ref 0–0.7)
EOSINOPHIL NFR BLD AUTO: 5.4 %
ERYTHROCYTE [DISTWIDTH] IN BLOOD BY AUTOMATED COUNT: 12.7 % (ref 10–15)
GFR SERPL CREATININE-BSD FRML MDRD: 82 ML/MIN/1.7M2
HCT VFR BLD AUTO: 42.2 % (ref 35–47)
HGB BLD-MCNC: 14 G/DL (ref 11.7–15.7)
LYMPHOCYTES # BLD AUTO: 1.7 10E9/L (ref 0.8–5.3)
LYMPHOCYTES NFR BLD AUTO: 34.1 %
MCH RBC QN AUTO: 30.6 PG (ref 26.5–33)
MCHC RBC AUTO-ENTMCNC: 33.2 G/DL (ref 31.5–36.5)
MCV RBC AUTO: 92 FL (ref 78–100)
MONOCYTES # BLD AUTO: 0.8 10E9/L (ref 0–1.3)
MONOCYTES NFR BLD AUTO: 15.5 %
NEUTROPHILS # BLD AUTO: 2.2 10E9/L (ref 1.6–8.3)
NEUTROPHILS NFR BLD AUTO: 44.4 %
PLATELET # BLD AUTO: 273 10E9/L (ref 150–450)
PROT SERPL-MCNC: 7.1 G/DL (ref 6.8–8.8)
RBC # BLD AUTO: 4.57 10E12/L (ref 3.8–5.2)
WBC # BLD AUTO: 5 10E9/L (ref 4–11)

## 2018-12-03 PROCEDURE — 82565 ASSAY OF CREATININE: CPT | Performed by: INTERNAL MEDICINE

## 2018-12-03 PROCEDURE — 85025 COMPLETE CBC W/AUTO DIFF WBC: CPT | Performed by: INTERNAL MEDICINE

## 2018-12-03 PROCEDURE — 80076 HEPATIC FUNCTION PANEL: CPT | Performed by: INTERNAL MEDICINE

## 2018-12-03 PROCEDURE — 71046 X-RAY EXAM CHEST 2 VIEWS: CPT | Mod: FY

## 2018-12-03 PROCEDURE — 36415 COLL VENOUS BLD VENIPUNCTURE: CPT | Performed by: INTERNAL MEDICINE

## 2018-12-06 NOTE — TELEPHONE ENCOUNTER
Contacted Pt, reviewed lab work. Pt also has made a 7:30 AM lab appointment to have the TB Gold test performed.  Pt had no questions or concerns, agrees and understands.    Faye Soria CMA  12/6/2018  11:26 AM

## 2018-12-06 NOTE — TELEPHONE ENCOUNTER
Rheumatology team: Please call to notify Ms. Haddad that liver enzymes have significantly improved; AST is now in the normal range and ALT is just above the normal range.  Chest x-ray was normal.  For some reason the lab did not run the blood test for tuberculosis screening which is needed before Humira can be started.  Advised that she return to the lab for QuantiFERON TB Gold plus testing    CMP  Recent Labs   Lab Test  12/03/18   0953  11/13/18   0715  10/16/18   0734   09/18/17   0913  12/05/16   0811  10/07/16   0912   NA   --    --    --    --   141  142  143   POTASSIUM   --    --    --    --   4.0  4.0  4.2   CHLORIDE   --    --    --    --   107  105  107   CO2   --    --    --    --   29  29  29   ANIONGAP   --    --    --    --   5  8  7   GLC   --    --    --    --   96  104*  75   BUN   --    --    --    --   14  10  16   CR  0.73  0.76  0.80   < >  0.76  0.73  0.74   GFRESTIMATED  82  79  74   < >  79  83  82   GFRESTBLACK  >90  >90  89   < >  >90  >90  African American GFR Calc    >90   GFR Calc     AGUILAR   --    --    --    --   8.8  9.2  9.3   BILITOTAL  0.5  0.5  0.4   < >  0.4   --    --    ALBUMIN  3.6  3.6  3.5   < >  3.7   --    --    PROTTOTAL  7.1  7.4  7.1   < >  7.4   --    --    ALKPHOS  78  99  77   < >  71   --    --    AST  40  125*  25   < >  16   --    --    ALT  73*  266*  40   < >  23   --    --     < > = values in this interval not displayed.

## 2018-12-06 NOTE — PROGRESS NOTES
"ICU Metrixt message sent:  \"Ms. Haddad,    Chest x-ray was normal.  The blood test for tuberculosis is still needed; it was not done when you were at the lab for some reason so please return for this test (quantiferon TB gold plus).  Liver enzymes significantly improved.    Sincerely,  Bill Rseendiz MD  12/6/2018 7:01 AM\""

## 2018-12-06 NOTE — TELEPHONE ENCOUNTER
Attempted to contact Pt, l/m having Pt return call to clinic @ [phone].    Faye Soria CMA  12/6/2018  10:48 AM

## 2018-12-07 DIAGNOSIS — M06.4 INFLAMMATORY POLYARTHROPATHY (H): ICD-10-CM

## 2018-12-07 PROCEDURE — 86480 TB TEST CELL IMMUN MEASURE: CPT | Performed by: INTERNAL MEDICINE

## 2018-12-07 PROCEDURE — 36415 COLL VENOUS BLD VENIPUNCTURE: CPT | Performed by: INTERNAL MEDICINE

## 2018-12-10 LAB
GAMMA INTERFERON BACKGROUND BLD IA-ACNC: 0.04 IU/ML
M TB IFN-G BLD-IMP: NEGATIVE
M TB IFN-G CD4+ BCKGRND COR BLD-ACNC: 6.55 IU/ML
MITOGEN IGNF BCKGRD COR BLD-ACNC: 0.02 IU/ML
MITOGEN IGNF BCKGRD COR BLD-ACNC: 0.02 IU/ML

## 2018-12-17 ENCOUNTER — TELEPHONE (OUTPATIENT)
Dept: RHEUMATOLOGY | Facility: CLINIC | Age: 53
End: 2018-12-17

## 2018-12-17 DIAGNOSIS — M06.4 INFLAMMATORY POLYARTHROPATHY (H): Primary | ICD-10-CM

## 2018-12-17 NOTE — TELEPHONE ENCOUNTER
Called patient however there was no answer and no option to leave detailed vm.  Left brief message for her to return call to [phone].    Miguel Loving RN....12/17/2018 4:44 PM

## 2018-12-17 NOTE — TELEPHONE ENCOUNTER
Patient would like the results from recent labs     Patient also needs to know if she still has to have the lab drawn for tomorrow.     Thank you

## 2018-12-17 NOTE — TELEPHONE ENCOUNTER
Rheumatology team: Please call to notify Ms. Haddad that the tuberculosis test was negative so Humira has been prescribed to the specialty pharmacy.  Please provide her with the phone number to schedule injection teaching; alternatively she may be taught how to do injections by the Humira Ambassador Program that she can sign up for on Humira.We Tribute    Bill Resendiz MD  12/17/2018 4:40 PM

## 2018-12-18 NOTE — TELEPHONE ENCOUNTER
Spoke with patient and discussed the message below. Provided her with rheumatology phone number if she would like to come in to clinic for Humira injection training.    Miguel Loving RN....12/18/2018 11:26 AM

## 2018-12-20 ENCOUNTER — TELEPHONE (OUTPATIENT)
Dept: RHEUMATOLOGY | Facility: CLINIC | Age: 53
End: 2018-12-20

## 2018-12-26 NOTE — TELEPHONE ENCOUNTER
PA Initiation    Medication: Humira - PA Initiated  Insurance Company: [employer] - Phone [phone] Fax [phone]  Pharmacy Filling the Rx: [facility] - [address] [place] [address]  Filling Pharmacy Phone:    Filling Pharmacy Fax:    Start Date: 12/20/2018

## 2018-12-27 ENCOUNTER — TELEPHONE (OUTPATIENT)
Dept: RHEUMATOLOGY | Facility: CLINIC | Age: 53
End: 2018-12-27

## 2018-12-27 NOTE — TELEPHONE ENCOUNTER
Patient is calling to check the status on medication for Humira stated she thought this was going to be mailed to her hasn't received any notice     Please call to discuss  Thank you

## 2018-12-28 NOTE — TELEPHONE ENCOUNTER
Called and informed patient that Humira prescription was sent to  mail order pharmacy on 12/17/18. Discussed that it can take some time to arrive especially during the holiday week. Advised her to call us if it does not arrive within the next week. Patient verbalized understanding and has no additional questions.    Miguel Loving RN....12/28/2018 8:17 AM

## 2019-01-15 ENCOUNTER — DOCUMENTATION ONLY (OUTPATIENT)
Dept: LAB | Facility: CLINIC | Age: 54
End: 2019-01-15

## 2019-01-15 DIAGNOSIS — Z79.899 HIGH RISK MEDICATION USE: Primary | ICD-10-CM

## 2019-01-15 NOTE — PROGRESS NOTES
Patient is scheduled for a Lab appointment on 1/22/2019 for labs for Dr. Resendiz.  Please enter future orders, or call to advise patient to cancel appointment if labs are not needed.  Thank you.

## 2019-01-17 NOTE — TELEPHONE ENCOUNTER
Prior Authorization Approval    Authorization Effective Date: 1/17/2019  Authorization Expiration Date: 1/17/2020  Medication: Humira - PA Approved  Approved Dose/Quantity:  Reference #:     Insurance Company: [employer] - Phone [phone] Fax [phone]  Expected CoPay:       CoPay Card Available: Yes    Foundation Assistance Needed:    Which Pharmacy is filling the prescription (Not needed for infusion/clinic administered): [address]  Pharmacy Notified: Yes  Patient Notified: Yes

## 2019-01-22 DIAGNOSIS — Z79.899 HIGH RISK MEDICATION USE: ICD-10-CM

## 2019-01-22 LAB
ALBUMIN SERPL-MCNC: 3.6 G/DL (ref 3.4–5)
ALP SERPL-CCNC: 79 U/L (ref 40–150)
ALT SERPL W P-5'-P-CCNC: 38 U/L (ref 0–50)
AST SERPL W P-5'-P-CCNC: 21 U/L (ref 0–45)
BASOPHILS # BLD AUTO: 0 10E9/L (ref 0–0.2)
BASOPHILS NFR BLD AUTO: 0.5 %
BILIRUB DIRECT SERPL-MCNC: <0.1 MG/DL (ref 0–0.2)
BILIRUB SERPL-MCNC: 0.3 MG/DL (ref 0.2–1.3)
CREAT SERPL-MCNC: 0.74 MG/DL (ref 0.52–1.04)
CRP SERPL-MCNC: 3.9 MG/L (ref 0–8)
DIFFERENTIAL METHOD BLD: NORMAL
EOSINOPHIL # BLD AUTO: 0.3 10E9/L (ref 0–0.7)
EOSINOPHIL NFR BLD AUTO: 5.5 %
ERYTHROCYTE [DISTWIDTH] IN BLOOD BY AUTOMATED COUNT: 12.5 % (ref 10–15)
ERYTHROCYTE [SEDIMENTATION RATE] IN BLOOD BY WESTERGREN METHOD: 8 MM/H (ref 0–30)
GFR SERPL CREATININE-BSD FRML MDRD: 90 ML/MIN/{1.73_M2}
HCT VFR BLD AUTO: 43.1 % (ref 35–47)
HGB BLD-MCNC: 14.2 G/DL (ref 11.7–15.7)
LYMPHOCYTES # BLD AUTO: 1.7 10E9/L (ref 0.8–5.3)
LYMPHOCYTES NFR BLD AUTO: 29 %
MCH RBC QN AUTO: 29.9 PG (ref 26.5–33)
MCHC RBC AUTO-ENTMCNC: 32.9 G/DL (ref 31.5–36.5)
MCV RBC AUTO: 91 FL (ref 78–100)
MONOCYTES # BLD AUTO: 0.7 10E9/L (ref 0–1.3)
MONOCYTES NFR BLD AUTO: 11.4 %
NEUTROPHILS # BLD AUTO: 3 10E9/L (ref 1.6–8.3)
NEUTROPHILS NFR BLD AUTO: 53.6 %
PLATELET # BLD AUTO: 278 10E9/L (ref 150–450)
PROT SERPL-MCNC: 7.2 G/DL (ref 6.8–8.8)
RBC # BLD AUTO: 4.75 10E12/L (ref 3.8–5.2)
WBC # BLD AUTO: 5.7 10E9/L (ref 4–11)

## 2019-01-22 PROCEDURE — 85652 RBC SED RATE AUTOMATED: CPT | Performed by: INTERNAL MEDICINE

## 2019-01-22 PROCEDURE — 86140 C-REACTIVE PROTEIN: CPT | Performed by: INTERNAL MEDICINE

## 2019-01-22 PROCEDURE — 80076 HEPATIC FUNCTION PANEL: CPT | Performed by: INTERNAL MEDICINE

## 2019-01-22 PROCEDURE — 82565 ASSAY OF CREATININE: CPT | Performed by: INTERNAL MEDICINE

## 2019-01-22 PROCEDURE — 85025 COMPLETE CBC W/AUTO DIFF WBC: CPT | Performed by: INTERNAL MEDICINE

## 2019-01-22 PROCEDURE — 36415 COLL VENOUS BLD VENIPUNCTURE: CPT | Performed by: INTERNAL MEDICINE

## 2019-01-31 ENCOUNTER — OFFICE VISIT (OUTPATIENT)
Dept: RHEUMATOLOGY | Facility: CLINIC | Age: 54
End: 2019-01-31
Payer: COMMERCIAL

## 2019-01-31 VITALS
SYSTOLIC BLOOD PRESSURE: 135 MMHG | WEIGHT: 225.8 LBS | BODY MASS INDEX: 38.76 KG/M2 | TEMPERATURE: 96.8 F | HEART RATE: 84 BPM | DIASTOLIC BLOOD PRESSURE: 77 MMHG | OXYGEN SATURATION: 97 %

## 2019-01-31 DIAGNOSIS — Z79.899 HIGH RISK MEDICATIONS (NOT ANTICOAGULANTS) LONG-TERM USE: ICD-10-CM

## 2019-01-31 DIAGNOSIS — M06.4 INFLAMMATORY POLYARTHROPATHY (H): Primary | ICD-10-CM

## 2019-01-31 PROCEDURE — 99214 OFFICE O/P EST MOD 30 MIN: CPT | Performed by: INTERNAL MEDICINE

## 2019-01-31 RX ORDER — SULFASALAZINE 500 MG/1
500 TABLET, DELAYED RELEASE ORAL 2 TIMES DAILY
Qty: 180 TABLET | Refills: 1 | Status: SHIPPED | OUTPATIENT
Start: 2019-01-31 | End: 2019-05-15

## 2019-01-31 RX ORDER — PREDNISONE 5 MG/1
2.5 TABLET ORAL DAILY PRN
Qty: 30 TABLET | Refills: 2 | Status: SHIPPED | OUTPATIENT
Start: 2019-01-31 | End: 2020-01-07

## 2019-01-31 NOTE — NURSING NOTE
RAPID3 (0-30) Cumulative Score  6.7          RAPID3 Weighted Score (divide #4 by 3 and that is the weighted score)  2.2

## 2019-01-31 NOTE — PROGRESS NOTES
Rheumatology Clinic Visit      Bria Haddad MRN# [id number]   YOB: 1965 Age: 53 year old      Date of visit: 1/31/19   PCP: Dr. Stephanie Saldana    Chief Complaint   Patient presents with:  RECHECK: inflammatory polyarthropathy-numbness and tingling in feet. Shooting pains off and on in ankle more than left.      Assessment and Plan     1.  Rheumatoid arthritis: Ms. Haddad initially presented to this clinic in 2015 with dependent edema of the bilateral lower extremities, fatigue, and a one time episode of right toe pain in the setting of a positive ROSEMARY.  On 9/18/2017 she presented with synovitis of her bilateral PIPs and pain without swelling of her MCPs, persistent fatigue, intermittent rash, and dependent edema. ROSEMARY positive but additional studies negative/normal including complement C3, complement C4, beta-2 glycoprotein IgM and IgG, cardiolipin IgM and IgG, lupus anticoagulant, RNP, Stuart, SSA, SSB, Scl-70.  Overall joint exam is most consistent with rheumatoid arthritis; she does not have a rash at this time.  Previously on HCQ (bloating), MTX (LFT elevation, alopecia, headache), leflunomide (LFT elevation). Currently on SSZ and Humira (x1 dose).  She also has prednisone to use as needed.  Continue on current medication regimen  - Continue sulfasalazine 500mg twice daily  - Continue Humira 40mg SQ every 14 days  - Continue prednisone 2.5 mg daily as needed for rheumatoid arthritis symptoms  - Labs in 3 months: CBC, Creatinine, Hepatic Panel, ESR, CRP     2. Left knee pain: mechanical in nature.  Significant improvement with PT and encouraged that she continue these exercises.  When she stopped the exercises in the past she had worsening left knee pain.      3.  Left shoulder pain, impingement syndrome: Resolved with combination of steroid injection and physical therapy.      4.  Dependent edema: Worse in the evening and improves with leg elevation. Has seen vascular surgery and also follows with her  PCP for this issue.  Interestingly the edema improved after the steroid injection given to her left shoulder, raising question of systemic absorption of steroids affecting the edema.     5.  Vaccinations: Vaccinations reviewed with Ms. Haddad.  Risks and benefits of vaccinations were discussed.CDC stance on shingrix when on moderate to high immunosuppression reviewed.   - Influenza: refused by patient  - [id number]: refused by patient  - [id number]: refused by patient  - Shingrix: refused by patient      Ms. Haddad verbalized agreement with and understanding of the rational for the diagnosis and treatment plan.  All questions were answered to best of my ability and the patient's satisfaction. Ms. Haddad was advised to contact the clinic with any questions that may arise after the clinic visit.      More than 50% of the face-to-face time was spent counseling the patient.  Total face-to-face time was at least 25 minutes.    Thank you for involving me in the care of the patient    Return to clinic: 3-4 months      HPI   Bria Haddad is a 53 year old female with a past medical history significant for endometriosis, s/p carpal tunnel release surgery who present for follow up of positive ROSEMARY.    Today, Ms. Haddad reports that her arthritis is doing okay.  Still with pain in her ankles and feet.  She reports that her bilateral lower extremity edema resolved for about 45 days after the left shoulder steroid injection, then recurred to the same level it was at before.  Left shoulder pain resolved with the combination of steroid injection and physical therapy.  Knee pain is not an issue since she is doing the physical therapy exercises she was taught previously.  Morning stiffness for approximately 1-2 hours.  Joint pain in her ankles and feet improve with activity and worsens with inactivity    Denies fevers, chills, nausea, vomiting, constipation, diarrhea. No abdominal pain. No chest pain/pressure, palpitations, or shortness of  breath. No neck pain. No oral or nasal sores. No sicca symptoms. No photosensitivity or photophobia. No eye pain or redness. No history of inflammatory eye disease.  No history of DVT or pulmonary embolism.  No history of serositis.  No history of Raynaud's Phenomenon.  No seizure disorder.  No known renal disorder.      Tobacco: none  EtOH: none  Drugs: none    ROS   GEN: No fevers, chills, night sweats, or weight change  SKIN: See HPI  HEENT: No oral or nasal ulcers.  CV: No chest pain, pressure, palpitations, or dyspnea on exertion.  PULM: No SOB, wheeze, cough.  GI: No nausea, vomiting, constipation, diarrhea. No blood in stool. No abdominal pain.  : No blood in urine.  MSK: See HPI.  NEURO: No numbness or tingling.  EXT: See HPI  PSYCH: Negative    Active Problem List     Patient Active Problem List   Diagnosis     CARDIOVASCULAR SCREENING; LDL GOAL LESS THAN 160     Endometriosis of uterus     Inflammatory polyarthropathy (H)     Past Medical History     Past Medical History:   Diagnosis Date     Bilateral carpal tunnel syndrome 12/20/2015     NO ACTIVE PROBLEMS      Obesity      Polyarthritis     inflammatory poly arthritis.     S/P carpal tunnel release 12/29/2016     Past Surgical History     Past Surgical History:   Procedure Laterality Date     bunions       COLONOSCOPY N/A 11/9/2015    Procedure: COLONOSCOPY;  Surgeon: Andrew Adamson MD;  Location: [facility]     COLONOSCOPY WITH CO2 INSUFFLATION N/A 11/9/2015    Procedure: COLONOSCOPY WITH CO2 INSUFFLATION;  Surgeon: Andrew Adamson MD;  Location: [facility]     GYN SURGERY      endometryosis     RELEASE CARPAL TUNNEL Right 10/12/2016    Procedure: RELEASE CARPAL TUNNEL;  Surgeon: Colby Nobles MD;  Location: [facility]     RELEASE CARPAL TUNNEL Left 12/16/2016    Procedure: RELEASE CARPAL TUNNEL;  Surgeon: Colby Nobles MD;  Location: [facility]     TONSILLECTOMY & ADENOIDECTOMY       Allergy     Allergies   Allergen Reactions     Asa  "[Aspirin] Hives     Current Medication List     Current Outpatient Medications   Medication Sig     adalimumab (HUMIRA *CF*) 40 MG/0.4ML pen kit Inject 0.4 mLs (40 mg) Subcutaneous every 14 days     order for DME Equipment being ordered: compression stockings 1 pair, at 30mm Hg, to be worn during the day. For Bilateral leg edema [R60.0]     sulfaSALAzine ER (AZULFIDINE EN) 500 MG EC tablet Take 1 tablet (500 mg) by mouth 2 times daily     predniSONE (DELTASONE) 10 MG tablet 3 daily for 3 days, then 2 daily for 3 days, then 1 daily for 3 days. Take with food. (Patient not taking: Reported on 1/31/2019.)     No current facility-administered medications for this visit.          Social History   See HPI    Family History     Family History   Problem Relation Age of Onset     Osteoporosis Mother      Respiratory Mother      Thyroid Disease Mother      Heart Disease Father      Cancer - colorectal Maternal Grandmother      Diabetes Maternal Grandfather      Heart Disease Maternal Grandfather      Cancer Paternal Grandmother      Heart Disease Paternal Grandfather      Respiratory Paternal Grandfather      Hypertension Brother      Thyroid Disease Sister      Thyroid Disease Sister      Neurologic Disorder Brother      Physical Exam     Temp Readings from Last 3 Encounters:   01/31/19 96.8  F (36  C) (Oral)   10/17/18 97.9  F (36.6  C) (Oral)   10/03/18 98.6  F (37  C) (Oral)     BP Readings from Last 5 Encounters:   01/31/19 135/77   10/17/18 153/78   10/03/18 (!) 142/98   09/19/18 131/73   09/18/18 128/75     Pulse Readings from Last 1 Encounters:   01/31/19 84     Resp Readings from Last 1 Encounters:   10/17/18 18     Estimated body mass index is 38.76 kg/m  as calculated from the following:    Height as of 6/1/18: 1.626 m (5' 4\").    Weight as of this encounter: 102.4 kg (225 lb 12.8 oz).    GEN: NAD  HEENT: MMM. No oral lesions. Anicteric, noninjected sclera  CV: S1, S2. RRR. No m/r/g.  PULM: CTA bilaterally. No " w/c.  MSK: MCPs, PIPs, wrists, elbows, knees, and ankles without swelling or tenderness to palpation.  Right shoulder without swelling or tenderness palpation.  Left shoulder without swelling or tenderness to palpation; normal range of motion.  MTPs tender to palpation diffusely.  No dactylitis.     NEURO: UE and LE strengths 5/5 and equal bilaterally.   SKIN: No rash  LYMPH: nonpitting edema distal to the knees bilaterally  PSYCH: Alert. Appropriate.    Labs / Imaging (select studies)   RF/CCP  Recent Labs   Lab Test 09/18/17  0913   CCPIGG 1   RHF <20     ROSEMARY  Recent Labs   Lab Test 07/03/17  1614 10/12/15  0923 10/02/15  0820   MONROE 4.9*  --  2.6*   ANAIGG  --  1:320  Reference range: <1:40  (Note)  Anti-Centromere pattern observed.  INTERPRETIVE INFORMATION: ROSEMARY by IFA, IgG  Anti-nuclear antibodies (ROSEMARY) are seen in a variety of  systemic rheumatic diseases and are determined by indirect  fluorescence assay (IFA) using HEp-2 substrate with an  IgG-specific conjugate. ROSEMARY titers less than or equal to  1:80 have variable relevance while titers greater than or  equal to 1:160 are considered clinically significant. These  antibodies may precede clinical disease onset; however,  healthy individuals and those with advanced age have been  reported to be positive for ROSEMARY. When observed, one of the  five basic patterns is reported: homogeneous,  peripheral/rim, speckled, centromere, or nucleolar. If  cytoplasmic fluorescence is observed, it is noted. IFA  methodology is subjective and has occasionally been shown  to lack sensitivity for anti-SSA/Ro antibodies.  Negative results do not necessarily rule out the presence  of SSc. If clinical suspicion r emains, consider further  testing for U3-RNP, PM/Scl, or Th/To antibodies associated  with SSc.  Performed by [facility],  [address] [place] [id number] [phone]  [email or web address] Ilan Vee MD, Lab. Director  *  --      RNP/Sm/SSA/SSB  Recent Labs   Lab  Test 09/18/17  0913 10/12/15  0923   RNPIGG <0.2 <0.2  Negative   Antibody index (AI) values reflect qualitative changes in antibody   concentration that cannot be directly associated with clinical condition or   disease state.     SMIGG <0.2 <0.2  Negative   Antibody index (AI) values reflect qualitative changes in antibody   concentration that cannot be directly associated with clinical condition or   disease state.     SSAIGG <0.2 <0.2  Negative   Antibody index (AI) values reflect qualitative changes in antibody   concentration that cannot be directly associated with clinical condition or   disease state.     SSBIGG <0.2 <0.2  Negative   Antibody index (AI) values reflect qualitative changes in antibody   concentration that cannot be directly associated with clinical condition or   disease state.     SCLIGG <0.2 <0.2  Negative   Antibody index (AI) values reflect qualitative changes in antibody   concentration that cannot be directly associated with clinical condition or   disease state.       dsDNA  Recent Labs   Lab Test 09/18/17  0913 10/12/15  0923   DNA 1 <15  Interpretation:  Negative       C3/C4  Recent Labs   Lab Test 09/18/17  0913 10/12/15  0923   U2WNDWE 137 124   C3UFWNM 31 24     Antiphospholipid Antibodies  Recent Labs   Lab Test 10/12/15  0923   B2IGG 1   B2IGM 4   CARG <15.0  Interpretation:  Negative     JOSELITO <12.5  Interpretation:  Negative     LUPINT Negative  (Note)  COMMENTS:  The INR is normal.  APTT ratio is normal.  DRVVT Screen ratio is normal.  Thrombin time is normal.  NEGATIVE TEST; A LUPUS ANTICOAGULANT WAS NOT DETECTED IN THIS  SPECIMEN WITHIN THE LIMITS OF THE TESTING REPERTOIRE.  If the clinical picture is strongly suggestive of an antiphospholipid  syndrome, recommend anticardiolipin and beta-2-glycoprotein (IgG and  IgM) antibody tests.  Barbara Lerma M.D.  [phone]  10/13/2015    INR =  1.05    Reference range: 0.86-1.14  Thrombin Time= 15.4    Reference range:  13.0-19.0 sec    APTT:       Ratio  Patient  =  0.97  1:2 Mix  =  N/A  Reference:  Negative: Less than or equal to 1.16  Positive: Greater than or equal to 1.17     DILUTE BELKIS VIPER VENOM TEST:  Screen Ratio = 0.70   Normal is less than 1.21         CBC  Recent Labs   Lab Test 01/22/19 0724 12/03/18  0953 11/13/18  0715   WBC 5.7 5.0 4.6   RBC 4.75 4.57 4.71   HGB 14.2 14.0 14.5   HCT 43.1 42.2 44.0   MCV 91 92 93   RDW 12.5 12.7 13.0    273 281   MCH 29.9 30.6 30.8   MCHC 32.9 33.2 33.0   NEUTROPHIL 53.6 44.4 50.1   LYMPH 29.0 34.1 33.8   MONOCYTE 11.4 15.5 12.1   EOSINOPHIL 5.5 5.4 3.3   BASOPHIL 0.5 0.6 0.7   ANEU 3.0 2.2 2.3   ALYM 1.7 1.7 1.6   MIKE 0.7 0.8 0.6   AEOS 0.3 0.3 0.2   ABAS 0.0 0.0 0.0     CMP  Recent Labs   Lab Test 01/22/19 0724 12/03/18  0953 11/13/18  0715 10/16/18  0734  09/18/17  0913 12/05/16  0811 10/07/16  0912 04/27/15  1834   NA  --   --   --   --   --  141 142 143 139   POTASSIUM  --   --   --   --   --  4.0 4.0 4.2 3.7   CHLORIDE  --   --   --   --   --  107 105 107 106   CO2  --   --   --   --   --  29 29 29 29   ANIONGAP  --   --   --   --   --  5 8 7 4   GLC  --   --   --   --   --  96 104* 75 86   BUN  --   --   --   --   --  14 10 16 13   CR 0.74 0.73 0.76 0.80   < > 0.76 0.73 0.74 0.77   GFRESTIMATED 90 82 79 74   < > 79 83 82 79   GFRESTBLACK >90 >90 >90 89   < > >90 >90   GFR Calc   >90   GFR Calc   >90   GFR Calc     AGUILAR  --   --   --   --   --  8.8 9.2 9.3 8.9   BILITOTAL 0.3 0.5 0.5 0.4   < > 0.4  --   --  0.2   ALBUMIN 3.6 3.6 3.6 3.5   < > 3.7  --   --  4.1   PROTTOTAL 7.2 7.1 7.4 7.1   < > 7.4  --   --  7.5   ALKPHOS 79 78 99 77   < > 71  --   --  67   AST 21 40 125* 25   < > 16  --   --  23   ALT 38 73* 266* 40   < > 23  --   --  36    < > = values in this interval not displayed.     Calcium/VitaminD  Recent Labs   Lab Test 09/18/17  0913 12/05/16  0811 10/07/16  0912   AGUILAR 8.8 9.2 9.3     ESR/CRP  Recent Labs    Lab Test 01/22/19  0724 09/13/18  0741 05/21/18  0731   SED 8 8 8   CRP 3.9 5.7 5.2     CK/Aldolase  Recent Labs   Lab Test 09/18/17  0913 10/12/15  0923 10/02/15  0820   CKT 92  --  66   ALDOLASE  --  5.8  --      TSH/T4  Recent Labs   Lab Test 04/27/15  1834   TSH 1.79     Hepatitis B  Recent Labs   Lab Test 10/12/15  0923   AUSAB 0.18   HBCAB Nonreactive   HEPBANG Nonreactive     Hepatitis C  Recent Labs   Lab Test 10/07/16  0912   HCVAB Nonreactive   Assay performance characteristics have not been established for newborns,   infants, and children       Tuberculosis Screening  Recent Labs   Lab Test 12/07/18  0724   TBRES Negative     HIV Screening  Recent Labs   Lab Test 10/12/15  0923   HIAGAB Nonreactive   HIV-1 p24 Ag & HIV-1/HIV-2 Ab Not Detected       UA  Recent Labs   Lab Test 09/18/17  0924 10/02/15  0838   COLOR Yellow Yellow   APPEARANCE Slightly Cloudy Slightly Cloudy   URINEGLC Negative Negative   URINEBILI Negative Negative   SG 1.015 1.025   URINEPH 7.5* 5.0   PROTEIN Negative Negative   UROBILINOGEN 0.2 0.2   NITRITE Negative Negative   UBLD Large* Negative   LEUKEST Negative Negative   WBCU O - 2 O - 2   RBCU 2-5* O - 2   SQUAMOUSEPI Few Moderate*   BACTERIA Few*  --      Urine Microscopic  Recent Labs   Lab Test 09/18/17  0924 10/02/15  0838   WBCU O - 2 O - 2   RBCU 2-5* O - 2   SQUAMOUSEPI Few Moderate*   BACTERIA Few*  --      Urine Protein  Recent Labs   Lab Test 09/18/17  0924   UTP 0.13   UTPG 0.10   UCRR 129          Chart documentation done in part with Dragon Voice recognition Software. Although reviewed after completion, some word and grammatical error may remain.    Bill Resendiz MD

## 2019-02-14 ENCOUNTER — TELEPHONE (OUTPATIENT)
Dept: RHEUMATOLOGY | Facility: CLINIC | Age: 54
End: 2019-02-14

## 2019-02-14 DIAGNOSIS — M06.4 INFLAMMATORY POLYARTHROPATHY (H): ICD-10-CM

## 2019-02-14 RX ORDER — SULFASALAZINE 500 MG/1
500 TABLET, DELAYED RELEASE ORAL 2 TIMES DAILY
Qty: 180 TABLET | Refills: 1 | Status: CANCELLED | OUTPATIENT
Start: 2019-02-14

## 2019-02-14 NOTE — TELEPHONE ENCOUNTER
6 month supply of sulfaSALAzine ER (AZULFIDINE EN) 500 MG EC tablet was sent to the pharmacy 2 weeks ago.   Please call pharmacy to verify    Caden Bustos RN

## 2019-05-08 DIAGNOSIS — M06.4 INFLAMMATORY POLYARTHROPATHY (H): ICD-10-CM

## 2019-05-08 LAB
ALBUMIN SERPL-MCNC: 4 G/DL (ref 3.4–5)
ALP SERPL-CCNC: 73 U/L (ref 40–150)
ALT SERPL W P-5'-P-CCNC: 31 U/L (ref 0–50)
ANION GAP SERPL CALCULATED.3IONS-SCNC: 3 MMOL/L (ref 3–14)
AST SERPL W P-5'-P-CCNC: 15 U/L (ref 0–45)
BASOPHILS # BLD AUTO: 0 10E9/L (ref 0–0.2)
BASOPHILS NFR BLD AUTO: 0.5 %
BILIRUB SERPL-MCNC: 0.3 MG/DL (ref 0.2–1.3)
BUN SERPL-MCNC: 14 MG/DL (ref 7–30)
CALCIUM SERPL-MCNC: 9.3 MG/DL (ref 8.5–10.1)
CHLORIDE SERPL-SCNC: 109 MMOL/L (ref 94–109)
CO2 SERPL-SCNC: 30 MMOL/L (ref 20–32)
CREAT SERPL-MCNC: 0.74 MG/DL (ref 0.52–1.04)
DIFFERENTIAL METHOD BLD: NORMAL
EOSINOPHIL # BLD AUTO: 0.2 10E9/L (ref 0–0.7)
EOSINOPHIL NFR BLD AUTO: 4.2 %
ERYTHROCYTE [DISTWIDTH] IN BLOOD BY AUTOMATED COUNT: 12.9 % (ref 10–15)
GFR SERPL CREATININE-BSD FRML MDRD: >90 ML/MIN/{1.73_M2}
GLUCOSE SERPL-MCNC: 99 MG/DL (ref 70–99)
HBA1C MFR BLD: 5.3 % (ref 0–5.6)
HCT VFR BLD AUTO: 45.9 % (ref 35–47)
HGB BLD-MCNC: 15.3 G/DL (ref 11.7–15.7)
LYMPHOCYTES # BLD AUTO: 2.1 10E9/L (ref 0.8–5.3)
LYMPHOCYTES NFR BLD AUTO: 37.2 %
MCH RBC QN AUTO: 30.1 PG (ref 26.5–33)
MCHC RBC AUTO-ENTMCNC: 33.3 G/DL (ref 31.5–36.5)
MCV RBC AUTO: 90 FL (ref 78–100)
MONOCYTES # BLD AUTO: 0.5 10E9/L (ref 0–1.3)
MONOCYTES NFR BLD AUTO: 8.8 %
NEUTROPHILS # BLD AUTO: 2.8 10E9/L (ref 1.6–8.3)
NEUTROPHILS NFR BLD AUTO: 49.3 %
PLATELET # BLD AUTO: 304 10E9/L (ref 150–450)
POTASSIUM SERPL-SCNC: 4 MMOL/L (ref 3.4–5.3)
PROT SERPL-MCNC: 7.5 G/DL (ref 6.8–8.8)
RBC # BLD AUTO: 5.08 10E12/L (ref 3.8–5.2)
SODIUM SERPL-SCNC: 142 MMOL/L (ref 133–144)
WBC # BLD AUTO: 5.7 10E9/L (ref 4–11)

## 2019-05-08 PROCEDURE — 85025 COMPLETE CBC W/AUTO DIFF WBC: CPT | Performed by: FAMILY MEDICINE

## 2019-05-08 PROCEDURE — 80053 COMPREHEN METABOLIC PANEL: CPT | Performed by: FAMILY MEDICINE

## 2019-05-08 PROCEDURE — 36415 COLL VENOUS BLD VENIPUNCTURE: CPT | Performed by: FAMILY MEDICINE

## 2019-05-08 PROCEDURE — 83036 HEMOGLOBIN GLYCOSYLATED A1C: CPT | Performed by: FAMILY MEDICINE

## 2019-05-15 ENCOUNTER — OFFICE VISIT (OUTPATIENT)
Dept: RHEUMATOLOGY | Facility: CLINIC | Age: 54
End: 2019-05-15
Payer: COMMERCIAL

## 2019-05-15 VITALS
TEMPERATURE: 96.5 F | SYSTOLIC BLOOD PRESSURE: 136 MMHG | DIASTOLIC BLOOD PRESSURE: 82 MMHG | HEART RATE: 78 BPM | OXYGEN SATURATION: 99 % | BODY MASS INDEX: 37.62 KG/M2 | WEIGHT: 225.8 LBS | HEIGHT: 65 IN

## 2019-05-15 DIAGNOSIS — M06.4 INFLAMMATORY POLYARTHROPATHY (H): Primary | ICD-10-CM

## 2019-05-15 DIAGNOSIS — M75.42 IMPINGEMENT SYNDROME OF LEFT SHOULDER: ICD-10-CM

## 2019-05-15 DIAGNOSIS — Z79.899 HIGH RISK MEDICATIONS (NOT ANTICOAGULANTS) LONG-TERM USE: ICD-10-CM

## 2019-05-15 PROCEDURE — 99214 OFFICE O/P EST MOD 30 MIN: CPT | Performed by: INTERNAL MEDICINE

## 2019-05-15 RX ORDER — SULFASALAZINE 500 MG/1
500 TABLET, DELAYED RELEASE ORAL 2 TIMES DAILY
Qty: 180 TABLET | Refills: 2 | Status: SHIPPED | OUTPATIENT
Start: 2019-05-15 | End: 2020-01-07

## 2019-05-15 ASSESSMENT — MIFFLIN-ST. JEOR: SCORE: 1611.97

## 2019-05-15 ASSESSMENT — PAIN SCALES - GENERAL: PAINLEVEL: MILD PAIN (3)

## 2019-05-15 NOTE — NURSING NOTE
RAPID3 (0-30) Cumulative Score  5.2          RAPID3 Weighted Score (divide #4 by 3 and that is the weighted score)  1.7

## 2019-05-15 NOTE — PROGRESS NOTES
Rheumatology Clinic Visit      Bria Haddad MRN# [id number]   YOB: 1965 Age: 54 year old      Date of visit: 5/15/19   PCP: Dr. Stephanie Saldana    Chief Complaint   Patient presents with:  RECHECK:   Rheumatoid arthritis-still having left shoulder pain.       Assessment and Plan     1.  Rheumatoid arthritis: Ms. Haddad initially presented to this clinic in 2015 with dependent edema of the bilateral lower extremities, fatigue, and a one time episode of right toe pain in the setting of a positive ROSEMARY.  On 9/18/2017 she presented with synovitis of her bilateral PIPs and pain without swelling of her MCPs, persistent fatigue, intermittent rash, and dependent edema. ROSEMARY positive but additional studies negative/normal including complement C3, complement C4, beta-2 glycoprotein IgM and IgG, cardiolipin IgM and IgG, lupus anticoagulant, RNP, Stuart, SSA, SSB, Scl-70.  Overall joint exam is most consistent with rheumatoid arthritis; she does not have a rash at this time.  Previously on HCQ (bloating), MTX (LFT elevation, alopecia, headache), leflunomide (LFT elevation). Currently on SSZ and Humira.  RA is doing well.   - Continue sulfasalazine 500mg twice daily  - Continue Humira 40mg SQ every 14 days  - Continue prednisone 2.5 mg daily as needed for rheumatoid arthritis symptoms (she is using this sparingly)  - Labs in 3 months: CBC, Creatinine, Hepatic Panel, ESR, CRP     2. Left knee pain: Not an issue currently.   Significant improvement with PT in the past and encouraged that she continue these exercises.  When she stopped the exercises in the past she had worsening left knee pain.      3.  Left shoulder pain, impingement syndrome: Resolved with combination of steroid injection and physical therapy.  Since stopping PT exercises the shoulder pain has come back.  Advised starting exercises again and she prefers to do this on her own at home, then if needed will have an appointment on 6/5/2019 to consider  injection if needed.     4.  Dependent edema: Worse in the evening and improves with leg elevation. Has seen vascular surgery and also follows with her PCP for this issue.  Interestingly the edema improved after the steroid injection given to her left shoulder, raising question of systemic absorption of steroids affecting the edema.     5.  Vaccinations: Vaccinations reviewed with Ms. Haddad.  Risks and benefits of vaccinations were discussed.CDC stance on shingrix when on moderate to high immunosuppression reviewed.   - Influenza: refused by patient  - [id number]: refused by patient  - [id number]: refused by patient  - Shingrix: refused by patient      Ms. Haddad verbalized agreement with and understanding of the rational for the diagnosis and treatment plan.  All questions were answered to best of my ability and the patient's satisfaction. Ms. Haddad was advised to contact the clinic with any questions that may arise after the clinic visit.      Thank you for involving me in the care of the patient    Return to clinic: 3-4 months, and 6/5/2019      HPI   Bria Haddad is a 54 year old female with a past medical history significant for endometriosis, s/p carpal tunnel release surgery who present for follow up of positive ROSEMARY.    Today, Ms. Haddad reports that her RA is doing well.  Tolerating SSZ and Humira.  Morning stiffness for <10 min.  Issue today is left shoulder pain that resolved previously with steroid injection + PT; she stopped doing the PT exercises.  Left shoulder pain with abduction >90 degrees and when laying on the left side.     Denies fevers, chills, nausea, vomiting, constipation, diarrhea. No abdominal pain. No chest pain/pressure, palpitations, or shortness of breath. No neck pain. No oral or nasal sores. No sicca symptoms. No photosensitivity or photophobia. No eye pain or redness. No history of inflammatory eye disease.  No history of DVT or pulmonary embolism.  No history of serositis.  No history of  Raynaud's Phenomenon.  No seizure disorder.  No known renal disorder.      Tobacco: none  EtOH: none  Drugs: none    ROS   GEN: No fevers, chills, night sweats, or weight change  SKIN: See HPI  HEENT: No oral or nasal ulcers.  CV: No chest pain, pressure, palpitations, or dyspnea on exertion.  PULM: No SOB, wheeze, cough.  GI: No nausea, vomiting, constipation, diarrhea. No blood in stool. No abdominal pain.  : No blood in urine.  MSK: See HPI.  NEURO: No numbness or tingling.  EXT: See HPI  PSYCH: Negative    Active Problem List     Patient Active Problem List   Diagnosis     CARDIOVASCULAR SCREENING; LDL GOAL LESS THAN 160     Endometriosis of uterus     Inflammatory polyarthropathy (H)     Past Medical History     Past Medical History:   Diagnosis Date     Bilateral carpal tunnel syndrome 12/20/2015     NO ACTIVE PROBLEMS      Obesity      Polyarthritis     inflammatory poly arthritis.     S/P carpal tunnel release 12/29/2016     Past Surgical History     Past Surgical History:   Procedure Laterality Date     bunions       COLONOSCOPY N/A 11/9/2015    Procedure: COLONOSCOPY;  Surgeon: Andrew Adamson MD;  Location: [facility]     COLONOSCOPY WITH CO2 INSUFFLATION N/A 11/9/2015    Procedure: COLONOSCOPY WITH CO2 INSUFFLATION;  Surgeon: Andrew Adamson MD;  Location: [facility]     GYN SURGERY      endometryosis     RELEASE CARPAL TUNNEL Right 10/12/2016    Procedure: RELEASE CARPAL TUNNEL;  Surgeon: Colby Nobles MD;  Location: [facility]     RELEASE CARPAL TUNNEL Left 12/16/2016    Procedure: RELEASE CARPAL TUNNEL;  Surgeon: Colby Nboles MD;  Location: [facility]     TONSILLECTOMY & ADENOIDECTOMY       Allergy     Allergies   Allergen Reactions     Asa [Aspirin] Hives     Current Medication List     Current Outpatient Medications   Medication Sig     adalimumab (HUMIRA *CF*) 40 MG/0.4ML pen kit Inject 0.4 mLs (40 mg) Subcutaneous every 14 days     order for DME Equipment being ordered: compression  "stockings 1 pair, at 30mm Hg, to be worn during the day. For Bilateral leg edema [R60.0]     predniSONE (DELTASONE) 5 MG tablet Take 2.5 mg by mouth daily as needed for rheumatoid arthritis symptoms.     sulfaSALAzine ER (AZULFIDINE EN) 500 MG EC tablet Take 1 tablet (500 mg) by mouth 2 times daily     predniSONE (DELTASONE) 10 MG tablet 3 daily for 3 days, then 2 daily for 3 days, then 1 daily for 3 days. Take with food. (Patient not taking: Reported on 5/15/2019.)     No current facility-administered medications for this visit.          Social History   See HPI    Family History     Family History   Problem Relation Age of Onset     Osteoporosis Mother      Respiratory Mother      Thyroid Disease Mother      Heart Disease Father      Cancer - colorectal Maternal Grandmother      Diabetes Maternal Grandfather      Heart Disease Maternal Grandfather      Cancer Paternal Grandmother      Heart Disease Paternal Grandfather      Respiratory Paternal Grandfather      Hypertension Brother      Thyroid Disease Sister      Thyroid Disease Sister      Neurologic Disorder Brother      Physical Exam     Temp Readings from Last 3 Encounters:   05/15/19 96.5  F (35.8  C) (Oral)   01/31/19 96.8  F (36  C) (Oral)   10/17/18 97.9  F (36.6  C) (Oral)     BP Readings from Last 5 Encounters:   05/15/19 136/82   01/31/19 135/77   10/17/18 153/78   10/03/18 (!) 142/98   09/19/18 131/73     Pulse Readings from Last 1 Encounters:   05/15/19 78     Resp Readings from Last 1 Encounters:   10/17/18 18     Estimated body mass index is 37.8 kg/m  as calculated from the following:    Height as of this encounter: 1.646 m (5' 4.8\").    Weight as of this encounter: 102.4 kg (225 lb 12.8 oz).    GEN: NAD  HEENT: MMM. No oral lesions. Anicteric, noninjected sclera  CV: S1, S2. RRR. No m/r/g.  PULM: CTA bilaterally. No w/c.  MSK: MCPs, PIPs, wrists, elbows, knees, and ankles without swelling or tenderness to palpation.  Right shoulder without " swelling or tenderness palpation.  Left shoulder without swelling or tenderness to palpation; normal range of motion; pain with abduction >90 degrees.  MTPs nontender to palpation.  No dactylitis.     NEURO: UE and LE strengths 5/5 and equal bilaterally.   SKIN: No rash  LYMPH: nonpitting edema distal to the knees bilaterally  PSYCH: Alert. Appropriate.    Labs / Imaging (select studies)   RF/CCP  Recent Labs   Lab Test 09/18/17 0913   CCPIGG 1   RHF <20     ROSEMARY  Recent Labs   Lab Test 07/03/17  1614 10/12/15  0923 10/02/15  0820   MONROE 4.9*  --  2.6*   ANAIGG  --  1:320  Reference range: <1:40  (Note)  Anti-Centromere pattern observed.  INTERPRETIVE INFORMATION: ROSEMARY by IFA, IgG  Anti-nuclear antibodies (ROSEMARY) are seen in a variety of  systemic rheumatic diseases and are determined by indirect  fluorescence assay (IFA) using HEp-2 substrate with an  IgG-specific conjugate. ROSEMARY titers less than or equal to  1:80 have variable relevance while titers greater than or  equal to 1:160 are considered clinically significant. These  antibodies may precede clinical disease onset; however,  healthy individuals and those with advanced age have been  reported to be positive for ROSEMARY. When observed, one of the  five basic patterns is reported: homogeneous,  peripheral/rim, speckled, centromere, or nucleolar. If  cytoplasmic fluorescence is observed, it is noted. IFA  methodology is subjective and has occasionally been shown  to lack sensitivity for anti-SSA/Ro antibodies.  Negative results do not necessarily rule out the presence  of SSc. If clinical suspicion r emains, consider further  testing for U3-RNP, PM/Scl, or Th/To antibodies associated  with SSc.  Performed by [facility],  [address] [place] [id number] [phone]  [email or web address], Ilan Vee MD, Lab. Director  *  --      RNP/Sm/SSA/SSB  Recent Labs   Lab Test 09/18/17  0913 10/12/15  0923   RNPIGG <0.2 <0.2  Negative   Antibody index (AI) values reflect  qualitative changes in antibody   concentration that cannot be directly associated with clinical condition or   disease state.     SMIGG <0.2 <0.2  Negative   Antibody index (AI) values reflect qualitative changes in antibody   concentration that cannot be directly associated with clinical condition or   disease state.     SSAIGG <0.2 <0.2  Negative   Antibody index (AI) values reflect qualitative changes in antibody   concentration that cannot be directly associated with clinical condition or   disease state.     SSBIGG <0.2 <0.2  Negative   Antibody index (AI) values reflect qualitative changes in antibody   concentration that cannot be directly associated with clinical condition or   disease state.     SCLIGG <0.2 <0.2  Negative   Antibody index (AI) values reflect qualitative changes in antibody   concentration that cannot be directly associated with clinical condition or   disease state.       dsDNA  Recent Labs   Lab Test 09/18/17  0913 10/12/15  0923   DNA 1 <15  Interpretation:  Negative       C3/C4  Recent Labs   Lab Test 09/18/17  0913 10/12/15  0923   V0HNCCU 137 124   B1VDSFY 31 24     RNA Polymerase III Antibody  No results for input(s): RNAPG in the last 91892 hours.  Histone Antibody  No results for input(s): HSTO in the last 12078 hours.  Send-out Labs  No results for input(s): SRESLT, STSTNM, STSTCD, SSPTYP in the last 72136 hours.  Antiphospholipid Antibodies  Recent Labs   Lab Test 10/12/15  0923   B2IGG 1   B2IGM 4   CARG <15.0  Interpretation:  Negative     JOSELITO <12.5  Interpretation:  Negative     LUPINT Negative  (Note)  COMMENTS:  The INR is normal.  APTT ratio is normal.  DRVVT Screen ratio is normal.  Thrombin time is normal.  NEGATIVE TEST; A LUPUS ANTICOAGULANT WAS NOT DETECTED IN THIS  SPECIMEN WITHIN THE LIMITS OF THE TESTING REPERTOIRE.  If the clinical picture is strongly suggestive of an antiphospholipid  syndrome, recommend anticardiolipin and beta-2-glycoprotein (IgG and  IgM)  antibody tests.  Barbara Lerma M.D.  [phone]  10/13/2015    INR =  1.05    Reference range: 0.86-1.14  Thrombin Time= 15.4    Reference range: 13.0-19.0 sec    APTT:       Ratio  Patient  =  0.97  1:2 Mix  =  N/A  Reference:  Negative: Less than or equal to 1.16  Positive: Greater than or equal to 1.17     DILUTE BELKIS VIPER VENOM TEST:  Screen Ratio = 0.70   Normal is less than 1.21         CBC  Recent Labs   Lab Test 05/08/19  0745 01/22/19  0724 12/03/18  0953   WBC 5.7 5.7 5.0   RBC 5.08 4.75 4.57   HGB 15.3 14.2 14.0   HCT 45.9 43.1 42.2   MCV 90 91 92   RDW 12.9 12.5 12.7    278 273   MCH 30.1 29.9 30.6   MCHC 33.3 32.9 33.2   NEUTROPHIL 49.3 53.6 44.4   LYMPH 37.2 29.0 34.1   MONOCYTE 8.8 11.4 15.5   EOSINOPHIL 4.2 5.5 5.4   BASOPHIL 0.5 0.5 0.6   ANEU 2.8 3.0 2.2   ALYM 2.1 1.7 1.7   MIKE 0.5 0.7 0.8   AEOS 0.2 0.3 0.3   ABAS 0.0 0.0 0.0     CMP  Recent Labs   Lab Test 05/08/19  0745 01/22/19  0724 12/03/18  0953 11/13/18  0715  09/18/17  0913 12/05/16  0811 10/07/16  0912 04/27/15  1834     --   --   --   --  141 142 143 139   POTASSIUM 4.0  --   --   --   --  4.0 4.0 4.2 3.7   CHLORIDE 109  --   --   --   --  107 105 107 106   CO2 30  --   --   --   --  29 29 29 29   ANIONGAP 3  --   --   --   --  5 8 7 4   GLC 99  --   --   --   --  96 104* 75 86   BUN 14  --   --   --   --  14 10 16 13   CR 0.74 0.74 0.73 0.76   < > 0.76 0.73 0.74 0.77   GFRESTIMATED >90 90 82 79   < > 79 83 82 79   GFRESTBLACK >90 >90 >90 >90   < > >90 >90   GFR Calc   >90   GFR Calc   >90   GFR Calc     AGUILAR 9.3  --   --   --   --  8.8 9.2 9.3 8.9   BILITOTAL 0.3 0.3 0.5 0.5   < > 0.4  --   --  0.2   ALBUMIN 4.0 3.6 3.6 3.6   < > 3.7  --   --  4.1   PROTTOTAL 7.5 7.2 7.1 7.4   < > 7.4  --   --  7.5   ALKPHOS 73 79 78 99   < > 71  --   --  67   AST 15 21 40 125*   < > 16  --   --  23   ALT 31 38 73* 266*   < > 23  --   --  36    < > = values in this interval not  displayed.     HgA1c  Recent Labs   Lab Test 05/08/19  0745   A1C 5.3     Calcium/VitaminD  Recent Labs   Lab Test 05/08/19  0745 09/18/17  0913 12/05/16  0811   AGUILAR 9.3 8.8 9.2     ESR/CRP  Recent Labs   Lab Test 01/22/19  0724 09/13/18  0741 05/21/18  0731   SED 8 8 8   CRP 3.9 5.7 5.2     CK/Aldolase  Recent Labs   Lab Test 09/18/17  0913 10/12/15  0923 10/02/15  0820   CKT 92  --  66   ALDOLASE  --  5.8  --      TSH/T4  Recent Labs   Lab Test 04/27/15  1834   TSH 1.79     Hepatitis B  Recent Labs   Lab Test 10/12/15  0923   AUSAB 0.18   HBCAB Nonreactive   HEPBANG Nonreactive     Hepatitis C  Recent Labs   Lab Test 10/07/16  0912   HCVAB Nonreactive   Assay performance characteristics have not been established for newborns,   infants, and children       Tuberculosis Screening  Recent Labs   Lab Test 12/07/18  0724   TBRES Negative     HIV Screening  Recent Labs   Lab Test 10/12/15  0923   HIAGAB Nonreactive   HIV-1 p24 Ag & HIV-1/HIV-2 Ab Not Detected       UA  Recent Labs   Lab Test 09/18/17  0924 10/02/15  0838   COLOR Yellow Yellow   APPEARANCE Slightly Cloudy Slightly Cloudy   URINEGLC Negative Negative   URINEBILI Negative Negative   SG 1.015 1.025   URINEPH 7.5* 5.0   PROTEIN Negative Negative   UROBILINOGEN 0.2 0.2   NITRITE Negative Negative   UBLD Large* Negative   LEUKEST Negative Negative   WBCU O - 2 O - 2   RBCU 2-5* O - 2   SQUAMOUSEPI Few Moderate*   BACTERIA Few*  --      Urine Microscopic  Recent Labs   Lab Test 09/18/17  0924 10/02/15  0838   WBCU O - 2 O - 2   RBCU 2-5* O - 2   SQUAMOUSEPI Few Moderate*   BACTERIA Few*  --      Urine Protein  Recent Labs   Lab Test 09/18/17  0924   UTP 0.13   UTPG 0.10   UCRR 129            Chart documentation done in part with Dragon Voice recognition Software. Although reviewed after completion, some word and grammatical error may remain.    Bill Resendiz MD

## 2019-07-03 ENCOUNTER — OFFICE VISIT (OUTPATIENT)
Dept: RHEUMATOLOGY | Facility: CLINIC | Age: 54
End: 2019-07-03
Payer: COMMERCIAL

## 2019-07-03 VITALS
HEART RATE: 79 BPM | WEIGHT: 223 LBS | SYSTOLIC BLOOD PRESSURE: 148 MMHG | HEIGHT: 65 IN | BODY MASS INDEX: 37.15 KG/M2 | DIASTOLIC BLOOD PRESSURE: 86 MMHG | OXYGEN SATURATION: 96 %

## 2019-07-03 DIAGNOSIS — G89.29 CHRONIC LEFT SHOULDER PAIN: Primary | ICD-10-CM

## 2019-07-03 DIAGNOSIS — M25.512 CHRONIC LEFT SHOULDER PAIN: Primary | ICD-10-CM

## 2019-07-03 PROCEDURE — 20610 DRAIN/INJ JOINT/BURSA W/O US: CPT | Mod: LT | Performed by: INTERNAL MEDICINE

## 2019-07-03 PROCEDURE — 99213 OFFICE O/P EST LOW 20 MIN: CPT | Mod: 25 | Performed by: INTERNAL MEDICINE

## 2019-07-03 ASSESSMENT — MIFFLIN-ST. JEOR: SCORE: 1599.22

## 2019-07-03 NOTE — NURSING NOTE
RAPID3 (0-30) Cumulative Score  14.8          RAPID3 Weighted Score (divide #4 by 3 and that is the weighted score)  4.9       Jolynn Angulo CMA Rheumatology  7/3/2019 2:50 PM        The following medication was given:     MEDICATION: Methylprednisolone  SITE: Left shoulder  DOSE: 1 ml  LOT #: [id number]  :  [employer]  EXPIRATION DATE:  04/2020  NDC#: 6767-9475-49  Time consent signed:  3:09 PM    1% Lidocaine  :  [employer]  Lot #: [id number]  Expiration date:  08/2020  NDC: 2334-5690-78

## 2019-07-05 NOTE — PROGRESS NOTES
Rheumatology Clinic Visit      Bria Haddad MRN# [id number]   YOB: 1965 Age: 54 year old      Date of visit: 7/03/19   PCP: Dr. Stehpanie Saldana    Chief Complaint   Patient presents with:  Arthritis: Inflammatory polyarthropathy. Patient is having left shoulder pain, can not raise arm up.      Assessment and Plan     1.  Rheumatoid arthritis: Ms. Haddad initially presented to this clinic in 2015 with dependent edema of the bilateral lower extremities, fatigue, and a one time episode of right toe pain in the setting of a positive ROSEMARY.  On 9/18/2017 she presented with synovitis of her bilateral PIPs and pain without swelling of her MCPs, persistent fatigue, intermittent rash, and dependent edema. ROSEMARY positive but additional studies negative/normal including complement C3, complement C4, beta-2 glycoprotein IgM and IgG, cardiolipin IgM and IgG, lupus anticoagulant, RNP, Stuart, SSA, SSB, Scl-70.  Overall joint exam is most consistent with rheumatoid arthritis; she does not have a rash at this time.  Previously on HCQ (bloating), MTX (LFT elevation, alopecia, headache), leflunomide (LFT elevation). Currently on SSZ and Humira.  RA is doing well.   - Continue sulfasalazine 500mg twice daily  - Continue Humira 40mg SQ every 14 days  - Continue prednisone 2.5 mg daily as needed for rheumatoid arthritis symptoms (she is using this sparingly)  - Labs in 3 months: CBC, Creatinine, Hepatic Panel, ESR, CRP     2. Left knee pain: Not an issue currently.   Significant improvement with PT in the past and encouraged that she continue these exercises.  When she stopped the exercises in the past she had worsening left knee pain.      3.  Left shoulder pain, impingement syndrome: Resolved with combination of steroid injection and physical therapy.  Since stopping PT exercises the shoulder pain has come back. She restarted the exercises with improvement but not resolution of the pain so she would like to repeat the injection  as she did about a year ago that helped so much.  Steroid injection today as documented in the procedure section. Return next week if symptoms persist (at which time imaging may be considered: x-ray, MRI).    4.  Dependent edema, history: Worse in the evening and improves with leg elevation. Has seen vascular surgery and also follows with her PCP for this issue.  Interestingly the edema improved after the steroid injection given to her left shoulder, raising question of systemic absorption of steroids affecting the edema.     5.  Vaccinations: Vaccinations reviewed with Ms. Haddad.  Risks and benefits of vaccinations were discussed.CDC stance on shingrix when on moderate to high immunosuppression reviewed.   - Influenza: refused by patient  - [id number]: refused by patient  - Hwchiksrr95: refused by patient  - Shingrix: refused by patient      Ms. Haddad verbalized agreement with and understanding of the rational for the diagnosis and treatment plan.  All questions were answered to best of my ability and the patient's satisfaction. Ms. Haddad was advised to contact the clinic with any questions that may arise after the clinic visit.      Thank you for involving me in the care of the patient    Return to clinic: 1 week      HPI   Bria Haddad is a 54 year old female with a past medical history significant for endometriosis, s/p carpal tunnel release surgery who present for follow up of positive ROSEMARY.    Today, Ms. Haddad reports that her RA is doing well.  Biggest issue right now is that her left shoulder is still hurting.  She previously did PT and did not improve so an injection was done and this resolved the issue for a year.  She then stopped doing the PT exercises and the pain improved but did not resolve. Therefore, she was going to come back a month ago incase the injection needed repeating but she was feeling better so she cancelled that appointment but then pain worsened that prompted her appointment today; she would  like a joint injection.     Denies fevers, chills, nausea, vomiting, constipation, diarrhea. No abdominal pain. No chest pain/pressure, palpitations, or shortness of breath. No neck pain. No oral or nasal sores. No sicca symptoms. No photosensitivity or photophobia. No eye pain or redness. No history of inflammatory eye disease.  No history of DVT or pulmonary embolism.  No history of serositis.  No history of Raynaud's Phenomenon.  No seizure disorder.  No known renal disorder.      Tobacco: none  EtOH: none  Drugs: none    ROS   GEN: No fevers, chills, night sweats, or weight change  SKIN: See HPI  HEENT: No oral or nasal ulcers.  CV: No chest pain, pressure, palpitations, or dyspnea on exertion.  PULM: No SOB, wheeze, cough.  GI: No nausea, vomiting, constipation, diarrhea. No blood in stool. No abdominal pain.  : No blood in urine.  MSK: See HPI.  NEURO: No numbness or tingling.  EXT: See HPI  PSYCH: Negative    Active Problem List     Patient Active Problem List   Diagnosis     CARDIOVASCULAR SCREENING; LDL GOAL LESS THAN 160     Endometriosis of uterus     Inflammatory polyarthropathy (H)     Past Medical History     Past Medical History:   Diagnosis Date     Bilateral carpal tunnel syndrome 12/20/2015     NO ACTIVE PROBLEMS      Obesity      Polyarthritis     inflammatory poly arthritis.     S/P carpal tunnel release 12/29/2016     Past Surgical History     Past Surgical History:   Procedure Laterality Date     bunions       COLONOSCOPY N/A 11/9/2015    Procedure: COLONOSCOPY;  Surgeon: Andrew Adamson MD;  Location: [facility]     COLONOSCOPY WITH CO2 INSUFFLATION N/A 11/9/2015    Procedure: COLONOSCOPY WITH CO2 INSUFFLATION;  Surgeon: Andrew Adamson MD;  Location: [facility]     GYN SURGERY      endometryosis     RELEASE CARPAL TUNNEL Right 10/12/2016    Procedure: RELEASE CARPAL TUNNEL;  Surgeon: Colby Nobles MD;  Location: [facility]     RELEASE CARPAL TUNNEL Left 12/16/2016    Procedure:  "RELEASE CARPAL TUNNEL;  Surgeon: Colby Nobles MD;  Location: [facility]     TONSILLECTOMY & ADENOIDECTOMY       Allergy     Allergies   Allergen Reactions     Asa [Aspirin] Hives     Current Medication List     Current Outpatient Medications   Medication Sig     adalimumab (HUMIRA *CF*) 40 MG/0.4ML pen kit Inject 0.4 mLs (40 mg) Subcutaneous every 14 days     predniSONE (DELTASONE) 5 MG tablet Take 2.5 mg by mouth daily as needed for rheumatoid arthritis symptoms.     sulfaSALAzine ER (AZULFIDINE EN) 500 MG EC tablet Take 1 tablet (500 mg) by mouth 2 times daily     order for DME Equipment being ordered: compression stockings 1 pair, at 30mm Hg, to be worn during the day. For Bilateral leg edema [R60.0]     No current facility-administered medications for this visit.          Social History   See HPI    Family History     Family History   Problem Relation Age of Onset     Osteoporosis Mother      Respiratory Mother      Thyroid Disease Mother      Heart Disease Father      Cancer - colorectal Maternal Grandmother      Diabetes Maternal Grandfather      Heart Disease Maternal Grandfather      Cancer Paternal Grandmother      Heart Disease Paternal Grandfather      Respiratory Paternal Grandfather      Hypertension Brother      Thyroid Disease Sister      Thyroid Disease Sister      Neurologic Disorder Brother      Physical Exam     Temp Readings from Last 3 Encounters:   05/15/19 96.5  F (35.8  C) (Oral)   01/31/19 96.8  F (36  C) (Oral)   10/17/18 97.9  F (36.6  C) (Oral)     BP Readings from Last 5 Encounters:   07/03/19 148/86   05/15/19 136/82   01/31/19 135/77   10/17/18 153/78   10/03/18 (!) 142/98     Pulse Readings from Last 1 Encounters:   07/03/19 79     Resp Readings from Last 1 Encounters:   10/17/18 18     Estimated body mass index is 37.34 kg/m  as calculated from the following:    Height as of this encounter: 1.646 m (5' 4.8\").    Weight as of this encounter: 101.2 kg (223 lb).    GEN: NAD  HEENT: " MMM. No oral lesions. Anicteric, noninjected sclera  CV: S1, S2. RRR. No m/r/g.  PULM: CTA bilaterally. No w/c.  MSK: MCPs, PIPs, wrists, elbows, knees, and ankles without swelling or tenderness to palpation.  Right shoulder without swelling or tenderness palpation.  Left shoulder without swelling or tenderness to palpation; normal range of motion; pain with abduction >90 degrees.  MTPs nontender to palpation.  No dactylitis.     NEURO: UE and LE strengths 5/5 and equal bilaterally.   SKIN: No rash  LYMPH: nonpitting edema distal to the knees bilaterally  PSYCH: Alert. Appropriate.    Labs / Imaging (select studies)   RF/CCP  Recent Labs   Lab Test 09/18/17  0913   CCPIGG 1   RHF <20     ROSEMARY  Recent Labs   Lab Test 07/03/17  1614 10/12/15  0923 10/02/15  0820   MONROE 4.9*  --  2.6*   ANAIGG  --  1:320  Reference range: <1:40  (Note)  Anti-Centromere pattern observed.  INTERPRETIVE INFORMATION: ROSEMARY by IFA, IgG  Anti-nuclear antibodies (ROSEMARY) are seen in a variety of  systemic rheumatic diseases and are determined by indirect  fluorescence assay (IFA) using HEp-2 substrate with an  IgG-specific conjugate. ROSEMARY titers less than or equal to  1:80 have variable relevance while titers greater than or  equal to 1:160 are considered clinically significant. These  antibodies may precede clinical disease onset; however,  healthy individuals and those with advanced age have been  reported to be positive for ROSEMARY. When observed, one of the  five basic patterns is reported: homogeneous,  peripheral/rim, speckled, centromere, or nucleolar. If  cytoplasmic fluorescence is observed, it is noted. IFA  methodology is subjective and has occasionally been shown  to lack sensitivity for anti-SSA/Ro antibodies.  Negative results do not necessarily rule out the presence  of SSc. If clinical suspicion r emains, consider further  testing for U3-RNP, PM/Scl, or Th/To antibodies associated  with SSc.  Performed by [facility],  [address] [place] [address]  [place] [address] [phone]  [email or web address], Ilan Vee MD, Lab. Director  *  --      RNP/Sm/SSA/SSB  Recent Labs   Lab Test 09/18/17  0913 10/12/15  0923   RNPIGG <0.2 <0.2  Negative   Antibody index (AI) values reflect qualitative changes in antibody   concentration that cannot be directly associated with clinical condition or   disease state.     SMIGG <0.2 <0.2  Negative   Antibody index (AI) values reflect qualitative changes in antibody   concentration that cannot be directly associated with clinical condition or   disease state.     SSAIGG <0.2 <0.2  Negative   Antibody index (AI) values reflect qualitative changes in antibody   concentration that cannot be directly associated with clinical condition or   disease state.     SSBIGG <0.2 <0.2  Negative   Antibody index (AI) values reflect qualitative changes in antibody   concentration that cannot be directly associated with clinical condition or   disease state.     SCLIGG <0.2 <0.2  Negative   Antibody index (AI) values reflect qualitative changes in antibody   concentration that cannot be directly associated with clinical condition or   disease state.       dsDNA  Recent Labs   Lab Test 09/18/17  0913 10/12/15  0923   DNA 1 <15  Interpretation:  Negative       C3/C4  Recent Labs   Lab Test 09/18/17  0913 10/12/15  0923   Y2SCGIW 137 124   D4FUBUM 31 24       Antiphospholipid Antibodies  Recent Labs   Lab Test 10/12/15  0923   B2IGG 1   B2IGM 4   CARG <15.0  Interpretation:  Negative     JOSELITO <12.5  Interpretation:  Negative     LUPINT Negative  (Note)  COMMENTS:  The INR is normal.  APTT ratio is normal.  DRVVT Screen ratio is normal.  Thrombin time is normal.  NEGATIVE TEST; A LUPUS ANTICOAGULANT WAS NOT DETECTED IN THIS  SPECIMEN WITHIN THE LIMITS OF THE TESTING REPERTOIRE.  If the clinical picture is strongly suggestive of an antiphospholipid  syndrome, recommend anticardiolipin and beta-2-glycoprotein (IgG and  IgM) antibody tests.  Barbara  Behzad Lerma M.D.  [phone]  10/13/2015    INR =  1.05    Reference range: 0.86-1.14  Thrombin Time= 15.4    Reference range: 13.0-19.0 sec    APTT:       Ratio  Patient  =  0.97  1:2 Mix  =  N/A  Reference:  Negative: Less than or equal to 1.16  Positive: Greater than or equal to 1.17     DILUTE BELKIS VIPER VENOM TEST:  Screen Ratio = 0.70   Normal is less than 1.21         CBC  Recent Labs   Lab Test 05/08/19  0745 01/22/19  0724 12/03/18  0953   WBC 5.7 5.7 5.0   RBC 5.08 4.75 4.57   HGB 15.3 14.2 14.0   HCT 45.9 43.1 42.2   MCV 90 91 92   RDW 12.9 12.5 12.7    278 273   MCH 30.1 29.9 30.6   MCHC 33.3 32.9 33.2   NEUTROPHIL 49.3 53.6 44.4   LYMPH 37.2 29.0 34.1   MONOCYTE 8.8 11.4 15.5   EOSINOPHIL 4.2 5.5 5.4   BASOPHIL 0.5 0.5 0.6   ANEU 2.8 3.0 2.2   ALYM 2.1 1.7 1.7   MIKE 0.5 0.7 0.8   AEOS 0.2 0.3 0.3   ABAS 0.0 0.0 0.0     CMP  Recent Labs   Lab Test 05/08/19  0745 01/22/19  0724 12/03/18  0953 11/13/18  0715  09/18/17  0913 12/05/16  0811 10/07/16  0912 04/27/15  1834     --   --   --   --  141 142 143 139   POTASSIUM 4.0  --   --   --   --  4.0 4.0 4.2 3.7   CHLORIDE 109  --   --   --   --  107 105 107 106   CO2 30  --   --   --   --  29 29 29 29   ANIONGAP 3  --   --   --   --  5 8 7 4   GLC 99  --   --   --   --  96 104* 75 86   BUN 14  --   --   --   --  14 10 16 13   CR 0.74 0.74 0.73 0.76   < > 0.76 0.73 0.74 0.77   GFRESTIMATED >90 90 82 79   < > 79 83 82 79   GFRESTBLACK >90 >90 >90 >90   < > >90 >90   GFR Calc   >90   GFR Calc   >90   GFR Calc     AGUILAR 9.3  --   --   --   --  8.8 9.2 9.3 8.9   BILITOTAL 0.3 0.3 0.5 0.5   < > 0.4  --   --  0.2   ALBUMIN 4.0 3.6 3.6 3.6   < > 3.7  --   --  4.1   PROTTOTAL 7.5 7.2 7.1 7.4   < > 7.4  --   --  7.5   ALKPHOS 73 79 78 99   < > 71  --   --  67   AST 15 21 40 125*   < > 16  --   --  23   ALT 31 38 73* 266*   < > 23  --   --  36    < > = values in this interval not displayed.      HgA1c  Recent Labs   Lab Test 05/08/19  0745   A1C 5.3     Calcium/VitaminD  Recent Labs   Lab Test 05/08/19  0745 09/18/17  0913 12/05/16  0811   AGUILAR 9.3 8.8 9.2     ESR/CRP  Recent Labs   Lab Test 01/22/19  0724 09/13/18  0741 05/21/18  0731   SED 8 8 8   CRP 3.9 5.7 5.2     CK/Aldolase  Recent Labs   Lab Test 09/18/17  0913 10/12/15  0923 10/02/15  0820   CKT 92  --  66   ALDOLASE  --  5.8  --      TSH/T4  Recent Labs   Lab Test 04/27/15  1834   TSH 1.79     Hepatitis B  Recent Labs   Lab Test 10/12/15  0923   AUSAB 0.18   HBCAB Nonreactive   HEPBANG Nonreactive     Hepatitis C  Recent Labs   Lab Test 10/07/16  0912   HCVAB Nonreactive   Assay performance characteristics have not been established for newborns,   infants, and children       Tuberculosis Screening  Recent Labs   Lab Test 12/07/18  0724   TBRES Negative     HIV Screening  Recent Labs   Lab Test 10/12/15  0923   HIAGAB Nonreactive   HIV-1 p24 Ag & HIV-1/HIV-2 Ab Not Detected       Procedure     Procedure: Steroid injections of the left shoulder  Indication: Pain, left shoulder pain    The procedure was explained in detail. Risks including infection, pain, structural damage such as cartilage damage and tendon rupture, fat atrophy, skin hyper-/hypo-pigmentation, and medication reaction was explained. The need for rest of the affected joint for one week after the procedure was explained.  The option of not doing the procedure was also provided. All questions were answered and the patient consented to the procedure.     A time-out was performed and the correct patient, procedure, and laterality were verified.    The left shoulder was examined and location for injection was identified. The area was cleaned with chlorhexidine, twice.  Ethyl chloride was then used for topical anaesthetic.  Then a mixture of lidocaine 1% 2 mL and Depo-Medrol 40mg was injected into the intra-articular space.     The patient tolerated the procedure well. No  complications.    MEDICATION: Methylprednisolone  LOT #: [id number]  :  [employer]  EXPIRATION DATE:  04/2020  NDC#: 0684-5412-37      1% Lidocaine  :  [employer]  Lot #: [id number]  Expiration date:  08/2020  NDC: 5857-5347-34           Chart documentation done in part with Dragon Voice recognition Software. Although reviewed after completion, some word and grammatical error may remain.    Bill Resendiz MD

## 2019-07-06 RX ORDER — METHYLPREDNISOLONE ACETATE 40 MG/ML
40 INJECTION, SUSPENSION INTRA-ARTICULAR; INTRALESIONAL; INTRAMUSCULAR; SOFT TISSUE ONCE
Status: COMPLETED | OUTPATIENT
Start: 2019-07-03 | End: 2019-07-06

## 2019-07-06 RX ADMIN — METHYLPREDNISOLONE ACETATE 40 MG: 40 INJECTION, SUSPENSION INTRA-ARTICULAR; INTRALESIONAL; INTRAMUSCULAR; SOFT TISSUE at 15:10

## 2019-07-10 ENCOUNTER — OFFICE VISIT (OUTPATIENT)
Dept: RHEUMATOLOGY | Facility: CLINIC | Age: 54
End: 2019-07-10
Payer: COMMERCIAL

## 2019-07-10 VITALS
BODY MASS INDEX: 37.45 KG/M2 | WEIGHT: 224.8 LBS | HEART RATE: 73 BPM | DIASTOLIC BLOOD PRESSURE: 82 MMHG | HEIGHT: 65 IN | OXYGEN SATURATION: 98 % | SYSTOLIC BLOOD PRESSURE: 128 MMHG

## 2019-07-10 DIAGNOSIS — M25.512 LEFT SHOULDER PAIN, UNSPECIFIED CHRONICITY: ICD-10-CM

## 2019-07-10 DIAGNOSIS — M06.4 INFLAMMATORY POLYARTHROPATHY (H): Primary | ICD-10-CM

## 2019-07-10 PROCEDURE — 99213 OFFICE O/P EST LOW 20 MIN: CPT | Performed by: INTERNAL MEDICINE

## 2019-07-10 ASSESSMENT — MIFFLIN-ST. JEOR: SCORE: 1607.39

## 2019-07-10 NOTE — PROGRESS NOTES
Rheumatology Clinic Visit      Bria Haddad MRN# [id number]   YOB: 1965 Age: 54 year old      Date of visit: 7/10/19   PCP: Dr. Stephanie Saldana    Chief Complaint   Patient presents with:  Arthritis: Inflammatory polyarthropathy. Left shoulder is still having issues      Assessment and Plan     1.  Rheumatoid arthritis: Ms. Haddad initially presented to this clinic in 2015 with dependent edema of the bilateral lower extremities, fatigue, and a one time episode of right toe pain in the setting of a positive ROSEMARY.  On 9/18/2017 she presented with synovitis of her bilateral PIPs and pain without swelling of her MCPs, persistent fatigue, intermittent rash, and dependent edema. ROSEMARY positive but additional studies negative/normal including complement C3, complement C4, beta-2 glycoprotein IgM and IgG, cardiolipin IgM and IgG, lupus anticoagulant, RNP, Stuart, SSA, SSB, Scl-70.  Overall joint exam is most consistent with rheumatoid arthritis; she does not have a rash at this time.  Previously on HCQ (bloating), MTX (LFT elevation, alopecia, headache), leflunomide (LFT elevation). Currently on SSZ and Humira.  RA is doing well.   - Continue sulfasalazine 500mg twice daily  - Continue Humira 40mg SQ every 14 days (changed to a 12-week supply)  - Continue prednisone 2.5 mg daily as needed for rheumatoid arthritis symptoms (she is using this sparingly)  - Labs in 3 months: CBC, Creatinine, Hepatic Panel, ESR, CRP     2. Left knee pain: Not an issue currently.   Significant improvement with PT in the past and encouraged that she continue these exercises.  When she stopped the exercises in the past she had worsening left knee pain.      3.  Left shoulder pain, impingement syndrome: Resolved with combination of steroid injection and physical therapy.  Since stopping PT exercises the shoulder pain has come back. She restarted the exercises with improvement but not resolution of the pain so she requested a steroid  injection of her left shoulder on 7/20/2019.  She reports having a 60% improvement of her shoulder pain but still has significant pain with over the head activity.  Given this improvement I anticipate that it will continue to improve so if still symptomatic in 2 weeks then she is to get an x-ray of her left shoulder.  If the x-ray does not show a reason for her pain then we will get an MRI of the left shoulder.  She was instructed to call the clinic after she gets the x-ray  -Left shoulder x-ray ordered     4.  Vaccinations: Vaccinations reviewed with Ms. Haddad.   - Influenza: refused by patient  - [id number]: refused by patient  - [id number]: refused by patient  - Shingrix: refused by patient      Ms. Haddad verbalized agreement with and understanding of the rational for the diagnosis and treatment plan.  All questions were answered to best of my ability and the patient's satisfaction. Ms. Haddad was advised to contact the clinic with any questions that may arise after the clinic visit.      Thank you for involving me in the care of the patient    Return to clinic: August 2019      HPI   Bria Haddad is a 54 year old female with a past medical history significant for endometriosis, s/p carpal tunnel release surgery who present for follow up of positive ROSEMARY.    Today, Ms. Haddad reports that her left shoulder pain improved by approximately 60%.  Still with pain during over the head activities.  No shoulder swelling.  She is hoping that it will continue to improve.  Still able to move her shoulder in all directions but it is painful.    Denies fevers, chills, nausea, vomiting, constipation, diarrhea. No abdominal pain. No chest pain/pressure, palpitations, or shortness of breath. No neck pain. No oral or nasal sores. No sicca symptoms. No photosensitivity or photophobia. No eye pain or redness. No history of inflammatory eye disease.  No history of DVT or pulmonary embolism.  No history of serositis.  No history of  Raynaud's Phenomenon.  No seizure disorder.  No known renal disorder.      Tobacco: none  EtOH: none  Drugs: none    ROS   GEN: No fevers, chills, night sweats, or weight change  SKIN: See HPI  HEENT: No oral or nasal ulcers.  CV: No chest pain, pressure, palpitations, or dyspnea on exertion.  PULM: No SOB, wheeze, cough.  GI: No nausea, vomiting, constipation, diarrhea. No blood in stool. No abdominal pain.  : No blood in urine.  MSK: See HPI.  NEURO: No numbness or tingling.  EXT: See HPI  PSYCH: Negative    Active Problem List     Patient Active Problem List   Diagnosis     CARDIOVASCULAR SCREENING; LDL GOAL LESS THAN 160     Endometriosis of uterus     Inflammatory polyarthropathy (H)     Past Medical History     Past Medical History:   Diagnosis Date     Bilateral carpal tunnel syndrome 12/20/2015     NO ACTIVE PROBLEMS      Obesity      Polyarthritis     inflammatory poly arthritis.     S/P carpal tunnel release 12/29/2016     Past Surgical History     Past Surgical History:   Procedure Laterality Date     bunions       COLONOSCOPY N/A 11/9/2015    Procedure: COLONOSCOPY;  Surgeon: Andrew Adamson MD;  Location: [facility]     COLONOSCOPY WITH CO2 INSUFFLATION N/A 11/9/2015    Procedure: COLONOSCOPY WITH CO2 INSUFFLATION;  Surgeon: Andrew Adamson MD;  Location: [facility]     GYN SURGERY      endometryosis     RELEASE CARPAL TUNNEL Right 10/12/2016    Procedure: RELEASE CARPAL TUNNEL;  Surgeon: Colby Nobles MD;  Location: [facility]     RELEASE CARPAL TUNNEL Left 12/16/2016    Procedure: RELEASE CARPAL TUNNEL;  Surgeon: Colby Nobles MD;  Location: [facility]     TONSILLECTOMY & ADENOIDECTOMY       Allergy     Allergies   Allergen Reactions     Asa [Aspirin] Hives     Current Medication List     Current Outpatient Medications   Medication Sig     adalimumab (HUMIRA *CF*) 40 MG/0.4ML pen kit Inject 0.4 mLs (40 mg) Subcutaneous every 14 days     sulfaSALAzine ER (AZULFIDINE EN) 500 MG EC tablet  "Take 1 tablet (500 mg) by mouth 2 times daily     order for DME Equipment being ordered: compression stockings 1 pair, at 30mm Hg, to be worn during the day. For Bilateral leg edema [R60.0]     predniSONE (DELTASONE) 5 MG tablet Take 2.5 mg by mouth daily as needed for rheumatoid arthritis symptoms.     No current facility-administered medications for this visit.          Social History   See HPI    Family History     Family History   Problem Relation Age of Onset     Osteoporosis Mother      Respiratory Mother      Thyroid Disease Mother      Heart Disease Father      Cancer - colorectal Maternal Grandmother      Diabetes Maternal Grandfather      Heart Disease Maternal Grandfather      Cancer Paternal Grandmother      Heart Disease Paternal Grandfather      Respiratory Paternal Grandfather      Hypertension Brother      Thyroid Disease Sister      Thyroid Disease Sister      Neurologic Disorder Brother      Physical Exam     Temp Readings from Last 3 Encounters:   05/15/19 96.5  F (35.8  C) (Oral)   01/31/19 96.8  F (36  C) (Oral)   10/17/18 97.9  F (36.6  C) (Oral)     BP Readings from Last 5 Encounters:   07/03/19 148/86   05/15/19 136/82   01/31/19 135/77   10/17/18 153/78   10/03/18 (!) 142/98     Pulse Readings from Last 1 Encounters:   07/03/19 79     Resp Readings from Last 1 Encounters:   10/17/18 18     Estimated body mass index is 37.34 kg/m  as calculated from the following:    Height as of this encounter: 1.646 m (5' 4.8\").    Weight as of 7/3/19: 101.2 kg (223 lb).    GEN: NAD  HEENT: MMM. No oral lesions. Anicteric, noninjected sclera  CV: S1, S2. RRR. No m/r/g.  PULM: CTA bilaterally. No w/c.  MSK: MCPs, PIPs, wrists, elbows, knees, and ankles without swelling or tenderness to palpation.  Right shoulder without swelling or tenderness palpation.  Left shoulder without swelling or tenderness to palpation; normal range of motion; pain with abduction >90 degrees but the pain is less severe on exam " today.  MTPs nontender to palpation.  No dactylitis.     NEURO: UE and LE strengths 5/5 and equal bilaterally.   SKIN: No rash  LYMPH: nonpitting edema distal to the knees bilaterally  PSYCH: Alert. Appropriate.    Labs / Imaging (select studies)   RF/CCP  Recent Labs   Lab Test 09/18/17  0913   CCPIGG 1   RHF <20     ROSEMARY  Recent Labs   Lab Test 07/03/17  1614 10/12/15  0923 10/02/15  0820   MONROE 4.9*  --  2.6*   ANAIGG  --  1:320  Reference range: <1:40  (Note)  Anti-Centromere pattern observed.  INTERPRETIVE INFORMATION: ROSEMARY by IFA, IgG  Anti-nuclear antibodies (ROSEMARY) are seen in a variety of  systemic rheumatic diseases and are determined by indirect  fluorescence assay (IFA) using HEp-2 substrate with an  IgG-specific conjugate. ROSEMARY titers less than or equal to  1:80 have variable relevance while titers greater than or  equal to 1:160 are considered clinically significant. These  antibodies may precede clinical disease onset; however,  healthy individuals and those with advanced age have been  reported to be positive for ROSEMARY. When observed, one of the  five basic patterns is reported: homogeneous,  peripheral/rim, speckled, centromere, or nucleolar. If  cytoplasmic fluorescence is observed, it is noted. IFA  methodology is subjective and has occasionally been shown  to lack sensitivity for anti-SSA/Ro antibodies.  Negative results do not necessarily rule out the presence  of SSc. If clinical suspicion r emains, consider further  testing for U3-RNP, PM/Scl, or Th/To antibodies associated  with SSc.  Performed by [facility],  [address] [place] [id number] [phone]  [email or web address], Ilan Vee MD, Lab. Director  *  --      RNP/Sm/SSA/SSB  Recent Labs   Lab Test 09/18/17  0913 10/12/15  0923   RNPIGG <0.2 <0.2  Negative   Antibody index (AI) values reflect qualitative changes in antibody   concentration that cannot be directly associated with clinical condition or   disease state.     SMIGG <0.2  <0.2  Negative   Antibody index (AI) values reflect qualitative changes in antibody   concentration that cannot be directly associated with clinical condition or   disease state.     SSAIGG <0.2 <0.2  Negative   Antibody index (AI) values reflect qualitative changes in antibody   concentration that cannot be directly associated with clinical condition or   disease state.     SSBIGG <0.2 <0.2  Negative   Antibody index (AI) values reflect qualitative changes in antibody   concentration that cannot be directly associated with clinical condition or   disease state.     SCLIGG <0.2 <0.2  Negative   Antibody index (AI) values reflect qualitative changes in antibody   concentration that cannot be directly associated with clinical condition or   disease state.       dsDNA  Recent Labs   Lab Test 09/18/17  0913 10/12/15  0923   DNA 1 <15  Interpretation:  Negative       C3/C4  Recent Labs   Lab Test 09/18/17  0913 10/12/15  0923   N4YIMZQ 137 124   O4XMPFB 31 24       Antiphospholipid Antibodies  Recent Labs   Lab Test 10/12/15  0923   B2IGG 1   B2IGM 4   CARG <15.0  Interpretation:  Negative     JOSELITO <12.5  Interpretation:  Negative     LUPINT Negative  (Note)  COMMENTS:  The INR is normal.  APTT ratio is normal.  DRVVT Screen ratio is normal.  Thrombin time is normal.  NEGATIVE TEST; A LUPUS ANTICOAGULANT WAS NOT DETECTED IN THIS  SPECIMEN WITHIN THE LIMITS OF THE TESTING REPERTOIRE.  If the clinical picture is strongly suggestive of an antiphospholipid  syndrome, recommend anticardiolipin and beta-2-glycoprotein (IgG and  IgM) antibody tests.  Barbara Lerma M.D.  [phone]  10/13/2015    INR =  1.05    Reference range: 0.86-1.14  Thrombin Time= 15.4    Reference range: 13.0-19.0 sec    APTT:       Ratio  Patient  =  0.97  1:2 Mix  =  N/A  Reference:  Negative: Less than or equal to 1.16  Positive: Greater than or equal to 1.17     DILUTE BELKIS VIPER VENOM TEST:  Screen Ratio = 0.70   Normal is less than  1.21         CBC  Recent Labs   Lab Test 05/08/19  0745 01/22/19  0724 12/03/18  0953   WBC 5.7 5.7 5.0   RBC 5.08 4.75 4.57   HGB 15.3 14.2 14.0   HCT 45.9 43.1 42.2   MCV 90 91 92   RDW 12.9 12.5 12.7    278 273   MCH 30.1 29.9 30.6   MCHC 33.3 32.9 33.2   NEUTROPHIL 49.3 53.6 44.4   LYMPH 37.2 29.0 34.1   MONOCYTE 8.8 11.4 15.5   EOSINOPHIL 4.2 5.5 5.4   BASOPHIL 0.5 0.5 0.6   ANEU 2.8 3.0 2.2   ALYM 2.1 1.7 1.7   MIKE 0.5 0.7 0.8   AEOS 0.2 0.3 0.3   ABAS 0.0 0.0 0.0     CMP  Recent Labs   Lab Test 05/08/19  0745 01/22/19  0724 12/03/18  0953 11/13/18  0715  09/18/17  0913 12/05/16  0811 10/07/16  0912 04/27/15  1834     --   --   --   --  141 142 143 139   POTASSIUM 4.0  --   --   --   --  4.0 4.0 4.2 3.7   CHLORIDE 109  --   --   --   --  107 105 107 106   CO2 30  --   --   --   --  29 29 29 29   ANIONGAP 3  --   --   --   --  5 8 7 4   GLC 99  --   --   --   --  96 104* 75 86   BUN 14  --   --   --   --  14 10 16 13   CR 0.74 0.74 0.73 0.76   < > 0.76 0.73 0.74 0.77   GFRESTIMATED >90 90 82 79   < > 79 83 82 79   GFRESTBLACK >90 >90 >90 >90   < > >90 >90   GFR Calc   >90   GFR Calc   >90   GFR Calc     AGUILAR 9.3  --   --   --   --  8.8 9.2 9.3 8.9   BILITOTAL 0.3 0.3 0.5 0.5   < > 0.4  --   --  0.2   ALBUMIN 4.0 3.6 3.6 3.6   < > 3.7  --   --  4.1   PROTTOTAL 7.5 7.2 7.1 7.4   < > 7.4  --   --  7.5   ALKPHOS 73 79 78 99   < > 71  --   --  67   AST 15 21 40 125*   < > 16  --   --  23   ALT 31 38 73* 266*   < > 23  --   --  36    < > = values in this interval not displayed.     HgA1c  Recent Labs   Lab Test 05/08/19  0745   A1C 5.3     Calcium/VitaminD  Recent Labs   Lab Test 05/08/19  0745 09/18/17  0913 12/05/16  0811   AGUILAR 9.3 8.8 9.2     ESR/CRP  Recent Labs   Lab Test 01/22/19  0724 09/13/18  0741 05/21/18  0731   SED 8 8 8   CRP 3.9 5.7 5.2     CK/Aldolase  Recent Labs   Lab Test 09/18/17  0913 10/12/15  0923 10/02/15  0820   CKT 92  --  66    ALDOLASE  --  5.8  --      TSH/T4  Recent Labs   Lab Test 04/27/15  1834   TSH 1.79     Hepatitis B  Recent Labs   Lab Test 10/12/15  0923   AUSAB 0.18   HBCAB Nonreactive   HEPBANG Nonreactive     Hepatitis C  Recent Labs   Lab Test 10/07/16  0912   HCVAB Nonreactive   Assay performance characteristics have not been established for newborns,   infants, and children       Tuberculosis Screening  Recent Labs   Lab Test 12/07/18  0724   TBRES Negative     HIV Screening  Recent Labs   Lab Test 10/12/15  0923   HIAGAB Nonreactive   HIV-1 p24 Ag & HIV-1/HIV-2 Ab Not Detected            Chart documentation done in part with Dragon Voice recognition Software. Although reviewed after completion, some word and grammatical error may remain.    Bill Resendiz MD

## 2019-10-04 ENCOUNTER — TELEPHONE (OUTPATIENT)
Dept: RHEUMATOLOGY | Facility: CLINIC | Age: 54
End: 2019-10-04

## 2019-10-04 NOTE — TELEPHONE ENCOUNTER
Prior Authorization Approval    Authorization Effective Date: 9/4/2019  Authorization Expiration Date: 10/3/2020  Medication: humira pa renewal  Approved Dose/Quantity: 2/28ds  Reference #: [id number]   [employer]: [employer] - Phone [phone] Fax [phone]  Expected CoPay: na     CoPay Card Available: Yes    Foundation Assistance Needed: na  Which Pharmacy is filling the prescription (Not needed for infusion/clinic administered): [facility] - [address] [place] [address]  Pharmacy Notified: No  Patient Notified: No

## 2019-10-08 ENCOUNTER — OFFICE VISIT (OUTPATIENT)
Dept: RHEUMATOLOGY | Facility: CLINIC | Age: 54
End: 2019-10-08
Payer: COMMERCIAL

## 2019-10-08 VITALS
HEART RATE: 69 BPM | HEIGHT: 65 IN | DIASTOLIC BLOOD PRESSURE: 84 MMHG | WEIGHT: 226 LBS | BODY MASS INDEX: 37.65 KG/M2 | OXYGEN SATURATION: 95 % | SYSTOLIC BLOOD PRESSURE: 140 MMHG

## 2019-10-08 DIAGNOSIS — Z79.899 HIGH RISK MEDICATIONS (NOT ANTICOAGULANTS) LONG-TERM USE: ICD-10-CM

## 2019-10-08 DIAGNOSIS — M06.4 INFLAMMATORY POLYARTHROPATHY (H): Primary | ICD-10-CM

## 2019-10-08 DIAGNOSIS — R53.83 FATIGUE, UNSPECIFIED TYPE: ICD-10-CM

## 2019-10-08 PROCEDURE — 99214 OFFICE O/P EST MOD 30 MIN: CPT | Performed by: INTERNAL MEDICINE

## 2019-10-08 ASSESSMENT — MIFFLIN-ST. JEOR: SCORE: 1612.83

## 2019-10-08 NOTE — PATIENT INSTRUCTIONS
Rheumatology    Dr. Bill Resendiz         [facility]   (Monday)  [address]  [place] [address]       [facility]   (Tuesday)  [address] [place] [address]    [facility]   (Wed., Thurs., and Friday)  [address] [place] [address]    Phone number: [phone]  Thank you for choosing [facility].  NEELIMA Umana RMA

## 2019-10-08 NOTE — PROGRESS NOTES
Rheumatology Clinic Visit      Bria Haddad MRN# [id number]   YOB: 1965 Age: 54 year old      Date of visit: 10/08/19   PCP: Dr. Stephanie Saldana    Chief Complaint   Patient presents with:  Inflammatory polyarthropathy: has been very tired otherwise doing good.    Assessment and Plan     1.  Rheumatoid arthritis: Ms. Haddad initially presented to this clinic in 2015 with dependent edema of the bilateral lower extremities, fatigue, and a one time episode of right toe pain in the setting of a positive ROSEMARY.  On 9/18/2017 she presented with synovitis of her bilateral PIPs and pain without swelling of her MCPs, persistent fatigue, intermittent rash, and dependent edema. ROSEMARY positive but additional studies negative/normal including complement C3, complement C4, beta-2 glycoprotein IgM and IgG, cardiolipin IgM and IgG, lupus anticoagulant, RNP, Stuart, SSA, SSB, Scl-70.  Overall joint exam is most consistent with rheumatoid arthritis; she does not have a rash at this time.  Previously on HCQ (bloating), MTX (LFT elevation, alopecia, headache), leflunomide (LFT elevation). Currently on SSZ and Humira.  RA is doing well.   - Continue sulfasalazine 500mg twice daily  - Continue Humira 40mg SQ every 14 days (changed to a 12-week supply)  - Continue prednisone 2.5 mg daily as needed for rheumatoid arthritis symptoms (she is using this sparingly)  - Labs today: CBC, Creatinine, Hepatic Panel, ESR, CRP     2. Left knee pain: Not an issue currently.   Significant improvement with PT in the past and encouraged that she continue these exercises.  When she stopped the exercises in the past she had worsening left knee pain.      3.  Left shoulder pain, impingement syndrome: Resolved with combination of steroid injection and physical therapy.  Symptoms return if she does not do her physical therapy exercises regularly.  The option of seeing orthopedic surgery was discussed but she prefers to just continue doing her exercises  and this is a good option.     4.  Vaccinations: Vaccinations reviewed with Ms. Haddad.   - Influenza: refused by patient  - [id number]: refused by patient  - [id number]: refused by patient  - Shingrix: refused by patient    5.  Fatigue: Suspect to be due to her frequent urination at night.  resulting in her waking up several times throughout the night check TSH today.  Advised that she follow-up with her primary care provider regarding this issue.  - Lab today: TSH    6. Elevated blood pressure: Bria to follow up with Primary Care provider regarding elevated blood pressure.       Ms. Haddad verbalized agreement with and understanding of the rational for the diagnosis and treatment plan.  All questions were answered to best of my ability and the patient's satisfaction. Ms. Haddad was advised to contact the clinic with any questions that may arise after the clinic visit.      Thank you for involving me in the care of the patient    Return to clinic: 3 months      HPI   Bria Haddad is a 54 year old female with a past medical history significant for endometriosis, s/p carpal tunnel release surgery who present for follow up of positive ROSEMARY.    Today, Ms. Haddad reports rheumatoid arthritis is doing well.  Tolerating medications well.  Not requiring prednisone use at this time.  Happy with how well she is doing.  Fatigue has been an issue; and she attributes it to awaken several times at night to urinate; she says that she avoids eating or drinking for 2-3 hours before going to sleep but wakes up several times at night regardless.    Denies fevers, chills, nausea, vomiting, constipation, diarrhea. No abdominal pain. No chest pain/pressure, palpitations, or shortness of breath. No neck pain. No oral or nasal sores. No sicca symptoms. No photosensitivity or photophobia. No eye pain or redness. No history of inflammatory eye disease.  No history of DVT or pulmonary embolism.  No history of serositis.  No history of Raynaud's  Phenomenon.  No seizure disorder.  No known renal disorder.      Tobacco: none  EtOH: none  Drugs: none    ROS   GEN: No fevers, chills, night sweats, or weight change  SKIN: See HPI  HEENT: No oral or nasal ulcers.  CV: No chest pain, pressure, palpitations, or dyspnea on exertion.  PULM: No SOB, wheeze, cough.  GI: No nausea, vomiting, constipation, diarrhea. No blood in stool. No abdominal pain.  : No blood in urine.  MSK: See HPI.  NEURO: No numbness or tingling.  EXT: See HPI  PSYCH: Negative    Active Problem List     Patient Active Problem List   Diagnosis     CARDIOVASCULAR SCREENING; LDL GOAL LESS THAN 160     Endometriosis of uterus     Inflammatory polyarthropathy (H)     Past Medical History     Past Medical History:   Diagnosis Date     Bilateral carpal tunnel syndrome 12/20/2015     NO ACTIVE PROBLEMS      Obesity      Polyarthritis     inflammatory poly arthritis.     S/P carpal tunnel release 12/29/2016     Past Surgical History     Past Surgical History:   Procedure Laterality Date     bunions       COLONOSCOPY N/A 11/9/2015    Procedure: COLONOSCOPY;  Surgeon: Andrew Adamson MD;  Location: [facility]     COLONOSCOPY WITH CO2 INSUFFLATION N/A 11/9/2015    Procedure: COLONOSCOPY WITH CO2 INSUFFLATION;  Surgeon: Andrew Adamson MD;  Location: [facility]     GYN SURGERY      endometryosis     RELEASE CARPAL TUNNEL Right 10/12/2016    Procedure: RELEASE CARPAL TUNNEL;  Surgeon: Colby Nobles MD;  Location: [facility]     RELEASE CARPAL TUNNEL Left 12/16/2016    Procedure: RELEASE CARPAL TUNNEL;  Surgeon: Colby Nobles MD;  Location: [facility]     TONSILLECTOMY & ADENOIDECTOMY       Allergy     Allergies   Allergen Reactions     Asa [Aspirin] Hives     Current Medication List     Current Outpatient Medications   Medication Sig     adalimumab (HUMIRA *CF*) 40 MG/0.4ML pen kit Inject 0.4 mLs (40 mg) Subcutaneous every 14 days     sulfaSALAzine ER (AZULFIDINE EN) 500 MG EC tablet Take 1  "tablet (500 mg) by mouth 2 times daily     order for DME Equipment being ordered: compression stockings 1 pair, at 30mm Hg, to be worn during the day. For Bilateral leg edema [R60.0]     predniSONE (DELTASONE) 5 MG tablet Take 2.5 mg by mouth daily as needed for rheumatoid arthritis symptoms. (Patient not taking: Reported on 10/8/2019)     No current facility-administered medications for this visit.          Social History   See HPI    Family History     Family History   Problem Relation Age of Onset     Osteoporosis Mother      Respiratory Mother      Thyroid Disease Mother      Heart Disease Father      Cancer - colorectal Maternal Grandmother      Diabetes Maternal Grandfather      Heart Disease Maternal Grandfather      Cancer Paternal Grandmother      Heart Disease Paternal Grandfather      Respiratory Paternal Grandfather      Hypertension Brother      Thyroid Disease Sister      Thyroid Disease Sister      Neurologic Disorder Brother      Physical Exam     Temp Readings from Last 3 Encounters:   05/15/19 96.5  F (35.8  C) (Oral)   01/31/19 96.8  F (36  C) (Oral)   10/17/18 97.9  F (36.6  C) (Oral)     BP Readings from Last 5 Encounters:   10/08/19 (!) 143/84   07/10/19 128/82   07/03/19 148/86   05/15/19 136/82   01/31/19 135/77     Pulse Readings from Last 1 Encounters:   10/08/19 69     Resp Readings from Last 1 Encounters:   10/17/18 18     Estimated body mass index is 37.84 kg/m  as calculated from the following:    Height as of this encounter: 1.646 m (5' 4.8\").    Weight as of this encounter: 102.5 kg (226 lb).    GEN: NAD  HEENT: MMM. No oral lesions. Anicteric, noninjected sclera  CV: S1, S2. RRR. No m/r/g.  PULM: CTA bilaterally. No w/c.  MSK: MCPs, PIPs, wrists, elbows, knees, and ankles without swelling or tenderness to palpation.  Right shoulder without swelling or tenderness palpation.  Left shoulder without swelling or tenderness to palpation; normal range of motion; pain with abduction >90 " degrees but the pain is less severe on exam today.  MTPs nontender to palpation.  No dactylitis.     NEURO: UE and LE strengths 5/5 and equal bilaterally.   SKIN: No rash  LYMPH: nonpitting edema distal to the knees bilaterally  PSYCH: Alert. Appropriate.    Labs / Imaging (select studies)   RF/CCP  Recent Labs   Lab Test 09/18/17 0913   CCPIGG 1   RHF <20     CBC  Recent Labs   Lab Test 05/08/19  0745 01/22/19 0724 12/03/18  0953   WBC 5.7 5.7 5.0   RBC 5.08 4.75 4.57   HGB 15.3 14.2 14.0   HCT 45.9 43.1 42.2   MCV 90 91 92   RDW 12.9 12.5 12.7    278 273   MCH 30.1 29.9 30.6   MCHC 33.3 32.9 33.2   NEUTROPHIL 49.3 53.6 44.4   LYMPH 37.2 29.0 34.1   MONOCYTE 8.8 11.4 15.5   EOSINOPHIL 4.2 5.5 5.4   BASOPHIL 0.5 0.5 0.6   ANEU 2.8 3.0 2.2   ALYM 2.1 1.7 1.7   MIKE 0.5 0.7 0.8   AEOS 0.2 0.3 0.3   ABAS 0.0 0.0 0.0     CMP  Recent Labs   Lab Test 05/08/19 0745 01/22/19  0724 12/03/18  0953  09/18/17  0913 12/05/16  0811     --   --   --  141 142   POTASSIUM 4.0  --   --   --  4.0 4.0   CHLORIDE 109  --   --   --  107 105   CO2 30  --   --   --  29 29   ANIONGAP 3  --   --   --  5 8   GLC 99  --   --   --  96 104*   BUN 14  --   --   --  14 10   CR 0.74 0.74 0.73   < > 0.76 0.73   GFRESTIMATED >90 90 82   < > 79 83   GFRESTBLACK >90 >90 >90   < > >90 >90  African American GFR Calc     AGUILAR 9.3  --   --   --  8.8 9.2   BILITOTAL 0.3 0.3 0.5   < > 0.4  --    ALBUMIN 4.0 3.6 3.6   < > 3.7  --    PROTTOTAL 7.5 7.2 7.1   < > 7.4  --    ALKPHOS 73 79 78   < > 71  --    AST 15 21 40   < > 16  --    ALT 31 38 73*   < > 23  --     < > = values in this interval not displayed.     Calcium/VitaminD  Recent Labs   Lab Test 05/08/19  0745 09/18/17  0913 12/05/16  0811   AGUILAR 9.3 8.8 9.2     ESR/CRP  Recent Labs   Lab Test 01/22/19  0724 09/13/18  0741 05/21/18  0731   SED 8 8 8   CRP 3.9 5.7 5.2     Hepatitis B  Recent Labs   Lab Test 10/12/15  0923   AUSAB 0.18   HBCAB Nonreactive   HEPBANG Nonreactive     Hepatitis  C  Recent Labs   Lab Test 10/07/16  0912   HCVAB Nonreactive   Assay performance characteristics have not been established for newborns,   infants, and children       Tuberculosis Screening  Recent Labs   Lab Test 12/07/18  0724   TBRES Negative     HIV Screening  Recent Labs   Lab Test 10/12/15  0923   HIAGAB Nonreactive   HIV-1 p24 Ag & HIV-1/HIV-2 Ab Not Detected            Chart documentation done in part with Dragon Voice recognition Software. Although reviewed after completion, some word and grammatical error may remain.    Bill Resendiz MD

## 2019-11-06 ENCOUNTER — HEALTH MAINTENANCE LETTER (OUTPATIENT)
Age: 54
End: 2019-11-06

## 2019-12-04 ENCOUNTER — OFFICE VISIT (OUTPATIENT)
Dept: FAMILY MEDICINE | Facility: CLINIC | Age: 54
End: 2019-12-04
Payer: COMMERCIAL

## 2019-12-04 VITALS
HEART RATE: 81 BPM | DIASTOLIC BLOOD PRESSURE: 80 MMHG | TEMPERATURE: 98.6 F | HEIGHT: 65 IN | BODY MASS INDEX: 36.99 KG/M2 | WEIGHT: 222 LBS | SYSTOLIC BLOOD PRESSURE: 130 MMHG

## 2019-12-04 DIAGNOSIS — Z13.1 SCREENING FOR DIABETES MELLITUS: ICD-10-CM

## 2019-12-04 DIAGNOSIS — Z12.31 ENCOUNTER FOR SCREENING MAMMOGRAM FOR BREAST CANCER: ICD-10-CM

## 2019-12-04 DIAGNOSIS — H90.3 BILATERAL SENSORINEURAL HEARING LOSS: ICD-10-CM

## 2019-12-04 DIAGNOSIS — N95.0 POST-MENOPAUSAL BLEEDING: ICD-10-CM

## 2019-12-04 DIAGNOSIS — Z00.00 ROUTINE HISTORY AND PHYSICAL EXAMINATION OF ADULT: Primary | ICD-10-CM

## 2019-12-04 DIAGNOSIS — Z13.220 SCREENING CHOLESTEROL LEVEL: ICD-10-CM

## 2019-12-04 DIAGNOSIS — M06.4 INFLAMMATORY POLYARTHROPATHY (H): ICD-10-CM

## 2019-12-04 DIAGNOSIS — R60.0 PEDAL EDEMA: ICD-10-CM

## 2019-12-04 PROCEDURE — 99396 PREV VISIT EST AGE 40-64: CPT | Performed by: FAMILY MEDICINE

## 2019-12-04 PROCEDURE — 99214 OFFICE O/P EST MOD 30 MIN: CPT | Mod: 25 | Performed by: FAMILY MEDICINE

## 2019-12-04 RX ORDER — HYDROCHLOROTHIAZIDE 25 MG/1
25 TABLET ORAL DAILY
Qty: 90 TABLET | Refills: 1 | Status: SHIPPED | OUTPATIENT
Start: 2019-12-04 | End: 2020-10-22

## 2019-12-04 ASSESSMENT — MIFFLIN-ST. JEOR: SCORE: 1593.9

## 2019-12-04 NOTE — PROGRESS NOTES
SUBJECTIVE:   CC: Bria Haddad is an 54 year old woman who presents for preventive health visit.     Healthy Habits:    Do you get at least three servings of calcium containing foods daily (dairy, green leafy vegetables, etc.)? yes    Amount of exercise or daily activities, outside of work: 3-5 day(s) per week    Problems taking medications regularly No    Medication side effects: No    Have you had an eye exam in the past two years? Due for one     Do you see a dentist twice per year? yes    Do you have sleep apnea, excessive snoring or daytime drowsiness?yes    Would like referral for audiology as notes bilateral hearing loss    Has inflammatory arthritis and followed by rheum  Has pedal edema for years. Has had cardiac work up with Dr. Saldana  Has seen vein specialist who told her to lose weight/elevate legs and compression stocking.   She is wondering why she has this. Discussed venous insufficiency. Can do trial of hydrochlorothiazide 25 mg to see if it helps some and to help with BP which has been intermittently elevated at recent visits    Pt with period for one week recently. Last period was well over a year ago so post menopausal bleeding  Will get pelvic US. Will need EMB if lining is thickened.     Today's PHQ-2 Score:   PHQ-2 ( 1999 Pf[employer]) 10/3/2018 7/12/2017   Q1: Little interest or pleasure in doing things 0 0   Q2: Feeling down, depressed or hopeless 0 0   PHQ-2 Score 0 0       Abuse: Current or Past(Physical, Sexual or Emotional)- No  Do you feel safe in your environment? YES        Social History     Tobacco Use     Smoking status: Never Smoker     Smokeless tobacco: Never Used   Substance Use Topics     Alcohol use: No     Alcohol/week: 0.0 standard drinks     If you drink alcohol do you typically have >3 drinks per day or >7 drinks per week? No                     Reviewed orders with patient.  Reviewed health maintenance and updated orders accordingly - Yes  Labs reviewed in  "EPIC    Mammogram Screening: Patient over age 50, mutual decision to screen reflected in health maintenance.    Pertinent mammograms are reviewed under the imaging tab.  History of abnormal Pap smear: NO - age 30-65 PAP every 5 years with negative HPV co-testing recommended  PAP / HPV Latest Ref Rng & Units 11/25/2015 9/25/2012   PAP - NIL NIL   HPV 16 DNA NEG Negative -   HPV 18 DNA NEG Negative -   OTHER HR HPV NEG Negative -     Reviewed and updated as needed this visit by clinical staff  Tobacco  Allergies  Meds  Med Hx  Surg Hx  Fam Hx  Soc Hx        Reviewed and updated as needed this visit by Provider            ROS:  CONSTITUTIONAL: NEGATIVE for fever, chills, change in weight  INTEGUMENTARY/SKIN: NEGATIVE for worrisome rashes, moles or lesions  EYES: NEGATIVE for vision changes or irritation  ENT: NEGATIVE for ear, mouth and throat problems  RESP: NEGATIVE for significant cough or SOB  BREAST: NEGATIVE for masses, tenderness or discharge  CV: NEGATIVE for chest pain, palpitations or peripheral edema  GI: NEGATIVE for nausea, abdominal pain, heartburn, or change in bowel habits  : NEGATIVE for unusual urinary or vaginal symptoms. No vaginal bleeding.  MUSCULOSKELETAL: NEGATIVE for significant arthralgias or myalgia  NEURO: NEGATIVE for weakness, dizziness or paresthesias  PSYCHIATRIC: NEGATIVE for changes in mood or affect     OBJECTIVE:   BP (!) 153/75   Pulse 81   Temp 98.6  F (37  C) (Oral)   Ht 1.645 m (5' 4.75\")   Wt 100.7 kg (222 lb)   BMI 37.23 kg/m    EXAM:  GENERAL: healthy, alert and no distress  EYES: Eyes grossly normal to inspection, PERRL and conjunctivae and sclerae normal  HENT: ear canals and TM's normal, nose and mouth without ulcers or lesions  NECK: no adenopathy, no asymmetry, masses, or scars and thyroid normal to palpation  RESP: lungs clear to auscultation - no rales, rhonchi or wheezes  BREAST: normal without masses, tenderness or nipple discharge and no palpable " "axillary masses or adenopathy  CV: regular rate and rhythm, normal S1 S2, no S3 or S4, no murmur, click or rub, no peripheral edema and peripheral pulses strong  ABDOMEN: soft, nontender, no hepatosplenomegaly, no masses and bowel sounds normal  MS: no gross musculoskeletal defects noted, no edema  SKIN: no suspicious lesions or rashes  NEURO: Normal strength and tone, mentation intact and speech normal  PSYCH: mentation appears normal, affect normal/bright    Diagnostic Test Results:  Labs reviewed in Epic    ASSESSMENT/PLAN:   1. Routine history and physical examination of adult      2. Inflammatory polyarthropathy (H)  Per rheum    3. Pedal edema  Claudio previously, trial of diuretic to see if helps. Already has compression stockings  - hydrochlorothiazide (HYDRODIURIL) 25 MG tablet; Take 1 tablet (25 mg) by mouth daily  Dispense: 90 tablet; Refill: 1    4. Bilateral sensorineural hearing loss  Refer to audiology  - AUDIOLOGY ADULT REFERRAL    5. Post-menopausal bleeding  Needs US  - US Pelvic Complete w Transvaginal; Future    6. Encounter for screening mammogram for breast cancer  scheduled    7. Screening for diabetes mellitus  Normal results with rheum    8. Screening cholesterol level        COUNSELING:   Reviewed preventive health counseling, as reflected in patient instructions       Regular exercise       Healthy diet/nutrition       Vision screening       Colon cancer screening    Estimated body mass index is 37.23 kg/m  as calculated from the following:    Height as of this encounter: 1.645 m (5' 4.75\").    Weight as of this encounter: 100.7 kg (222 lb).    Weight management plan: Discussed healthy diet and exercise guidelines     reports that she has never smoked. She has never used smokeless tobacco.      Counseling Resources:  ATP IV Guidelines  Pooled Cohorts Equation Calculator  Breast Cancer Risk Calculator  FRAX Risk Assessment  ICSI Preventive Guidelines  Dietary Guidelines for Americans, " 2010  USDA's MyPlate  ASA Prophylaxis  Lung CA Screening    Holli Cabrera MD  [facility]

## 2019-12-09 ENCOUNTER — ANCILLARY PROCEDURE (OUTPATIENT)
Dept: ULTRASOUND IMAGING | Facility: CLINIC | Age: 54
End: 2019-12-09
Attending: FAMILY MEDICINE
Payer: COMMERCIAL

## 2019-12-09 DIAGNOSIS — N95.0 POST-MENOPAUSAL BLEEDING: ICD-10-CM

## 2019-12-09 PROCEDURE — 76830 TRANSVAGINAL US NON-OB: CPT

## 2019-12-09 PROCEDURE — 76856 US EXAM PELVIC COMPLETE: CPT

## 2019-12-12 ENCOUNTER — TELEPHONE (OUTPATIENT)
Dept: FAMILY MEDICINE | Facility: CLINIC | Age: 54
End: 2019-12-12

## 2019-12-12 NOTE — TELEPHONE ENCOUNTER
See Result note from Dr Douglas.   Please call to schedule endometrial biopsy per Dr Douglas.    Zee Rosas BSN, RN

## 2019-12-12 NOTE — TELEPHONE ENCOUNTER
Patient states someone was suppose to call her about Dr Douglas doing an Endometrial biopsy. Please advise.  Ok to leave a detailed message.

## 2019-12-23 ENCOUNTER — OFFICE VISIT (OUTPATIENT)
Dept: FAMILY MEDICINE | Facility: CLINIC | Age: 54
End: 2019-12-23
Payer: COMMERCIAL

## 2019-12-23 VITALS
SYSTOLIC BLOOD PRESSURE: 149 MMHG | DIASTOLIC BLOOD PRESSURE: 78 MMHG | BODY MASS INDEX: 36.99 KG/M2 | WEIGHT: 222 LBS | HEIGHT: 65 IN | HEART RATE: 85 BPM | TEMPERATURE: 98.1 F

## 2019-12-23 DIAGNOSIS — N95.0 POST-MENOPAUSAL BLEEDING: Primary | ICD-10-CM

## 2019-12-23 PROCEDURE — 88305 TISSUE EXAM BY PATHOLOGIST: CPT | Mod: 26 | Performed by: FAMILY MEDICINE

## 2019-12-23 PROCEDURE — 99207 ZZC DROP WITH A PROCEDURE: CPT | Mod: 25 | Performed by: FAMILY MEDICINE

## 2019-12-23 PROCEDURE — 58100 BIOPSY OF UTERUS LINING: CPT | Performed by: FAMILY MEDICINE

## 2019-12-23 PROCEDURE — 88305 TISSUE EXAM BY PATHOLOGIST: CPT | Performed by: FAMILY MEDICINE

## 2019-12-23 ASSESSMENT — MIFFLIN-ST. JEOR: SCORE: 1593.9

## 2019-12-23 NOTE — PROGRESS NOTES
"Subjective     Bria Haddad is a 54 year old female who presents to clinic today for the following health issues:    HPI   Endometrial biopsy for post menopausal bleeding    Pt with post menopausal bleeding  US showed thickened endometrial lingin and 2 fibroids  Pt her for EMB for further evaluation  Procedure discussed and consent has been signed.       Reviewed and updated as needed this visit by Provider         Review of Systems   ROS COMP: Constitutional, HEENT, cardiovascular, pulmonary, gi and gu systems are negative, except as otherwise noted.      Objective    BP (!) 149/78   Pulse 85   Temp 98.1  F (36.7  C) (Oral)   Ht 1.645 m (5' 4.75\")   Wt 100.7 kg (222 lb)   BMI 37.23 kg/m    Body mass index is 37.23 kg/m .  Physical Exam   GENERAL: healthy, alert and no distress   (female): normal female external genitalia, normal urethral meatus, vaginal mucosa pink, moist, well rugated, and normal cervix/adnexa/uterus without masses or discharge    Endometrial Biopsy was performed as follows: A speculum placed in   vagina with good visualization of cervix; cervix swabbed x3 with   Betadine solution. Anterior lip of cervix grasped with single   toothed tenaculum; endometrial sampling achieved with Pipelle   sampling unit. Tissue collected, labelled and sent to Pathology.   Instruments removed, hemostasis achieved with ease. The patient   tolerated the procedure well.  Diagnostic Test Results:  Labs reviewed in Epic        Assessment & Plan     1. Post-menopausal bleeding  Await pathology, discussed signs of infection, will communicate via [employer] once get results unless concerning, then call  - Surgical pathology exam  - ENDOMETRIAL BIOPSY W/O CERVICAL DILATION           No follow-ups on file.    Holli Cabrera MD  [facility]    "

## 2019-12-26 LAB — COPATH REPORT: NORMAL

## 2019-12-31 DIAGNOSIS — R53.83 FATIGUE, UNSPECIFIED TYPE: ICD-10-CM

## 2019-12-31 DIAGNOSIS — M06.4 INFLAMMATORY POLYARTHROPATHY (H): ICD-10-CM

## 2019-12-31 LAB
ALBUMIN SERPL-MCNC: 3.9 G/DL (ref 3.4–5)
ALP SERPL-CCNC: 71 U/L (ref 40–150)
ALT SERPL W P-5'-P-CCNC: 24 U/L (ref 0–50)
AST SERPL W P-5'-P-CCNC: 15 U/L (ref 0–45)
BASOPHILS # BLD AUTO: 0 10E9/L (ref 0–0.2)
BASOPHILS NFR BLD AUTO: 0.3 %
BILIRUB DIRECT SERPL-MCNC: <0.1 MG/DL (ref 0–0.2)
BILIRUB SERPL-MCNC: 0.4 MG/DL (ref 0.2–1.3)
CREAT SERPL-MCNC: 0.76 MG/DL (ref 0.52–1.04)
CRP SERPL-MCNC: <2.9 MG/L (ref 0–8)
DIFFERENTIAL METHOD BLD: NORMAL
EOSINOPHIL # BLD AUTO: 0.1 10E9/L (ref 0–0.7)
EOSINOPHIL NFR BLD AUTO: 2.1 %
ERYTHROCYTE [DISTWIDTH] IN BLOOD BY AUTOMATED COUNT: 13 % (ref 10–15)
ERYTHROCYTE [SEDIMENTATION RATE] IN BLOOD BY WESTERGREN METHOD: 7 MM/H (ref 0–30)
GFR SERPL CREATININE-BSD FRML MDRD: 87 ML/MIN/{1.73_M2}
HCT VFR BLD AUTO: 44.3 % (ref 35–47)
HGB BLD-MCNC: 14.7 G/DL (ref 11.7–15.7)
LYMPHOCYTES # BLD AUTO: 2.8 10E9/L (ref 0.8–5.3)
LYMPHOCYTES NFR BLD AUTO: 41.9 %
MCH RBC QN AUTO: 29.7 PG (ref 26.5–33)
MCHC RBC AUTO-ENTMCNC: 33.2 G/DL (ref 31.5–36.5)
MCV RBC AUTO: 90 FL (ref 78–100)
MONOCYTES # BLD AUTO: 0.6 10E9/L (ref 0–1.3)
MONOCYTES NFR BLD AUTO: 9.6 %
NEUTROPHILS # BLD AUTO: 3.1 10E9/L (ref 1.6–8.3)
NEUTROPHILS NFR BLD AUTO: 46.1 %
PLATELET # BLD AUTO: 301 10E9/L (ref 150–450)
PROT SERPL-MCNC: 7.3 G/DL (ref 6.8–8.8)
RBC # BLD AUTO: 4.95 10E12/L (ref 3.8–5.2)
TSH SERPL DL<=0.005 MIU/L-ACNC: 3.17 MU/L (ref 0.4–4)
WBC # BLD AUTO: 6.7 10E9/L (ref 4–11)

## 2019-12-31 PROCEDURE — 85652 RBC SED RATE AUTOMATED: CPT | Performed by: INTERNAL MEDICINE

## 2019-12-31 PROCEDURE — 82565 ASSAY OF CREATININE: CPT | Performed by: INTERNAL MEDICINE

## 2019-12-31 PROCEDURE — 84443 ASSAY THYROID STIM HORMONE: CPT | Performed by: INTERNAL MEDICINE

## 2019-12-31 PROCEDURE — 80076 HEPATIC FUNCTION PANEL: CPT | Performed by: INTERNAL MEDICINE

## 2019-12-31 PROCEDURE — 36415 COLL VENOUS BLD VENIPUNCTURE: CPT | Performed by: INTERNAL MEDICINE

## 2019-12-31 PROCEDURE — 85025 COMPLETE CBC W/AUTO DIFF WBC: CPT | Performed by: INTERNAL MEDICINE

## 2019-12-31 PROCEDURE — 86140 C-REACTIVE PROTEIN: CPT | Performed by: INTERNAL MEDICINE

## 2020-01-07 ENCOUNTER — OFFICE VISIT (OUTPATIENT)
Dept: RHEUMATOLOGY | Facility: CLINIC | Age: 55
End: 2020-01-07
Payer: COMMERCIAL

## 2020-01-07 VITALS
HEART RATE: 80 BPM | SYSTOLIC BLOOD PRESSURE: 136 MMHG | WEIGHT: 223.4 LBS | HEIGHT: 65 IN | BODY MASS INDEX: 37.22 KG/M2 | DIASTOLIC BLOOD PRESSURE: 76 MMHG | OXYGEN SATURATION: 97 %

## 2020-01-07 DIAGNOSIS — Z79.899 HIGH RISK MEDICATION USE: ICD-10-CM

## 2020-01-07 DIAGNOSIS — M06.9 RHEUMATOID ARTHRITIS INVOLVING MULTIPLE SITES, UNSPECIFIED RHEUMATOID FACTOR PRESENCE: Primary | ICD-10-CM

## 2020-01-07 PROCEDURE — 99213 OFFICE O/P EST LOW 20 MIN: CPT | Performed by: INTERNAL MEDICINE

## 2020-01-07 RX ORDER — SULFASALAZINE 500 MG/1
500 TABLET, DELAYED RELEASE ORAL 2 TIMES DAILY
Qty: 180 TABLET | Refills: 1 | Status: SHIPPED | OUTPATIENT
Start: 2020-01-07 | End: 2020-07-07

## 2020-01-07 ASSESSMENT — MIFFLIN-ST. JEOR: SCORE: 1600.25

## 2020-01-07 NOTE — PATIENT INSTRUCTIONS
Rheumatology    Dr. Bill Resendiz       [facility] in [place]   (Monday)  [address]  [place] [address]       [facility] in [place]   (Tuesday)  [address] [place] [address]    [facility] in [place]   (Wed., Thurs., and Friday)  [address] [place] [address]    Phone number: [phone]  Thank you for choosing [facility].  Jolynn Angulo CMA

## 2020-01-07 NOTE — PROGRESS NOTES
Rheumatology Clinic Visit      Bria Haddad MRN# [id number]   YOB: 1965 Age: 54 year old      Date of visit: 1/07/20   PCP: Dr. Stephanie Saldana    Chief Complaint   Patient presents with:  Arthritis: Patient is feeling good    Assessment and Plan     1.  Rheumatoid arthritis: Ms. Haddad initially presented to this clinic in 2015 with dependent edema of the bilateral lower extremities, fatigue, and a one time episode of right toe pain in the setting of a positive ROSEMARY.  On 9/18/2017 she presented with synovitis of her bilateral PIPs and pain without swelling of her MCPs, persistent fatigue, intermittent rash, and dependent edema. ROSEMARY positive but additional studies negative/normal including complement C3, complement C4, beta-2 glycoprotein IgM and IgG, cardiolipin IgM and IgG, lupus anticoagulant, RNP, Stuart, SSA, SSB, Scl-70.  Overall joint exam is most consistent with rheumatoid arthritis; no rash. Previously on HCQ (bloating), MTX (LFT elevation, alopecia, headache), leflunomide (LFT elevation). Currently on SSZ and Humira.  RA is doing well.  Reminded her to have labs performed every 8-12 weeks, as this is important to monitor for potential side effects of sulfasalazine.  - Continue sulfasalazine 500mg twice daily  - Continue Humira 40mg SQ every 14 days  - Continue prednisone 2.5 mg daily as needed for rheumatoid arthritis symptoms (she is using this sparingly)  - Labs every 8-12 weeks: CBC, Cr, Hepatic Panel    2. Left knee pain history: Not an issue currently.   Significant improvement with PT in the past and encouraged that she continue these exercises.  When she stopped the exercises in the past she had worsening left knee pain.      3.  History of Left shoulder pain, impingement syndrome: Resolved with combination of steroid injection and physical therapy.  Not an issue today.     4.  Vaccinations: Vaccinations reviewed with Ms. Haddad.   - Influenza: refused by patient  - [id number]: refused by  patient  - [id number]: refused by patient  - Shingrix: refused by patient    5. Elevated blood pressure: Bria to follow up with Primary Care provider regarding elevated blood pressure.     Ms. Haddad verbalized agreement with and understanding of the rational for the diagnosis and treatment plan.  All questions were answered to best of my ability and the patient's satisfaction. Ms. Haddad was advised to contact the clinic with any questions that may arise after the clinic visit.      Thank you for involving me in the care of the patient    Return to clinic: 6 months      HPI   Bria Haddad is a 54 year old female with a past medical history significant for endometriosis, s/p carpal tunnel release surgery who present for follow up of positive ROSEMARY.    Today, Ms. Haddad reports that her rheumatoid arthritis is doing well.  She continues to do exercises for her shoulder and knee and her shoulder and knee pain have not been an issue since then.  No morning stiffness.  No gelling phenomenon.  Tolerating sulfasalazine and Humira well.    Reports being prescribed hydrochlorothiazide by her PCP because of her elevated blood pressure and lower extremity edema; she has not started hydrochlorothiazide yet.    Denies fevers, chills, nausea, vomiting, constipation, diarrhea. No abdominal pain. No chest pain/pressure, palpitations, or shortness of breath. No neck pain. No oral or nasal sores. No sicca symptoms. No photosensitivity or photophobia. No eye pain or redness. No history of inflammatory eye disease.  No history of DVT or pulmonary embolism.  No history of serositis.  No history of Raynaud's Phenomenon.  No seizure disorder.  No known renal disorder.      Tobacco: none  EtOH: none  Drugs: none    ROS   GEN: No fevers, chills, night sweats, or weight change  SKIN: See HPI  HEENT: No oral or nasal ulcers.  CV: No chest pain, pressure, palpitations, or dyspnea on exertion.  PULM: No SOB, wheeze, cough.  GI: No nausea, vomiting,  constipation, diarrhea. No blood in stool. No abdominal pain.  : No blood in urine.  MSK: See HPI.  NEURO: No numbness or tingling.  EXT: See HPI  PSYCH: Negative    Active Problem List     Patient Active Problem List   Diagnosis     CARDIOVASCULAR SCREENING; LDL GOAL LESS THAN 160     Endometriosis of uterus     Inflammatory polyarthropathy (H)     Past Medical History     Past Medical History:   Diagnosis Date     Bilateral carpal tunnel syndrome 12/20/2015     NO ACTIVE PROBLEMS      Obesity      Polyarthritis     inflammatory poly arthritis.     S/P carpal tunnel release 12/29/2016     Past Surgical History     Past Surgical History:   Procedure Laterality Date     bunions       COLONOSCOPY N/A 11/9/2015    Procedure: COLONOSCOPY;  Surgeon: Andrew Adamson MD;  Location: [facility]     COLONOSCOPY WITH CO2 INSUFFLATION N/A 11/9/2015    Procedure: COLONOSCOPY WITH CO2 INSUFFLATION;  Surgeon: Andrew Adamson MD;  Location: [facility]     GYN SURGERY      endometryosis     RELEASE CARPAL TUNNEL Right 10/12/2016    Procedure: RELEASE CARPAL TUNNEL;  Surgeon: Colby Nobles MD;  Location: [facility]     RELEASE CARPAL TUNNEL Left 12/16/2016    Procedure: RELEASE CARPAL TUNNEL;  Surgeon: Colby Nobles MD;  Location: [facility]     TONSILLECTOMY & ADENOIDECTOMY       Allergy     Allergies   Allergen Reactions     Asa [Aspirin] Hives     Current Medication List     Current Outpatient Medications   Medication Sig     adalimumab (HUMIRA *CF*) 40 MG/0.4ML pen kit Inject 0.4 mLs (40 mg) Subcutaneous every 14 days     hydrochlorothiazide (HYDRODIURIL) 25 MG tablet Take 1 tablet (25 mg) by mouth daily     order for DME Equipment being ordered: compression stockings 1 pair, at 30mm Hg, to be worn during the day. For Bilateral leg edema [R60.0]     predniSONE (DELTASONE) 5 MG tablet Take 2.5 mg by mouth daily as needed for rheumatoid arthritis symptoms.     sulfaSALAzine ER (AZULFIDINE EN) 500 MG EC tablet Take 1  "tablet (500 mg) by mouth 2 times daily     No current facility-administered medications for this visit.          Social History   See HPI    Family History     Family History   Problem Relation Age of Onset     Osteoporosis Mother      Respiratory Mother      Thyroid Disease Mother      Heart Disease Father      Cancer - colorectal Maternal Grandmother      Diabetes Maternal Grandfather      Heart Disease Maternal Grandfather      Cancer Paternal Grandmother      Heart Disease Paternal Grandfather      Respiratory Paternal Grandfather      Hypertension Brother      Thyroid Disease Sister      Thyroid Disease Sister      Neurologic Disorder Brother      Physical Exam     Temp Readings from Last 3 Encounters:   12/23/19 98.1  F (36.7  C) (Oral)   12/04/19 98.6  F (37  C) (Oral)   05/15/19 96.5  F (35.8  C) (Oral)     BP Readings from Last 5 Encounters:   12/23/19 (!) 149/78   12/04/19 130/80   10/08/19 (!) 140/84   07/10/19 128/82   07/03/19 148/86     Pulse Readings from Last 1 Encounters:   12/23/19 85     Resp Readings from Last 1 Encounters:   10/17/18 18     Estimated body mass index is 37.23 kg/m  as calculated from the following:    Height as of 12/23/19: 1.645 m (5' 4.75\").    Weight as of 12/23/19: 100.7 kg (222 lb).    GEN: NAD  HEENT: MMM. No oral lesions. Anicteric, noninjected sclera  CV: S1, S2. RRR. No m/r/g.  PULM: CTA bilaterally. No w/c.  MSK: MCPs, PIPs, DIPs, wrists, elbows, shoulders, knees, and ankles without swelling or tenderness to palpation.  MTPs without swelling or tenderness to palpation.  Hips nontender to palpation.     NEURO: UE and LE strengths 5/5 and equal bilaterally.   SKIN: No rash  LYMPH: nonpitting edema distal to the knees bilaterally  PSYCH: Alert. Appropriate.    Labs / Imaging (select studies)   RF/CCP  Recent Labs   Lab Test 09/18/17  0913   CCPIGG 1   RHF <20     CBC  Recent Labs   Lab Test 12/31/19  0712 05/08/19  0745 01/22/19  0724   WBC 6.7 5.7 5.7   RBC 4.95 5.08 4.75 "   HGB 14.7 15.3 14.2   HCT 44.3 45.9 43.1   MCV 90 90 91   RDW 13.0 12.9 12.5    304 278   MCH 29.7 30.1 29.9   MCHC 33.2 33.3 32.9   NEUTROPHIL 46.1 49.3 53.6   LYMPH 41.9 37.2 29.0   MONOCYTE 9.6 8.8 11.4   EOSINOPHIL 2.1 4.2 5.5   BASOPHIL 0.3 0.5 0.5   ANEU 3.1 2.8 3.0   ALYM 2.8 2.1 1.7   MIKE 0.6 0.5 0.7   AEOS 0.1 0.2 0.3   ABAS 0.0 0.0 0.0     CMP  Recent Labs   Lab Test 12/31/19 0712 05/08/19 0745 01/22/19 0724 09/18/17  0913 12/05/16  0811   NA  --  142  --   --  141 142   POTASSIUM  --  4.0  --   --  4.0 4.0   CHLORIDE  --  109  --   --  107 105   CO2  --  30  --   --  29 29   ANIONGAP  --  3  --   --  5 8   GLC  --  99  --   --  96 104*   BUN  --  14  --   --  14 10   CR 0.76 0.74 0.74   < > 0.76 0.73   GFRESTIMATED 87 >90 90   < > 79 83   GFRESTBLACK >90 >90 >90   < > >90 >90  African American GFR Calc     AGUILAR  --  9.3  --   --  8.8 9.2   BILITOTAL 0.4 0.3 0.3   < > 0.4  --    ALBUMIN 3.9 4.0 3.6   < > 3.7  --    PROTTOTAL 7.3 7.5 7.2   < > 7.4  --    ALKPHOS 71 73 79   < > 71  --    AST 15 15 21   < > 16  --    ALT 24 31 38   < > 23  --     < > = values in this interval not displayed.     Calcium/VitaminD  Recent Labs   Lab Test 05/08/19 0745 09/18/17  0913 12/05/16  0811   AGUILAR 9.3 8.8 9.2     ESR/CRP  Recent Labs   Lab Test 12/31/19 0712 01/22/19 0724 09/13/18  0741   SED 7 8 8   CRP <2.9 3.9 5.7     Hepatitis B  Recent Labs   Lab Test 10/12/15  0923   AUSAB 0.18   HBCAB Nonreactive   HEPBANG Nonreactive     Hepatitis C  Recent Labs   Lab Test 10/07/16  0912   HCVAB Nonreactive   Assay performance characteristics have not been established for newborns,   infants, and children       Tuberculosis Screening  Recent Labs   Lab Test 12/07/18  0724   TBRES Negative     HIV Screening  Recent Labs   Lab Test 10/12/15  0923   HIAGAB Nonreactive   HIV-1 p24 Ag & HIV-1/HIV-2 Ab Not Detected              Chart documentation done in part with Dragon Voice recognition Software. Although reviewed after  completion, some word and grammatical error may remain.    Bill Resendiz MD

## 2020-01-07 NOTE — NURSING NOTE
Bria to follow up with Primary Care provider regarding elevated blood pressure.  Blood pressure rechecked after visit    136/76  Jolynn Angulo CMA Rheumatology  1/7/2020 8:33 AM                                RAPID3 (0-30) Cumulative Score  3.2          RAPID3 Weighted Score (divide #4 by 3 and that is the weighted score)  1.1     Jolynn Angulo CMA Rheumatology  1/7/2020 8:06 AM

## 2020-01-24 ENCOUNTER — OFFICE VISIT (OUTPATIENT)
Dept: AUDIOLOGY | Facility: CLINIC | Age: 55
End: 2020-01-24
Payer: COMMERCIAL

## 2020-01-24 DIAGNOSIS — H90.3 SENSORINEURAL HEARING LOSS, BILATERAL: Primary | ICD-10-CM

## 2020-01-24 PROCEDURE — 92557 COMPREHENSIVE HEARING TEST: CPT | Performed by: AUDIOLOGIST

## 2020-01-24 PROCEDURE — 92550 TYMPANOMETRY & REFLEX THRESH: CPT | Performed by: AUDIOLOGIST

## 2020-01-24 PROCEDURE — 99207 ZZC NO CHARGE LOS: CPT | Performed by: AUDIOLOGIST

## 2020-01-24 NOTE — PROGRESS NOTES
"AUDIOLOGY REPORT    SUBJECTIVE:  Bria Haddad is a 54 year old female who was seen in the Audiology Clinic [facility] on 1/24/20 for audiologic evaluation, referred by Dr. Douglas.  The patient reports a gradual decline in hearing. Bria reports that her little sister has hearing aids and her mother had hearing loss due to \"a toumor thing\".The patient denies  bilateral tinnitus, bilateral otalgia, bilateral drainage, bilateral aural fullness, history of noise exposure, and dizziness. The patient notes difficulty with communication in a variety of listening situations. Patient was unaccompanied to today's visit.     Abuse Screening:  Do you feel unsafe at home or work/school? No  Do you feel threatened by someone? No  Does anyone try to keep you from having contact with others, or doing things outside of your home? No  Physical signs of abuse present? No     OBJECTIVE:    Otoscopic exam indicates ears are clear of cerumen bilaterally     Pure Tone Thresholds assessed using standard techniques  audiometry with good  reliability from 250-8000 Hz bilaterally using insert earphones and circumaural headphones     RIGHT:  mild-moderate sensorineural hearing loss    LEFT:    mild-moderate sensorineural hearing loss    NOTE: Change in transducers did not merit a change in thresholds     Tympanogram:    RIGHT: Hypercompliant     LEFT:   Hypercompliant    Reflexes (reported by stimulus ear): 1000 Hz  RIGHT: Ipsilateral is present at normal levels  RIGHT: Contralateral is absent at frequencies tested  LEFT:   Ipsilateral is present at normal levels  LEFT:   Contralateral is absent at frequencies tested    Speech Reception Threshold:    RIGHT: 35 dB HL    LEFT:   40 dB HL    Word Recognition Score:     RIGHT: 100% at 75 dB HL using NU-6 recorded word list.    LEFT:   100% at 75 dB HL using NU-6 recorded word list.    ASSESSMENT:   Bilateral sensorineural hearing loss      Today s results were discussed with " the patient in detail.     PLAN:  Patient was counseled regarding hearing loss and impact on communication.  Patient is a good candidate for amplification at this time. Handout on good communication strategies, and hearing aid use was given to patient. It is recommended that the patient return for a hearing aid consultation.  Please call this clinic with questions regarding these results or recommendations.    José Luis Kent CCC-A  Licensed Audiologist #[id number]  1/24/2020    CC: Dr. Douglas

## 2020-01-27 NOTE — PATIENT INSTRUCTIONS
Continue symbicort 80/4.5 2 puffs two times a day through the spring (ok to lower to 1 puff two times a day this summer if she does well).      Continue zyrtec and singulair through allergy season - ok to try stopping zrytec if allergies are well controlled     Albuterol as needed (inhaler or nebulizer) but please call if needing or using frequently. Rheumatology    Dr. Bill Resendiz         [facility]   (Monday)  [address]  [place] [address]       [facility]   (Tuesday)  [address]  [place] [address]    [facility]   (Wed., Thurs., and Friday)  [address] [place] [address]    Phone number: [phone]  Thank you for choosing [facility].  Jolynn Angulo CMA

## 2020-02-17 ENCOUNTER — OFFICE VISIT (OUTPATIENT)
Dept: AUDIOLOGY | Facility: CLINIC | Age: 55
End: 2020-02-17
Payer: COMMERCIAL

## 2020-02-17 ENCOUNTER — TELEPHONE (OUTPATIENT)
Dept: AUDIOLOGY | Facility: CLINIC | Age: 55
End: 2020-02-17

## 2020-02-17 DIAGNOSIS — H90.3 SENSORINEURAL HEARING LOSS, BILATERAL: Primary | ICD-10-CM

## 2020-02-17 PROCEDURE — 99207 ZZC NO CHARGE LOS: CPT | Performed by: AUDIOLOGIST

## 2020-02-17 PROCEDURE — 92591 HC HEARING AID EXAM BINAURAL: CPT | Performed by: AUDIOLOGIST

## 2020-02-17 PROCEDURE — V5275 EAR IMPRESSION: HCPCS | Mod: RT | Performed by: AUDIOLOGIST

## 2020-02-17 NOTE — PROGRESS NOTES
AUDIOLOGY REPORT    SUBJECTIVE: Bria Haddad is a 54 year old female was seen in the Audiology Clinic at [facility] on 2/17/20 to discuss concerns with hearing and functional communication difficulties. Bria has been seen previously on 1/24/2020, and results revealed a bilateral sensorineural hearing loss. Bria notes difficulty with communication in a variety of listening situations. Patient was unaccompanied to today's visit.       OBJECTIVE:  Patient is a hearing aid candidate. Patient would like to move forward with a hearing aid evaluation today. Therefore, the patient was presented with different options for amplification to help aid in communication. Discussed styles, levels of technology and monaural vs. binaural fitting.     The hearing aid(s) mutually chosen were:  Binaural: ReSound Linx Quattro 7 CIC with 2.4 GHz  COLOR: pink  BATTERY SIZE: 10  EARMOLD/TIPS: not applicable  CANAL/ LENGTH: not applicable    Otoscopy revealed ears are clear of cerumen bilaterally. Bilateral earmolds were taken without incident.    ASSESSMENT:   Bilateral sensorineural hearing loss      Reviewed purchase information and warranty information with patient. The 45 day trial period was explained to patient. The patient was given a copy of the [place] Department of [employer] consumer brochure on purchasing hearing instruments. Patient risk factors have been provided to the patient in writing prior to the sale of the hearing aid per FDA regulation. The risk factors are also available in the User Instructional Booklet to be presented on the day of the hearing aid fitting. Hearing aid(s) ordered. Hearing aid evaluation completed. Patient was advised to check with their insurance to ascertain of they are eligible for any hearing aid benefits.     PLAN: Bria is scheduled to return in 2-3 weeks for a hearing aid fitting and programming. Purchase agreement will be completed on that date. Please contact  this clinic with any questions or concerns.      José Luis Kent [facility]  Licensed Audiologist #[id number]  2/17/2020

## 2020-02-17 NOTE — TELEPHONE ENCOUNTER
Reason for call:  Other   Patient called regarding (reason for call): call back  Additional comments: Patient is calling because she wants to cancel her hearing aid order as her insurance will not cover them. Please call back     Phone number to reach patient:  Cell number on file:    Telephone Information:   Mobile [phone]       Best Time:  any    Can we leave a detailed message on this number?  YES

## 2020-02-17 NOTE — TELEPHONE ENCOUNTER
The insurance company now informs Bria that [facility] is not in her network for coverage for hearing aids. We will halt and hold the order for hearing aids until this gets sorted out.     -José Luis SCHMIDTA

## 2020-02-24 ENCOUNTER — TELEPHONE (OUTPATIENT)
Dept: AUDIOLOGY | Facility: CLINIC | Age: 55
End: 2020-02-24

## 2020-02-24 NOTE — TELEPHONE ENCOUNTER
Reason for call:  Other   Patient called regarding (reason for call): call back  Additional comments: Patient is calling because she says her insurance will cover the hearing the aids so go ahead with the order. Call back with any questions    Phone number to reach patient:  Home number on file [phone] (home)    Best Time:  any    Can we leave a detailed message on this number?  YES    Travel screening: Not Applicable

## 2020-02-25 ENCOUNTER — TELEPHONE (OUTPATIENT)
Dept: AUDIOLOGY | Facility: CLINIC | Age: 55
End: 2020-02-25

## 2020-02-25 NOTE — TELEPHONE ENCOUNTER
Bria now informs me that her insurance will cover the hearing aids but they would like a prior authorization for the hearing aids. I will have scheduling call to schedule the patient for a fitting appointment.     -José Luis Kent CCC-A

## 2020-02-25 NOTE — TELEPHONE ENCOUNTER
Please call patient and schedule a hearing aid fitting appointment no sooner than 3 weeks from today. If you have any questions please do not hesitate to ask.     Thank you,   -José Luis SCHMIDTA

## 2020-02-25 NOTE — TELEPHONE ENCOUNTER
Called and left patient voice message to call back and schedule hearing aid fitting appointment 3 weeks from now after March 17th, 2020 with Dr. Walden.

## 2020-03-31 ENCOUNTER — DOCUMENTATION ONLY (OUTPATIENT)
Dept: LAB | Facility: CLINIC | Age: 55
End: 2020-03-31

## 2020-03-31 NOTE — TELEPHONE ENCOUNTER
The need for performing scheduled test(s) has been reviewed, and test(s) should be performed at this time, as ordered.  Bill Resendiz MD  3/31/2020 12:38 PM

## 2020-03-31 NOTE — PROGRESS NOTES
.Patient has lab only appointment scheduled for 4/6/2020.  Due to current coronavirus pandemic, please consider whether these lab tests can be deferred.     Please open ORDER REVIEW, review orders, and then confirm or defer orders ASAP.  Enter <dot>keep or <dot>defer smart phrase into NOTES, complete documentation, and route encounter.  [facility]: /team  [facility]: individual provider pool  [facility] primary care:   [facility] specialty: scheduling pool   Thank you,  Greta Allen on 3/31/2020 at 8:37 AM

## 2020-04-11 DIAGNOSIS — Z79.899 HIGH RISK MEDICATION USE: ICD-10-CM

## 2020-04-11 DIAGNOSIS — M06.9 RHEUMATOID ARTHRITIS INVOLVING MULTIPLE SITES, UNSPECIFIED RHEUMATOID FACTOR PRESENCE: ICD-10-CM

## 2020-04-11 LAB
ALBUMIN SERPL-MCNC: 4 G/DL (ref 3.4–5)
ALP SERPL-CCNC: 65 U/L (ref 40–150)
ALT SERPL W P-5'-P-CCNC: 29 U/L (ref 0–50)
AST SERPL W P-5'-P-CCNC: 18 U/L (ref 0–45)
BASOPHILS # BLD AUTO: 0 10E9/L (ref 0–0.2)
BASOPHILS NFR BLD AUTO: 0.1 %
BILIRUB DIRECT SERPL-MCNC: <0.1 MG/DL (ref 0–0.2)
BILIRUB SERPL-MCNC: 0.4 MG/DL (ref 0.2–1.3)
CREAT SERPL-MCNC: 0.8 MG/DL (ref 0.52–1.04)
DIFFERENTIAL METHOD BLD: NORMAL
EOSINOPHIL # BLD AUTO: 0.2 10E9/L (ref 0–0.7)
EOSINOPHIL NFR BLD AUTO: 2.3 %
ERYTHROCYTE [DISTWIDTH] IN BLOOD BY AUTOMATED COUNT: 13.3 % (ref 10–15)
GFR SERPL CREATININE-BSD FRML MDRD: 82 ML/MIN/{1.73_M2}
HCT VFR BLD AUTO: 43.6 % (ref 35–47)
HGB BLD-MCNC: 14.6 G/DL (ref 11.7–15.7)
LYMPHOCYTES # BLD AUTO: 3.4 10E9/L (ref 0.8–5.3)
LYMPHOCYTES NFR BLD AUTO: 41.3 %
MCH RBC QN AUTO: 29.7 PG (ref 26.5–33)
MCHC RBC AUTO-ENTMCNC: 33.5 G/DL (ref 31.5–36.5)
MCV RBC AUTO: 89 FL (ref 78–100)
MONOCYTES # BLD AUTO: 0.8 10E9/L (ref 0–1.3)
MONOCYTES NFR BLD AUTO: 9.2 %
NEUTROPHILS # BLD AUTO: 3.9 10E9/L (ref 1.6–8.3)
NEUTROPHILS NFR BLD AUTO: 47.1 %
PLATELET # BLD AUTO: 342 10E9/L (ref 150–450)
PROT SERPL-MCNC: 7.9 G/DL (ref 6.8–8.8)
RBC # BLD AUTO: 4.91 10E12/L (ref 3.8–5.2)
WBC # BLD AUTO: 8.2 10E9/L (ref 4–11)

## 2020-04-11 PROCEDURE — 85025 COMPLETE CBC W/AUTO DIFF WBC: CPT | Performed by: INTERNAL MEDICINE

## 2020-04-11 PROCEDURE — 82565 ASSAY OF CREATININE: CPT | Performed by: INTERNAL MEDICINE

## 2020-04-11 PROCEDURE — 36415 COLL VENOUS BLD VENIPUNCTURE: CPT | Performed by: INTERNAL MEDICINE

## 2020-04-11 PROCEDURE — 80076 HEPATIC FUNCTION PANEL: CPT | Performed by: INTERNAL MEDICINE

## 2020-04-14 ENCOUNTER — TELEPHONE (OUTPATIENT)
Dept: RHEUMATOLOGY | Facility: CLINIC | Age: 55
End: 2020-04-14

## 2020-04-14 NOTE — TELEPHONE ENCOUNTER
Prior Authorization Not Needed per Insurance    Medication: sulfaSALAzine ER (AZULFIDINE EN) 500 MG EC tablet-PA NOT NEEDED   Insurance Company: [employer] - Phone [phone] Fax [phone]  Expected CoPay:      Pharmacy Filling the Rx: [id number] - [address] AT [facility]  Pharmacy Notified: Yes  Patient Notified: No    Called pharmacy and pharmacy stated that PA is Not Needed and medication is covered. Pharmacy stated that last filled was on 4/11/2020 quantity 23 for 11 day supply and patient picked up medication. Spoke to Laura Technician and she stated that medication was on back order; as a result, last filled was quantity 23 tablets. Currently pharmacy has medication is stock; however, next fill date is 4/18/2020. Requested pharmacy to place an order for medication in case pharmacy runs out before next fill date. Pharmacy will set medication to fill on next fill date and notify patient when medication is ready for . Insurance also stated that PA is Not Needed and medication is covered.

## 2020-04-14 NOTE — TELEPHONE ENCOUNTER
Plan does not cover sulfaSALAzine ER (AZULFIDINE EN) 500 MG EC tablet.  Please call [phone] to initiate Prior Auth or change med.    Insurance type and ID number: ID# [id number]      Additional Information:     Nafisa Gregorio

## 2020-05-21 DIAGNOSIS — M06.4 INFLAMMATORY POLYARTHROPATHY (H): ICD-10-CM

## 2020-05-21 NOTE — TELEPHONE ENCOUNTER
Medication:   Humira  Last written on:   7/10/2019  Quantity:   2.4 ml    Refills:   3    Last office visit:   1/7/2020  Next office visit:   7/7/2020  Last labs:   4/11/2020    Jolynn Angulo CMA Rheumatology  5/21/2020 9:23 AM

## 2020-05-21 NOTE — TELEPHONE ENCOUNTER
Rheumatology team: Please call to notify Ms. Haddad that Humira has been refilled.  Bill Resendiz MD  5/21/2020 5:01 PM

## 2020-06-29 DIAGNOSIS — M06.9 RHEUMATOID ARTHRITIS INVOLVING MULTIPLE SITES, UNSPECIFIED RHEUMATOID FACTOR PRESENCE: ICD-10-CM

## 2020-06-29 DIAGNOSIS — Z79.899 HIGH RISK MEDICATION USE: ICD-10-CM

## 2020-06-29 LAB
ALBUMIN SERPL-MCNC: 3.7 G/DL (ref 3.4–5)
ALP SERPL-CCNC: 58 U/L (ref 40–150)
ALT SERPL W P-5'-P-CCNC: 29 U/L (ref 0–50)
AST SERPL W P-5'-P-CCNC: 21 U/L (ref 0–45)
BASOPHILS # BLD AUTO: 0 10E9/L (ref 0–0.2)
BASOPHILS NFR BLD AUTO: 0.1 %
BILIRUB DIRECT SERPL-MCNC: <0.1 MG/DL (ref 0–0.2)
BILIRUB SERPL-MCNC: 0.5 MG/DL (ref 0.2–1.3)
CREAT SERPL-MCNC: 0.86 MG/DL (ref 0.52–1.04)
DIFFERENTIAL METHOD BLD: NORMAL
EOSINOPHIL # BLD AUTO: 0.2 10E9/L (ref 0–0.7)
EOSINOPHIL NFR BLD AUTO: 2.6 %
ERYTHROCYTE [DISTWIDTH] IN BLOOD BY AUTOMATED COUNT: 12.8 % (ref 10–15)
GFR SERPL CREATININE-BSD FRML MDRD: 75 ML/MIN/{1.73_M2}
HCT VFR BLD AUTO: 41 % (ref 35–47)
HGB BLD-MCNC: 13.7 G/DL (ref 11.7–15.7)
LYMPHOCYTES # BLD AUTO: 3.4 10E9/L (ref 0.8–5.3)
LYMPHOCYTES NFR BLD AUTO: 49.6 %
MCH RBC QN AUTO: 29.6 PG (ref 26.5–33)
MCHC RBC AUTO-ENTMCNC: 33.4 G/DL (ref 31.5–36.5)
MCV RBC AUTO: 89 FL (ref 78–100)
MONOCYTES # BLD AUTO: 0.7 10E9/L (ref 0–1.3)
MONOCYTES NFR BLD AUTO: 10.5 %
NEUTROPHILS # BLD AUTO: 2.6 10E9/L (ref 1.6–8.3)
NEUTROPHILS NFR BLD AUTO: 37.2 %
PLATELET # BLD AUTO: 302 10E9/L (ref 150–450)
PROT SERPL-MCNC: 7.3 G/DL (ref 6.8–8.8)
RBC # BLD AUTO: 4.63 10E12/L (ref 3.8–5.2)
WBC # BLD AUTO: 6.9 10E9/L (ref 4–11)

## 2020-06-29 PROCEDURE — 85025 COMPLETE CBC W/AUTO DIFF WBC: CPT | Performed by: INTERNAL MEDICINE

## 2020-06-29 PROCEDURE — 82565 ASSAY OF CREATININE: CPT | Performed by: INTERNAL MEDICINE

## 2020-06-29 PROCEDURE — 80076 HEPATIC FUNCTION PANEL: CPT | Performed by: INTERNAL MEDICINE

## 2020-06-29 PROCEDURE — 36415 COLL VENOUS BLD VENIPUNCTURE: CPT | Performed by: INTERNAL MEDICINE

## 2020-07-06 ENCOUNTER — TELEPHONE (OUTPATIENT)
Dept: FAMILY MEDICINE | Facility: CLINIC | Age: 55
End: 2020-07-06

## 2020-07-06 DIAGNOSIS — R60.0 PEDAL EDEMA: ICD-10-CM

## 2020-07-06 RX ORDER — HYDROCHLOROTHIAZIDE 25 MG/1
TABLET ORAL
Qty: 90 TABLET | Refills: 1 | OUTPATIENT
Start: 2020-07-06

## 2020-07-06 NOTE — TELEPHONE ENCOUNTER
Routing refill request to provider for review/approval because:  Labs not current:    Potassium   Date Value Ref Range Status   05/08/2019 4.0 3.4 - 5.3 mmol/L Final     Sodium   Date Value Ref Range Status   05/08/2019 142 133 - 144 mmol/L Final     Alie Wasserman RN

## 2020-07-07 ENCOUNTER — VIRTUAL VISIT (OUTPATIENT)
Dept: RHEUMATOLOGY | Facility: CLINIC | Age: 55
End: 2020-07-07
Payer: COMMERCIAL

## 2020-07-07 ENCOUNTER — VIRTUAL VISIT (OUTPATIENT)
Dept: FAMILY MEDICINE | Facility: CLINIC | Age: 55
End: 2020-07-07
Payer: COMMERCIAL

## 2020-07-07 DIAGNOSIS — M65.331 TRIGGER MIDDLE FINGER OF RIGHT HAND: ICD-10-CM

## 2020-07-07 DIAGNOSIS — Z79.899 HIGH RISK MEDICATIONS (NOT ANTICOAGULANTS) LONG-TERM USE: ICD-10-CM

## 2020-07-07 DIAGNOSIS — R60.9 DEPENDENT EDEMA: ICD-10-CM

## 2020-07-07 DIAGNOSIS — M65.341 TRIGGER RING FINGER OF RIGHT HAND: ICD-10-CM

## 2020-07-07 DIAGNOSIS — R60.0 PEDAL EDEMA: Primary | ICD-10-CM

## 2020-07-07 DIAGNOSIS — M06.9 RHEUMATOID ARTHRITIS INVOLVING MULTIPLE SITES, UNSPECIFIED RHEUMATOID FACTOR PRESENCE: Primary | ICD-10-CM

## 2020-07-07 PROCEDURE — 99213 OFFICE O/P EST LOW 20 MIN: CPT | Mod: 95 | Performed by: FAMILY MEDICINE

## 2020-07-07 PROCEDURE — 99214 OFFICE O/P EST MOD 30 MIN: CPT | Mod: 95 | Performed by: INTERNAL MEDICINE

## 2020-07-07 RX ORDER — PREDNISONE 5 MG/1
TABLET ORAL
Qty: 50 TABLET | Refills: 0 | Status: SHIPPED | OUTPATIENT
Start: 2020-07-07 | End: 2020-10-06

## 2020-07-07 RX ORDER — SULFASALAZINE 500 MG/1
1000 TABLET, DELAYED RELEASE ORAL 2 TIMES DAILY
Qty: 360 TABLET | Refills: 0 | Status: SHIPPED | OUTPATIENT
Start: 2020-07-07 | End: 2020-10-02

## 2020-07-07 RX ORDER — FUROSEMIDE 20 MG
20 TABLET ORAL DAILY
Qty: 30 TABLET | Refills: 3 | Status: SHIPPED | OUTPATIENT
Start: 2020-07-07 | End: 2021-11-17

## 2020-07-07 NOTE — PROGRESS NOTES
"Bria Haddad is a 55 year old female who is being evaluated via a billable video visit.      The patient has been notified of following:     \"This video visit will be conducted via a call between you and your physician/provider. We have found that certain health care needs can be provided without the need for an in-person physical exam.  This service lets us provide the care you need with a video conversation.  If a prescription is necessary we can send it directly to your pharmacy.  If lab work is needed we can place an order for that and you can then stop by our lab to have the test done at a later time.    Video visits are billed at different rates depending on your insurance coverage.  Please reach out to your insurance provider with any questions.    If during the course of the call the physician/provider feels a video visit is not appropriate, you will not be charged for this service.\"    Patient has given verbal consent for Video visit? Yes  How would you like to obtain your AVS? [facility]  Patient would like the video invitation sent by: Text to cell phone: [phone]  Will anyone else be joining your video visit? No    Subjective     Bria Haddad is a 55 year old female who presents today via video visit for the following health issues:    HPI  Musculoskeletal problem/pain      Duration: on going issue for years    Description  Location: both legs    Intensity:  vaires now    Accompanying signs and symptoms: tingling    History  Previous similar problem: YES  Previous evaluation:  x-ray and ultrasound    Precipitating or alleviating factors:  Trauma or overuse: no   Aggravating factors include: not sure    Therapies tried and outcome: rest/inactivity         Video Start Time: 12:06      Pt with pedal edema. Has been going on for years  Bit worse recently as in they are not back to normal in the morning  It is in her feet and ankles.   She tried some hydrochlorothiazide but it did not help  Compression " "stockings were not very helpful  She is not elevating her legs mid day high enough to be effective.   Reviewed labs from rheum  Has normal liver and kidney and cbc  Will check lytes, bnp and tsh  Has had a couple of echocardiograms and have been normal in the past      Reviewed and updated as needed this visit by Provider         Review of Systems   Constitutional, HEENT, cardiovascular, pulmonary, gi and gu systems are negative, except as otherwise noted.      Objective             Physical Exam     GENERAL: Healthy, alert and no distress  EYES: Eyes grossly normal to inspection.  No discharge or erythema, or obvious scleral/conjunctival abnormalities.  RESP: No audible wheeze, cough, or visible cyanosis.  No visible retractions or increased work of breathing.    SKIN: Visible skin clear. No significant rash, abnormal pigmentation or lesions.  NEURO: Cranial nerves grossly intact.  Mentation and speech appropriate for age.  PSYCH: Mentation appears normal, affect normal/bright, judgement and insight intact, normal speech and appearance well-groomed.      Diagnostic Test Results:  Labs reviewed in Epic        Assessment & Plan     1. Pedal edema  Check labs. Trial of lasix. Elevate feet midday,   - Basic metabolic panel; Future  - TSH with free T4 reflex; Future  - BNP-N terminal pro; Future     BMI:   Estimated body mass index is 37.46 kg/m  as calculated from the following:    Height as of 1/7/20: 1.645 m (5' 4.75\").    Weight as of 1/7/20: 101.3 kg (223 lb 6.4 oz).   Weight management plan: Discussed healthy diet and exercise guidelines            No follow-ups on file.    Holli Marley MD  [facility]      Video-Visit Details    Type of service:  Video Visit    Video End Time:12:14    Originating Location (pt. Location): Home    Distant Location (provider location):  home     Platform used for Video Visit: [facility]    No follow-ups on file.       Holli Marley MD      "

## 2020-07-07 NOTE — PROGRESS NOTES
"Bria Haddad is a 55 year old female who is being evaluated via a billable video visit.      The patient has been notified of following:     \"This video visit will be conducted via a call between you and your physician/provider. We have found that certain health care needs can be provided without the need for an in-person physical exam.  This service lets us provide the care you need with a video conversation.  If a prescription is necessary we can send it directly to your pharmacy.  If lab work is needed we can place an order for that and you can then stop by our lab to have the test done at a later time.    Video visits are billed at different rates depending on your insurance coverage.  Please reach out to your insurance provider with any questions.    If during the course of the call the physician/provider feels a video visit is not appropriate, you will not be charged for this service.\"    Patient has given verbal consent for Video visit? Yes  How would you like to obtain your AVS? [employer]  Patient would like the video invitation sent by: Text to cell phone: [phone]  Will anyone else be joining your video visit? No    Rheumatology Video Visit      Bria Haddad MRN# [id number]   YOB: 1965 Age: 55 year old      Date of visit: 7/07/20   PCP: Dr. Stephanie Saldana    Chief Complaint   Patient presents with:  Arthritis: RA. Has good and bad days. Right hand middle finger and ring finger are locking up.    Assessment and Plan     1.  Rheumatoid arthritis: Ms. Haddad initially presented to this clinic in 2015 with dependent edema of the bilateral lower extremities, fatigue, and a one time episode of right toe pain in the setting of a positive ROSEMARY.  On 9/18/2017 she presented with synovitis of her bilateral PIPs and pain without swelling of her MCPs, persistent fatigue, intermittent rash, and dependent edema. ROSEMARY positive but additional studies negative/normal including complement C3, complement C4, " beta-2 glycoprotein IgM and IgG, cardiolipin IgM and IgG, lupus anticoagulant, RNP, Stuart, SSA, SSB, Scl-70.  Overall joint exam is most consistent with rheumatoid arthritis; no rash. Previously on HCQ (bloating), MTX (LFT elevation, alopecia, headache), leflunomide (LFT elevation). Currently on SSZ and Humira.  Swelling at the MCPs and triggering of the right third and fourth fingers.  Suspect that the intermittent trigger finger could be related to RA and therefore will escalate RA treatment as noted below.  If no improvement then could consider steroid injection for these trigger fingers.  Note that her trigger fingers were not actively triggering during this clinic visit   - Increase sulfasalazine from 500mg twice daily, to 1000mg twice daily  - Continue Humira 40mg SQ every 14 days  - Start prednisone 10 mg daily x20 days, then 5 mg daily x10 days, then stop   - Labs monthly [id number]: CBC, Cr, Hepatic Panel  - Labs in 3 months: CBC, Creatinine, Hepatic Panel, ESR, CRP, glucose    # Prednisone Risks and Benefits: The risks and benefits of prednisone were discussed in detail and the patient verbalized understanding.  The risks discussed include, but are not limited to, weight gain, fluid retention, impaired wound healing, hyperglycemia, adrenal suppression, GI upset, peptic ulcer, hepatotoxicity, aseptic necrosis of the femoral and humeral heads, osteoporosis, myopathy, tendon rupture (particularly Achilles tendon), ocular changes including an increased intraocular pressure.  I encouraged reviewing the package insert and asking any questions about the medication.      2. Left knee pain history: Not an issue currently.   Significant improvement with PT in the past and encouraged that she continue these exercises.  When she stopped the exercises in the past she had worsening left knee pain.      3.  Right third and fourth digit trigger fingers: Intermittent and possibly related to RA.  See #1.  If no improvement  with medication adjustments then consider steroid injection.    4.  History of Left shoulder pain, impingement syndrome: Resolved with combination of steroid injection and physical therapy.  Not an issue today.     5.  Bilateral dependent lower extremity edema: She plans to follow-up with her PCP regarding this issue.  She reports no improvement with compression stockings, and has run out of hydrochlorothiazide with plans to follow-up with her PCP for a refill.    # Relevant labs and imaging were reviewed with the patient    # High risk medication toxicity monitoring: discussion and labs reviewed; appropriate labs ordered. See above.  Instructed that if confirmed to have COVID-19 or exposure to someone with confirmed COVID-19 to call this clinic for directions on DMARD management.    # Note that this is a virtual visit to reduce the risk of COVID-19 exposure during this current pandemic.      # Considered to be at high risk of complications from the COVID-19 virus.  It is recommended to limit contact with other people and if possible to work remotely or provide a leave of absence to reduce the risk for COVID-19.      Ms. Haddad verbalized agreement with and understanding of the rational for the diagnosis and treatment plan.  All questions were answered to best of my ability and the patient's satisfaction. Ms. Haddad was advised to contact the clinic with any questions that may arise after the clinic visit.      Thank you for involving me in the care of the patient    Return to clinic: 3 months      HPI   Bria Haddad is a 55 year old female with a past medical history significant for endometriosis, s/p carpal tunnel release surgery who present for follow up of positive ROSEMARY.    Today, Ms. Haddad reports that she has mild swelling of the MCPs that is worse in the morning and improves with time and activity.  No pain or increased warmth of these joints.  She does have active triggering of the right third and fourth fingers  that occurs randomly and resolves randomly.  Tolerating medications well.  Not using prednisone currently and has not for a long time now she says.  Morning stiffness for about 5 minutes if present at all.  No gelling phenomenon.     Lower extremity edema that is worse at the end of the day and improved with leg elevation.  Compression stockings were ineffective, per patient.  She was given hydrochlorothiazide by her PCP and says that she needs to follow-up for a refill.    Denies fevers, chills, nausea, vomiting, constipation, diarrhea. No abdominal pain. No chest pain/pressure, palpitations, or shortness of breath. No neck pain. No oral or nasal sores. No sicca symptoms. No photosensitivity or photophobia. No eye pain or redness. No history of inflammatory eye disease.  No history of DVT or pulmonary embolism.  No history of serositis.  No history of Raynaud's Phenomenon.  No seizure disorder.  No known renal disorder.      Tobacco: none  EtOH: none  Drugs: none    ROS   GEN: No fevers, chills, night sweats, or weight change  SKIN: See HPI  HEENT: No oral or nasal ulcers.  CV: No chest pain, pressure, palpitations, or dyspnea on exertion.  PULM: No SOB, wheeze, cough.  GI: No nausea, vomiting, constipation, diarrhea. No blood in stool. No abdominal pain.  : No blood in urine.  MSK: See HPI.  NEURO: No numbness or tingling.  EXT: See HPI  PSYCH: Negative    Active Problem List     Patient Active Problem List   Diagnosis     CARDIOVASCULAR SCREENING; LDL GOAL LESS THAN 160     Endometriosis of uterus     Inflammatory polyarthropathy (H)     Past Medical History     Past Medical History:   Diagnosis Date     Bilateral carpal tunnel syndrome 12/20/2015     NO ACTIVE PROBLEMS      Obesity      Polyarthritis     inflammatory poly arthritis.     S/P carpal tunnel release 12/29/2016     Past Surgical History     Past Surgical History:   Procedure Laterality Date     bunions       COLONOSCOPY N/A 11/9/2015    Procedure:  COLONOSCOPY;  Surgeon: Andrew Adamson MD;  Location: [facility]     COLONOSCOPY WITH CO2 INSUFFLATION N/A 11/9/2015    Procedure: COLONOSCOPY WITH CO2 INSUFFLATION;  Surgeon: Andrew Adamson MD;  Location: [facility] OR     GYN SURGERY      endometryosis     RELEASE CARPAL TUNNEL Right 10/12/2016    Procedure: RELEASE CARPAL TUNNEL;  Surgeon: Colby Nobles MD;  Location: [facility] OR     RELEASE CARPAL TUNNEL Left 12/16/2016    Procedure: RELEASE CARPAL TUNNEL;  Surgeon: Colby Nobles MD;  Location: [facility] OR     TONSILLECTOMY & ADENOIDECTOMY       Allergy     Allergies   Allergen Reactions     Asa [Aspirin] Hives     Current Medication List     Current Outpatient Medications   Medication Sig     adalimumab (HUMIRA *CF*) 40 MG/0.4ML pen kit Inject 0.4 mLs (40 mg) Subcutaneous every 14 days     sulfaSALAzine ER (AZULFIDINE EN) 500 MG EC tablet Take 1 tablet (500 mg) by mouth 2 times daily ; labs required every 8-12 weeks     hydrochlorothiazide (HYDRODIURIL) 25 MG tablet Take 1 tablet (25 mg) by mouth daily (Patient not taking: Reported on 1/7/2020)     order for DME Equipment being ordered: compression stockings 1 pair, at 30mm Hg, to be worn during the day. For Bilateral leg edema [R60.0]     No current facility-administered medications for this visit.          Social History   See HPI    Family History     Family History   Problem Relation Age of Onset     Osteoporosis Mother      Respiratory Mother      Thyroid Disease Mother      Heart Disease Father      Cancer - colorectal Maternal Grandmother      Diabetes Maternal Grandfather      Heart Disease Maternal Grandfather      Cancer Paternal Grandmother      Heart Disease Paternal Grandfather      Respiratory Paternal Grandfather      Hypertension Brother      Thyroid Disease Sister      Thyroid Disease Sister      Neurologic Disorder Brother      Physical Exam     Temp Readings from Last 3 Encounters:   12/23/19 98.1  F (36.7  C) (Oral)   12/04/19 98.6  " F (37  C) (Oral)   05/15/19 96.5  F (35.8  C) (Oral)     BP Readings from Last 5 Encounters:   01/07/20 136/76   12/23/19 (!) 149/78   12/04/19 130/80   10/08/19 (!) 140/84   07/10/19 128/82     Pulse Readings from Last 1 Encounters:   01/07/20 80     Resp Readings from Last 1 Encounters:   10/17/18 18     Estimated body mass index is 37.46 kg/m  as calculated from the following:    Height as of 1/7/20: 1.645 m (5' 4.75\").    Weight as of 1/7/20: 101.3 kg (223 lb 6.4 oz).      GEN: NAD  HEENT: MMM.  Anicteric, noninjected sclera  PULM: No increased work of breathing  MSK: Mild swelling of the bilateral second-fourth MCPs.  Other MCPs without swelling.  PIPs and DIPs without swelling.  Wrists without swelling.  No active triggering of her right third and fourth fingers.    SKIN: No Rash  PSYCH: Alert. Appropriate.        Labs / Imaging (select studies)   RF/CCP  Recent Labs   Lab Test 09/18/17  0913   CCPIGG 1   RHF <20     CBC  Recent Labs   Lab Test 06/29/20 0720 04/11/20  0919 12/31/19  0712   WBC 6.9 8.2 6.7   RBC 4.63 4.91 4.95   HGB 13.7 14.6 14.7   HCT 41.0 43.6 44.3   MCV 89 89 90   RDW 12.8 13.3 13.0    342 301   MCH 29.6 29.7 29.7   MCHC 33.4 33.5 33.2   NEUTROPHIL 37.2 47.1 46.1   LYMPH 49.6 41.3 41.9   MONOCYTE 10.5 9.2 9.6   EOSINOPHIL 2.6 2.3 2.1   BASOPHIL 0.1 0.1 0.3   ANEU 2.6 3.9 3.1   ALYM 3.4 3.4 2.8   MIKE 0.7 0.8 0.6   AEOS 0.2 0.2 0.1   ABAS 0.0 0.0 0.0     CMP  Recent Labs   Lab Test 06/29/20  0720 04/11/20 0919 12/31/19 0712 05/08/19  0745  09/18/17  0913 12/05/16  0811   NA  --   --   --  142  --  141 142   POTASSIUM  --   --   --  4.0  --  4.0 4.0   CHLORIDE  --   --   --  109  --  107 105   CO2  --   --   --  30  --  29 29   ANIONGAP  --   --   --  3  --  5 8   GLC  --   --   --  99  --  96 104*   BUN  --   --   --  14  --  14 10   CR 0.86 0.80 0.76 0.74   < > 0.76 0.73   GFRESTIMATED 75 82 87 >90   < > 79 83   GFRESTBLACK 87 >90 >90 >90   < > >90 >90   GFR " Calc     AGUILAR  --   --   --  9.3  --  8.8 9.2   BILITOTAL 0.5 0.4 0.4 0.3   < > 0.4  --    ALBUMIN 3.7 4.0 3.9 4.0   < > 3.7  --    PROTTOTAL 7.3 7.9 7.3 7.5   < > 7.4  --    ALKPHOS 58 65 71 73   < > 71  --    AST 21 18 15 15   < > 16  --    ALT 29 29 24 31   < > 23  --     < > = values in this interval not displayed.     Calcium/VitaminD  Recent Labs   Lab Test 05/08/19  0745 09/18/17  0913 12/05/16  0811   AGUILAR 9.3 8.8 9.2     ESR/CRP  Recent Labs   Lab Test 12/31/19  0712 01/22/19  0724 09/13/18  0741   SED 7 8 8   CRP <2.9 3.9 5.7       Hepatitis B  Recent Labs   Lab Test 10/12/15  0923   AUSAB 0.18   HBCAB Nonreactive   HEPBANG Nonreactive     Hepatitis C  Recent Labs   Lab Test 10/07/16  0912   HCVAB Nonreactive   Assay performance characteristics have not been established for newborns,   infants, and children         Tuberculosis Screening  Recent Labs   Lab Test 12/07/18  0724   TBRES Negative     HIV Screening  Recent Labs   Lab Test 10/12/15  0923   HIAGAB Nonreactive   HIV-1 p24 Ag & HIV-1/HIV-2 Ab Not Detected                Chart documentation done in part with Dragon Voice recognition Software. Although reviewed after completion, some word and grammatical error may remain.    Video-Visit Details    Type of service:  Video Visit    Video Start Time: 7:20 AM  Video End Time: 7:40 AM    Originating Location (pt. Location): Home in [place]    Distant Location (provider location):  Home    Platform used for Video Visit: Celestine Resendiz MD

## 2020-07-31 ENCOUNTER — OFFICE VISIT (OUTPATIENT)
Dept: AUDIOLOGY | Facility: CLINIC | Age: 55
End: 2020-07-31
Payer: COMMERCIAL

## 2020-07-31 DIAGNOSIS — H90.3 BILATERAL SENSORINEURAL HEARING LOSS: Primary | ICD-10-CM

## 2020-07-31 PROCEDURE — V5160 DISPENSING FEE BINAURAL: HCPCS | Performed by: AUDIOLOGIST

## 2020-07-31 PROCEDURE — V5011 HEARING AID FITTING/CHECKING: HCPCS | Mod: LT | Performed by: AUDIOLOGIST

## 2020-07-31 PROCEDURE — 99207 ZZC NO CHARGE LOS: CPT | Performed by: AUDIOLOGIST

## 2020-07-31 PROCEDURE — 92593 HC HEARING AID CHECK, BINAURAL: CPT | Performed by: AUDIOLOGIST

## 2020-07-31 PROCEDURE — V5020 CONFORMITY EVALUATION: HCPCS | Mod: LT | Performed by: AUDIOLOGIST

## 2020-07-31 PROCEDURE — V5258 HEARING AID, DIGIT, BIN, CIC: HCPCS | Performed by: AUDIOLOGIST

## 2020-07-31 PROCEDURE — V5020 CONFORMITY EVALUATION: HCPCS | Mod: RT | Performed by: AUDIOLOGIST

## 2020-07-31 PROCEDURE — V5011 HEARING AID FITTING/CHECKING: HCPCS | Mod: RT | Performed by: AUDIOLOGIST

## 2020-07-31 NOTE — PROGRESS NOTES
AUDIOLOGY REPORT    SUBJECTIVE: Bria Haddad, a 55 year old female, was seen in the Audiology Clinic at [facility] today for a Binaural hearing aid fitting. Previous results have revealed a bilateral sensorineural hearing loss. Patient was unaccompanied to today's visit.     OBJECTIVE:  Prior to fitting, a hearing aid check was performed to ensure device functionality. The hearing aid conformity evaluation was completed.The hearing aids were placed and they provided a good fit. Real-ear-probe-microphone measurements were completed on the [employer] system and were an acceptable match to NAL-NL2 target with soft sounds audible, moderate sounds comfortable, and loud sounds below discomfort. UCLs are verified through maximum power output measures and demonstrate appropriate limiting of loud inputs. Ms. Haddad was oriented to proper hearing aid use, care, cleaning (no water, dry brush), batteries (size 10, insertion/removal, toxicity, low-battery signal), aid insertion/removal, user booklet, warranty information, storage cases, and other hearing aid details. The patient confirmed understanding of hearing aid use and care, and showed proper insertion of hearing aid and batteries while in the office today. Ms. Haddad reported good volume and sound quality today.    EAR(S) FIT: Binaural  HEARING AID MAKE: Right: ReSound ; Left: ReSound  HEARING AID MODEL #: Right: Linx Quattro 7; Left: Linx Quattro 7   HEARING AID STYLE: Right: CIC-W; Left: CIC-W  DOME SIZE: Right:   ; Left::      LENGTH: Right:   ; Left:     EARMOLDS: Right:  ; Left:     SERIAL NUMBERS: Right: [id number]; Left: [id number]  WARRANTY END DATE: Right: 8/16/2023; Left:: 8/16/2023      CHARGES:     Hearing Aid Check: Binaural, 59729, $81.00  Dispensing Fee: Binaural, , $500.00  Fit/Orientation: Binaural, , $370.00  Hearing Aid Conformity Evaluation: 2, , $174.00  Hearing Aid Digital: Binaural, CIC,  $4475  Total: $5600      ASSESSMENT: Binaural hearing aid fitting completed today. Verification measures were performed. The 45 day trial period was explained to patient, and they expressed understanding. Ms. Haddad signed the Hearing Aid Purchase Agreement and was given a copy, as well as details on her hearing aids. Patient was counseled that exact out of pocket amounts cannot be determined for hearing aid claims being sent to insurance. Any insurance coverage information presented to the patient is an estimate only, and is not a guarantee of payment. Patient has been advised to check with their own insurance.    PLAN: Ms. Haddad will return for follow-up in 2-3 weeks for a hearing aid review appointment. Please call this clinic with questions regarding today s appointment.    José Luis Kent [facility]  Licensed Audiologist #[id number]  7/31/2020

## 2020-07-31 NOTE — PATIENT INSTRUCTIONS

## 2020-08-11 ENCOUNTER — VIRTUAL VISIT (OUTPATIENT)
Dept: FAMILY MEDICINE | Facility: OTHER | Age: 55
End: 2020-08-11

## 2020-08-11 NOTE — PROGRESS NOTES
"Date: 2020 18:33:50  Clinician: Mariel Coon  Clinician NPI: [id number]  Patient: Bria Haddad  Patient : 1965  Patient Address: [address] [place] [id number]  Patient Phone: [phone]  Visit Protocol: Eczema  Patient Summary:  Bria is a 55 year old ( : 1965 ) female who initiated a Visit for evaluation of atopic dermatitis (eczema). When asked the question \"Please sign me up to receive news, health information and promotions. \", Bria responded \"No\".    Images of her skin condition were uploaded.  Her symptoms started 1-3 days ago. The rash is located on her feet. The rash is red in color.   The affected area has dry skin and flaky skin. It feels itchy and warm to touch. The symptoms interfere with her sleep.   Symptom details   Redness: The redness has not rapidly increased in size.   Denied symptoms include sores, drainage, scabs, blisters, tender to touch, burning, pain, numbness, crusts, and scaly skin. Bria does not feel feverish.   Treatments or home remedies used to relieve the symptoms as reported by the patient (free text): Applied 100% aloe, Applied hand lotion, applied a baking soda paste and soaked feet in Epsom salt   Precipitating events  She spent excess time in the sun just before her symptoms started.   Bria did not come in contact with any irritants prior to the onset of her symptoms and has not been in close contact with anyone that has similar symptoms.   Pertinent medical history  Bria has experienced this skin condition before. Her current skin condition comes and goes. The last time she experienced this skin condition was 7-12 months ago.   Bria has a family history of seasonal allergies or hay fever. She has ongoing medical conditions. Ongoing medical conditions as reported by the patient (free text): Rheumatoid arthritis and swelling of feet and ankles due to venous incontinence   Bria does not have a history of atopia.   Bria does not smoke or use " smokeless tobacco.   Additional information as reported by the patient (free text): I have had this a couple of times before and it should be in my medical chart     MEDICATIONS: sulfasalazine oral, adalimumab subcutaneous, furosemide oral, ALLERGIES: aspirin  Clinician Response:  Dear Bria,  Based on the information provided, you have atopic dermatitis (eczema). Eczema is a condition involving skin inflammation. The inflammation is the result of over-sensitive skin rather than direct contact with a specific substance. This over-sensitivity tends to run in families and is often associated with asthma and allergies. The condition almost always starts in early childhood and comes and goes over time.  The most common symptom is a red, itchy rash. The skin may also be dry or cracked. Occasionally, blisters develop and form a crust on the surface of the skin after bursting.  Medication information  I am prescribing:     Triamcinolone acetonide 0.1% topical cream. Apply a thin layer to the affected area 2 times a day. Wash hands before and after use. There are no refills with this prescription.   Self care  Steps you can take to be as comfortable as possible:     Avoid scratching the rash    Take a lukewarm bath to soothe the skin (adding colloidal oatmeal can help even more)    Apply a moisturizing lotion immediately after bathing and frequently reapply throughout the day    Apply a cool, wet washcloth to your rash for 15 minutes several times a day    Use mild soap and laundry detergent    Choose clothing and bedding made of a breathable material like cotton    Do not use antibiotic creams or ointments unless recommended by a  provider     When to seek care  Please make an appointment to be seen in a clinic or urgent care if any of the following occur:     You develop new symptoms or your symptoms become worse    Your rash hasn't improved after 14 days    Symptoms are so severe that you are unable to sleep or do  regular activities    You have areas of broken skin from scratching    You notice symptoms of a skin infection (e.g. Spreading redness, pain that is not improving, fever, warmth)      Diagnosis: Atopic dermatitis  Diagnosis ICD: L20.9  Prescription: triamcinolone acetonide (Triderm) 0.1 % topical cream 1 30 gram tube, 14 days supply. Apply a thin layer to the affected area 2 times a day. Refills: 0, Refill as needed: no, Allow substitutions: yes  Pharmacy: [facility] - [phone] - [address] [place] [address]

## 2020-08-12 ENCOUNTER — OFFICE VISIT (OUTPATIENT)
Dept: URGENT CARE | Facility: URGENT CARE | Age: 55
End: 2020-08-12
Payer: COMMERCIAL

## 2020-08-12 VITALS
HEART RATE: 73 BPM | TEMPERATURE: 99.2 F | SYSTOLIC BLOOD PRESSURE: 140 MMHG | OXYGEN SATURATION: 98 % | DIASTOLIC BLOOD PRESSURE: 90 MMHG

## 2020-08-12 DIAGNOSIS — L25.9 CONTACT DERMATITIS, UNSPECIFIED CONTACT DERMATITIS TYPE, UNSPECIFIED TRIGGER: Primary | ICD-10-CM

## 2020-08-12 DIAGNOSIS — R03.0 ELEVATED BP WITHOUT DIAGNOSIS OF HYPERTENSION: ICD-10-CM

## 2020-08-12 PROCEDURE — 99214 OFFICE O/P EST MOD 30 MIN: CPT | Performed by: INTERNAL MEDICINE

## 2020-08-12 RX ORDER — PREDNISONE 20 MG/1
TABLET ORAL
Qty: 20 TABLET | Refills: 0 | Status: SHIPPED | OUTPATIENT
Start: 2020-08-12 | End: 2020-10-06

## 2020-08-12 RX ORDER — TRIAMCINOLONE ACETONIDE 1 MG/G
CREAM TOPICAL
COMMUNITY
Start: 2020-08-11 | End: 2020-10-22

## 2020-08-13 NOTE — PROGRESS NOTES
SUBJECTIVE:  Bria Haddad is an 55 year old female who presents for rash on legs.  Rash started three days ago.  Did an oncare visit yesterday and was started on triamcinolone which hasn't helped.  rash is itchy.  A few spots are starting to break open.  Initially on feet and ankles but now a couple on arms and one by lip.  No known exposures to plants but was in yard work.   Initially thought was maybe a couple mosquito bites but then seemed to act differently. No fevers, chills, sweats.  No recent travel.  No one else at home with same skin sxs.  Has tried aloe,  Baking soda, epsom salts, and some lotions which haven't helped.  Rash is itchy but not really painful.   Spots start as red spots, then raise some, then become tiny blisters.  No new swelling of feet, but has chronic intermittent swelling of feet, including current swelling of left foot.    PMH:   has a past medical history of Bilateral carpal tunnel syndrome (12/20/2015), NO ACTIVE PROBLEMS, Obesity, Polyarthritis, and S/P carpal tunnel release (12/29/2016).  Patient Active Problem List   Diagnosis     CARDIOVASCULAR SCREENING; LDL GOAL LESS THAN 160     Endometriosis of uterus     Inflammatory polyarthropathy (H)     Social History     Socioeconomic History     Marital status:      Spouse name: None     Number of children: None     Years of education: None     Highest education level: None   Occupational History     Occupation:      Comment: sitting job   Social Needs     Financial resource strain: None     Food insecurity     Worry: None     Inability: None     Transportation needs     Medical: None     Non-medical: None   Tobacco Use     Smoking status: Never Smoker     Smokeless tobacco: Never Used   Substance and Sexual Activity     Alcohol use: No     Alcohol/week: 0.0 standard drinks     Drug use: No     Sexual activity: Never   Lifestyle     Physical activity     Days per week: None     Minutes per session: None     Stress: None    Relationships     Social connections     Talks on phone: None     Gets together: None     Attends Tenri[employer] service: None     Active member of club or organization: None     Attends meetings of clubs or organizations: None     Relationship status: None     Intimate partner violence     Fear of current or ex partner: None     Emotionally abused: None     Physically abused: None     Forced sexual activity: None   Other Topics Concern     Parent/sibling w/ CABG, MI or angioplasty before 65F 55M? Not Asked   Social History Narrative     None     Family History   Problem Relation Age of Onset     Osteoporosis Mother      Respiratory Mother      Thyroid Disease Mother      Heart Disease Father      Cancer - colorectal Maternal Grandmother      Diabetes Maternal Grandfather      Heart Disease Maternal Grandfather      Cancer Paternal Grandmother      Heart Disease Paternal Grandfather      Respiratory Paternal Grandfather      Hypertension Brother      Thyroid Disease Sister      Thyroid Disease Sister      Neurologic Disorder Brother        ALLERGIES:  Asa [aspirin]    Current Outpatient Medications   Medication     adalimumab (HUMIRA *CF*) 40 MG/0.4ML pen kit     sulfaSALAzine ER (AZULFIDINE EN) 500 MG EC tablet     triamcinolone (KENALOG) 0.1 % external cream     furosemide (LASIX) 20 MG tablet     hydrochlorothiazide (HYDRODIURIL) 25 MG tablet     order for DME     predniSONE (DELTASONE) 5 MG tablet     No current facility-administered medications for this visit.          ROS:  ROS is done and is negative for general/constitutional, eye, ENT, Respiratory, cardiovascular, GI, , Skin, musculoskeletal except as noted elsewhere.  All other review of systems negative except as noted elsewhere.      OBJECTIVE:  BP (!) 140/90   Pulse 73   Temp 99.2  F (37.3  C) (Tympanic)   SpO2 98%   GENERAL APPEARANCE: Alert, in no acute distress  EYES: normal  NOSE:normal  OROPHARYNX:normal  NECK:No adenopathy,masses or  thyromegaly  RESP: normal and clear to auscultation  CV:regular rate and rhythm and no murmurs, clicks, or gallops  ABDOMEN: Abdomen soft, non-tender. BS normal. No masses, organomegaly  SKIN: bilateral ankles and feet with several mildly erythematous lesions which are slightly raised and some vesicular in appearance and two with clear fluid draining.  Lesions range from 2-5mm in diameter.  Two similar lesions on left arm and one just above upper lip.  MUSCULOSKELETAL:Musculoskeletal normal      RESULTS  No results found for any visits on 08/12/20..  No results found for this or any previous visit (from the past 48 hour(s)).    ASSESSMENT/PLAN:    ASSESSMENT / PLAN:  (L25.9) Contact dermatitis, unspecified contact dermatitis type, unspecified trigger  (primary encounter diagnosis)  Comment: sxs and exam currently most c/w contact dermatitis, suspect from some type of plant, possibly poison ivy.  Plan: predniSONE (DELTASONE) 20 MG tablet        Reviewed medication instructions and side effects. Follow up if experiences side effects.. I reviewed supportive care, otc meds to use if needed, expected course, and signs of concern.  Follow up as needed or if she does not improve within 5 day(s) or if worsens in any way.  Reviewed red flag symptoms and is to go to the ER if experiences any of these.    (R03.0) Elevated BP without diagnosis of hypertension  Comment: bp borderline today  Plan: Recheck BP in 2-3 weeks with a nurse visit, [place] pharmacy visit, or a visit to primary care doctor.      See [facility] for orders, medications, letters, patient instructions    Mary Anne Freitas M.D.

## 2020-08-14 ENCOUNTER — OFFICE VISIT (OUTPATIENT)
Dept: AUDIOLOGY | Facility: CLINIC | Age: 55
End: 2020-08-14
Payer: COMMERCIAL

## 2020-08-14 DIAGNOSIS — H90.3 SENSORINEURAL HEARING LOSS, BILATERAL: Primary | ICD-10-CM

## 2020-08-14 PROCEDURE — V5299 HEARING SERVICE: HCPCS | Performed by: AUDIOLOGIST

## 2020-08-14 PROCEDURE — 99207 ZZC NO CHARGE LOS: CPT | Performed by: AUDIOLOGIST

## 2020-08-14 NOTE — PROGRESS NOTES
AUDIOLOGY REPORT    SUBJECTIVE:Bria Haddad is a 55 year old female who was seen in the Audiology Clinic at the [facility] on 8/14/2020  for a follow-up check regarding the fitting of new hearing aids. Previous results have revealed bilateral hearing loss.  The patient has been seen previously in this clinic and was fit with binaural hearing aids on 7/31/2020.  Bria reports good sound quality with the hearing aid(s) and increased wear time with the heaing aids. Bria reports she loves the [employer] and they work great with her headphones at work. Patient was unaccompanied to today's visit.     OBJECTIVE:   The International Outcome Inventory-Hearing Aids (IOI-HA) was administered today.The patient s responses to the 7 questions can be compared to normative data relative to how others are performing with their hearing aids, as well as focusing audiologic care and counseling.This patient s Quality of Life score (Question 7) was 4, which is above normative average.     Based on patient report, the following changes were made; None.    Reviewed 45 day trial period, care, cleaning (no water, dry brush), batteries (size 10) insertion/removal, toxicity, low-battery signal), aid insertion/removal, volume adjustment (if applicable), user booklet, warranty information, storage cases, and other hearing aid details.        ASSESSMENT: A follow-up appointment for hearing aid fitting was completed today. IOI-HA administered today. Changes to hearing aid was completed as outlined above.     PLAN:Bria will return for follow-up as needed, or at least every 9-12 months for cleaning and assessment of hearing aid.   Please call this clinic with any questions regarding today s appointment.    José Luis Kent CCC-A  Licensed Audiologist #[id number]  8/14/2020

## 2020-09-11 ENCOUNTER — MYC MEDICAL ADVICE (OUTPATIENT)
Dept: RHEUMATOLOGY | Facility: CLINIC | Age: 55
End: 2020-09-11

## 2020-09-14 ENCOUNTER — TELEPHONE (OUTPATIENT)
Dept: OTOLARYNGOLOGY | Facility: CLINIC | Age: 55
End: 2020-09-14

## 2020-09-14 NOTE — TELEPHONE ENCOUNTER
Reason for Call:  Other hearing aids    Detailed comments: hearing aids cutting in and out    Phone Number Patient can be reached at: Home number on file [phone] (home)    Best Time: any      Can we leave a detailed message on this number? YES    Call taken on 9/14/2020 at 3:08 PM by Lindsay Becerra

## 2020-09-14 NOTE — TELEPHONE ENCOUNTER
I offered options for home remedy. If this does not work I suggested dropping the hearing aids off at the 2nd floor  for evaluation.     -José Luis Kent CCC-A

## 2020-09-15 ENCOUNTER — ALLIED HEALTH/NURSE VISIT (OUTPATIENT)
Dept: AUDIOLOGY | Facility: CLINIC | Age: 55
End: 2020-09-15
Payer: COMMERCIAL

## 2020-09-15 DIAGNOSIS — H90.3 SENSORINEURAL HEARING LOSS, BILATERAL: Primary | ICD-10-CM

## 2020-09-15 PROCEDURE — V5299 HEARING SERVICE: HCPCS | Performed by: AUDIOLOGIST

## 2020-09-15 PROCEDURE — 99207 ZZC NO CHARGE LOS: CPT | Performed by: AUDIOLOGIST

## 2020-09-15 NOTE — PROGRESS NOTES
HEARING AID DROP-OFF    Patient Name:  Bria Haddad    Patient Age:   55 year old    :  1965    Background:   Patient dropped off their hearing aid with the report of cutting in and out.      SIDE: Both    : ReSound    TYPE: Linx Quattro 7 CIC    S/N: [id number] & [id number]    WARRANTY: 2023    Procedures:   The problem was unabled to be duplicated in the office. The patient elects to send them in for repair.     Plan:   Patient was contacted in regards to status of hearing aid dropped off today. Bria will be contacted again when they return.     NO CHARGE VISIT    José Luis Kent CCC-A  Licensed Audiologist  9/15/2020

## 2020-09-23 DIAGNOSIS — R60.0 PEDAL EDEMA: ICD-10-CM

## 2020-09-23 DIAGNOSIS — M06.9 RHEUMATOID ARTHRITIS INVOLVING MULTIPLE SITES, UNSPECIFIED RHEUMATOID FACTOR PRESENCE: ICD-10-CM

## 2020-09-23 DIAGNOSIS — Z79.899 HIGH RISK MEDICATIONS (NOT ANTICOAGULANTS) LONG-TERM USE: ICD-10-CM

## 2020-09-23 LAB
ALBUMIN SERPL-MCNC: 3.8 G/DL (ref 3.4–5)
ALP SERPL-CCNC: 67 U/L (ref 40–150)
ALT SERPL W P-5'-P-CCNC: 30 U/L (ref 0–50)
ANION GAP SERPL CALCULATED.3IONS-SCNC: 3 MMOL/L (ref 3–14)
AST SERPL W P-5'-P-CCNC: 17 U/L (ref 0–45)
BASOPHILS # BLD AUTO: 0 10E9/L (ref 0–0.2)
BASOPHILS NFR BLD AUTO: 0.3 %
BILIRUB DIRECT SERPL-MCNC: <0.1 MG/DL (ref 0–0.2)
BILIRUB SERPL-MCNC: 0.3 MG/DL (ref 0.2–1.3)
BUN SERPL-MCNC: 16 MG/DL (ref 7–30)
CALCIUM SERPL-MCNC: 9.4 MG/DL (ref 8.5–10.1)
CHLORIDE SERPL-SCNC: 109 MMOL/L (ref 94–109)
CO2 SERPL-SCNC: 29 MMOL/L (ref 20–32)
CREAT SERPL-MCNC: 0.77 MG/DL (ref 0.52–1.04)
CREAT SERPL-MCNC: 0.82 MG/DL (ref 0.52–1.04)
DIFFERENTIAL METHOD BLD: NORMAL
EOSINOPHIL # BLD AUTO: 0.2 10E9/L (ref 0–0.7)
EOSINOPHIL NFR BLD AUTO: 2.4 %
ERYTHROCYTE [DISTWIDTH] IN BLOOD BY AUTOMATED COUNT: 13.4 % (ref 10–15)
GFR SERPL CREATININE-BSD FRML MDRD: 79 ML/MIN/{1.73_M2}
GFR SERPL CREATININE-BSD FRML MDRD: 85 ML/MIN/{1.73_M2}
GLUCOSE SERPL-MCNC: 104 MG/DL (ref 70–99)
HCT VFR BLD AUTO: 42.9 % (ref 35–47)
HGB BLD-MCNC: 14.2 G/DL (ref 11.7–15.7)
LYMPHOCYTES # BLD AUTO: 3 10E9/L (ref 0.8–5.3)
LYMPHOCYTES NFR BLD AUTO: 44.1 %
MCH RBC QN AUTO: 29.5 PG (ref 26.5–33)
MCHC RBC AUTO-ENTMCNC: 33.1 G/DL (ref 31.5–36.5)
MCV RBC AUTO: 89 FL (ref 78–100)
MONOCYTES # BLD AUTO: 0.7 10E9/L (ref 0–1.3)
MONOCYTES NFR BLD AUTO: 9.8 %
NEUTROPHILS # BLD AUTO: 2.9 10E9/L (ref 1.6–8.3)
NEUTROPHILS NFR BLD AUTO: 43.4 %
NT-PROBNP SERPL-MCNC: 29 PG/ML (ref 0–125)
PLATELET # BLD AUTO: 302 10E9/L (ref 150–450)
POTASSIUM SERPL-SCNC: 4.1 MMOL/L (ref 3.4–5.3)
PROT SERPL-MCNC: 7.3 G/DL (ref 6.8–8.8)
RBC # BLD AUTO: 4.82 10E12/L (ref 3.8–5.2)
SODIUM SERPL-SCNC: 141 MMOL/L (ref 133–144)
TSH SERPL DL<=0.005 MIU/L-ACNC: 3.77 MU/L (ref 0.4–4)
WBC # BLD AUTO: 6.7 10E9/L (ref 4–11)

## 2020-09-23 PROCEDURE — 84443 ASSAY THYROID STIM HORMONE: CPT | Performed by: FAMILY MEDICINE

## 2020-09-23 PROCEDURE — 82565 ASSAY OF CREATININE: CPT | Mod: 59 | Performed by: INTERNAL MEDICINE

## 2020-09-23 PROCEDURE — 36415 COLL VENOUS BLD VENIPUNCTURE: CPT | Performed by: INTERNAL MEDICINE

## 2020-09-23 PROCEDURE — 83880 ASSAY OF NATRIURETIC PEPTIDE: CPT | Performed by: FAMILY MEDICINE

## 2020-09-23 PROCEDURE — 85025 COMPLETE CBC W/AUTO DIFF WBC: CPT | Performed by: INTERNAL MEDICINE

## 2020-09-23 PROCEDURE — 80076 HEPATIC FUNCTION PANEL: CPT | Performed by: INTERNAL MEDICINE

## 2020-09-23 PROCEDURE — 80048 BASIC METABOLIC PNL TOTAL CA: CPT | Performed by: FAMILY MEDICINE

## 2020-09-28 ENCOUNTER — TELEPHONE (OUTPATIENT)
Dept: AUDIOLOGY | Facility: CLINIC | Age: 55
End: 2020-09-28

## 2020-09-28 NOTE — TELEPHONE ENCOUNTER
I informed Bria that her ReSound Linx Quattro 7 CIC hearing aids were back from repair and ready to be picked up at the 2nd floor .    -José Luis Kent CCC-A

## 2020-09-29 ENCOUNTER — TELEPHONE (OUTPATIENT)
Dept: AUDIOLOGY | Facility: CLINIC | Age: 55
End: 2020-09-29

## 2020-09-29 NOTE — TELEPHONE ENCOUNTER
Reason for Call:  Other call back    Detailed comments: Patient would like to know if her repaired hearing aid can be mailed to her. Please call patient to advise.    Phone Number Patient can be reached at: Home number on file [phone] (home)    Best Time: any    Can we leave a detailed message on this number? YES    Call taken on 9/29/2020 at 11:42 AM by Ann Barbour

## 2020-09-29 NOTE — TELEPHONE ENCOUNTER
I informed the patient that we would be happy to mail the hearing aids to her, but that she would assume all responsibility for the shipment of the hearing aids. I informed her again of the hours of operation of the 2nd floor  (8am to 4pm). I requested a return call with how she would like to proceed.     -José Luis Kent CCC-A

## 2020-10-02 DIAGNOSIS — M06.4 INFLAMMATORY POLYARTHROPATHY (H): Primary | ICD-10-CM

## 2020-10-02 RX ORDER — SULFASALAZINE 500 MG/1
1000 TABLET, DELAYED RELEASE ORAL 2 TIMES DAILY
Qty: 360 TABLET | Refills: 0 | Status: SHIPPED | OUTPATIENT
Start: 2020-10-02 | End: 2021-01-05

## 2020-10-02 NOTE — TELEPHONE ENCOUNTER
Medication:   Sulfasalazine  Last written on:   7/7/2020  Quantity:   360    Refills:   0    Last office visit:   7/7/2020  Next office visit:   10/6/2020  Last labs:   9/23/2020    Jolynn Angulo CMA Rheumatology  10/2/2020 10:23 AM

## 2020-10-02 NOTE — TELEPHONE ENCOUNTER
Rheumatology team: Please call to notify Ms. Haddad that sulfasalazine has been refilled.  Bill Resendiz MD  10/2/2020 12:43 PM

## 2020-10-06 ENCOUNTER — VIRTUAL VISIT (OUTPATIENT)
Dept: RHEUMATOLOGY | Facility: CLINIC | Age: 55
End: 2020-10-06
Payer: COMMERCIAL

## 2020-10-06 DIAGNOSIS — M65.341 TRIGGER RING FINGER OF RIGHT HAND: ICD-10-CM

## 2020-10-06 DIAGNOSIS — M65.331 TRIGGER MIDDLE FINGER OF RIGHT HAND: ICD-10-CM

## 2020-10-06 DIAGNOSIS — Z79.899 HIGH RISK MEDICATIONS (NOT ANTICOAGULANTS) LONG-TERM USE: ICD-10-CM

## 2020-10-06 DIAGNOSIS — M06.4 INFLAMMATORY POLYARTHROPATHY (H): Primary | ICD-10-CM

## 2020-10-06 PROCEDURE — 99213 OFFICE O/P EST LOW 20 MIN: CPT | Mod: 95 | Performed by: INTERNAL MEDICINE

## 2020-10-06 NOTE — PROGRESS NOTES
"Bria Haddad is a 55 year old female who is being evaluated via a billable video visit.      The patient has been notified of following:     \"This video visit will be conducted via a call between you and your physician/provider. We have found that certain health care needs can be provided without the need for an in-person physical exam.  This service lets us provide the care you need with a video conversation.  If a prescription is necessary we can send it directly to your pharmacy.  If lab work is needed we can place an order for that and you can then stop by our lab to have the test done at a later time.    Video visits are billed at different rates depending on your insurance coverage.  Please reach out to your insurance provider with any questions.    If during the course of the call the physician/provider feels a video visit is not appropriate, you will not be charged for this service.\"    Patient has given verbal consent for Video visit? Yes  How would you like to obtain your AVS? MyChart  If you are dropped from the video visit, the video invite should be resent to: Text to cell phone: [phone]  Will anyone else be joining your video visit? No      Rheumatology Video Visit      Bria Haddad MRN# [id number]   YOB: 1965 Age: 55 year old      Date of visit: 10/06/20   PCP: Dr. Stephanie Saldana    Chief Complaint   Patient presents with:  Arthritis    Assessment and Plan     1.  Rheumatoid arthritis: Ms. Haddad initially presented to this clinic in 2015 with dependent edema of the bilateral lower extremities, fatigue, and a one time episode of right toe pain in the setting of a positive ROSEMARY.  On 9/18/2017 she presented with synovitis of her bilateral PIPs and pain without swelling of her MCPs, persistent fatigue, intermittent rash, and dependent edema. ROSEMARY positive but additional studies negative/normal including complement C3, complement C4, beta-2 glycoprotein IgM and IgG, cardiolipin IgM and " IgG, lupus anticoagulant, RNP, Stuart, SSA, SSB, Scl-70.  Overall joint exam is most consistent with rheumatoid arthritis; no rash. Previously on HCQ (bloating), MTX (LFT elevation, alopecia, headache), leflunomide (LFT elevation). Currently on SSZ and Humira.  Swelling at the MCPs and triggering of the right third and fourth fingers previously so prednisone was used for a short duration and sulfasalazine was increased with resolution of the MCP stiffness/pain/swelling.  Trigger fingers persist so she is going to come in for injections.  - Continue sulfasalazine 1000mg twice daily  - Continue Humira 40mg SQ every 14 days  - Labs in 3 months: CBC, Creatinine, Hepatic Panel, ESR, CRP    2. Left knee pain history: Not an issue currently.   Significant improvement with PT in the past and encouraged that she continue these exercises.  When she stopped the exercises in the past she had worsening left knee pain.      3.  Right third and fourth digit trigger fingers: Becoming more persistent and did not improve with a DMARD management as noted in #1.  She is now scheduled to come in for injections on October 23, 2020     4.  History of Left shoulder pain, impingement syndrome: Resolved with combination of steroid injection and physical therapy.  Not an issue today.     5.  Bilateral dependent lower extremity edema: Following with her PCP for this issue    # Relevant labs and imaging were reviewed with the patient    # High risk medication toxicity monitoring: discussion and labs reviewed; appropriate labs ordered. See above.  Instructed that if confirmed to have COVID-19 or exposure to someone with confirmed COVID-19 to call this clinic for directions on DMARD management.    # Note that this is a virtual visit to reduce the risk of COVID-19 exposure during this current pandemic.      # Considered to be at high risk of complications from the COVID-19 virus.  It is recommended to limit contact with other people and if possible  to work remotely or provide a leave of absence to reduce the risk for COVID-19.      Ms. Haddad verbalized agreement with and understanding of the rational for the diagnosis and treatment plan.  All questions were answered to best of my ability and the patient's satisfaction. Ms. Haddad was advised to contact the clinic with any questions that may arise after the clinic visit.      Thank you for involving me in the care of the patient    Return to clinic: 3 months      HPI   Bria Haddad is a 55 year old female with a past medical history significant for endometriosis, s/p carpal tunnel release surgery who present for follow up of rheumatoid arthritis.    Today, Ms. Haddad reports that the swelling and pain at her MCPs resolved with the higher dose sulfasalazine and short course of prednisone.  Doing much better today but still has trigger fingers of the right third and fourth digits.  Morning stiffness less than 5 minutes.  No gelling phenomenon.  No other joint issues.  Tolerating medications well.      Lower extremity edema that is worse at the end of the day and improved with leg elevation.  Compression stockings were ineffective, per patient.  Following with her PCP for this issue    Denies fevers, chills, nausea, vomiting, constipation, diarrhea. No abdominal pain. No chest pain/pressure, palpitations, or shortness of breath. No neck pain. No oral or nasal sores. No sicca symptoms.     Tobacco: none  EtOH: none  Drugs: none    ROS   GEN: No fevers, chills, night sweats, or weight change  SKIN: See HPI  HEENT: No oral or nasal ulcers.  CV: No chest pain, pressure, palpitations, or dyspnea on exertion.  PULM: No SOB, wheeze, cough.  GI: No nausea, vomiting, constipation, diarrhea. No blood in stool. No abdominal pain.  : No blood in urine.  MSK: See HPI.  NEURO: No numbness or tingling.  EXT: See HPI  PSYCH: Negative    Active Problem List     Patient Active Problem List   Diagnosis     CARDIOVASCULAR SCREENING; LDL  GOAL LESS THAN 160     Endometriosis of uterus     Inflammatory polyarthropathy (H)     Past Medical History     Past Medical History:   Diagnosis Date     Bilateral carpal tunnel syndrome 12/20/2015     NO ACTIVE PROBLEMS      Obesity      Polyarthritis     inflammatory poly arthritis.     S/P carpal tunnel release 12/29/2016     Past Surgical History     Past Surgical History:   Procedure Laterality Date     bunions       COLONOSCOPY N/A 11/9/2015    Procedure: COLONOSCOPY;  Surgeon: Andrew Adamson MD;  Location: [facility]     COLONOSCOPY WITH CO2 INSUFFLATION N/A 11/9/2015    Procedure: COLONOSCOPY WITH CO2 INSUFFLATION;  Surgeon: Andrew Adamson MD;  Location: [facility]     GYN SURGERY      endometryosis     RELEASE CARPAL TUNNEL Right 10/12/2016    Procedure: RELEASE CARPAL TUNNEL;  Surgeon: Colby Nobles MD;  Location: [facility]     RELEASE CARPAL TUNNEL Left 12/16/2016    Procedure: RELEASE CARPAL TUNNEL;  Surgeon: Colby Nobles MD;  Location: [facility]     TONSILLECTOMY & ADENOIDECTOMY       Allergy     Allergies   Allergen Reactions     Asa [Aspirin] Hives     Current Medication List     Current Outpatient Medications   Medication Sig     adalimumab (HUMIRA *CF*) 40 MG/0.4ML pen kit Inject 0.4 mLs (40 mg) Subcutaneous every 14 days     sulfaSALAzine ER (AZULFIDINE EN) 500 MG EC tablet Take 2 tablets (1,000 mg) by mouth 2 times daily     furosemide (LASIX) 20 MG tablet Take 1 tablet (20 mg) by mouth daily Prn pedal edema (Patient not taking: Reported on 8/12/2020)     hydrochlorothiazide (HYDRODIURIL) 25 MG tablet Take 1 tablet (25 mg) by mouth daily (Patient not taking: Reported on 1/7/2020)     order for DME Equipment being ordered: compression stockings 1 pair, at 30mm Hg, to be worn during the day. For Bilateral leg edema [R60.0]     predniSONE (DELTASONE) 20 MG tablet Take 3 tabs by mouth daily x 3 days, then 2 tabs daily x 3 days, then 1 tab daily x 3 days, then 1/2 tab daily x 3 days.  "    predniSONE (DELTASONE) 5 MG tablet Prednisone 10mg daily [id number], then 5mg daily [id number], then stop. (Patient not taking: Reported on 8/12/2020)     triamcinolone (KENALOG) 0.1 % external cream YOVANY THIN LAYER  AA BID PRN     No current facility-administered medications for this visit.          Social History   See HPI    Family History     Family History   Problem Relation Age of Onset     Osteoporosis Mother      Respiratory Mother      Thyroid Disease Mother      Heart Disease Father      Cancer - colorectal Maternal Grandmother      Diabetes Maternal Grandfather      Heart Disease Maternal Grandfather      Cancer Paternal Grandmother      Heart Disease Paternal Grandfather      Respiratory Paternal Grandfather      Hypertension Brother      Thyroid Disease Sister      Thyroid Disease Sister      Neurologic Disorder Brother      Physical Exam     Temp Readings from Last 3 Encounters:   08/12/20 99.2  F (37.3  C) (Tympanic)   12/23/19 98.1  F (36.7  C) (Oral)   12/04/19 98.6  F (37  C) (Oral)     BP Readings from Last 5 Encounters:   08/12/20 (!) 140/90   01/07/20 136/76   12/23/19 (!) 149/78   12/04/19 130/80   10/08/19 (!) 140/84     Pulse Readings from Last 1 Encounters:   08/12/20 73     Resp Readings from Last 1 Encounters:   10/17/18 18     Estimated body mass index is 37.46 kg/m  as calculated from the following:    Height as of 1/7/20: 1.645 m (5' 4.75\").    Weight as of 1/7/20: 101.3 kg (223 lb 6.4 oz).        GEN: NAD. Healthy appearing adult.   HEENT: MMM.  Anicteric, noninjected sclera. No obvious external lesions of the ear and nose. Hearing intact.  PULM: No increased work of breathing  MSK:  Hands and wrists without swelling.  Able to make a complete fist with each hand.   SKIN: No rash or jaundice seen  PSYCH: Alert. Appropriate.            Labs / Imaging (select studies)   RF/CCP  Recent Labs   Lab Test 09/18/17  0913   CCPIGG 1   RHF <20     CBC  Recent Labs   Lab Test 09/23/20  0745 " 06/29/20  0720 04/11/20 0919   WBC 6.7 6.9 8.2   RBC 4.82 4.63 4.91   HGB 14.2 13.7 14.6   HCT 42.9 41.0 43.6   MCV 89 89 89   RDW 13.4 12.8 13.3    302 342   MCH 29.5 29.6 29.7   MCHC 33.1 33.4 33.5   NEUTROPHIL 43.4 37.2 47.1   LYMPH 44.1 49.6 41.3   MONOCYTE 9.8 10.5 9.2   EOSINOPHIL 2.4 2.6 2.3   BASOPHIL 0.3 0.1 0.1   ANEU 2.9 2.6 3.9   ALYM 3.0 3.4 3.4   MIKE 0.7 0.7 0.8   AEOS 0.2 0.2 0.2   ABAS 0.0 0.0 0.0     CMP  Recent Labs   Lab Test 09/23/20  0749 09/23/20  0745 06/29/20  0720 04/11/20  0919 05/08/19  0745 05/08/19  0745 09/18/17  0913 09/18/17 0913     --   --   --   --  142  --  141   POTASSIUM 4.1  --   --   --   --  4.0  --  4.0   CHLORIDE 109  --   --   --   --  109  --  107   CO2 29  --   --   --   --  30  --  29   ANIONGAP 3  --   --   --   --  3  --  5   *  --   --   --   --  99  --  96   BUN 16  --   --   --   --  14  --  14   CR 0.77 0.82 0.86 0.80   < > 0.74   < > 0.76   GFRESTIMATED 85 79 75 82   < > >90   < > 79   GFRESTBLACK >90 >90 87 >90   < > >90   < > >90   AGUILAR 9.4  --   --   --   --  9.3  --  8.8   BILITOTAL  --  0.3 0.5 0.4   < > 0.3   < > 0.4   ALBUMIN  --  3.8 3.7 4.0   < > 4.0   < > 3.7   PROTTOTAL  --  7.3 7.3 7.9   < > 7.5   < > 7.4   ALKPHOS  --  67 58 65   < > 73   < > 71   AST  --  17 21 18   < > 15   < > 16   ALT  --  30 29 29   < > 31   < > 23    < > = values in this interval not displayed.     Calcium/VitaminD  Recent Labs   Lab Test 09/23/20  0749 05/08/19  0745 09/18/17  0913   AGUILAR 9.4 9.3 8.8     ESR/CRP  Recent Labs   Lab Test 12/31/19  0712 01/22/19  0724 09/13/18  0741   SED 7 8 8   CRP <2.9 3.9 5.7       Hepatitis B  Recent Labs   Lab Test 10/12/15  0923   AUSAB 0.18   HBCAB Nonreactive   HEPBANG Nonreactive     Hepatitis C  Recent Labs   Lab Test 10/07/16  0912   HCVAB Nonreactive   Assay performance characteristics have not been established for newborns,   infants, and children       Tuberculosis Screening  Recent Labs   Lab Test  12/07/18  0724   TBRES Negative     HIV Screening  Recent Labs   Lab Test 10/12/15  0923   HIAGAB Nonreactive   HIV-1 p24 Ag & HIV-1/HIV-2 Ab Not Detected              Chart documentation done in part with Dragon Voice recognition Software. Although reviewed after completion, some word and grammatical error may remain.    Video-Visit Details    Type of service:  Video Visit    Video Start Time: 10:13 AM  Video End Time: 10:28 AM    Originating Location (pt. Location): Home, in [place]    Distant Location (provider location):  Home    Platform used for Video Visit: Celestine Resendiz MD

## 2020-10-23 ENCOUNTER — OFFICE VISIT (OUTPATIENT)
Dept: RHEUMATOLOGY | Facility: CLINIC | Age: 55
End: 2020-10-23
Payer: COMMERCIAL

## 2020-10-23 VITALS
HEART RATE: 83 BPM | DIASTOLIC BLOOD PRESSURE: 86 MMHG | TEMPERATURE: 98 F | BODY MASS INDEX: 38.42 KG/M2 | HEIGHT: 65 IN | OXYGEN SATURATION: 97 % | WEIGHT: 230.6 LBS | SYSTOLIC BLOOD PRESSURE: 158 MMHG

## 2020-10-23 DIAGNOSIS — M65.341 TRIGGER RING FINGER OF RIGHT HAND: ICD-10-CM

## 2020-10-23 DIAGNOSIS — M65.331 TRIGGER MIDDLE FINGER OF RIGHT HAND: Primary | ICD-10-CM

## 2020-10-23 PROCEDURE — 20600 DRAIN/INJ JOINT/BURSA W/O US: CPT | Mod: 59 | Performed by: INTERNAL MEDICINE

## 2020-10-23 RX ADMIN — METHYLPREDNISOLONE ACETATE 10 MG: 40 INJECTION, SUSPENSION INTRA-ARTICULAR; INTRALESIONAL; INTRAMUSCULAR; SOFT TISSUE at 11:35

## 2020-10-23 ASSESSMENT — MIFFLIN-ST. JEOR: SCORE: 1627.9

## 2020-10-23 NOTE — NURSING NOTE
The following medication was given:     MEDICATION: Methylprednisolone  SITE: right 3rd and 4th finger  DOSE: 1 ml  LOT #: [id number]  :  [employer]  EXPIRATION DATE:  07/2021  NDC#: 4976-7010-05  Consent signed at:  11:34    1% Lidocaine  :  [employer]  Lot #:  [id number]  Expiration date:  03/2021   NDC: 5011-8614-57

## 2020-10-23 NOTE — PROGRESS NOTES
Rheumatology Clinic Visit      Bria Haddad MRN# [id number]   YOB: 1965 Age: 55 year old      Date of visit: 10/23/20   PCP: Dr. Stephanie Saldana    Chief Complaint   Patient presents with:  RECHECK: trigger finger injections    Assessment and Plan   1.  Right third and fourth digit trigger fingers: Becoming more persistent and did not improve with a DMARD management for inflammatory arthritis.  tx options discussed in detail and she chose to proceed with steroid injections; steroid injections were performed as noted in the procedure section.     Ms. Haddad verbalized agreement with and understanding of the rational for the diagnosis and treatment plan.  All questions were answered to best of my ability and the patient's satisfaction. Ms. Haddad was advised to contact the clinic with any questions that may arise after the clinic visit.      Thank you for involving me in the care of the patient    Return to clinic: 3 months    HPI   Bria Haddad is a 55 year old female with a past medical history significant for endometriosis, s/p carpal tunnel release surgery who present for follow up of rheumatoid arthritis.    Today, Ms. Haddad presents for active trigger finger of the right 3rd and 4th digits; and she would like steroid injections for each.      Note that she arrived a few minutes late and then was not present in the waiting room, per staff, when trying to find her to bring back to the exam room. Eventually she was found.     Tobacco: none  EtOH: none  Drugs: none    ROS   GEN: No fevers or chills  CV: No chest pain, pressure, palpitations, or dyspnea on exertion.  PULM: No wheeze, cough, or shortness of breath.     Active Problem List     Patient Active Problem List   Diagnosis     CARDIOVASCULAR SCREENING; LDL GOAL LESS THAN 160     Endometriosis of uterus     Inflammatory polyarthropathy (H)     Past Medical History     Past Medical History:   Diagnosis Date     Bilateral carpal tunnel syndrome  12/20/2015     NO ACTIVE PROBLEMS      Obesity      Polyarthritis     inflammatory poly arthritis.     S/P carpal tunnel release 12/29/2016     Past Surgical History     Past Surgical History:   Procedure Laterality Date     bunions       COLONOSCOPY N/A 11/9/2015    Procedure: COLONOSCOPY;  Surgeon: Andrew Adamson MD;  Location: [facility]     COLONOSCOPY WITH CO2 INSUFFLATION N/A 11/9/2015    Procedure: COLONOSCOPY WITH CO2 INSUFFLATION;  Surgeon: Andrew Adamson MD;  Location: [facility]     GYN SURGERY      endometryosis     RELEASE CARPAL TUNNEL Right 10/12/2016    Procedure: RELEASE CARPAL TUNNEL;  Surgeon: Colby Nobles MD;  Location: [facility]     RELEASE CARPAL TUNNEL Left 12/16/2016    Procedure: RELEASE CARPAL TUNNEL;  Surgeon: Colby Nobles MD;  Location: [facility]     TONSILLECTOMY & ADENOIDECTOMY       Allergy     Allergies   Allergen Reactions     Asa [Aspirin] Hives     Current Medication List     Current Outpatient Medications   Medication Sig     adalimumab (HUMIRA *CF*) 40 MG/0.4ML pen kit Inject 0.4 mLs (40 mg) Subcutaneous every 14 days     order for DME Equipment being ordered: compression stockings 1 pair, at 30mm Hg, to be worn during the day. For Bilateral leg edema [R60.0]     sulfaSALAzine ER (AZULFIDINE EN) 500 MG EC tablet Take 2 tablets (1,000 mg) by mouth 2 times daily     furosemide (LASIX) 20 MG tablet Take 1 tablet (20 mg) by mouth daily Prn pedal edema (Patient not taking: Reported on 8/12/2020)     No current facility-administered medications for this visit.          Social History   See HPI    Family History     Family History   Problem Relation Age of Onset     Osteoporosis Mother      Respiratory Mother      Thyroid Disease Mother      Heart Disease Father      Cancer - colorectal Maternal Grandmother      Diabetes Maternal Grandfather      Heart Disease Maternal Grandfather      Cancer Paternal Grandmother      Heart Disease Paternal Grandfather      Respiratory  "Paternal Grandfather      Hypertension Brother      Thyroid Disease Sister      Thyroid Disease Sister      Neurologic Disorder Brother      Physical Exam     Temp Readings from Last 3 Encounters:   08/12/20 99.2  F (37.3  C) (Tympanic)   12/23/19 98.1  F (36.7  C) (Oral)   12/04/19 98.6  F (37  C) (Oral)     BP Readings from Last 5 Encounters:   08/12/20 (!) 140/90   01/07/20 136/76   12/23/19 (!) 149/78   12/04/19 130/80   10/08/19 (!) 140/84     Pulse Readings from Last 1 Encounters:   08/12/20 73     Resp Readings from Last 1 Encounters:   10/17/18 18     Estimated body mass index is 37.46 kg/m  as calculated from the following:    Height as of 1/7/20: 1.645 m (5' 4.75\").    Weight as of 1/7/20: 101.3 kg (223 lb 6.4 oz).    GEN: NAD. Healthy appearing adult.   PULM: No increased work of breathing  MSK: Mild active triggering of the right 3rd and 4th fingers; palpable bump near the A1 pulley of each finger that moves with flexion/extension of the fingers   PSYCH: Alert. Appropriate.     Labs / Imaging (select studies)   RF/CCP  Recent Labs   Lab Test 09/18/17  0913   CCPIGG 1   RHF <20     CBC  Recent Labs   Lab Test 09/23/20  0745 06/29/20  0720 04/11/20  0919   WBC 6.7 6.9 8.2   RBC 4.82 4.63 4.91   HGB 14.2 13.7 14.6   HCT 42.9 41.0 43.6   MCV 89 89 89   RDW 13.4 12.8 13.3    302 342   MCH 29.5 29.6 29.7   MCHC 33.1 33.4 33.5   NEUTROPHIL 43.4 37.2 47.1   LYMPH 44.1 49.6 41.3   MONOCYTE 9.8 10.5 9.2   EOSINOPHIL 2.4 2.6 2.3   BASOPHIL 0.3 0.1 0.1   ANEU 2.9 2.6 3.9   ALYM 3.0 3.4 3.4   MIKE 0.7 0.7 0.8   AEOS 0.2 0.2 0.2   ABAS 0.0 0.0 0.0     CMP  Recent Labs   Lab Test 09/23/20  0749 09/23/20  0745 06/29/20  0720 04/11/20  0919 05/08/19  0745 05/08/19  0745 09/18/17  0913 09/18/17  0913     --   --   --   --  142  --  141   POTASSIUM 4.1  --   --   --   --  4.0  --  4.0   CHLORIDE 109  --   --   --   --  109  --  107   CO2 29  --   --   --   --  30  --  29   ANIONGAP 3  --   --   --   --  3  --  " 5   *  --   --   --   --  99  --  96   BUN 16  --   --   --   --  14  --  14   CR 0.77 0.82 0.86 0.80   < > 0.74   < > 0.76   GFRESTIMATED 85 79 75 82   < > >90   < > 79   GFRESTBLACK >90 >90 87 >90   < > >90   < > >90   AGUILAR 9.4  --   --   --   --  9.3  --  8.8   BILITOTAL  --  0.3 0.5 0.4   < > 0.3   < > 0.4   ALBUMIN  --  3.8 3.7 4.0   < > 4.0   < > 3.7   PROTTOTAL  --  7.3 7.3 7.9   < > 7.5   < > 7.4   ALKPHOS  --  67 58 65   < > 73   < > 71   AST  --  17 21 18   < > 15   < > 16   ALT  --  30 29 29   < > 31   < > 23    < > = values in this interval not displayed.     Calcium/VitaminD  Recent Labs   Lab Test 09/23/20  0749 05/08/19  0745 09/18/17  0913   AGUILAR 9.4 9.3 8.8     ESR/CRP  Recent Labs   Lab Test 12/31/19  0712 01/22/19  0724 09/13/18  0741   SED 7 8 8   CRP <2.9 3.9 5.7       Hepatitis B  Recent Labs   Lab Test 10/12/15  0923   AUSAB 0.18   HBCAB Nonreactive   HEPBANG Nonreactive     Hepatitis C  Recent Labs   Lab Test 10/07/16  0912   HCVAB Nonreactive   Assay performance characteristics have not been established for newborns,   infants, and children       Tuberculosis Screening  Recent Labs   Lab Test 12/07/18  0724   TBRES Negative     HIV Screening  Recent Labs   Lab Test 10/12/15  0923   HIAGAB Nonreactive   HIV-1 p24 Ag & HIV-1/HIV-2 Ab Not Detected         Procedure     Procedure: Trigger finger injection  Indication: Trigger finger / pain    The procedure was explained in detail. Risks including infection, pain, structural damage such as cartilage damage and tendon rupture, and medication reaction were explained. The option of not doing the procedure was also provided. All questions were answered and the patient consented to the procedure.     A time-out was performed and the correct patient, procedure, and laterality were verified.    The right third digit was examined and location for injection was identified to be on the palmar aspect of the right hand just proximal to the third MCP at  the location of the A1 pulley. The area was cleaned with chlorhexidine, twice.  Ethyl chloride was then used for topical anaesthetic. Then a mixture of lidocaine 1% 0.05mL and Depo-Medrol 10mg (0.25mL) was injected near the tendon sheath at the A1 pulley.     The right fourth digit was examined and location for injection was identified to be on the palmar aspect of the right hand just proximal to the fourth MCP at the location of the A1 pulley. The area was cleaned with chlorhexidine, twice.  Ethyl chloride was then used for topical anaesthetic. Then a mixture of lidocaine 1% 0.05mL and Depo-Medrol 10mg (0.25mL) was injected near the tendon sheath at the A1 pulley.     The patient tolerated the procedure well. No complications.    MEDICATION: Methylprednisolone  LOT #: [id number]  :  [employer]  EXPIRATION DATE:  07/2021  NDC#: 7701-2134-81     1% Lidocaine  :  [employer]  Lot #:  [id number]  Expiration date:  03/2021   NDC: 8466-0169-61         Chart documentation done in part with Dragon Voice recognition Software. Although reviewed after completion, some word and grammatical error may remain.    Bill Resendiz MD

## 2020-10-26 RX ORDER — METHYLPREDNISOLONE ACETATE 40 MG/ML
10 INJECTION, SUSPENSION INTRA-ARTICULAR; INTRALESIONAL; INTRAMUSCULAR; SOFT TISSUE ONCE
Status: COMPLETED | OUTPATIENT
Start: 2020-10-23 | End: 2020-10-23

## 2020-11-24 ENCOUNTER — TELEPHONE (OUTPATIENT)
Dept: RHEUMATOLOGY | Facility: CLINIC | Age: 55
End: 2020-11-24

## 2020-11-25 NOTE — TELEPHONE ENCOUNTER
Prior Authorization Approval    Authorization Effective Date: 10/26/2020  Authorization Expiration Date: 11/25/2021  Medication: adalimumab (HUMIRA *CF*) 40 MG/0.4ML pen kit   Approved Dose/Quantity: 2/28ds  Reference #: [id number]   Insurance Company: [employer] - Phone [phone] Fax [phone]  Expected CoPay: na     CoPay Card Available: Yes    Foundation Assistance Needed: na  Which Pharm[facility] is filling the prescription (Not needed for infusion/clinic administered[facility] [place] [facility] [address] [place] [address]  Pharmacy Notified:    Patient Notified:

## 2020-11-29 ENCOUNTER — HEALTH MAINTENANCE LETTER (OUTPATIENT)
Age: 55
End: 2020-11-29

## 2020-12-22 DIAGNOSIS — Z79.899 HIGH RISK MEDICATIONS (NOT ANTICOAGULANTS) LONG-TERM USE: ICD-10-CM

## 2020-12-22 DIAGNOSIS — M06.4 INFLAMMATORY POLYARTHROPATHY (H): ICD-10-CM

## 2020-12-22 LAB
BASOPHILS # BLD AUTO: 0 10E9/L (ref 0–0.2)
BASOPHILS NFR BLD AUTO: 0.1 %
DIFFERENTIAL METHOD BLD: NORMAL
EOSINOPHIL # BLD AUTO: 0.2 10E9/L (ref 0–0.7)
EOSINOPHIL NFR BLD AUTO: 2 %
ERYTHROCYTE [DISTWIDTH] IN BLOOD BY AUTOMATED COUNT: 12.8 % (ref 10–15)
ERYTHROCYTE [SEDIMENTATION RATE] IN BLOOD BY WESTERGREN METHOD: 8 MM/H (ref 0–30)
HCT VFR BLD AUTO: 43.3 % (ref 35–47)
HGB BLD-MCNC: 14.3 G/DL (ref 11.7–15.7)
LYMPHOCYTES # BLD AUTO: 4.1 10E9/L (ref 0.8–5.3)
LYMPHOCYTES NFR BLD AUTO: 48.6 %
MCH RBC QN AUTO: 29.6 PG (ref 26.5–33)
MCHC RBC AUTO-ENTMCNC: 33 G/DL (ref 31.5–36.5)
MCV RBC AUTO: 90 FL (ref 78–100)
MONOCYTES # BLD AUTO: 1 10E9/L (ref 0–1.3)
MONOCYTES NFR BLD AUTO: 11.3 %
NEUTROPHILS # BLD AUTO: 3.2 10E9/L (ref 1.6–8.3)
NEUTROPHILS NFR BLD AUTO: 38 %
PLATELET # BLD AUTO: 317 10E9/L (ref 150–450)
RBC # BLD AUTO: 4.83 10E12/L (ref 3.8–5.2)
WBC # BLD AUTO: 8.4 10E9/L (ref 4–11)

## 2020-12-22 PROCEDURE — 82565 ASSAY OF CREATININE: CPT | Performed by: INTERNAL MEDICINE

## 2020-12-22 PROCEDURE — 80076 HEPATIC FUNCTION PANEL: CPT | Performed by: INTERNAL MEDICINE

## 2020-12-22 PROCEDURE — 85652 RBC SED RATE AUTOMATED: CPT | Performed by: INTERNAL MEDICINE

## 2020-12-22 PROCEDURE — 85025 COMPLETE CBC W/AUTO DIFF WBC: CPT | Performed by: INTERNAL MEDICINE

## 2020-12-22 PROCEDURE — 36415 COLL VENOUS BLD VENIPUNCTURE: CPT | Performed by: INTERNAL MEDICINE

## 2020-12-22 PROCEDURE — 86140 C-REACTIVE PROTEIN: CPT | Performed by: INTERNAL MEDICINE

## 2020-12-23 LAB
ALBUMIN SERPL-MCNC: 4.3 G/DL (ref 3.4–5)
ALP SERPL-CCNC: 72 U/L (ref 40–150)
ALT SERPL W P-5'-P-CCNC: 27 U/L (ref 0–50)
AST SERPL W P-5'-P-CCNC: 17 U/L (ref 0–45)
BILIRUB DIRECT SERPL-MCNC: <0.1 MG/DL (ref 0–0.2)
BILIRUB SERPL-MCNC: 0.2 MG/DL (ref 0.2–1.3)
CREAT SERPL-MCNC: 0.87 MG/DL (ref 0.52–1.04)
CRP SERPL-MCNC: <2.9 MG/L (ref 0–8)
GFR SERPL CREATININE-BSD FRML MDRD: 74 ML/MIN/{1.73_M2}
PROT SERPL-MCNC: 7.8 G/DL (ref 6.8–8.8)

## 2021-01-04 NOTE — PROGRESS NOTES
Bria Haddad is a 55 year old female who is being evaluated via a billable video visit.      How would you like to obtain your AVS? MyChart  If the video visit is dropped, the invitation should be resent by: Text to cell phone: [phone]  Will anyone else be joining your video visit? No    Rheumatology Video Visit      Bria Haddad MRN# [id number]   YOB: 1965 Age: 55 year old      Date of visit: 1/05/21   PCP: Dr. Stephanie Saldana    Chief Complaint   Patient presents with:  Arthritis: RA    Assessment and Plan     1.  Rheumatoid arthritis: Ms. Haddad initially presented to this clinic in 2015 with dependent edema of the bilateral lower extremities, fatigue, and a one time episode of right toe pain in the setting of a positive ROSEMARY.  On 9/18/2017 she presented with synovitis of her bilateral PIPs and pain without swelling of her MCPs, persistent fatigue, intermittent rash, and dependent edema. ROSEMARY positive but additional studies negative/normal including complement C3, complement C4, beta-2 glycoprotein IgM and IgG, cardiolipin IgM and IgG, lupus anticoagulant, RNP, Stuart, SSA, SSB, Scl-70.  Overall joint exam is most consistent with rheumatoid arthritis; no rash. Previously on HCQ (bloating), MTX (LFT elevation, alopecia, headache), leflunomide (LFT elevation). Currently on SSZ and Humira.  Swelling at the MCPs and triggering of the right third and fourth fingers previously so prednisone was used for a short duration and sulfasalazine was increased with resolution of the MCP stiffness/pain/swelling.  Most recently trigger fingers that resolved with steroid injection.  Doing well with regard to rheumatoid arthritis at this time.  Chronic illness, stable  - Continue sulfasalazine 1000mg twice daily  - Continue Humira 40mg SQ every 14 days  - Labs in 3 months: CBC, Creatinine, Hepatic Panel, ESR, CRP    Recent labs reviewed and independent interpretation of these tests reveal no evidence of medication  toxicity, and disease appears well controlled.  Additional labs were ordered for HIGH RISK MEDICATION TOXICITY MONITORING and disease activity evaluation.  Management options discussed and implemented after shared medical decision making with the patient.      2. Left knee pain history: Not an issue currently.   Significant improvement with PT in the past and encouraged that she continue these exercises.  When she stopped the exercises in the past she had worsening left knee pain.      3.  History of right third and fourth digit trigger fingers: Resolved with steroid injections    4.  History of Left shoulder pain, impingement syndrome: Resolved with combination of steroid injection and physical therapy.  Not an issue today.     5.  Bilateral dependent lower extremity edema: Following with her PCP for this issue    # Relevant labs and imaging were reviewed with the patient    # High risk medication toxicity monitoring: discussion and labs reviewed; appropriate labs ordered. See above.  Instructed that if confirmed to have COVID-19 or exposure to someone with confirmed COVID-19 to call this clinic for directions on DMARD management.    # Considered to be at high risk of complications from the COVID-19 virus.  It is recommended to limit contact with other people and if possible to work remotely or provide a leave of absence to reduce the risk for COVID-19.      Total minutes spent in evaluation with patient, review of pertinent lab tests and chart notes, and documentation: 13      Ms. Haddad verbalized agreement with and understanding of the rational for the diagnosis and treatment plan.  All questions were answered to best of my ability and the patient's satisfaction. Ms. Haddad was advised to contact the clinic with any questions that may arise after the clinic visit.      Thank you for involving me in the care of the patient    Return to clinic: 3 months      HPI   Bria Haddad is a 55 year old female with a past  medical history significant for endometriosis, s/p carpal tunnel release surgery who present for follow up of rheumatoid arthritis.    Today, Ms. Haddad reports that the right 3rd and 4th digit trigger fingers are significantly better.  Ankles are bothering her now; worse in the evening and she attributes to LE edema; says that compression stockings don't help.  Massager to her legs is going to be tried.  Diuretics have been tried without improvement.  Tolerating Humira and sulfasalazine well.  Morning stiffness for less than 5 minutes.  No gelling.    Denies fevers, chills, nausea, vomiting, constipation, diarrhea. No abdominal pain. No chest pain/pressure, palpitations, or shortness of breath. No neck pain. No oral or nasal sores. No sicca symptoms.     Tobacco: none  EtOH: none  Drugs: none    ROS   GEN: No fevers, chills, night sweats, or weight change  SKIN: See HPI  HEENT: No oral or nasal ulcers.  CV: No chest pain, pressure, palpitations, or dyspnea on exertion.  PULM: No SOB, wheeze, cough.  GI: No nausea, vomiting, constipation, diarrhea. No blood in stool. No abdominal pain.  : No blood in urine.  MSK: See HPI.  NEURO: No numbness or tingling.  EXT: See HPI  PSYCH: Negative    Active Problem List     Patient Active Problem List   Diagnosis     CARDIOVASCULAR SCREENING; LDL GOAL LESS THAN 160     Endometriosis of uterus     Inflammatory polyarthropathy (H)     Past Medical History     Past Medical History:   Diagnosis Date     Bilateral carpal tunnel syndrome 12/20/2015     NO ACTIVE PROBLEMS      Obesity      Polyarthritis     inflammatory poly arthritis.     S/P carpal tunnel release 12/29/2016     Past Surgical History     Past Surgical History:   Procedure Laterality Date     bunions       COLONOSCOPY N/A 11/9/2015    Procedure: COLONOSCOPY;  Surgeon: Andrew Adamson MD;  Location: [facility]     COLONOSCOPY WITH CO2 INSUFFLATION N/A 11/9/2015    Procedure: COLONOSCOPY WITH CO2 INSUFFLATION;   Surgeon: Andrew Adamson MD;  Location: [facility]     GYN SURGERY      endometryosis     RELEASE CARPAL TUNNEL Right 10/12/2016    Procedure: RELEASE CARPAL TUNNEL;  Surgeon: Colby Nobles MD;  Location: [facility]     RELEASE CARPAL TUNNEL Left 12/16/2016    Procedure: RELEASE CARPAL TUNNEL;  Surgeon: Colby Nobles MD;  Location: [facility]     TONSILLECTOMY & ADENOIDECTOMY       Allergy     Allergies   Allergen Reactions     Asa [Aspirin] Hives     Current Medication List     Current Outpatient Medications   Medication Sig     adalimumab (HUMIRA *CF*) 40 MG/0.4ML pen kit Inject 0.4 mLs (40 mg) Subcutaneous every 14 days     sulfaSALAzine ER (AZULFIDINE EN) 500 MG EC tablet Take 2 tablets (1,000 mg) by mouth 2 times daily     furosemide (LASIX) 20 MG tablet Take 1 tablet (20 mg) by mouth daily Prn pedal edema (Patient not taking: Reported on 8/12/2020)     order for DME Equipment being ordered: compression stockings 1 pair, at 30mm Hg, to be worn during the day. For Bilateral leg edema [R60.0]     No current facility-administered medications for this visit.          Social History   See HPI    Family History     Family History   Problem Relation Age of Onset     Osteoporosis Mother      Respiratory Mother      Thyroid Disease Mother      Heart Disease Father      Cancer - colorectal Maternal Grandmother      Diabetes Maternal Grandfather      Heart Disease Maternal Grandfather      Cancer Paternal Grandmother      Heart Disease Paternal Grandfather      Respiratory Paternal Grandfather      Hypertension Brother      Thyroid Disease Sister      Thyroid Disease Sister      Neurologic Disorder Brother      Physical Exam     Temp Readings from Last 3 Encounters:   10/23/20 98  F (36.7  C) (Oral)   08/12/20 99.2  F (37.3  C) (Tympanic)   12/23/19 98.1  F (36.7  C) (Oral)     BP Readings from Last 5 Encounters:   10/23/20 (!) 158/86   08/12/20 (!) 140/90   01/07/20 136/76   12/23/19 (!) 149/78   12/04/19 130/80  "    Pulse Readings from Last 1 Encounters:   10/23/20 83     Resp Readings from Last 1 Encounters:   10/17/18 18     Estimated body mass index is 38.67 kg/m  as calculated from the following:    Height as of 10/23/20: 1.645 m (5' 4.75\").    Weight as of 10/23/20: 104.6 kg (230 lb 9.6 oz).        GEN: NAD. Healthy appearing adult.   HEENT: MMM.  Anicteric, noninjected sclera. No obvious external lesions of the ear and nose. Hearing intact.  PULM: No increased work of breathing  MSK:  Hands and wrists without swelling.  Able to make a complete fist with each hand.   SKIN: No rash or jaundice seen  PSYCH: Alert. Appropriate.            Labs / Imaging (select studies)   RF/CCP  Recent Labs   Lab Test 09/18/17 0913   CCPIGG 1   RHF <20     CBC  Recent Labs   Lab Test 12/22/20 1754 09/23/20 0745 06/29/20 0720   WBC 8.4 6.7 6.9   RBC 4.83 4.82 4.63   HGB 14.3 14.2 13.7   HCT 43.3 42.9 41.0   MCV 90 89 89   RDW 12.8 13.4 12.8    302 302   MCH 29.6 29.5 29.6   MCHC 33.0 33.1 33.4   NEUTROPHIL 38.0 43.4 37.2   LYMPH 48.6 44.1 49.6   MONOCYTE 11.3 9.8 10.5   EOSINOPHIL 2.0 2.4 2.6   BASOPHIL 0.1 0.3 0.1   ANEU 3.2 2.9 2.6   ALYM 4.1 3.0 3.4   MIKE 1.0 0.7 0.7   AEOS 0.2 0.2 0.2   ABAS 0.0 0.0 0.0     CMP  Recent Labs   Lab Test 12/22/20 1754 09/23/20 0749 09/23/20 0745 06/29/20  0720 05/08/19  0745 05/08/19  0745 09/18/17  0913 09/18/17 0913   NA  --  141  --   --   --  142  --  141   POTASSIUM  --  4.1  --   --   --  4.0  --  4.0   CHLORIDE  --  109  --   --   --  109  --  107   CO2  --  29  --   --   --  30  --  29   ANIONGAP  --  3  --   --   --  3  --  5   GLC  --  104*  --   --   --  99  --  96   BUN  --  16  --   --   --  14  --  14   CR 0.87 0.77 0.82 0.86   < > 0.74   < > 0.76   GFRESTIMATED 74 85 79 75   < > >90   < > 79   GFRESTBLACK 85 >90 >90 87   < > >90   < > >90   AGUILAR  --  9.4  --   --   --  9.3  --  8.8   BILITOTAL 0.2  --  0.3 0.5   < > 0.3   < > 0.4   ALBUMIN 4.3  --  3.8 3.7   < > 4.0   < > " 3.7   PROTTOTAL 7.8  --  7.3 7.3   < > 7.5   < > 7.4   ALKPHOS 72  --  67 58   < > 73   < > 71   AST 17  --  17 21   < > 15   < > 16   ALT 27  --  30 29   < > 31   < > 23    < > = values in this interval not displayed.     Calcium/VitaminD  Recent Labs   Lab Test 09/23/20  0749 05/08/19  0745 09/18/17  0913   AGUILAR 9.4 9.3 8.8     ESR/CRP  Recent Labs   Lab Test 12/22/20  1754 12/31/19  0712 01/22/19  0724   SED 8 7 8   CRP <2.9 <2.9 3.9       Hepatitis B  Recent Labs   Lab Test 10/12/15  0923   AUSAB 0.18   HBCAB Nonreactive   HEPBANG Nonreactive     Hepatitis C  Recent Labs   Lab Test 10/07/16  0912   HCVAB Nonreactive   Assay performance characteristics have not been established for newborns,   infants, and children       Tuberculosis Screening  Recent Labs   Lab Test 12/07/18  0724   TBRES Negative     HIV Screening  Recent Labs   Lab Test 10/12/15  0923   HIAGAB Nonreactive   HIV-1 p24 Ag & HIV-1/HIV-2 Ab Not Detected              Chart documentation done in part with Dragon Voice recognition Software. Although reviewed after completion, some word and grammatical error may remain.      Video-Visit Details    Type of service:  Video Visit    Video Start Time: 3:47 PM (visit later than previously scheduled because of patient's connectivity issues at the time of her scheduled appointment)    Video End Time: 4:08 PM    Originating Location (pt. Location): [place], [place]    Distant Location (provider location):  Home    Platform used for Video Visit: Celestine

## 2021-01-05 ENCOUNTER — MYC MEDICAL ADVICE (OUTPATIENT)
Dept: RHEUMATOLOGY | Facility: CLINIC | Age: 56
End: 2021-01-05

## 2021-01-05 ENCOUNTER — VIRTUAL VISIT (OUTPATIENT)
Dept: RHEUMATOLOGY | Facility: CLINIC | Age: 56
End: 2021-01-05
Payer: COMMERCIAL

## 2021-01-05 DIAGNOSIS — Z79.899 HIGH RISK MEDICATIONS (NOT ANTICOAGULANTS) LONG-TERM USE: ICD-10-CM

## 2021-01-05 DIAGNOSIS — M06.4 INFLAMMATORY POLYARTHROPATHY (H): ICD-10-CM

## 2021-01-05 DIAGNOSIS — M06.4 INFLAMMATORY POLYARTHROPATHY (H): Primary | ICD-10-CM

## 2021-01-05 PROCEDURE — 99214 OFFICE O/P EST MOD 30 MIN: CPT | Mod: 95 | Performed by: INTERNAL MEDICINE

## 2021-01-05 RX ORDER — SULFASALAZINE 500 MG/1
1000 TABLET, DELAYED RELEASE ORAL 2 TIMES DAILY
Qty: 360 TABLET | Refills: 0 | Status: SHIPPED | OUTPATIENT
Start: 2021-01-05 | End: 2021-03-01

## 2021-01-05 RX ORDER — SULFASALAZINE 500 MG/1
1000 TABLET, DELAYED RELEASE ORAL 2 TIMES DAILY
Qty: 360 TABLET | Refills: 0 | Status: CANCELLED | OUTPATIENT
Start: 2021-01-05

## 2021-01-05 NOTE — PATIENT INSTRUCTIONS
RHEUMATOLOGY    Dr. Bill Resendiz    [facility]  [address] [place] [address]    Our new phone number for Rheumatology is [phone], this number will be able to help you schedule appointments for Dr. Resendiz or if you have any message you would like sent to us.    Thank you for choosing [facility]!  Jolynn Angulo [facility] Rheumatology

## 2021-01-06 NOTE — TELEPHONE ENCOUNTER
Patient had video visit at 3:40 due to having connection issues.  Jolynn Angulo CMA Rheumatology  1/6/2021 8:26 AM

## 2021-01-06 NOTE — TELEPHONE ENCOUNTER
Prescription was filled during virtual visit on 1/5/2021.  Jolynn Angulo CMA Rheumatology  1/6/2021 8:28 AM

## 2021-01-15 ENCOUNTER — HEALTH MAINTENANCE LETTER (OUTPATIENT)
Age: 56
End: 2021-01-15

## 2021-01-31 ENCOUNTER — TELEPHONE (OUTPATIENT)
Dept: RHEUMATOLOGY | Facility: CLINIC | Age: 56
End: 2021-01-31

## 2021-01-31 NOTE — TELEPHONE ENCOUNTER
sulfaSALAzine ER (AZULFIDINE EN) 500 MG EC tablet is back ordered. Please send alternative.          Chintan Faarax  [facility]

## 2021-02-03 NOTE — TELEPHONE ENCOUNTER
Called patient's pharmacy and was told that patient picked up a partial supply of sulfasalazine  tablets on 2/1/21. Pharmacy stated that the med was on backorder but is now in stock. They asked that we disregard the fax we received.    Miguel Loving RN....2/3/2021 10:46 AM

## 2021-02-14 ENCOUNTER — HEALTH MAINTENANCE LETTER (OUTPATIENT)
Age: 56
End: 2021-02-14

## 2021-03-01 ENCOUNTER — TELEPHONE (OUTPATIENT)
Dept: RHEUMATOLOGY | Facility: CLINIC | Age: 56
End: 2021-03-01

## 2021-03-01 DIAGNOSIS — M06.4 INFLAMMATORY POLYARTHROPATHY (H): Primary | ICD-10-CM

## 2021-03-01 RX ORDER — SULFASALAZINE 500 MG/1
1000 TABLET ORAL 2 TIMES DAILY
Qty: 360 TABLET | Refills: 0 | Status: SHIPPED | OUTPATIENT
Start: 2021-03-01 | End: 2021-04-13

## 2021-03-01 NOTE — TELEPHONE ENCOUNTER
[facility]    Phone Message    May a detailed message be left on voicemail: yes, [phone]    Reason for Call: Medication Question or concern regarding medication   Prescription Clarification  Name of Medication: Sulfasalazine  Prescribing Provider: Dr Resendiz   Ph[facility]acy: [facility]   What on the order needs clarification? The pharmacy is on back order of the delayed release form of this medication. They are wondering if the doctor wants to switch to the regular form.     Action Taken: Message routed to:  Other: [facility]    Travel Screening: Not Applicable

## 2021-03-01 NOTE — TELEPHONE ENCOUNTER
RN: Please call to notify Bria Haddad that the non-enteric-coated sulfasalazine has been prescribed because the other formulation of sulfasalazine is on backorder, per the pharmacy.    Bill Resendiz MD  3/1/2021 5:09 PM

## 2021-03-02 NOTE — TELEPHONE ENCOUNTER
Called pt and let her know the below message.    Danitza MUNOZ RN Specialty Triage 3/2/2021 10:24 AM

## 2021-03-03 ENCOUNTER — ALLIED HEALTH/NURSE VISIT (OUTPATIENT)
Dept: AUDIOLOGY | Facility: CLINIC | Age: 56
End: 2021-03-03
Payer: COMMERCIAL

## 2021-03-03 DIAGNOSIS — H90.3 BILATERAL SENSORINEURAL HEARING LOSS: Primary | ICD-10-CM

## 2021-03-03 PROCEDURE — V5299 HEARING SERVICE: HCPCS | Performed by: AUDIOLOGIST

## 2021-03-03 PROCEDURE — 99207 PR NO CHARGE LOS: CPT | Performed by: AUDIOLOGIST

## 2021-03-03 NOTE — PROGRESS NOTES
HEARING AID RECHECK    Patient Name:  Bria Haddad    Patient Age:   55 year old    :  1965    Background:   Patient dropped off both hearing aids with  complaint of not working.      SIDE: Both    : [employer]    TYPE: Quattro 7 CIC    S/N: [id number]    WARRANTY: 2023    Procedures:   Listening check results: The left aid is very weak, the right aid sounds ok, but to be safe will send both aids to [employer] for repair.    Plan:   I left message for patient on voicemail.  Call when aids come back from factory.    NO CHARGE VISIT    Jono Hunter MA, CCC-A  [place] Licensed Audiologist #[id number]  3/3/2021

## 2021-03-11 ENCOUNTER — TELEPHONE (OUTPATIENT)
Dept: AUDIOLOGY | Facility: CLINIC | Age: 56
End: 2021-03-11
Payer: COMMERCIAL

## 2021-03-11 DIAGNOSIS — H90.3 BILATERAL SENSORINEURAL HEARING LOSS: Primary | ICD-10-CM

## 2021-03-11 PROCEDURE — V5299 HEARING SERVICE: HCPCS | Performed by: AUDIOLOGIST

## 2021-03-11 NOTE — TELEPHONE ENCOUNTER
HEARING AID RECHECK     Patient Name:  Bria Haddad     Patient Age:   55 year old     :  1965     Background:              Patient dropped off both hearing aids with  complaint of not working on 3/3/21  Hearing aids have been returned from the .                            SIDE: Both                          : [employer]                          TYPE: Quattro 7 CIC                          S/N: [id number]                          WARRANTY: 2023     Procedures:              Listening check results: Both hearing aids have good gain and sound quality..     Plan:              I left message for patient on voicemail that she may  her hearing aids at the 2nd floor  in [place].     NO CHARGE VISIT     Jono Hunter MA, CCC-A  [place] Licensed Audiologist #[id number]  3/11/2021

## 2021-04-06 DIAGNOSIS — Z79.899 HIGH RISK MEDICATIONS (NOT ANTICOAGULANTS) LONG-TERM USE: ICD-10-CM

## 2021-04-06 DIAGNOSIS — M06.4 INFLAMMATORY POLYARTHROPATHY (H): ICD-10-CM

## 2021-04-06 LAB
ALBUMIN SERPL-MCNC: 3.8 G/DL (ref 3.4–5)
ALP SERPL-CCNC: 76 U/L (ref 40–150)
ALT SERPL W P-5'-P-CCNC: 26 U/L (ref 0–50)
AST SERPL W P-5'-P-CCNC: 13 U/L (ref 0–45)
BASOPHILS # BLD AUTO: 0 10E9/L (ref 0–0.2)
BASOPHILS NFR BLD AUTO: 0.3 %
BILIRUB DIRECT SERPL-MCNC: <0.1 MG/DL (ref 0–0.2)
BILIRUB SERPL-MCNC: 0.4 MG/DL (ref 0.2–1.3)
CREAT SERPL-MCNC: 0.86 MG/DL (ref 0.52–1.04)
CRP SERPL-MCNC: 12.4 MG/L (ref 0–8)
DIFFERENTIAL METHOD BLD: NORMAL
EOSINOPHIL # BLD AUTO: 0.1 10E9/L (ref 0–0.7)
EOSINOPHIL NFR BLD AUTO: 1.5 %
ERYTHROCYTE [DISTWIDTH] IN BLOOD BY AUTOMATED COUNT: 12.8 % (ref 10–15)
ERYTHROCYTE [SEDIMENTATION RATE] IN BLOOD BY WESTERGREN METHOD: 9 MM/H (ref 0–30)
GFR SERPL CREATININE-BSD FRML MDRD: 75 ML/MIN/{1.73_M2}
HCT VFR BLD AUTO: 43.5 % (ref 35–47)
HGB BLD-MCNC: 14.4 G/DL (ref 11.7–15.7)
LYMPHOCYTES # BLD AUTO: 3.1 10E9/L (ref 0.8–5.3)
LYMPHOCYTES NFR BLD AUTO: 43 %
MCH RBC QN AUTO: 29.4 PG (ref 26.5–33)
MCHC RBC AUTO-ENTMCNC: 33.1 G/DL (ref 31.5–36.5)
MCV RBC AUTO: 89 FL (ref 78–100)
MONOCYTES # BLD AUTO: 0.7 10E9/L (ref 0–1.3)
MONOCYTES NFR BLD AUTO: 9.9 %
NEUTROPHILS # BLD AUTO: 3.3 10E9/L (ref 1.6–8.3)
NEUTROPHILS NFR BLD AUTO: 45.3 %
PLATELET # BLD AUTO: 302 10E9/L (ref 150–450)
PROT SERPL-MCNC: 7.6 G/DL (ref 6.8–8.8)
RBC # BLD AUTO: 4.89 10E12/L (ref 3.8–5.2)
WBC # BLD AUTO: 7.2 10E9/L (ref 4–11)

## 2021-04-06 PROCEDURE — 36415 COLL VENOUS BLD VENIPUNCTURE: CPT | Performed by: INTERNAL MEDICINE

## 2021-04-06 PROCEDURE — 82565 ASSAY OF CREATININE: CPT | Performed by: INTERNAL MEDICINE

## 2021-04-06 PROCEDURE — 86140 C-REACTIVE PROTEIN: CPT | Performed by: INTERNAL MEDICINE

## 2021-04-06 PROCEDURE — 85025 COMPLETE CBC W/AUTO DIFF WBC: CPT | Performed by: INTERNAL MEDICINE

## 2021-04-06 PROCEDURE — 85652 RBC SED RATE AUTOMATED: CPT | Performed by: INTERNAL MEDICINE

## 2021-04-06 PROCEDURE — 80076 HEPATIC FUNCTION PANEL: CPT | Performed by: INTERNAL MEDICINE

## 2021-04-13 ENCOUNTER — VIRTUAL VISIT (OUTPATIENT)
Dept: RHEUMATOLOGY | Facility: CLINIC | Age: 56
End: 2021-04-13
Payer: COMMERCIAL

## 2021-04-13 DIAGNOSIS — Z79.899 HIGH RISK MEDICATIONS (NOT ANTICOAGULANTS) LONG-TERM USE: ICD-10-CM

## 2021-04-13 DIAGNOSIS — R06.09 DYSPNEA ON EXERTION: ICD-10-CM

## 2021-04-13 DIAGNOSIS — M06.09 RHEUMATOID ARTHRITIS OF MULTIPLE SITES WITH NEGATIVE RHEUMATOID FACTOR (H): Primary | ICD-10-CM

## 2021-04-13 PROCEDURE — 99214 OFFICE O/P EST MOD 30 MIN: CPT | Mod: GT | Performed by: INTERNAL MEDICINE

## 2021-04-13 RX ORDER — SULFASALAZINE 500 MG/1
1000 TABLET ORAL 2 TIMES DAILY
Qty: 360 TABLET | Refills: 0 | Status: SHIPPED | OUTPATIENT
Start: 2021-04-13 | End: 2021-07-13

## 2021-04-13 NOTE — PROGRESS NOTES
Bria Haddad  is a 55 year old year old female who is being evaluated via a billable video visit.      How would you like to obtain your AVS? MyChart  If the video visit is dropped, the invitation should be resent by: Text to cell phone: [phone]  Will anyone else be joining your video visit? No    Rheumatology Video Visit      Bria Haddad MRN# [id number]   YOB: 1965 Age: 55 year old      Date of visit: 4/13/21  PCP: Dr. Stephanie Saldana    Chief Complaint   Patient presents with:  Arthritis: RA    Assessment and Plan     1.  Rheumatoid arthritis: Ms. Haddad initially presented to this clinic in 2015 with dependent edema of the bilateral lower extremities, fatigue, and a one time episode of right toe pain in the setting of a positive ROSEMARY.  On 9/18/2017 she presented with synovitis of her bilateral PIPs and pain without swelling of her MCPs, persistent fatigue, intermittent rash, and dependent edema. ROSEMARY positive but additional studies negative/normal including complement C3, complement C4, beta-2 glycoprotein IgM and IgG, cardiolipin IgM and IgG, lupus anticoagulant, RNP, Stuart, SSA, SSB, Scl-70.  Overall joint exam is most consistent with rheumatoid arthritis; no rash. Previously on HCQ (bloating), MTX (LFT elevation, alopecia, headache), leflunomide (LFT elevation). Currently on SSZ and Humira.  Swelling at the MCPs and triggering of the right third and fourth fingers previously so prednisone was used for a short duration and sulfasalazine was increased with resolution of the MCP stiffness/pain/swelling.  Doing well with regard to rheumatoid arthritis at this time, but further investigation with regard to #5 is needed.  Chronic illness, stable  - Continue sulfasalazine 1000mg twice daily  - Continue Humira 40mg SQ every 14 days  - Labs in 3 months: CBC, Creatinine, Hepatic Panel, ESR, CRP    High risk medication requiring intensive toxicity monitoring at least quarterly: labs ordered include CBC,  Creatinine, Hepatic panel to monitor for cytopenia and hepatotoxicity; checking creatinine as it affects clearance of medication.     2. Left knee pain history: Not an issue currently.   Significant improvement with PT in the past and encouraged that she continue these exercises.  When she stopped the exercises in the past she had worsening left knee pain.      3.  History of right third and fourth digit trigger fingers: Resolved with steroid injections     4.  History of Left shoulder pain, impingement syndrome: Resolved with combination of steroid injection and physical therapy.  Not an issue today.     5.  Bilateral dependent lower extremity edema and dyspnea on exertion: Has been worsening over the past 1 year.  It is now on furosemide with no benefit per patient, with regard to the lower extremity edema or the dyspnea on exertion.  Lower extremity compression stockings were ineffective per patient.  Has to rest when walking any distance because of dyspnea on exertion.  States that she cannot run because of associated dyspnea on exertion.  Symptoms are worsening over time.  No chest pain/pressure, palpitations, nausea, diaphoresis, lightheadedness, or dizziness.  We discussed the warning signs of an MI and that she is to call 911 if having those symptoms.  Check echocardiogram to evaluate for heart failure.  If no abnormality on echocardiogram to explain her symptoms then advise she follow-up with her PCP to consider stress test.  Chronic illness, progressive.      # Status-post 2 doses of the Pfizer COVID-19 vaccine    Total minutes spent in evaluation with patient, documentation, , and review of pertinent studies and chart notes: 34     Ms. Haddad verbalized agreement with and understanding of the rational for the diagnosis and treatment plan.  All questions were answered to best of my ability and the patient's satisfaction. Ms. Haddad was advised to contact the clinic with any questions that may arise  after the clinic visit.      Thank you for involving me in the care of the patient    Return to clinic: 3 months      HPI   Bria Haddad is a 55 year old female with a past medical history significant for endometriosis, s/p carpal tunnel release surgery who present for follow up of rheumatoid arthritis.    Today, 4/13/2021: hands were swollen for about 1 day and she had trigger finger at that time; all resolved after 1 day.  Morning stiffness for about 30 min.  +gelling that resolves quickly.  Ankles with lower extremity edema that is worse at the end of the day and improved with leg elevation; has been using a leg massager; not using compression stockings because they were not effective. Sometimes with shortness of breath; occurs with increased physical activity; no other associated symptoms; resolves with rest. No hx of asthma. Sometimes with cough that occurs randomly and she attributes to post nasal drip. States that she cannot go for a jog because of the shortness of breath being too much.  Shortness of breath in this nature has been worsening over the past year, worse than compared to 3 months ago.     Denies fevers, chills, nausea, vomiting, constipation, diarrhea. No abdominal pain. No chest pain/pressure, palpitations. No neck pain. No oral or nasal sores. No sicca symptoms.     Tobacco: none  EtOH: none  Drugs: none    ROS   12 point review of system was completed and negative except as noted in the HPI     Active Problem List     Patient Active Problem List   Diagnosis     CARDIOVASCULAR SCREENING; LDL GOAL LESS THAN 160     Endometriosis of uterus     Inflammatory polyarthropathy (H)     Past Medical History     Past Medical History:   Diagnosis Date     Bilateral carpal tunnel syndrome 12/20/2015     NO ACTIVE PROBLEMS      Obesity      Polyarthritis     inflammatory poly arthritis.     S/P carpal tunnel release 12/29/2016     Past Surgical History     Past Surgical History:   Procedure Laterality Date      bunions       COLONOSCOPY N/A 11/9/2015    Procedure: COLONOSCOPY;  Surgeon: Andrew Adamson MD;  Location: [facility]     COLONOSCOPY WITH CO2 INSUFFLATION N/A 11/9/2015    Procedure: COLONOSCOPY WITH CO2 INSUFFLATION;  Surgeon: Andrew Adamson MD;  Location: [facility]     GYN SURGERY      endometryosis     RELEASE CARPAL TUNNEL Right 10/12/2016    Procedure: RELEASE CARPAL TUNNEL;  Surgeon: Colby Nobles MD;  Location: [facility]     RELEASE CARPAL TUNNEL Left 12/16/2016    Procedure: RELEASE CARPAL TUNNEL;  Surgeon: Colby Nobles MD;  Location: [facility]     TONSILLECTOMY & ADENOIDECTOMY       Allergy     Allergies   Allergen Reactions     Asa [Aspirin] Hives     Current Medication List     Current Outpatient Medications   Medication Sig     adalimumab (HUMIRA *CF*) 40 MG/0.4ML pen kit Inject 0.4 mLs (40 mg) Subcutaneous every 14 days     sulfaSALAzine (AZULFIDINE) 500 MG tablet Take 2 tablets (1,000 mg) by mouth 2 times daily     furosemide (LASIX) 20 MG tablet Take 1 tablet (20 mg) by mouth daily Prn pedal edema (Patient not taking: Reported on 8/12/2020)     order for DME Equipment being ordered: compression stockings 1 pair, at 30mm Hg, to be worn during the day. For Bilateral leg edema [R60.0]     No current facility-administered medications for this visit.          Social History   See HPI    Family History     Family History   Problem Relation Age of Onset     Osteoporosis Mother      Respiratory Mother      Thyroid Disease Mother      Heart Disease Father      Cancer - colorectal Maternal Grandmother      Diabetes Maternal Grandfather      Heart Disease Maternal Grandfather      Cancer Paternal Grandmother      Heart Disease Paternal Grandfather      Respiratory Paternal Grandfather      Hypertension Brother      Thyroid Disease Sister      Thyroid Disease Sister      Neurologic Disorder Brother      Physical Exam     Temp Readings from Last 3 Encounters:   10/23/20 98  F (36.7  C)  "(Oral)   08/12/20 99.2  F (37.3  C) (Tympanic)   12/23/19 98.1  F (36.7  C) (Oral)     BP Readings from Last 5 Encounters:   10/23/20 (!) 158/86   08/12/20 (!) 140/90   01/07/20 136/76   12/23/19 (!) 149/78   12/04/19 130/80     Pulse Readings from Last 1 Encounters:   10/23/20 83     Resp Readings from Last 1 Encounters:   10/17/18 18     Estimated body mass index is 38.67 kg/m  as calculated from the following:    Height as of 10/23/20: 1.645 m (5' 4.75\").    Weight as of 10/23/20: 104.6 kg (230 lb 9.6 oz).    GEN: NAD. Healthy appearing adult.   HEENT: MMM.  Anicteric, noninjected sclera. No obvious external lesions of the ear and nose. Hearing intact.  PULM: No increased work of breathing  MSK:  Hands and wrists without swelling.  Able to make a complete fist with each hand.   SKIN: No rash or jaundice seen  PSYCH: Alert. Appropriate.      Labs / Imaging (select studies)     RF/CCP  Recent Labs   Lab Test 09/18/17  0913   CCPIGG 1   RHF <20     CBC  Recent Labs   Lab Test 04/06/21  0716 12/22/20  1754 09/23/20  0745   WBC 7.2 8.4 6.7   RBC 4.89 4.83 4.82   HGB 14.4 14.3 14.2   HCT 43.5 43.3 42.9   MCV 89 90 89   RDW 12.8 12.8 13.4    317 302   MCH 29.4 29.6 29.5   MCHC 33.1 33.0 33.1   NEUTROPHIL 45.3 38.0 43.4   LYMPH 43.0 48.6 44.1   MONOCYTE 9.9 11.3 9.8   EOSINOPHIL 1.5 2.0 2.4   BASOPHIL 0.3 0.1 0.3   ANEU 3.3 3.2 2.9   ALYM 3.1 4.1 3.0   MIKE 0.7 1.0 0.7   AEOS 0.1 0.2 0.2   ABAS 0.0 0.0 0.0     CMP  Recent Labs   Lab Test 04/06/21  0716 12/22/20  1754 09/23/20  0749 09/23/20  0745 05/08/19  0745 05/08/19  0745 09/18/17 0913 09/18/17 0913   NA  --   --  141  --   --  142  --  141   POTASSIUM  --   --  4.1  --   --  4.0  --  4.0   CHLORIDE  --   --  109  --   --  109  --  107   CO2  --   --  29  --   --  30  --  29   ANIONGAP  --   --  3  --   --  3  --  5   GLC  --   --  104*  --   --  99  --  96   BUN  --   --  16  --   --  14  --  14   CR 0.86 0.87 0.77 0.82   < > 0.74   < > 0.76   GFRESTIMATED " 75 74 85 79   < > >90   < > 79   GFRESTBLACK 86 85 >90 >90   < > >90   < > >90   AGUILAR  --   --  9.4  --   --  9.3  --  8.8   BILITOTAL 0.4 0.2  --  0.3   < > 0.3   < > 0.4   ALBUMIN 3.8 4.3  --  3.8   < > 4.0   < > 3.7   PROTTOTAL 7.6 7.8  --  7.3   < > 7.5   < > 7.4   ALKPHOS 76 72  --  67   < > 73   < > 71   AST 13 17  --  17   < > 15   < > 16   ALT 26 27  --  30   < > 31   < > 23    < > = values in this interval not displayed.     Calcium/VitaminD  Recent Labs   Lab Test 09/23/20  0749 05/08/19  0745 09/18/17  0913   AGUILAR 9.4 9.3 8.8     ESR/CRP  Recent Labs   Lab Test 04/06/21  0716 12/22/20  1754 12/31/19  0712   SED 9 8 7   CRP 12.4* <2.9 <2.9     Hepatitis B  Recent Labs   Lab Test 10/12/15  0923   AUSAB 0.18   HBCAB Nonreactive   HEPBANG Nonreactive     Hepatitis C  Recent Labs   Lab Test 10/07/16  0912   HCVAB Nonreactive   Assay performance characteristics have not been established for newborns,   infants, and children       Tuberculosis Screening  Recent Labs   Lab Test 12/07/18  0724   TBRES Negative     HIV Screening  Recent Labs   Lab Test 10/12/15  0923   HIAGAB Nonreactive   HIV-1 p24 Ag & HIV-1/HIV-2 Ab Not Detected              Chart documentation done in part with Dragon Voice recognition Software. Although reviewed after completion, some word and grammatical error may remain.    Video-Visit Details    Type of service:  Video Visit    Video Start Time: 8:38 AM    Video End Time:8:55 AM    Originating Location (pt. Location): [place], [place]    Distant Location (provider location):  Home    Platform used for Video Visit: Celestine Resendiz MD

## 2021-04-13 NOTE — PATIENT INSTRUCTIONS
RHEUMATOLOGY    Dr. Bill Resendiz    [facility]  [address] [place] [address]    Our new phone number for Rheumatology is [phone], this number will be able to help you schedule appointments for Dr. Resendiz or if you have any message you would like sent to us.    Thank you for choosing [facility]!    Jolynn Angulo [facility] Rheumatology

## 2021-04-22 ENCOUNTER — ANCILLARY PROCEDURE (OUTPATIENT)
Dept: CARDIOLOGY | Facility: CLINIC | Age: 56
End: 2021-04-22
Attending: INTERNAL MEDICINE
Payer: COMMERCIAL

## 2021-04-22 DIAGNOSIS — R06.09 DYSPNEA ON EXERTION: ICD-10-CM

## 2021-04-22 PROCEDURE — 93306 TTE W/DOPPLER COMPLETE: CPT | Performed by: INTERNAL MEDICINE

## 2021-07-02 ENCOUNTER — TELEPHONE (OUTPATIENT)
Dept: RHEUMATOLOGY | Facility: CLINIC | Age: 56
End: 2021-07-02

## 2021-07-02 NOTE — TELEPHONE ENCOUNTER
Received fax from pharmacy stating patient requires Prior Authorization for Sulfasalazine.     Insurance information:   Name:   Phone number:   ID number:     Initiate prior authorization or change medication?    If a prior authorization is to be initiated, please list the following:    -any medications the patient has tried and failed or any contraindications.  -is the patient currently on this medication, or has tried before?  -What is the diagnosis?  -Justification or other information that may be helpful.

## 2021-07-05 DIAGNOSIS — Z79.899 HIGH RISK MEDICATIONS (NOT ANTICOAGULANTS) LONG-TERM USE: ICD-10-CM

## 2021-07-05 DIAGNOSIS — M06.09 RHEUMATOID ARTHRITIS OF MULTIPLE SITES WITH NEGATIVE RHEUMATOID FACTOR (H): ICD-10-CM

## 2021-07-05 LAB
ALBUMIN SERPL-MCNC: 3.9 G/DL (ref 3.4–5)
ALP SERPL-CCNC: 76 U/L (ref 40–150)
ALT SERPL W P-5'-P-CCNC: 32 U/L (ref 0–50)
AST SERPL W P-5'-P-CCNC: 17 U/L (ref 0–45)
BASOPHILS # BLD AUTO: 0 10E9/L (ref 0–0.2)
BASOPHILS NFR BLD AUTO: 0.3 %
BILIRUB DIRECT SERPL-MCNC: <0.1 MG/DL (ref 0–0.2)
BILIRUB SERPL-MCNC: 0.2 MG/DL (ref 0.2–1.3)
CREAT SERPL-MCNC: 0.78 MG/DL (ref 0.52–1.04)
CRP SERPL-MCNC: 2.9 MG/L (ref 0–8)
DIFFERENTIAL METHOD BLD: NORMAL
EOSINOPHIL # BLD AUTO: 0.2 10E9/L (ref 0–0.7)
EOSINOPHIL NFR BLD AUTO: 2.9 %
ERYTHROCYTE [DISTWIDTH] IN BLOOD BY AUTOMATED COUNT: 13.1 % (ref 10–15)
ERYTHROCYTE [SEDIMENTATION RATE] IN BLOOD BY WESTERGREN METHOD: 7 MM/H (ref 0–30)
GFR SERPL CREATININE-BSD FRML MDRD: 84 ML/MIN/{1.73_M2}
HCT VFR BLD AUTO: 43.6 % (ref 35–47)
HGB BLD-MCNC: 14.2 G/DL (ref 11.7–15.7)
LYMPHOCYTES # BLD AUTO: 2.6 10E9/L (ref 0.8–5.3)
LYMPHOCYTES NFR BLD AUTO: 41.1 %
MCH RBC QN AUTO: 29 PG (ref 26.5–33)
MCHC RBC AUTO-ENTMCNC: 32.6 G/DL (ref 31.5–36.5)
MCV RBC AUTO: 89 FL (ref 78–100)
MONOCYTES # BLD AUTO: 0.8 10E9/L (ref 0–1.3)
MONOCYTES NFR BLD AUTO: 12.4 %
NEUTROPHILS # BLD AUTO: 2.7 10E9/L (ref 1.6–8.3)
NEUTROPHILS NFR BLD AUTO: 43.3 %
PLATELET # BLD AUTO: 363 10E9/L (ref 150–450)
PROT SERPL-MCNC: 7.5 G/DL (ref 6.8–8.8)
RBC # BLD AUTO: 4.89 10E12/L (ref 3.8–5.2)
WBC # BLD AUTO: 6.3 10E9/L (ref 4–11)

## 2021-07-05 PROCEDURE — 80076 HEPATIC FUNCTION PANEL: CPT | Performed by: INTERNAL MEDICINE

## 2021-07-05 PROCEDURE — 85025 COMPLETE CBC W/AUTO DIFF WBC: CPT | Performed by: INTERNAL MEDICINE

## 2021-07-05 PROCEDURE — 36415 COLL VENOUS BLD VENIPUNCTURE: CPT | Performed by: INTERNAL MEDICINE

## 2021-07-05 PROCEDURE — 86140 C-REACTIVE PROTEIN: CPT | Performed by: INTERNAL MEDICINE

## 2021-07-05 PROCEDURE — 85652 RBC SED RATE AUTOMATED: CPT | Performed by: INTERNAL MEDICINE

## 2021-07-05 PROCEDURE — 82565 ASSAY OF CREATININE: CPT | Performed by: INTERNAL MEDICINE

## 2021-07-05 NOTE — TELEPHONE ENCOUNTER
Prior Authorization Not Needed per Insurance    Medication: Sulfasalazine  Insurance Company: [employer] - Phone [phone] Fax [phone]  Expected CoPay:      Pharmacy Filling the Rx: [id number] - [address] AT [facility]  Pharmacy Notified: Yes  Patient Notified: Yes    Drug is covered by current benefit plan. No further PA activity needed  Pharmacy received paid claim, no PA needed.

## 2021-07-06 ENCOUNTER — TELEPHONE (OUTPATIENT)
Dept: FAMILY MEDICINE | Facility: CLINIC | Age: 56
End: 2021-07-06

## 2021-07-06 NOTE — TELEPHONE ENCOUNTER
Patient Quality Outreach      Summary:    Patient has the following on her problem list/HM: None    Patient is due/failing the following:   Colonoscopy, Breast Cancer Screening - Mammogram and Cervical Cancer Screening - PAP Needed    Type of outreach:    Sent [employer] message.    Questions for provider review:    None                                                                                                                                     Melissa Johnson MA       Chart routed to Care Team.

## 2021-07-13 ENCOUNTER — VIRTUAL VISIT (OUTPATIENT)
Dept: RHEUMATOLOGY | Facility: CLINIC | Age: 56
End: 2021-07-13
Payer: COMMERCIAL

## 2021-07-13 DIAGNOSIS — Z79.899 HIGH RISK MEDICATIONS (NOT ANTICOAGULANTS) LONG-TERM USE: ICD-10-CM

## 2021-07-13 DIAGNOSIS — M06.09 RHEUMATOID ARTHRITIS OF MULTIPLE SITES WITH NEGATIVE RHEUMATOID FACTOR (H): Primary | ICD-10-CM

## 2021-07-13 PROCEDURE — 99214 OFFICE O/P EST MOD 30 MIN: CPT | Mod: GT | Performed by: INTERNAL MEDICINE

## 2021-07-13 RX ORDER — SULFASALAZINE 500 MG/1
1000 TABLET ORAL 2 TIMES DAILY
Qty: 360 TABLET | Refills: 1 | Status: SHIPPED | OUTPATIENT
Start: 2021-07-13 | End: 2021-10-12

## 2021-07-13 NOTE — Clinical Note
JOHNNIE Medrano that Bria Haddad plans to see you again for her chronic bilateral lower extremity edema that has not responded to Lasix or compression stockings.  Bill Resendiz MD  7/13/2021 9:55 AM

## 2021-07-13 NOTE — PROGRESS NOTES
Bria Haddad  is a 56 year old year old female who is being evaluated via a billable video visit.      How would you like to obtain your AVS? MyChart  If the video visit is dropped, the invitation should be resent by: Text to cell phone: [phone]  Will anyone else be joining your video visit? No    Rheumatology Video Visit      Bria Haddad MRN# [id number]   YOB: 1965 Age: 56 year old      Date of visit: 7/13/21  PCP: Dr. Holli Marley    Chief Complaint   Patient presents with:  Arthritis: RA    Assessment and Plan     1.  Rheumatoid arthritis: Ms. Haddad initially presented to this clinic in 2015 with dependent edema of the bilateral lower extremities, fatigue, and a one time episode of right toe pain in the setting of a positive ROSEMARY.  On 9/18/2017 she presented with synovitis of her bilateral PIPs and pain without swelling of her MCPs, persistent fatigue, intermittent rash, and dependent edema. ROSEMARY positive but additional studies negative/normal including complement C3, complement C4, beta-2 glycoprotein IgM and IgG, cardiolipin IgM and IgG, lupus anticoagulant, RNP, Stuart, SSA, SSB, Scl-70.  Joint exam most consistent with rheumatoid arthritis.  Previously on HCQ (bloating), MTX (LFT elevation, alopecia, headache), leflunomide (LFT elevation). Currently on SSZ and Humira.  Doing well at this time with no joint pain, swelling, or stiffness.   Chronic illness, stable.    - Continue sulfasalazine 1000mg twice daily  - Continue Humira 40mg SQ every 14 days  - Labs in 3 months: CBC, Creatinine, Hepatic Panel, ESR, CRP    High risk medication requiring intensive toxicity monitoring at least quarterly: labs ordered include CBC, Creatinine, Hepatic panel to monitor for cytopenia and hepatotoxicity; checking creatinine as it affects clearance of medication.      2. Left knee pain history: Not an issue currently.   Significant improvement with PT in the past and encouraged that she continue these  exercises.  When she stopped the exercises in the past she had worsening left knee pain.       3.  History of right third and fourth digit trigger fingers: Resolved with steroid injections.  Not an issue today.      4.  History of Left shoulder pain, impingement syndrome: Resolved with combination of steroid injection and physical therapy.  Not an issue today.     5.  Bilateral dependent lower extremity edema: No longer with lower extremity edema.  Occasional cough that she thinks is secondary to heartburn or allergies.  Looking back in the chart, she has had bilateral lower extremity edema for many years.  She was given furosemide and reportedly 2 weeks of furosemide 20 mg daily did not help her symptoms.  She has also seen a vascular specialist who wanted to see her back in 1 month but she did not go.  Compression stockings have been ineffective, per patient.  She says that weight loss is likely not going to help her, as this has been an issue even when she weighed 150 pounds; she currently weighs about 230 pounds.  Given the chronicity of the bilateral lower extremity edema with reported no improvement with furosemide or compression stockings, and similar issue when she weighed less, it is reasonable for her to follow-up with the vascular specialist so advised that she do so.  For the cough that she has, I advised that she try an oral antihistamine such as Claritin, Zyrtec, or Allegra; and to use heartburn medication such as omeprazole; she may continue these for 3-4 weeks and then taper off of one at a time to help identify which is helping.  I asked that she follow-up with her primary care provider regarding these issues.    - COVID-19: has received the Pfizer COVID-19 vaccine on 3/19/2021 and 4/9/2021    Total minutes spent in evaluation with patient, documentation, , and review of pertinent studies and chart notes: 21      MsAkash Boo verbalized agreement with and understanding of the rational for the  diagnosis and treatment plan.  All questions were answered to best of my ability and the patient's satisfaction. Ms. Haddad was advised to contact the clinic with any questions that may arise after the clinic visit.      Thank you for involving me in the care of the patient    Return to clinic: 3 months      HPI   Bria Haddad is a 56 year old female with a past medical history significant for endometriosis, s/p carpal tunnel release surgery who present for follow up of rheumatoid arthritis.    Today, 7/13/2021: No longer shortness of breath.  She has an occasional dry cough that she thinks could be due to postnasal drip or heartburn; she takes nothing for allergies; she uses Tums for heartburn.  Joints are doing well with no pain, stiffness, or swelling.  However, she has bilateral lower extremity edema still.  Today, she explains that she had bilateral lower extremity edema for many years, not improved with Lasix 20 mg daily for 2 weeks, and not improved with compression stockings.  She has seen a vascular specialist in 2018 who advised follow-up in 1 month but she did not return.  She says that she was advised to lose weight but she had a similar issue when she weighed 150 pounds and now she weighs 230 pounds; so she says that weight loss will probably not help her.      Denies fevers, chills, nausea, vomiting, constipation, diarrhea. No abdominal pain. No chest pain/pressure, palpitations. No neck pain. No oral or nasal sores. No sicca symptoms.     Tobacco: none  EtOH: none  Drugs: none    ROS   12 point review of system was completed and negative except as noted in the HPI     Active Problem List     Patient Active Problem List   Diagnosis     CARDIOVASCULAR SCREENING; LDL GOAL LESS THAN 160     Endometriosis of uterus     Inflammatory polyarthropathy (H)     Past Medical History     Past Medical History:   Diagnosis Date     Bilateral carpal tunnel syndrome 12/20/2015     NO ACTIVE PROBLEMS      Obesity       Polyarthritis     inflammatory poly arthritis.     S/P carpal tunnel release 12/29/2016     Past Surgical History     Past Surgical History:   Procedure Laterality Date     bunions       COLONOSCOPY N/A 11/9/2015    Procedure: COLONOSCOPY;  Surgeon: Andrew Adamson MD;  Location: [facility]     COLONOSCOPY WITH CO2 INSUFFLATION N/A 11/9/2015    Procedure: COLONOSCOPY WITH CO2 INSUFFLATION;  Surgeon: Andrew Adamson MD;  Location: [facility]     GYN SURGERY      endometryosis     RELEASE CARPAL TUNNEL Right 10/12/2016    Procedure: RELEASE CARPAL TUNNEL;  Surgeon: Colby Nobles MD;  Location: [facility]     RELEASE CARPAL TUNNEL Left 12/16/2016    Procedure: RELEASE CARPAL TUNNEL;  Surgeon: Colby Nobles MD;  Location: [facility]     TONSILLECTOMY & ADENOIDECTOMY       Allergy     Allergies   Allergen Reactions     Asa [Aspirin] Hives     Current Medication List     Current Outpatient Medications   Medication Sig     adalimumab (HUMIRA *CF*) 40 MG/0.4ML pen kit Inject 0.4 mLs (40 mg) Subcutaneous every 14 days     sulfaSALAzine (AZULFIDINE) 500 MG tablet Take 2 tablets (1,000 mg) by mouth 2 times daily     furosemide (LASIX) 20 MG tablet Take 1 tablet (20 mg) by mouth daily Prn pedal edema (Patient not taking: Reported on 8/12/2020)     order for DME Equipment being ordered: compression stockings 1 pair, at 30mm Hg, to be worn during the day. For Bilateral leg edema [R60.0]     No current facility-administered medications for this visit.         Social History   See HPI    Family History     Family History   Problem Relation Age of Onset     Osteoporosis Mother      Respiratory Mother      Thyroid Disease Mother      Heart Disease Father      Cancer - colorectal Maternal Grandmother      Diabetes Maternal Grandfather      Heart Disease Maternal Grandfather      Cancer Paternal Grandmother      Heart Disease Paternal Grandfather      Respiratory Paternal Grandfather      Hypertension Brother      Thyroid  "Disease Sister      Thyroid Disease Sister      Neurologic Disorder Brother      Physical Exam     Temp Readings from Last 3 Encounters:   10/23/20 98  F (36.7  C) (Oral)   08/12/20 99.2  F (37.3  C) (Tympanic)   12/23/19 98.1  F (36.7  C) (Oral)     BP Readings from Last 5 Encounters:   10/23/20 (!) 158/86   08/12/20 (!) 140/90   01/07/20 136/76   12/23/19 (!) 149/78   12/04/19 130/80     Pulse Readings from Last 1 Encounters:   10/23/20 83     Resp Readings from Last 1 Encounters:   10/17/18 18     Estimated body mass index is 38.67 kg/m  as calculated from the following:    Height as of 10/23/20: 1.645 m (5' 4.75\").    Weight as of 10/23/20: 104.6 kg (230 lb 9.6 oz).    GEN: NAD.   HEENT: MMM.  Anicteric, noninjected sclera. No obvious external lesions of the ear and nose. Hearing intact.  PULM: No increased work of breathing  MSK:  Hands and wrists without swelling.  Able to make a complete fist with each hand.   SKIN: No rash or jaundice seen  PSYCH: Alert. Appropriate.      Labs / Imaging (select studies)     RF/CCP  Recent Labs   Lab Test 09/18/17  0913   CCPIGG 1   RHF <20     CBC  Recent Labs   Lab Test 07/05/21  1519 04/06/21  0716 12/22/20  1754   WBC 6.3 7.2 8.4   RBC 4.89 4.89 4.83   HGB 14.2 14.4 14.3   HCT 43.6 43.5 43.3   MCV 89 89 90   RDW 13.1 12.8 12.8    302 317   MCH 29.0 29.4 29.6   MCHC 32.6 33.1 33.0   NEUTROPHIL 43.3 45.3 38.0   LYMPH 41.1 43.0 48.6   MONOCYTE 12.4 9.9 11.3   EOSINOPHIL 2.9 1.5 2.0   BASOPHIL 0.3 0.3 0.1   ANEU 2.7 3.3 3.2   ALYM 2.6 3.1 4.1   MIKE 0.8 0.7 1.0   AEOS 0.2 0.1 0.2   ABAS 0.0 0.0 0.0     CMP  Recent Labs   Lab Test 07/05/21  1519 04/06/21  0716 12/22/20  1754 09/23/20  0749 09/23/20  0745 05/08/19  0745 09/18/17  0913   NA  --   --   --  141  --  142 141   POTASSIUM  --   --   --  4.1  --  4.0 4.0   CHLORIDE  --   --   --  109  --  109 107   CO2  --   --   --  29  --  30 29   ANIONGAP  --   --   --  3  --  3 5   GLC  --   --   --  104*  --  99 96   BUN  " --   --   --  16  --  14 14   CR 0.78 0.86 0.87 0.77   < > 0.74 0.76   GFRESTIMATED 84 75 74 85   < > >90 79   GFRESTBLACK >90 86 85 >90   < > >90 >90   AGUILAR  --   --   --  9.4  --  9.3 8.8   BILITOTAL 0.2 0.4 0.2  --   --  0.3 0.4   ALBUMIN 3.9 3.8 4.3  --   --  4.0 3.7   PROTTOTAL 7.5 7.6 7.8  --   --  7.5 7.4   ALKPHOS 76 76 72  --   --  73 71   AST 17 13 17  --   --  15 16   ALT 32 26 27  --   --  31 23    < > = values in this interval not displayed.     Calcium/VitaminD  Recent Labs   Lab Test 09/23/20  0749 05/08/19  0745 09/18/17  0913   AGUILAR 9.4 9.3 8.8     ESR/CRP  Recent Labs   Lab Test 07/05/21  1519 04/06/21  0716 12/22/20  1754   SED 7 9 8   CRP 2.9 12.4* <2.9     Hepatitis B  Recent Labs   Lab Test 10/12/15  0923   AUSAB 0.18   HBCAB Nonreactive   HEPBANG Nonreactive     Hepatitis C  Recent Labs   Lab Test 10/07/16  0912   HCVAB Nonreactive   Assay performance characteristics have not been established for newborns,   infants, and children       Tuberculosis Screening  Recent Labs   Lab Test 12/07/18  0724   TBRES Negative     HIV Screening  Recent Labs   Lab Test 10/12/15  0923   HIAGAB Nonreactive   HIV-1 p24 Ag & HIV-1/HIV-2 Ab Not Detected              Chart documentation done in part with Dragon Voice recognition Software. Although reviewed after completion, some word and grammatical error may remain.      Video-Visit Details    Type of service:  Video Visit    Video Start Time: 9:31 AM    Video End Time: 9:45 AM    Originating Location (pt. Location): [place], [place]    Distant Location (provider location):  Home    Platform used for Video Visit: Celestine Resendiz MD

## 2021-07-13 NOTE — PATIENT INSTRUCTIONS
RHEUMATOLOGY    Dr. Bill Resendiz    [facility]  [address] [place] [address]    Our new phone number for Rheumatology is [phone], this number will be able to help you schedule appointments for Dr. Resendiz or if you have any message you would like sent to us.    Thank you for choosing [facility]!    Jolynn Angulo [facility] Rheumatology

## 2021-08-25 ENCOUNTER — E-VISIT (OUTPATIENT)
Dept: FAMILY MEDICINE | Facility: CLINIC | Age: 56
End: 2021-08-25
Payer: COMMERCIAL

## 2021-08-25 DIAGNOSIS — R21 RASH: Primary | ICD-10-CM

## 2021-08-25 PROCEDURE — 99207 PR NON-BILLABLE SERV PER CHARTING: CPT | Performed by: FAMILY MEDICINE

## 2021-09-01 ENCOUNTER — TELEPHONE (OUTPATIENT)
Dept: OTHER | Facility: CLINIC | Age: 56
End: 2021-09-01

## 2021-09-01 NOTE — TELEPHONE ENCOUNTER
Reviewed chart, offer next available appointment in last week of September.     LG       Please call patient and arrange for office visit with Dr. Barrera at next available.  This must be in clinic.  No labs/images prior.    Radha Arciniega RN BSN  [facility]  [phone]

## 2021-09-01 NOTE — TELEPHONE ENCOUNTER
Patient called to schedule appointment. She seen Dr. Barrera 2 years ago. She states her legs feel heavy, ankles swelling, difficulty walking. No available appointment until 9/13/2021. Can we call patient back with earlier appointment.

## 2021-09-03 NOTE — TELEPHONE ENCOUNTER
LM on preferred number asking Bria to call to schedule next available appointment with Dr. Barrera.  Explained that delay in calling could result in being scheduled out further as appointments fill quickly.

## 2021-09-19 ENCOUNTER — HEALTH MAINTENANCE LETTER (OUTPATIENT)
Age: 56
End: 2021-09-19

## 2021-09-23 ENCOUNTER — OFFICE VISIT (OUTPATIENT)
Dept: OTHER | Facility: CLINIC | Age: 56
End: 2021-09-23
Attending: INTERNAL MEDICINE
Payer: COMMERCIAL

## 2021-09-23 ENCOUNTER — MYC MEDICAL ADVICE (OUTPATIENT)
Dept: FAMILY MEDICINE | Facility: CLINIC | Age: 56
End: 2021-09-23

## 2021-09-23 VITALS
HEIGHT: 64 IN | WEIGHT: 234 LBS | SYSTOLIC BLOOD PRESSURE: 154 MMHG | RESPIRATION RATE: 16 BRPM | OXYGEN SATURATION: 97 % | HEART RATE: 71 BPM | DIASTOLIC BLOOD PRESSURE: 90 MMHG | BODY MASS INDEX: 39.95 KG/M2

## 2021-09-23 DIAGNOSIS — M79.89 LEG SWELLING: ICD-10-CM

## 2021-09-23 DIAGNOSIS — I89.0 LYMPHEDEMA OF BOTH LOWER EXTREMITIES: Primary | ICD-10-CM

## 2021-09-23 DIAGNOSIS — R06.83 SNORING: ICD-10-CM

## 2021-09-23 DIAGNOSIS — E66.01 MORBID OBESITY (H): Primary | ICD-10-CM

## 2021-09-23 DIAGNOSIS — M06.4 INFLAMMATORY POLYARTHROPATHY (H): ICD-10-CM

## 2021-09-23 DIAGNOSIS — E66.01 MORBID OBESITY (H): ICD-10-CM

## 2021-09-23 PROCEDURE — 99205 OFFICE O/P NEW HI 60 MIN: CPT | Performed by: INTERNAL MEDICINE

## 2021-09-23 PROCEDURE — G0463 HOSPITAL OUTPT CLINIC VISIT: HCPCS

## 2021-09-23 ASSESSMENT — MIFFLIN-ST. JEOR: SCORE: 1626.42

## 2021-09-23 NOTE — NURSING NOTE
"[facility]        Patient is here for a  follow up  to discuss leg swelling    Pt is currently taking no meds that would impact our treatment plan.    BP (!) 154/90 (BP Location: Right arm, Patient Position: Sitting)   Pulse 71   Resp 16   Ht 5' 4\" (1.626 m)   Wt 234 lb (106.1 kg)   SpO2 97%   BMI 40.17 kg/m      The provider has been notified that the patient has concerns of leg swelling- does not currently wear compression stockings.     Questions patient would like addressed today are: concerns of ongoing leg swelling.    Refills are needed: N/A    Has homecare services and agency name:  No       Eva Marie RN      "

## 2021-09-23 NOTE — PATIENT INSTRUCTIONS
1. Refer to lymphedema therapist     2. Go for bilateral leg venous duplex , CT abdomen and pelvis then virtual visit 1-2 weeks after     4. Lose weight     4. Compression stockings, elevate legs , consider sleep evaluation through primary

## 2021-09-23 NOTE — PROGRESS NOTES
[facility] INITIAL VASCULAR MEDICINE CONSULT    ( New patient visit last seen more than 3 years ago )       PRIMARY HEALTH CARE PROVIDER:  Holli Marley MD      REFERRING HEALTH CARE PROVIDER;  Holli Marley MD      REASON FOR CONSULT:  Evaluation of vascular causes of BLE swelling left more than Rt side for many years worsening lately. She is morbidly obese, Hx of RA sees Rheumatologist , snoring Hx etc.      HPI: Bria Haddad is a 56 year old morbidly obese female BMI greater than 40 with history of rheumatoid arthritis following up with rheumatologist and recent inflammatory markers were unremarkable taking Humira and sulfasalazine here today for evaluation and management of vascular causes of bilateral lower extremity swelling for many years left more than right side and worsening lately she underwent multiple venous duplexes negative for DVT and also underwent venous competency studies which were unremarkable in the past.  No recent travel.  No history of smoking.  She is also having pain in the legs and other areas due to polyarthritis etc..  She is a longstanding history of a snoring her  noticed but no daytime somnolence and she was not formally evaluated for sleep study.  No personal history of malignancy.  She underwent pelvic ultrasound few years ago endometrial thickening underwent biopsy which was unremarkable and she also has a 2 fibroids.    Reviewed available imaging studies laboratory tests in the epic and updated chart    PAST MEDICAL HISTORY  Past Medical History:   Diagnosis Date     Bilateral carpal tunnel syndrome 12/20/2015     NO ACTIVE PROBLEMS      Obesity      Polyarthritis     inflammatory poly arthritis.     S/P carpal tunnel release 12/29/2016       CURRENT MEDICATIONS  adalimumab (HUMIRA *CF*) 40 MG/0.4ML pen kit, Inject 0.4 mLs (40 mg) Subcutaneous every 14 days  furosemide (LASIX) 20 MG tablet, Take 1 tablet (20 mg) by mouth  daily Prn pedal edema (Patient not taking: Reported on 8/12/2020)  order for DME, Equipment being ordered: compression stockings 1 pair, at 30mm Hg, to be worn during the day. For Bilateral leg edema [R60.0]  sulfaSALAzine (AZULFIDINE) 500 MG tablet, Take 2 tablets (1,000 mg) by mouth 2 times daily    No current facility-administered medications on file prior to visit.      PAST SURGICAL HISTORY:  Past Surgical History:   Procedure Laterality Date     bunions       COLONOSCOPY N/A 11/9/2015    Procedure: COLONOSCOPY;  Surgeon: Andrew Adamson MD;  Location: [facility] OR     COLONOSCOPY WITH CO2 INSUFFLATION N/A 11/9/2015    Procedure: COLONOSCOPY WITH CO2 INSUFFLATION;  Surgeon: Andrew Adamson MD;  Location: [facility] OR     GYN SURGERY      endometryosis     RELEASE CARPAL TUNNEL Right 10/12/2016    Procedure: RELEASE CARPAL TUNNEL;  Surgeon: Colby Nobles MD;  Location: [facility] OR     RELEASE CARPAL TUNNEL Left 12/16/2016    Procedure: RELEASE CARPAL TUNNEL;  Surgeon: Colby Nobles MD;  Location: [facility] OR     TONSILLECTOMY & ADENOIDECTOMY         ALLERGIES     Allergies   Allergen Reactions     Asa [Aspirin] Hives       FAMILY HISTORY  Family History   Problem Relation Age of Onset     Osteoporosis Mother      Respiratory Mother      Thyroid Disease Mother      Heart Disease Father      Cancer - colorectal Maternal Grandmother      Diabetes Maternal Grandfather      Heart Disease Maternal Grandfather      Cancer Paternal Grandmother      Heart Disease Paternal Grandfather      Respiratory Paternal Grandfather      Hypertension Brother      Thyroid Disease Sister      Thyroid Disease Sister      Neurologic Disorder Brother        VASCULAR FAMILY HISTORY  1st order relative with atherosclerotic PAD: No  1st order relative with AAA: No  Family history of Familial Hyperlipidemia No  Family History of Hypercoagulable state:No    VASCULAR RISK FACTORS  1. Diabetes:No   2. Smoking: has never smoked.  3. HTN:  elevated today   4.Hyperlipidemia: No      SOCIAL HISTORY  Social History     Socioeconomic History     Marital status:      Spouse name: Not on file     Number of children: Not on file     Years of education: Not on file     Highest education level: Not on file   Occupational History     Occupation:      Comment: sitting job   Tobacco Use     Smoking status: Never Smoker     Smokeless tobacco: Never Used   Substance and Sexual Activity     Alcohol use: No     Alcohol/week: 0.0 standard drinks     Drug use: No     Sexual activity: Never   Other Topics Concern     Parent/sibling w/ CABG, MI or angioplasty before 65F 55M? Not Asked   Social History Narrative     Not on file     Social Determinants of Health     Financial Resource Strain:      Difficulty of Paying Living Expenses:    Food Insecurity:      Worried About Running Out of Food in the Last Year:      Ran Out of Food in the Last Year:    Transportation Needs:      Lack of Transportation (Medical):      Lack of Transportation (Non-Medical):    Physical Activity:      Days of Exercise per Week:      Minutes of Exercise per Session:    Stress:      Feeling of Stress :    Social Connections:      Frequency of Communication with Friends and Family:      Frequency of Social Gatherings with Friends and Family:      Attends [employer] Services:      Active Member of Clubs or Organizations:      Attends Club or Organization Meetings:      Marital Status:    Intimate Partner Violence:      Fear of Current or Ex-Partner:      Emotionally Abused:      Physically Abused:      Sexually Abused:        ROS:   General: No change in weight, sleep or appetite.  Normal energy.  No fever or chills  Eyes: Negative for vision changes or eye problems  ENT: No problems with ears, nose or throat.  No difficulty swallowing.  Resp: No coughing, wheezing or shortness of breath  CV: No chest pains or palpitations  GI: No nausea, vomiting,  heartburn, abdominal pain,  "diarrhea, constipation or change in bowel habits  : No urinary frequency or dysuria, bladder or kidney problems  Musculoskeletal: No significant muscle or joint pains  Neurologic: No headaches, numbness, tingling, weakness, problems with balance or coordination  Psychiatric: No problems with anxiety, depression or mental health  Heme/immune/allergy: No history of bleeding or clotting problems or anemia.  No allergies or immune system problems  Endocrine: No history of thyroid disease, diabetes or other endocrine disorders  Skin: No rashes,worrisome lesions or skin problems  Vascular:  No claudication, lifestyle limiting or otherwise; no ischemic rest pain; no non-healing ulcers. No weakness, No loss of sensation    Bilateral lower extremity swelling left more than right side with pain discomfort and worsening lately    EXAM:  BP (!) 154/90 (BP Location: Right arm, Patient Position: Sitting)   Pulse 71   Resp 16   Ht 5' 4\" (1.626 m)   Wt 234 lb (106.1 kg)   SpO2 97%   BMI 40.17 kg/m    In general, the patient is a pleasant female in no apparent distress.    HEENT: NC/AT.  PERRLA.  EOMI.  Sclerae white, not injected.  Nares clear.  Pharynx without erythema or exudate.  Dentition intact.    Neck: No adenopathy.  No thyromegaly. Carotids +2/2 bilaterally without bruits.  No jugular venous distension.   Heart: RRR. Normal S1, S2 splits physiologically. No murmur, rub, click, or gallop. The PMI is in the 5th ICS in the midclavicular line. There is no heave.    Lungs: CTA.  No ronchi, wheezes, rales.  No dullness to percussion.   Abdomen: Soft, nontender, nondistended. No organomegaly. No AAA.  No bruits.   Extremities: Vascular;  She has a classical features of lymphedema bilaterally left worse than right side asymmetrical swelling, positive stemmer sign, swelling of the dorsum of the foot, asymmetry and loss of contour of leg.  Excellent palpable symmetrical peripheral pulses bilaterally  New varicose veins " noted.  No foot ulcers or leg ulcers    Labs:    LIVER ENZYME RESULTS:  Lab Results   Component Value Date    AST 17 07/05/2021    ALT 32 07/05/2021       CBC RESULTS:  Lab Results   Component Value Date    WBC 6.3 07/05/2021    RBC 4.89 07/05/2021    HGB 14.2 07/05/2021    HCT 43.6 07/05/2021    MCV 89 07/05/2021    MCH 29.0 07/05/2021    MCHC 32.6 07/05/2021    RDW 13.1 07/05/2021     07/05/2021       BMP RESULTS:  Lab Results   Component Value Date     09/23/2020    POTASSIUM 4.1 09/23/2020    CHLORIDE 109 09/23/2020    CO2 29 09/23/2020    ANIONGAP 3 09/23/2020     (H) 09/23/2020    BUN 16 09/23/2020    CR 0.78 07/05/2021    GFRESTIMATED 84 07/05/2021    GFRESTBLACK >90 07/05/2021    AGUILAR 9.4 09/23/2020        A1C RESULTS:  Lab Results   Component Value Date    A1C 5.3 05/08/2019       THYROID RESULTS:  Lab Results   Component Value Date    TSH 3.77 09/23/2020       Procedures:     VENOUS ULTRASOUND LEFT LEG  9/19/2018 1:22 PM      HISTORY: ; Leg swelling     COMPARISON: None.     FINDINGS:  Examination of the deep veins with graded compression and  color flow Doppler with spectral wave form analysis was performed.                                                                      IMPRESSION: No evidence of deep venous thrombosis.     CORA RANDOLPH MD    VENOUS ULTRASOUND BILATERAL LEG(S)  6/13/2018 8:54 AM      HISTORY: Worsening bilateral leg edema. Please recheck for venous  incompetence and also check for deep vein thrombosis (patient recently  started methotrexate). Bilateral leg edema.     COMPARISON: 6/23/2017.     FINDINGS: Examination of the deep veins with graded compression and  color flow Doppler with spectral wave form analysis was performed.  Images show no evidence of thrombus in the bilateral common femoral  vein, femoral vein, popliteal vein or calf veins.     There is deep venous insufficiency in the right common femoral vein.  No left deep venous insufficiency.     No  superficial venous insufficiency in either lower extremity.                                                                      IMPRESSION:  1. No deep vein thrombosis in either lower extremity.  2. Deep venous insufficiency at the level of the right common femoral  vein.  3. No left deep venous insufficiency.  4. No superficial venous insufficiency bilaterally.     CURTIS BRODY, DO  VENOUS ULTRASOUND BOTH LEGS  6/23/2017 2:30 PM      HISTORY: Localized edema     COMPARISON: None.     FINDINGS: Examination of the deep veins with graded compression and  color flow Doppler with spectral wave form analysis shows no evidence  of thrombus in the common femoral vein, femoral vein, popliteal vein  or calf veins. No evidence of venous incompetency.                                                                      IMPRESSION: No evidence of deep venous thrombosis. No venous  incompetency.     CURTIS BRODY DO      Assessment and Plan:     1. Lymphedema of both lower extremities asymmetrical Left >Rt side     This is a very pleasant 56-year-old female postmenopausal morbidly obese BMI greater than 40 experiencing bilateral lower extremity swelling with pain discomfort and intermittent skin rash for many years and previous extensive work-up multiple venous duplexes negative for DVT and venous competency studies unremarkable.  She has no personal history of malignancy no previous history of a DVT.  No signs of arterial insufficiency she has a palpable peripheral pulses no varicose veins.  Her exam is classical for lymphedema left more than right side with loss of contour of the left leg and dorsal hump and positive stemmer sign  She also history of endometrial thickness with a postmenopausal bleeding few years ago underwent pelvic ultrasound and also seen by OB/GYN biopsy was negative    At present my recommendations,  Arrange bilateral lower extremity venous duplex to rule out any DVT  Referral to lymphedema  therapist for MLD, leg wraps etc.  Given asymmetrical leg swelling will arrange CT scan of the abdomen pelvis to look for any compressive pathology  She will benefit with weight loss  She will also benefit with formal sleep evaluation etc.    Virtual visit 1 to 2 weeks after completion of the studies  Follow-up with me in the clinic or virtual in 6 months after completion of lymphedema therapy etc.    - Lymphedema Therapy Referral; Future  - US Lower Ext Venous Duplex Limited Bilat; Future    2. Inflammatory polyarthropathy (H)    Currently following up with rheumatologist taking Humira and also sulfasalazine recent inflammatory markers are unremarkable continue the same plan      3. Morbid obesity (H)BMI >40  4. Snoring  She will benefit with weight loss and also if her snoring continues to worse or witnessed apneic episodes or daytime somnolence or fatigue or tiredness consider going for formal sleep evaluation through primary care physician's office      60 minutes spent on the date of the encounter doing chart review, history and exam, documentation, and further activities as noted above.    Thank you for the consultation !    This note was dictated by utilizing Dragon software    Copy of this note to primary care physician    Val Barrera MD, YUMIKO, FS, FNLA  Vascular Medicine  Clinical Lipidologist  Clinical Hypertension specialist

## 2021-09-27 ENCOUNTER — TELEPHONE (OUTPATIENT)
Dept: OTHER | Facility: CLINIC | Age: 56
End: 2021-09-27

## 2021-09-27 NOTE — TELEPHONE ENCOUNTER
Results for TRENT GARNER (MRN [id number]) as of 9/27/2021 12:47   Ref. Range 7/5/2021 15:19   Creatinine Latest Ref Range: 0.52 - 1.04 mg/dL 0.78   GFR Estimate   Latest Ref Range: >60 mL/min/1.73_m2 84   No contrast allergy per chart review.      Follow up to 9/23/21    Please arrange for:    BLE Venous US - next available (lives in [place])    CT abdomen/with contrast - next available    Virtual visit 1-2 weeks after imaging      Radha Arciniega RN BSN  [facility]  [phone]

## 2021-10-02 ENCOUNTER — LAB (OUTPATIENT)
Dept: LAB | Facility: CLINIC | Age: 56
End: 2021-10-02
Payer: COMMERCIAL

## 2021-10-02 DIAGNOSIS — M06.09 RHEUMATOID ARTHRITIS OF MULTIPLE SITES WITH NEGATIVE RHEUMATOID FACTOR (H): ICD-10-CM

## 2021-10-02 DIAGNOSIS — Z79.899 HIGH RISK MEDICATIONS (NOT ANTICOAGULANTS) LONG-TERM USE: ICD-10-CM

## 2021-10-02 LAB
BASOPHILS # BLD AUTO: 0 10E3/UL (ref 0–0.2)
BASOPHILS NFR BLD AUTO: 0 %
EOSINOPHIL # BLD AUTO: 0.2 10E3/UL (ref 0–0.7)
EOSINOPHIL NFR BLD AUTO: 2 %
ERYTHROCYTE [DISTWIDTH] IN BLOOD BY AUTOMATED COUNT: 12.9 % (ref 10–15)
ERYTHROCYTE [SEDIMENTATION RATE] IN BLOOD BY WESTERGREN METHOD: 7 MM/HR (ref 0–30)
HCT VFR BLD AUTO: 41.4 % (ref 35–47)
HGB BLD-MCNC: 13.7 G/DL (ref 11.7–15.7)
LYMPHOCYTES # BLD AUTO: 3.1 10E3/UL (ref 0.8–5.3)
LYMPHOCYTES NFR BLD AUTO: 34 %
MCH RBC QN AUTO: 29.7 PG (ref 26.5–33)
MCHC RBC AUTO-ENTMCNC: 33.1 G/DL (ref 31.5–36.5)
MCV RBC AUTO: 90 FL (ref 78–100)
MONOCYTES # BLD AUTO: 0.9 10E3/UL (ref 0–1.3)
MONOCYTES NFR BLD AUTO: 10 %
NEUTROPHILS # BLD AUTO: 4.9 10E3/UL (ref 1.6–8.3)
NEUTROPHILS NFR BLD AUTO: 54 %
PLATELET # BLD AUTO: 333 10E3/UL (ref 150–450)
RBC # BLD AUTO: 4.62 10E6/UL (ref 3.8–5.2)
WBC # BLD AUTO: 9.1 10E3/UL (ref 4–11)

## 2021-10-02 PROCEDURE — 86140 C-REACTIVE PROTEIN: CPT

## 2021-10-02 PROCEDURE — 82565 ASSAY OF CREATININE: CPT

## 2021-10-02 PROCEDURE — 36415 COLL VENOUS BLD VENIPUNCTURE: CPT

## 2021-10-02 PROCEDURE — 85652 RBC SED RATE AUTOMATED: CPT

## 2021-10-02 PROCEDURE — 80076 HEPATIC FUNCTION PANEL: CPT

## 2021-10-02 PROCEDURE — 85025 COMPLETE CBC W/AUTO DIFF WBC: CPT

## 2021-10-04 LAB
ALBUMIN SERPL-MCNC: 4 G/DL (ref 3.4–5)
ALP SERPL-CCNC: 76 U/L (ref 40–150)
ALT SERPL W P-5'-P-CCNC: 33 U/L (ref 0–50)
AST SERPL W P-5'-P-CCNC: 18 U/L (ref 0–45)
BILIRUB DIRECT SERPL-MCNC: <0.1 MG/DL (ref 0–0.2)
BILIRUB SERPL-MCNC: 0.3 MG/DL (ref 0.2–1.3)
CREAT SERPL-MCNC: 1.04 MG/DL (ref 0.52–1.04)
CRP SERPL-MCNC: 3 MG/L (ref 0–8)
GFR SERPL CREATININE-BSD FRML MDRD: 59 ML/MIN/1.73M2
PROT SERPL-MCNC: 7.4 G/DL (ref 6.8–8.8)

## 2021-10-08 ENCOUNTER — HOSPITAL ENCOUNTER (OUTPATIENT)
Dept: PHYSICAL THERAPY | Facility: CLINIC | Age: 56
Setting detail: THERAPIES SERIES
End: 2021-10-08
Attending: INTERNAL MEDICINE
Payer: COMMERCIAL

## 2021-10-08 DIAGNOSIS — I89.0 LYMPHEDEMA OF BOTH LOWER EXTREMITIES: ICD-10-CM

## 2021-10-08 PROCEDURE — 97161 PT EVAL LOW COMPLEX 20 MIN: CPT | Mod: GP | Performed by: PHYSICAL THERAPIST

## 2021-10-08 PROCEDURE — 97140 MANUAL THERAPY 1/> REGIONS: CPT | Mod: GP | Performed by: PHYSICAL THERAPIST

## 2021-10-08 NOTE — PROGRESS NOTES
"   Lymphedema Evaulation  10/08/21 0800   Rehab Discipline   Discipline PT   Type of Visit   Type of visit Initial Edema Evaluation   General Information   Start of care 10/08/21   Referring physician Dr. Val Barrera   Orders Evaluate and treat as indicated   Order date 09/23/21   Medical diagnosis Lymphedema of both lower extremities asymmetrical left > right side   Onset of illness / date of surgery 09/23/21  (date of referral (chronic issue))   Edema onset 09/23/21  (date of referrl (chronic issue))   Affected body parts LLE;RLE   Edema etiology Chronic Venous Insufficiency   Pertinent history of current problem (PT: include personal factors and/or comorbidities that impact the POC; OT: include additional occupational profile info) Pt reports that she has had swelling in her legs for many years L>R.  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 No   Have you fallen and had an injury in the past year? No   Is patient a fall risk? No   Abuse Screen (yes response referral indicated)   Feels Unsafe at Home or Work/School no   Feels Threatened by Someone no   Does Anyone Try to Keep You From Having Contact with Others or Doing Things Outside Your Home? no   Physical Signs of Abuse Present no   System Outcome Measures   Outcome Measures Lymphedema   Lymphedema Life Impact Scale (score range 0-72). A higher score indicates greater impairment. 40   Subjective Report   Patient report of symptoms \"pain L>R, tightness, heaviness\"   Patient / Family " "Goals   Patient / family goals statement \"to have the swelling go away and to be more mobile\"   Pain   Patient currently in pain Yes   Pain location Left and Right ankle   Pain rating unable to rate   Pain description Heaviness   Cognitive Status   Orientation Orientation to person, place and time   Level of consciousness Alert   Follows commands and answers questions 100% of the time   Personal safety and judgement Intact   Memory Intact   Edema Exam / Assessment   Skin condition Non-pitting;Dryness   Skin condition comments skin thickening L LE > R LE   Scar Yes   Location B great toes due to bunionectomy   Mobility mobile   Dorsal pedal pulse Symmetrical   Stemmer sign Negative   Ulceration No   Volume LE   Right LE (mL) 2352.6   Left LE (mL) 2684.1   LE volume comparison LLE volume greater than RLE volume   % difference 14.1%   Range of Motion   ROM No deficits were identified   Strength   Strength No deficits were identified   Posture   Posture Normal   Gait / Locomotion   Gait / Locomotion independent   Clinical Impression   Criteria for skilled therapeutic intervention met Yes   Therapy diagnosis B LE secondary lymphedema   Influenced by the following impairments / conditions Edema;Stage 2;Phlebolymphedema   Functional limitations due to impairments / conditions decreased ambulation, pain, decreased fit of shoes   Clinical Presentation Stable/Uncomplicated   Clinical Presentation Rationale positive motivation   Clinical Decision Making (Complexity) Low complexity   Treatment Frequency 3x/week   Treatment duration 10 weeks   Patient / family and/or staff in agreement with plan of care Yes   Risks and benefits of therapy have been explained Yes   Goals   Edema Eval Goals 1;2;3   Goal 1   Goal identifier stg 1   Goal description pt to have around the clock tolerance to BLE GCB for edema reduction response   Target date 10/22/21   Goal 2   Goal identifier ltg 1   Goal description once appropriate, pt to be " independent with donning, doffing and care of compression stocking/garment for longterm edema management for maintenance   Target date 12/17/21   Goal 3   Goal identifier ltg 2   Goal description pt to have at least 3-5 point improvement on LLIS due to lymphedema reduction response in BLE   Target date 12/17/21   Total Evaluation Time   PT Eval, Low Complexity Minutes (83113) 25     Araceli Peterson, PT, DPT, CLT  Physical Therapist & Certified Lymphedema Therapist  [facility]

## 2021-10-11 ENCOUNTER — HOSPITAL ENCOUNTER (OUTPATIENT)
Dept: PHYSICAL THERAPY | Facility: CLINIC | Age: 56
Setting detail: THERAPIES SERIES
End: 2021-10-11
Attending: INTERNAL MEDICINE
Payer: COMMERCIAL

## 2021-10-11 PROCEDURE — 97140 MANUAL THERAPY 1/> REGIONS: CPT | Mod: GP | Performed by: PHYSICAL THERAPIST

## 2021-10-12 ENCOUNTER — VIRTUAL VISIT (OUTPATIENT)
Dept: RHEUMATOLOGY | Facility: CLINIC | Age: 56
End: 2021-10-12
Payer: COMMERCIAL

## 2021-10-12 DIAGNOSIS — M06.09 RHEUMATOID ARTHRITIS OF MULTIPLE SITES WITH NEGATIVE RHEUMATOID FACTOR (H): Primary | ICD-10-CM

## 2021-10-12 DIAGNOSIS — Z79.899 HIGH RISK MEDICATIONS (NOT ANTICOAGULANTS) LONG-TERM USE: ICD-10-CM

## 2021-10-12 PROCEDURE — 99214 OFFICE O/P EST MOD 30 MIN: CPT | Mod: GT | Performed by: INTERNAL MEDICINE

## 2021-10-12 RX ORDER — SULFASALAZINE 500 MG/1
1000 TABLET ORAL 2 TIMES DAILY
Qty: 360 TABLET | Refills: 2 | Status: SHIPPED | OUTPATIENT
Start: 2021-10-12 | End: 2022-04-27

## 2021-10-12 NOTE — PATIENT INSTRUCTIONS
RHEUMATOLOGY    Dr. Bill Resendiz    [facility] [place]  [address]  [place] [address]  Phone number: [phone]  Fax number: [phone]    Thank you for choosing [facility]!    Jolynn Angulo CMA Rheumatology    --------------------    Vaccine:    Week 0: Recommend receiving the 3rd dose of the COVID19 vaccine now    Week 2: [id number] and Influenza    Week 6: Shingrix (shingles vaccine)    Week 10: Penumovax 23    2-6 months after the initial Shingrix: get the 2nd dose of Shingrix      --------------------    A single additional dose of the Pfizer or Moderna COVID-19 vaccine is recommended at least 28 days after the completion of the 2-dose mRNA vaccine series for patients receiving any immunosuppressive or immunomodulatory therapy. Attempts should be made to match the additional mRNA dose type to the type given in the mRNA primary series; however, if that is not feasible, a booster dose with the alternative mRNA vaccine is permitted.    Based on our current understanding (and this may change over time as we learn more), medications should be adjusted as noted below, if disease activity allows:  - NSAIDs and Acetaminophen: hold for 24 hours prior to vaccination if able to do so  - Sulfasalazine should be held for 1 week after the COVID19 booster vaccine

## 2021-10-12 NOTE — PROGRESS NOTES
Bria Haddad  is a 56 year old year old female who is being evaluated via a billable video visit.      How would you like to obtain your AVS? MyChart  If the video visit is dropped, the invitation should be resent by: Text to cell phone: [phone]  Will anyone else be joining your video visit? No    Rheumatology Video Visit      Bria Haddad MRN# [id number]   YOB: 1965 Age: 56 year old      Date of visit: 10/12/21  PCP: Dr. Holli Marley    Chief Complaint   Patient presents with:  Rheumatoid Arthritis    Assessment and Plan     1.  Rheumatoid arthritis: Ms. Haddad initially presented to this clinic in 2015 with dependent edema of the bilateral lower extremities, fatigue, and a one time episode of right toe pain in the setting of a positive ROSEMARY.  On 9/18/2017 she presented with synovitis of her bilateral PIPs and pain without swelling of her MCPs, persistent fatigue, intermittent rash, and dependent edema. ROSEMARY positive but additional studies negative/normal including complement C3, complement C4, beta-2 glycoprotein IgM and IgG, cardiolipin IgM and IgG, lupus anticoagulant, RNP, Stuart, SSA, SSB, Scl-70.  Joint exam most consistent with rheumatoid arthritis.  Previously on HCQ (bloating), MTX (LFT elevation, alopecia, headache), leflunomide (LFT elevation). Currently on SSZ and Humira.  Doing well at this time with no joint pain, swelling, or stiffness.   Chronic illness, stable.    - Continue sulfasalazine 1000mg twice daily  - Continue Humira 40mg SQ every 14 days  - Labs in 3 months: CBC, Creatinine, Hepatic Panel, ESR, CRP, quantiferon TB gold plus    High risk medication requiring intensive toxicity monitoring at least quarterly: labs ordered include CBC, Creatinine, Hepatic panel to monitor for cytopenia and hepatotoxicity; checking creatinine as it affects clearance of medication.      2. Left knee pain history: Not an issue currently.   Significant improvement with PT in the past and  encouraged that she continue these exercises.  When she stopped the exercises in the past she had worsening left knee pain.       3.  History of right third and fourth digit trigger fingers: Resolved with steroid injections.  Not an issue today.      4.  History of Left shoulder pain, impingement syndrome: Resolved with combination of steroid injection and physical therapy.  Not an issue today.     5. Dyspnea on exertion: stable for months now and without other associated symptoms. I advised that she follow-up with her PCP for further evaluation.     6.  Vaccinations: Vaccinations reviewed with Ms. Haddad.  - Influenza: encouraged yearly vaccination  - [id number]: advised receiving  - [id number]: advised receiving at least 8 weeks after [id number] is administered  - Shingrix: advised receiving   - COVID-19: has received the Pfizer COVID-19 vaccine on 3/19/2021 and 4/9/2021     Timeline of vaccines recommended were outlined in the after-visit summary (AVS)    A single additional dose of the Pfizer or Moderna COVID-19 vaccine is recommended at least 28 days after the completion of the 2-dose mRNA vaccine series for patients receiving any immunosuppressive or immunomodulatory therapy. Attempts should be made to match the additional mRNA dose type to the type given in the mRNA primary series; however, if that is not feasible, a booster dose with the alternative mRNA vaccine is permitted.    Based on our current understanding (and this may change over time as we learn more), medications should be adjusted as noted below, if disease activity allows:  - NSAIDs and Acetaminophen: hold for 24 hours prior to vaccination if able to do so  - Sulfasalazine should be held for 1 week after the COVID19 booster vaccine    Total minutes spent in evaluation with patient, documentation, , and review of pertinent studies and chart notes: 22      Ms. Haddad verbalized agreement with and understanding of the rational for the  diagnosis and treatment plan.  All questions were answered to best of my ability and the patient's satisfaction. Ms. Haddad was advised to contact the clinic with any questions that may arise after the clinic visit.      Thank you for involving me in the care of the patient    Return to clinic: 3 months      HPI   Bria Haddad is a 56 year old female with a past medical history significant for endometriosis, s/p carpal tunnel release surgery who present for follow up of rheumatoid arthritis.    Today, 10/12/2021: doing lymphedema therapy now and that his helping the legs / knees. RA controlled. Other than knees, no joint pain. No joint swelling. Morning stiffness <30 min. No gelling.  Tolerating humira and SSZ well.     Denies fevers, chills, nausea, vomiting, constipation, diarrhea. No abdominal pain. No chest pain/pressure, palpitations. Occasional dyspnea with exertion that is unchanged for months now and not associated with chest pain, diaphoresis, nausea, lightheadedness, or dizziness.  No neck pain. No oral or nasal sores. No sicca symptoms.     Tobacco: none  EtOH: none  Drugs: none    ROS   12 point review of system was completed and negative except as noted in the HPI     Active Problem List     Patient Active Problem List   Diagnosis     CARDIOVASCULAR SCREENING; LDL GOAL LESS THAN 160     Endometriosis of uterus     Inflammatory polyarthropathy (H)     Morbid obesity (H)     Past Medical History     Past Medical History:   Diagnosis Date     Bilateral carpal tunnel syndrome 12/20/2015     NO ACTIVE PROBLEMS      Obesity      Polyarthritis     inflammatory poly arthritis.     S/P carpal tunnel release 12/29/2016     Past Surgical History     Past Surgical History:   Procedure Laterality Date     bunions       COLONOSCOPY N/A 11/9/2015    Procedure: COLONOSCOPY;  Surgeon: Andrew Adamson MD;  Location: [facility]     COLONOSCOPY WITH CO2 INSUFFLATION N/A 11/9/2015    Procedure: COLONOSCOPY WITH CO2  INSUFFLATION;  Surgeon: Andrew Adamson MD;  Location: [facility]     GYN SURGERY      endometryosis     RELEASE CARPAL TUNNEL Right 10/12/2016    Procedure: RELEASE CARPAL TUNNEL;  Surgeon: Colby Nobles MD;  Location: [facility]     RELEASE CARPAL TUNNEL Left 12/16/2016    Procedure: RELEASE CARPAL TUNNEL;  Surgeon: Colby Nobles MD;  Location: [facility]     TONSILLECTOMY & ADENOIDECTOMY       Allergy     Allergies   Allergen Reactions     Asa [Aspirin] Hives     Current Medication List     Current Outpatient Medications   Medication Sig     adalimumab (HUMIRA *CF*) 40 MG/0.4ML pen kit Inject 0.4 mLs (40 mg) Subcutaneous every 14 days     sulfaSALAzine (AZULFIDINE) 500 MG tablet Take 2 tablets (1,000 mg) by mouth 2 times daily     furosemide (LASIX) 20 MG tablet Take 1 tablet (20 mg) by mouth daily Prn pedal edema (Patient not taking: Reported on 8/12/2020)     order for DME Equipment being ordered: compression stockings 1 pair, at 30mm Hg, to be worn during the day. For Bilateral leg edema [R60.0]     No current facility-administered medications for this visit.         Social History   See HPI    Family History     Family History   Problem Relation Age of Onset     Osteoporosis Mother      Respiratory Mother      Thyroid Disease Mother      Heart Disease Father      Cancer - colorectal Maternal Grandmother      Diabetes Maternal Grandfather      Heart Disease Maternal Grandfather      Cancer Paternal Grandmother      Heart Disease Paternal Grandfather      Respiratory Paternal Grandfather      Hypertension Brother      Thyroid Disease Sister      Thyroid Disease Sister      Neurologic Disorder Brother      Physical Exam     Temp Readings from Last 3 Encounters:   10/23/20 98  F (36.7  C) (Oral)   08/12/20 99.2  F (37.3  C) (Tympanic)   12/23/19 98.1  F (36.7  C) (Oral)     BP Readings from Last 5 Encounters:   09/23/21 (!) 154/90   10/23/20 (!) 158/86   08/12/20 (!) 140/90   01/07/20 136/76   12/23/19  "(!) 149/78     Pulse Readings from Last 1 Encounters:   09/23/21 71     Resp Readings from Last 1 Encounters:   09/23/21 16     Estimated body mass index is 40.17 kg/m  as calculated from the following:    Height as of 9/23/21: 1.626 m (5' 4\").    Weight as of 9/23/21: 106.1 kg (234 lb).    GEN: NAD.   HEENT: MMM.  Anicteric, noninjected sclera. No obvious external lesions of the ear and nose. Hearing intact.  PULM: No increased work of breathing  MSK:  Hands and wrists without swelling.  Able to make a complete fist with each hand.   SKIN: No rash or jaundice seen  PSYCH: Alert. Appropriate.      Labs / Imaging (select studies)     RF/CCP  Recent Labs   Lab Test 09/18/17  0913   CCPIGG 1   RHF <20     CBC  Recent Labs   Lab Test 10/02/21  1656 07/05/21  1519 04/06/21  0716 12/22/20  1754 12/22/20  1754   WBC 9.1 6.3 7.2   < > 8.4   RBC 4.62 4.89 4.89   < > 4.83   HGB 13.7 14.2 14.4   < > 14.3   HCT 41.4 43.6 43.5   < > 43.3   MCV 90 89 89   < > 90   RDW 12.9 13.1 12.8   < > 12.8    363 302   < > 317   MCH 29.7 29.0 29.4   < > 29.6   MCHC 33.1 32.6 33.1   < > 33.0   NEUTROPHIL 54 43.3 45.3   < > 38.0   LYMPH 34 41.1 43.0   < > 48.6   MONOCYTE 10 12.4 9.9   < > 11.3   EOSINOPHIL 2 2.9 1.5   < > 2.0   BASOPHIL 0 0.3 0.3   < > 0.1   ANEU  --  2.7 3.3  --  3.2   ALYM  --  2.6 3.1  --  4.1   MIKE  --  0.8 0.7  --  1.0   AEOS  --  0.2 0.1  --  0.2   ABAS  --  0.0 0.0  --  0.0   ANEUTAUTO 4.9  --   --   --   --    ALYMPAUTO 3.1  --   --   --   --    AMONOAUTO 0.9  --   --   --   --    AEOSAUTO 0.2  --   --   --   --    ABSBASO 0.0  --   --   --   --     < > = values in this interval not displayed.     CMP  Recent Labs   Lab Test 10/02/21  1656 07/05/21  1519 04/06/21  0716 12/22/20  1754 12/22/20  1754 09/23/20  0749 09/23/20  0745 12/31/19  0712 05/08/19  0745 12/04/17  0805 09/18/17  0913   NA  --   --   --   --   --  141  --   --  142  --  141   POTASSIUM  --   --   --   --   --  4.1  --   --  4.0  --  4.0 "   CHLORIDE  --   --   --   --   --  109  --   --  109  --  107   CO2  --   --   --   --   --  29  --   --  30  --  29   ANIONGAP  --   --   --   --   --  3  --   --  3  --  5   GLC  --   --   --   --   --  104*  --   --  99  --  96   BUN  --   --   --   --   --  16  --   --  14  --  14   CR 1.04 0.78 0.86   < > 0.87 0.77   < >   < > 0.74   < > 0.76   GFRESTIMATED 59* 84 75   < > 74 85   < >   < > >90   < > 79   GFRESTBLACK  --  >90 86  --  85 >90   < >   < > >90   < > >90   AGUILAR  --   --   --   --   --  9.4  --   --  9.3  --  8.8   BILITOTAL 0.3 0.2 0.4   < > 0.2  --    < >   < > 0.3   < > 0.4   ALBUMIN 4.0 3.9 3.8   < > 4.3  --    < >   < > 4.0   < > 3.7   PROTTOTAL 7.4 7.5 7.6   < > 7.8  --    < >   < > 7.5   < > 7.4   ALKPHOS 76 76 76   < > 72  --    < >   < > 73   < > 71   AST 18 17 13   < > 17  --    < >   < > 15   < > 16   ALT 33 32 26   < > 27  --    < >   < > 31   < > 23    < > = values in this interval not displayed.     Calcium/VitaminD  Recent Labs   Lab Test 09/23/20  0749 05/08/19  0745 09/18/17  0913   AGUILAR 9.4 9.3 8.8     ESR/CRP  Recent Labs   Lab Test 10/02/21  1656 07/05/21  1519 04/06/21  0716   SED 7 7 9   CRP 3.0 2.9 12.4*       Hepatitis B  Recent Labs   Lab Test 10/12/15  0923   AUSAB 0.18   HBCAB Nonreactive   HEPBANG Nonreactive     Hepatitis C  Recent Labs   Lab Test 10/07/16  0912   HCVAB Nonreactive   Assay performance characteristics have not been established for newborns,   infants, and children       Tuberculosis Screening  Recent Labs   Lab Test 12/07/18  0724   TBRES Negative     HIV Screening  Recent Labs   Lab Test 10/12/15  0923   HIAGAB Nonreactive   HIV-1 p24 Ag & HIV-1/HIV-2 Ab Not Detected              Chart documentation done in part with Dragon Voice recognition Software. Although reviewed after completion, some word and grammatical error may remain.      Video-Visit Details    Type of service:  Video Visit    Video Start Time: 11:00 AM    Video End Time:11:14  AM    Originating Location (pt. Location): [place], [place]    Distant Location (provider location):  Home    Platform used for Video Visit: Celestine Resendiz MD

## 2021-10-13 ENCOUNTER — HOSPITAL ENCOUNTER (OUTPATIENT)
Dept: PHYSICAL THERAPY | Facility: CLINIC | Age: 56
Setting detail: THERAPIES SERIES
End: 2021-10-13
Attending: INTERNAL MEDICINE
Payer: COMMERCIAL

## 2021-10-13 PROCEDURE — 97140 MANUAL THERAPY 1/> REGIONS: CPT | Mod: GP | Performed by: PHYSICAL THERAPIST

## 2021-10-15 ENCOUNTER — HOSPITAL ENCOUNTER (OUTPATIENT)
Dept: PHYSICAL THERAPY | Facility: CLINIC | Age: 56
Setting detail: THERAPIES SERIES
End: 2021-10-15
Attending: INTERNAL MEDICINE
Payer: COMMERCIAL

## 2021-10-15 PROCEDURE — 97140 MANUAL THERAPY 1/> REGIONS: CPT | Mod: GP | Performed by: REHABILITATION PRACTITIONER

## 2021-10-18 ENCOUNTER — ANCILLARY PROCEDURE (OUTPATIENT)
Dept: ULTRASOUND IMAGING | Facility: CLINIC | Age: 56
End: 2021-10-18
Attending: INTERNAL MEDICINE
Payer: COMMERCIAL

## 2021-10-18 ENCOUNTER — ANCILLARY PROCEDURE (OUTPATIENT)
Dept: CT IMAGING | Facility: CLINIC | Age: 56
End: 2021-10-18
Attending: INTERNAL MEDICINE
Payer: COMMERCIAL

## 2021-10-18 DIAGNOSIS — M79.89 LEG SWELLING: ICD-10-CM

## 2021-10-18 DIAGNOSIS — I89.0 LYMPHEDEMA OF BOTH LOWER EXTREMITIES: ICD-10-CM

## 2021-10-18 PROCEDURE — 74177 CT ABD & PELVIS W/CONTRAST: CPT | Mod: TC | Performed by: RADIOLOGY

## 2021-10-18 PROCEDURE — 93971 EXTREMITY STUDY: CPT | Performed by: SURGERY

## 2021-10-18 RX ORDER — IOPAMIDOL 755 MG/ML
100 INJECTION, SOLUTION INTRAVASCULAR ONCE
Status: COMPLETED | OUTPATIENT
Start: 2021-10-18 | End: 2021-10-18

## 2021-10-18 RX ADMIN — IOPAMIDOL 100 ML: 755 INJECTION, SOLUTION INTRAVASCULAR at 08:20

## 2021-10-19 NOTE — RESULT ENCOUNTER NOTE
Unremarkable CT abdomen except small 1.7 cm liver lesion appears benign hemangioma , radiologist recommended MRI of liver to better define the lesion .     Follow up with me as scheduled next month.    Continue same plan

## 2021-10-20 ENCOUNTER — HOSPITAL ENCOUNTER (OUTPATIENT)
Dept: PHYSICAL THERAPY | Facility: CLINIC | Age: 56
Setting detail: THERAPIES SERIES
End: 2021-10-20
Attending: INTERNAL MEDICINE
Payer: COMMERCIAL

## 2021-10-20 PROCEDURE — 97140 MANUAL THERAPY 1/> REGIONS: CPT | Mod: GP | Performed by: PHYSICAL THERAPIST

## 2021-10-22 ENCOUNTER — HOSPITAL ENCOUNTER (OUTPATIENT)
Dept: PHYSICAL THERAPY | Facility: CLINIC | Age: 56
Setting detail: THERAPIES SERIES
End: 2021-10-22
Attending: INTERNAL MEDICINE
Payer: COMMERCIAL

## 2021-10-22 PROCEDURE — 97140 MANUAL THERAPY 1/> REGIONS: CPT | Mod: GP | Performed by: REHABILITATION PRACTITIONER

## 2021-11-03 ENCOUNTER — VIRTUAL VISIT (OUTPATIENT)
Dept: OTHER | Facility: CLINIC | Age: 56
End: 2021-11-03
Attending: INTERNAL MEDICINE
Payer: COMMERCIAL

## 2021-11-03 ENCOUNTER — TELEPHONE (OUTPATIENT)
Dept: OTHER | Facility: CLINIC | Age: 56
End: 2021-11-03

## 2021-11-03 ENCOUNTER — MYC MEDICAL ADVICE (OUTPATIENT)
Dept: OTHER | Facility: CLINIC | Age: 56
End: 2021-11-03

## 2021-11-03 DIAGNOSIS — R16.0 LIVER MASS: ICD-10-CM

## 2021-11-03 DIAGNOSIS — F40.240 CLAUSTROPHOBIA: Primary | ICD-10-CM

## 2021-11-03 DIAGNOSIS — E66.01 MORBID OBESITY (H): ICD-10-CM

## 2021-11-03 DIAGNOSIS — I89.0 LYMPHEDEMA OF BOTH LOWER EXTREMITIES: Primary | ICD-10-CM

## 2021-11-03 DIAGNOSIS — R06.83 SNORING: ICD-10-CM

## 2021-11-03 PROCEDURE — 99213 OFFICE O/P EST LOW 20 MIN: CPT | Mod: GT | Performed by: INTERNAL MEDICINE

## 2021-11-03 RX ORDER — LORAZEPAM 0.5 MG/1
.5-1 TABLET ORAL ONCE
Qty: 2 TABLET | Refills: 0 | Status: SHIPPED | OUTPATIENT
Start: 2021-11-03 | End: 2021-11-03

## 2021-11-03 NOTE — PROGRESS NOTES
Bria is a 56 year old who is being evaluated via a billable video visit.      How would you like to obtain your AVS? MyChart  If the video visit is dropped, the invitation should be resent by: Text to cell phone: [phone]  Will anyone else be joining your video visit? No        Provider visit note:    Chief complaint:    Follow-up visit  Review of recent labs and imaging studies  She went for few lymphedema therapy sessions  Using the wraps MLD and it is helping  Liver mass on CT ? hemangioma      History of present illness:  For full details please see my previous visit note on September 23, 2021  She was seen and evaluated for bilateral lower extremity swelling left more than right side for many years and it was worsening lately she is morbidly obese history of rheumatoid arthritis she is following up with rheumatologist and snoring etc.  She underwent CBC, CMP, inflammatory markers TSH lab tests last month which were unremarkable  She also underwent bilateral lower extremity venous duplex which is negative for DVT  Given unilateral swelling she underwent CT scan of the abdomen which was unremarkable except 1.7 cm liver mass likely hemangioma was noted but radiologist suggested MRI of abdomen/liver for better evaluation  She denies any abdominal pain nausea vomiting    Reviewed all her recent results  Review of systems: Reviewed all 12 point review of systems as per HPI otherwise unremarkable    Physical exam:( no physical exam done this is virtual visit)    Reviewed recent laboratory tests, imaging studies in the epic and updated chart    Assessment and plan:  1. Lymphedema of both lower extremities asymmetrical Left >Rt side   2.  1.7 cm liver mass left lobe appears like hemangioma on recent CT (radiologist suggested MRI )     This is a very pleasant 56-year-old female postmenopausal morbidly obese BMI greater than 40 experiencing bilateral lower extremity swelling with pain discomfort and intermittent skin  rash for many years and previous extensive work-up multiple venous duplexes negative for DVT and venous competency studies unremarkable.  She has no personal history of malignancy no previous history of a DVT.  During last office visit No signs of arterial insufficiency she has a palpable peripheral pulses no varicose veins and her last office exam is classical for lymphedema left more than right side with loss of contour of the left leg and dorsal hump and positive stemmer sign  She also history of endometrial thickness with a postmenopausal bleeding few years ago underwent pelvic ultrasound and also seen by OB/GYN biopsy was negative    Recent venous duplex negative for DVT  All laboratory tests are normal  CT scan of the abdomen is unremarkable except 1.7 cm liver mass appears hemangioma  She was already seen and evaluated by edema therapist and underwent few sessions and she wants to duplicate same at home     At present my recommendations,  Continue lymphedema therapist recommendations MLD lose weight elevate the legs compression etc.  Go for sleep study through primary care physician's office  Given 1.7 cm liver mass will arrange MRI of the liver/abdomen next available  Virtual or office visit in 6 months          3. Inflammatory polyarthropathy (H)     Currently following up with rheumatologist taking Humira and also sulfasalazine recent inflammatory markers are unremarkable continue the same plan        4. Morbid obesity (H)BMI >40  5. Snoring  She will benefit with weight loss and also if her snoring continues to worse or witnessed apneic episodes or daytime somnolence or fatigue or tiredness consider going for formal sleep evaluation through primary care physician's office       Video Visit Details    Type of Service: Video Visit    Video Start Time: 8:50 AM    21 minutes spent on the date of the encounter doing chart review, history , documentation and further activities as noted above.   Reviewed recent  imaging studies and laboratory tests with the patient    Originating Location (patient location): Home    Distant Location (provider location): [facility]    Mode of Communication:  Video Conference via [employer]      This visit is being conducted as a virtual visit due to the emphasis on mitigation of the COVID-19 virus pandemic. The clinician has decided that the risk of an in-office visit outweighs the benefit for this patient.     Val Barrera MD

## 2021-11-03 NOTE — PATIENT INSTRUCTIONS
1.  Please go for MRI of abdomen/liver, we will arrange the test and notify the results when available    2.  Continue to follow lymphedema therapist recommendations, lose weight and consider seeing sleep clinic     3.  Virtual or office visit in 6 months

## 2021-11-17 ENCOUNTER — OFFICE VISIT (OUTPATIENT)
Dept: FAMILY MEDICINE | Facility: CLINIC | Age: 56
End: 2021-11-17
Payer: COMMERCIAL

## 2021-11-17 VITALS
SYSTOLIC BLOOD PRESSURE: 145 MMHG | TEMPERATURE: 97.6 F | WEIGHT: 230 LBS | OXYGEN SATURATION: 98 % | BODY MASS INDEX: 39.48 KG/M2 | HEART RATE: 70 BPM | DIASTOLIC BLOOD PRESSURE: 88 MMHG | RESPIRATION RATE: 18 BRPM

## 2021-11-17 DIAGNOSIS — Z13.220 SCREENING FOR HYPERLIPIDEMIA: ICD-10-CM

## 2021-11-17 DIAGNOSIS — Z01.818 PREOPERATIVE EXAMINATION: Primary | ICD-10-CM

## 2021-11-17 DIAGNOSIS — Z12.11 SCREEN FOR COLON CANCER: ICD-10-CM

## 2021-11-17 PROCEDURE — U0003 INFECTIOUS AGENT DETECTION BY NUCLEIC ACID (DNA OR RNA); SEVERE ACUTE RESPIRATORY SYNDROME CORONAVIRUS 2 (SARS-COV-2) (CORONAVIRUS DISEASE [COVID-19]), AMPLIFIED PROBE TECHNIQUE, MAKING USE OF HIGH THROUGHPUT TECHNOLOGIES AS DESCRIBED BY CMS-2020-01-R: HCPCS | Performed by: FAMILY MEDICINE

## 2021-11-17 PROCEDURE — U0005 INFEC AGEN DETEC AMPLI PROBE: HCPCS | Performed by: FAMILY MEDICINE

## 2021-11-17 PROCEDURE — 99213 OFFICE O/P EST LOW 20 MIN: CPT | Performed by: FAMILY MEDICINE

## 2021-11-17 ASSESSMENT — PAIN SCALES - GENERAL: PAINLEVEL: NO PAIN (0)

## 2021-11-17 NOTE — NURSING NOTE
"Chief Complaint   Patient presents with     Pre-Op Exam       Initial BP (!) 162/78   Pulse 70   Temp 97.6  F (36.4  C) (Tympanic)   Resp 18   Wt 104.3 kg (230 lb)   SpO2 98%   BMI 39.48 kg/m   Estimated body mass index is 39.48 kg/m  as calculated from the following:    Height as of 9/23/21: 1.626 m (5' 4\").    Weight as of this encounter: 104.3 kg (230 lb).  Medication Reconciliation: complete  Stefany Baker, NEELIMA    "

## 2021-11-17 NOTE — PROGRESS NOTES
[facility]  [address] [facility] [address]  Phone: [phone]  Primary Provider: Holli Marley  Pre-op Performing Provider: KELLI SHERIDAN      PREOPERATIVE EVALUATION:  Today's date: 11/17/2021    Bria Haddad is a 56 year old female who presents for a preoperative evaluation.    Surgical Information:  Surgery/Procedure: right eye cataract and corrective lens on 11-22 for right and then 11-29 on left   Surgery Location: [facility]  Surgeon: Dr. Rolando Kauffman  Surgery Date: 11/22/21  Time of Surgery: 9:00  Where patient plans to recover: At home with family  Fax number for surgical facility: [phone]    Type of Anesthesia Anticipated: to be determined    Assessment & Plan     The proposed surgical procedure is considered LOW risk.           Risks and Recommendations:  The patient has the following additional risks and recommendations for perioperative complications:   - No identified additional risk factors other than previously addressed    Medication Instructions:   - DMARDs Adalimumab {Reference  See System Preoperative Guideline section on DMARDs:[id number] She has    Adalimumab (usual dosing is as frequent as Q7 days; typical maintenance dosing is every 7-14 days but may vary) Schedule surgery to be at least 2 weeks after the last dose. Resume at a minimum 14 days after surgery is completed and in the absence of wound healing problems, surgical site infection, and systemic infection, and only after given approval to restart by the operating surgeon or provider administering post-operative follow up evaluation. She had her last dose on 11-13.     Sulfasalazine Continue without modification      RECOMMENDATION:  APPROVAL GIVEN to proceed with proposed procedure, without further diagnostic evaluation. I discussed the recommendations regarding her adalimumab with her surgeon and he will take extra precautions for infection prevention and would like  to proceed as scheduled.           Subjective     HPI related to upcoming procedure: The patient is planned for cataract surgery and corrective lens replacement on the right eye on 11-22-21. She is planned for the left eye 1 week later on 11-29      Preop Questions 11/17/2021   1. Have you ever had a heart attack or stroke? No   2. Have you ever had surgery on your heart or blood vessels, such as a stent placement, a coronary artery bypass, or surgery on an artery in your head, neck, heart, or legs? No   3. Do you have chest pain with activity? No   4. Do you have a history of  heart failure? No   5. Do you currently have a cold, bronchitis or symptoms of other infection? YES - she has had a runny nose watery eyes and cough for 2 weeks    6. Do you have a cough, shortness of breath, or wheezing? YES - as above   7. Do you or anyone in your family have previous history of blood clots? YES - her grandmother had a DVT in her legs   8. Do you or does anyone in your family have a serious bleeding problem such as prolonged bleeding following surgeries or cuts? No   9. Have you ever had problems with anemia or been told to take iron pills? UNKNOWN -    10. Have you had any abnormal blood loss such as black, tarry or bloody stools, or abnormal vaginal bleeding? YES - have abnormal postmenopausal vaginal bleeding   11. Have you ever had a blood transfusion? No   12. Are you willing to have a blood transfusion if it is medically needed before, during, or after your surgery? Yes   13. Have you or any of your relatives ever had problems with anesthesia? No   14. Do you have sleep apnea, excessive snoring or daytime drowsiness? No   15. Do you have any artifical heart valves or other implanted medical devices like a pacemaker, defibrillator, or continuous glucose monitor? No   16. Do you have artificial joints? No   17. Are you allergic to latex? No   18. Is there any chance that you may be pregnant? No     Health Care  Directive:  Patient does not have a Health Care Directive or Living Will: Discussed advance care planning with patient; however, patient declined at this time.    Preoperative Review of :   reviewed - no record of controlled substances prescribed.          Review of Systems  CONSTITUTIONAL: NEGATIVE for fever, chills, change in weight  INTEGUMENTARY/SKIN: NEGATIVE for worrisome rashes, moles or lesions  EYES: NEGATIVE for vision changes or irritation  ENT/MOUTH: NEGATIVE for ear, mouth and throat problems  RESP: NEGATIVE for significant cough or SOB  CV: NEGATIVE for chest pain, palpitations or peripheral edema  GI: NEGATIVE for nausea, abdominal pain, heartburn, or change in bowel habits  : NEGATIVE for frequency, dysuria, or hematuria  MUSCULOSKELETAL: NEGATIVE for significant arthralgias or myalgia  NEURO: NEGATIVE for weakness, dizziness or paresthesias  ENDOCRINE: NEGATIVE for temperature intolerance, skin/hair changes  HEME: NEGATIVE for bleeding problems  PSYCHIATRIC: NEGATIVE for changes in mood or affect  She has had 10 years of LE edema    Patient Active Problem List    Diagnosis Date Noted     Morbid obesity (H) 09/23/2021     Priority: Medium     Inflammatory polyarthropathy (H) 09/19/2017     Priority: Medium     Endometriosis of uterus 09/25/2012     Priority: Medium     CARDIOVASCULAR SCREENING; LDL GOAL LESS THAN 160 10/31/2010     Priority: Medium      Past Medical History:   Diagnosis Date     Arthritis      Bilateral carpal tunnel syndrome 12/20/2015     NO ACTIVE PROBLEMS      Obesity      Polyarthritis     inflammatory poly arthritis.     S/P carpal tunnel release 12/29/2016     Past Surgical History:   Procedure Laterality Date     ABDOMEN SURGERY  1992    Laparoscopy     bunions       COLONOSCOPY N/A 11/9/2015    Procedure: COLONOSCOPY;  Surgeon: Andrew Adamson MD;  Location: [facility]     COLONOSCOPY WITH CO2 INSUFFLATION N/A 11/9/2015    Procedure: COLONOSCOPY WITH CO2  INSUFFLATION;  Surgeon: Andrew Adamson MD;  Location: [facility]     GYN SURGERY      endometryosis     RELEASE CARPAL TUNNEL Right 10/12/2016    Procedure: RELEASE CARPAL TUNNEL;  Surgeon: Colby Nobles MD;  Location: [facility]     RELEASE CARPAL TUNNEL Left 12/16/2016    Procedure: RELEASE CARPAL TUNNEL;  Surgeon: Colby Nobles MD;  Location: [facility]     TONSILLECTOMY & ADENOIDECTOMY       Current Outpatient Medications   Medication Sig Dispense Refill     adalimumab (HUMIRA *CF*) 40 MG/0.4ML pen kit Inject 0.4 mLs (40 mg) Subcutaneous every 14 days 2.4 mL 2     order for DME Equipment being ordered: compression stockings 1 pair, at 30mm Hg, to be worn during the day. For Bilateral leg edema [R60.0] 2 Units 0     sulfaSALAzine (AZULFIDINE) 500 MG tablet Take 2 tablets (1,000 mg) by mouth 2 times daily 360 tablet 2       Allergies   Allergen Reactions     Asa [Aspirin] Hives        Social History     Tobacco Use     Smoking status: Never Smoker     Smokeless tobacco: Never Used   Substance Use Topics     Alcohol use: No     Alcohol/week: 0.0 standard drinks       History   Drug Use No         Objective     BP (!) 145/88   Pulse 70   Temp 97.6  F (36.4  C) (Tympanic)   Resp 18   Wt 104.3 kg (230 lb)   SpO2 98%   BMI 39.48 kg/m      Physical Exam    GENERAL APPEARANCE: healthy, alert and no distress     EYES: EOMI, PERRL     HENT: ear canals and TM's normal and nose and mouth without ulcers or lesions     NECK: no adenopathy, no asymmetry, masses, or scars and thyroid normal to palpation     RESP: lungs clear to auscultation - no rales, rhonchi or wheezes     CV: regular rates and rhythm, normal S1 S2, no S3 or S4 and no murmur, click or rub     ABDOMEN:  soft, nontender, no HSM or masses and bowel sounds normal     MS: extremities normal- no gross deformities noted, no evidence of inflammation in joints, FROM in all extremities.     SKIN: no suspicious lesions or rashes     NEURO: Normal strength  and tone, sensory exam grossly normal, mentation intact and speech normal     PSYCH: mentation appears normal. and affect normal/bright     LYMPHATICS: No cervical adenopathy    Recent Labs   Lab Test 10/02/21  1656 07/05/21  1519 12/22/20  1754 09/23/20  0749   HGB 13.7 14.2   < >  --     363   < >  --    NA  --   --   --  141   POTASSIUM  --   --   --  4.1   CR 1.04 0.78   < > 0.77    < > = values in this interval not displayed.        Diagnostics:  No labs were ordered during this visit.   No EKG required for low risk surgery (cataract, skin procedure, breast biopsy, etc).    Revised Cardiac Risk Index (RCRI):  The patient has the following serious cardiovascular risks for perioperative complications:   - No serious cardiac risks = 0 points     RCRI Interpretation: 0 points: Class I (very low risk - 0.4% complication rate)    Adalimumab (usual dosing is as frequent as Q7 days; typical maintenance dosing is every 7-14 days but may vary) Schedule surgery to be at least 2 weeks after the last dose. Resume at a minimum 14 days after surgery is completed and in the absence of wound healing problems, surgical site infection, and systemic infection, and only after given approval to restart by the operating surgeon or provider administering post-operative follow up evaluation.       Sulfasalazine Continue without modification      Signed Electronically by: Jose Raul Wagoner MD  Copy of this evaluation report is provided to requesting physician.

## 2021-11-18 LAB — SARS-COV-2 RNA RESP QL NAA+PROBE: NEGATIVE

## 2021-11-19 NOTE — RESULT ENCOUNTER NOTE
Bria,  I have reviewed the results of the laboratory tests that we recently ordered. All of the lab work performed was normal or considered normal for you.  Sincerely,   Jose Raul Wagoner MD

## 2021-12-01 NOTE — TELEPHONE ENCOUNTER
MR liver order faxed to [facility] [phone].  Right fax confirmed [phone].     Eva EDWARD, RN    [facility]  [facility]  Office: [phone]  Fax: [phone]

## 2021-12-13 ENCOUNTER — TELEPHONE (OUTPATIENT)
Dept: FAMILY MEDICINE | Facility: CLINIC | Age: 56
End: 2021-12-13
Payer: COMMERCIAL

## 2021-12-13 NOTE — TELEPHONE ENCOUNTER
Patient Quality Outreach    Patient is due for the following:   Colon Cancer Screening -  Colonoscopy  Breast Cancer Screening - Mammogram  Cervical Cancer Screening - PAP Needed    NEXT STEPS:   Schedule a yearly physical    Type of outreach:    Sent [email or web address] message.      Questions for provider review:    None     Melissa Johnson MA

## 2022-01-11 ENCOUNTER — TELEPHONE (OUTPATIENT)
Dept: RHEUMATOLOGY | Facility: CLINIC | Age: 57
End: 2022-01-11
Payer: COMMERCIAL

## 2022-01-11 NOTE — TELEPHONE ENCOUNTER
Prior Authorization Retail Medication Request    Medication/Dose: adalimumab (HUMIRA *CF*) 40 MG/0.4ML pen kit  ICD code (if different than what is on RX):  M06.09      Insurance Name:  [employer]  Insurance ID:  [id number]      Pharmacy Information (if different than what is on RX)  Name:  [facility]  Phone:  [phone]

## 2022-01-12 NOTE — TELEPHONE ENCOUNTER
[facility] called and left additional info for Humira prior auth:    C/b number for questions/verbal authorization #: [id number].     Case ID for reference: [id number]

## 2022-01-13 NOTE — TELEPHONE ENCOUNTER
PA Initiation    Medication: humira pen 40 mg/0.4 ml  Insurance Company: [employer] - Phone [phone] Fax [phone]  Pharmacy Filling the Rx: [facility] [address]  Filling Pharmacy Phone: [phone]  Filling Pharmacy Fax: [phone]  Start Date: 1/13/2022    Central Prior Authorization Team   Phone: [phone]      Called [employer] to check status and answer any questions for case# [id number]. Request was built wrong under an old group, she had to restart the case and answered questions over the phone, new case ID# [id number]

## 2022-01-13 NOTE — TELEPHONE ENCOUNTER
Prior Authorization Approval    Authorization Effective Date: 1/1/2022  Authorization Expiration Date: 1/13/2023  Medication: humira pen 40 mg/0.4 ml  Approved Dose/Quantity: 40mg  Reference #: [id number]   Insurance Company: [employer] - Phone [phone] Fax [phone]  Which Pharmacy is filling the prescription (Not needed for infusion/clinic administered): [address]  Pharmacy Notified: Yes **Instructed pharmacy to notify patient when script is ready to /ship.**  Patient Notified: Yes jo ann

## 2022-01-18 ENCOUNTER — LAB (OUTPATIENT)
Dept: LAB | Facility: CLINIC | Age: 57
End: 2022-01-18
Payer: COMMERCIAL

## 2022-01-18 ENCOUNTER — DOCUMENTATION ONLY (OUTPATIENT)
Dept: LAB | Facility: CLINIC | Age: 57
End: 2022-01-18

## 2022-01-18 DIAGNOSIS — Z13.220 SCREENING CHOLESTEROL LEVEL: ICD-10-CM

## 2022-01-18 DIAGNOSIS — Z79.899 HIGH RISK MEDICATIONS (NOT ANTICOAGULANTS) LONG-TERM USE: ICD-10-CM

## 2022-01-18 DIAGNOSIS — Z13.220 SCREENING CHOLESTEROL LEVEL: Primary | ICD-10-CM

## 2022-01-18 DIAGNOSIS — M06.09 RHEUMATOID ARTHRITIS OF MULTIPLE SITES WITH NEGATIVE RHEUMATOID FACTOR (H): ICD-10-CM

## 2022-01-18 LAB
ALBUMIN SERPL-MCNC: 3.7 G/DL (ref 3.4–5)
ALP SERPL-CCNC: 69 U/L (ref 40–150)
ALT SERPL W P-5'-P-CCNC: 29 U/L (ref 0–50)
AST SERPL W P-5'-P-CCNC: 14 U/L (ref 0–45)
BASOPHILS # BLD AUTO: 0 10E3/UL (ref 0–0.2)
BASOPHILS NFR BLD AUTO: 1 %
BILIRUB DIRECT SERPL-MCNC: <0.1 MG/DL (ref 0–0.2)
BILIRUB SERPL-MCNC: 0.3 MG/DL (ref 0.2–1.3)
CHOLEST SERPL-MCNC: 270 MG/DL
CREAT SERPL-MCNC: 0.79 MG/DL (ref 0.52–1.04)
CRP SERPL-MCNC: 4.2 MG/L (ref 0–8)
EOSINOPHIL # BLD AUTO: 0.3 10E3/UL (ref 0–0.7)
EOSINOPHIL NFR BLD AUTO: 4 %
ERYTHROCYTE [DISTWIDTH] IN BLOOD BY AUTOMATED COUNT: 13.3 % (ref 10–15)
ERYTHROCYTE [SEDIMENTATION RATE] IN BLOOD BY WESTERGREN METHOD: 7 MM/HR (ref 0–30)
FASTING STATUS PATIENT QL REPORTED: YES
GFR SERPL CREATININE-BSD FRML MDRD: 86 ML/MIN/1.73M2
HCT VFR BLD AUTO: 43.4 % (ref 35–47)
HDLC SERPL-MCNC: 42 MG/DL
HGB BLD-MCNC: 14.6 G/DL (ref 11.7–15.7)
HOLD SPECIMEN: NORMAL
LDLC SERPL CALC-MCNC: 190 MG/DL
LYMPHOCYTES # BLD AUTO: 2.7 10E3/UL (ref 0.8–5.3)
LYMPHOCYTES NFR BLD AUTO: 43 %
MCH RBC QN AUTO: 30.2 PG (ref 26.5–33)
MCHC RBC AUTO-ENTMCNC: 33.6 G/DL (ref 31.5–36.5)
MCV RBC AUTO: 90 FL (ref 78–100)
MONOCYTES # BLD AUTO: 0.7 10E3/UL (ref 0–1.3)
MONOCYTES NFR BLD AUTO: 10 %
NEUTROPHILS # BLD AUTO: 2.7 10E3/UL (ref 1.6–8.3)
NEUTROPHILS NFR BLD AUTO: 42 %
NONHDLC SERPL-MCNC: 228 MG/DL
PLATELET # BLD AUTO: 321 10E3/UL (ref 150–450)
PROT SERPL-MCNC: 7.2 G/DL (ref 6.8–8.8)
RBC # BLD AUTO: 4.84 10E6/UL (ref 3.8–5.2)
TRIGL SERPL-MCNC: 191 MG/DL
WBC # BLD AUTO: 6.3 10E3/UL (ref 4–11)

## 2022-01-18 PROCEDURE — 80061 LIPID PANEL: CPT

## 2022-01-18 PROCEDURE — 85652 RBC SED RATE AUTOMATED: CPT

## 2022-01-18 PROCEDURE — 82565 ASSAY OF CREATININE: CPT

## 2022-01-18 PROCEDURE — 86481 TB AG RESPONSE T-CELL SUSP: CPT

## 2022-01-18 PROCEDURE — 85025 COMPLETE CBC W/AUTO DIFF WBC: CPT

## 2022-01-18 PROCEDURE — 80076 HEPATIC FUNCTION PANEL: CPT

## 2022-01-18 PROCEDURE — 36415 COLL VENOUS BLD VENIPUNCTURE: CPT

## 2022-01-18 PROCEDURE — 86140 C-REACTIVE PROTEIN: CPT

## 2022-01-18 NOTE — PROGRESS NOTES
.Bria MUNOZ Boo has an upcoming lab appointment:    Future Appointments   Date Time Provider Department Center   1/25/2022 10:20 AM Bill Resendiz MD [facility]     Patient is scheduled for the following lab.  There is no order available. Please review and place either future orders or HMPO (Review of Health Maintenance Protocol Orders), as appropriate. Patient is requesting to have her cholesterol checked.Extra tubes drawn.    Health Maintenance Due   Topic     ANNUAL REVIEW OF HM ORDERS      LIPID      Greta Allen

## 2022-01-19 LAB
GAMMA INTERFERON BACKGROUND BLD IA-ACNC: 0.04 IU/ML
M TB IFN-G BLD-IMP: NEGATIVE
M TB IFN-G CD4+ BCKGRND COR BLD-ACNC: 9.96 IU/ML
MITOGEN IGNF BCKGRD COR BLD-ACNC: 0.04 IU/ML
MITOGEN IGNF BCKGRD COR BLD-ACNC: 0.09 IU/ML
QUANTIFERON MITOGEN: 10 IU/ML
QUANTIFERON NIL TUBE: 0.04 IU/ML
QUANTIFERON TB1 TUBE: 0.08 IU/ML
QUANTIFERON TB2 TUBE: 0.13

## 2022-01-26 ENCOUNTER — VIRTUAL VISIT (OUTPATIENT)
Dept: RHEUMATOLOGY | Facility: CLINIC | Age: 57
End: 2022-01-26
Payer: COMMERCIAL

## 2022-01-26 DIAGNOSIS — M06.09 RHEUMATOID ARTHRITIS OF MULTIPLE SITES WITH NEGATIVE RHEUMATOID FACTOR (H): Primary | ICD-10-CM

## 2022-01-26 DIAGNOSIS — Z79.899 HIGH RISK MEDICATIONS (NOT ANTICOAGULANTS) LONG-TERM USE: ICD-10-CM

## 2022-01-26 PROCEDURE — 99214 OFFICE O/P EST MOD 30 MIN: CPT | Mod: GT | Performed by: INTERNAL MEDICINE

## 2022-01-26 NOTE — PROGRESS NOTES
Bria Haddad  is a 56 year old year old female who is being evaluated via a billable video visit.      How would you like to obtain your AVS? MyChart  If the video visit is dropped, the invitation should be resent by: Text to cell phone: [phone]  Will anyone else be joining your video visit? No     Rheumatology Video Visit      Bria Haddad MRN# [id number]   YOB: 1965 Age: 56 year old      Date of visit: 1/26/22  PCP: Dr. Holli Marley    Chief Complaint   Patient presents with:  Rheumatoid Arthritis    Assessment and Plan     1.  Rheumatoid arthritis: Ms. Haddad initially presented to this clinic in 2015 with dependent edema of the bilateral lower extremities, fatigue, and a one time episode of right toe pain in the setting of a positive ROSEMARY.  On 9/18/2017 she presented with synovitis of her bilateral PIPs and pain without swelling of her MCPs, persistent fatigue, intermittent rash, and dependent edema. ROSEMARY positive but additional studies negative/normal including complement C3, complement C4, beta-2 glycoprotein IgM and IgG, cardiolipin IgM and IgG, lupus anticoagulant, RNP, Stuart, SSA, SSB, Scl-70.  Joint exam most consistent with rheumatoid arthritis.  Previously on HCQ (bloating), MTX (LFT elevation, alopecia, headache), leflunomide (LFT elevation). Currently on SSZ and Humira.  Doing well at this time with no joint pain, swelling, or stiffness.   Chronic illness, stable.    - Continue sulfasalazine 1000mg twice daily  - Continue Humira 40mg SQ every 14 days  - Labs in 3 months: CBC, Creatinine, Hepatic Panel, ESR, CRP    High risk medication requiring intensive toxicity monitoring at least quarterly: labs ordered include CBC, Creatinine, Hepatic panel to monitor for cytopenia and hepatotoxicity; checking creatinine as it affects clearance of medication.      2. Left knee pain history: Not an issue currently.   Significant improvement with PT in the past and encouraged that she continue  these exercises.  When she stopped the exercises in the past she had worsening left knee pain.       3. History of right third and fourth digit trigger fingers: Resolved with steroid injections.  Not an issue today.      4. History of Left shoulder pain, impingement syndrome: Resolved with combination of steroid injection and physical therapy.  Not an issue today.      5.  Vaccinations: Vaccinations reviewed with Ms. Haddad.  - Influenza: encouraged yearly vaccination  - COVID-19: has received the Pfizer COVID-19 vaccine on 3/19/2021 and 4/9/2021 and 10/26/2021; 4th mRNA COVID19 vaccine recommended to be on or shortly after 3/26/2022 and to hold sulfasalazine for 1 week afterward.    Total minutes spent in evaluation with patient, documentation, , and review of pertinent studies and chart notes: 20      Ms. Haddad verbalized agreement with and understanding of the rational for the diagnosis and treatment plan.  All questions were answered to best of my ability and the patient's satisfaction. Ms. Haddad was advised to contact the clinic with any questions that may arise after the clinic visit.      Thank you for involving me in the care of the patient    Return to clinic: 3 months      HPI   Bria Haddad is a 56 year old female with a past medical history significant for endometriosis, s/p carpal tunnel release surgery who present for follow up of rheumatoid arthritis.    Today, 1/26/2022:  was dx'd with cancer recently and that has been very stressful; all new diagnosis within the last 3 weeks. Joints are doing well.  No joint swelling or pain.  Recently had cataract surgery so didn't take Humira for a month with mild aches that came and went, but back on Humira now and joints are better controlled and fatigue is improved now.  Still with lymphedema and continues wrapping that is helpful; didn't go back to the lymphedema clinic because the appointments kept getting cancelled.      Tobacco: none  EtOH:  none  Drugs: none    ROS   12 point review of system was completed and negative except as noted in the HPI     Active Problem List     Patient Active Problem List   Diagnosis     CARDIOVASCULAR SCREENING; LDL GOAL LESS THAN 160     Endometriosis of uterus     Inflammatory polyarthropathy (H)     Morbid obesity (H)     Past Medical History     Past Medical History:   Diagnosis Date     Arthritis      Bilateral carpal tunnel syndrome 12/20/2015     NO ACTIVE PROBLEMS      Obesity      Polyarthritis     inflammatory poly arthritis.     S/P carpal tunnel release 12/29/2016     Past Surgical History     Past Surgical History:   Procedure Laterality Date     ABDOMEN SURGERY  1992    Laparoscopy     bunions       COLONOSCOPY N/A 11/9/2015    Procedure: COLONOSCOPY;  Surgeon: Andrew Adamson MD;  Location: [facility]     COLONOSCOPY WITH CO2 INSUFFLATION N/A 11/9/2015    Procedure: COLONOSCOPY WITH CO2 INSUFFLATION;  Surgeon: Andrew Adamson MD;  Location: [facility]     GYN SURGERY      endometryosis     RELEASE CARPAL TUNNEL Right 10/12/2016    Procedure: RELEASE CARPAL TUNNEL;  Surgeon: Colby Nobles MD;  Location: [facility]     RELEASE CARPAL TUNNEL Left 12/16/2016    Procedure: RELEASE CARPAL TUNNEL;  Surgeon: Colby Nobles MD;  Location: [facility]     TONSILLECTOMY & ADENOIDECTOMY       Allergy     Allergies   Allergen Reactions     Asa [Aspirin] Hives     Current Medication List     Current Outpatient Medications   Medication Sig     adalimumab (HUMIRA *CF*) 40 MG/0.4ML pen kit Inject 0.4 mLs (40 mg) Subcutaneous every 14 days     order for DME Equipment being ordered: compression stockings 1 pair, at 30mm Hg, to be worn during the day. For Bilateral leg edema [R60.0]     sulfaSALAzine (AZULFIDINE) 500 MG tablet Take 2 tablets (1,000 mg) by mouth 2 times daily     No current facility-administered medications for this visit.         Social History   See HPI    Family History     Family History   Problem  "Relation Age of Onset     Osteoporosis Mother      Respiratory Mother      Thyroid Disease Mother      Anxiety Disorder Mother      Asthma Mother      Heart Disease Father      Cancer - colorectal Maternal Grandmother      Colon Cancer Maternal Grandmother      Diabetes Maternal Grandfather      Heart Disease Maternal Grandfather      Cancer Paternal Grandmother      Heart Disease Paternal Grandfather      Respiratory Paternal Grandfather      Hypertension Brother      Thyroid Disease Sister      Thyroid Disease Sister      Neurologic Disorder Brother      Hypertension Brother      Thyroid Disease Brother      Other Cancer Sister      Anxiety Disorder Sister      Mental Illness Sister      Anesthesia Reaction Sister      Thyroid Disease Sister      Physical Exam     Temp Readings from Last 3 Encounters:   11/17/21 97.6  F (36.4  C) (Tympanic)   10/23/20 98  F (36.7  C) (Oral)   08/12/20 99.2  F (37.3  C) (Tympanic)     BP Readings from Last 5 Encounters:   11/17/21 (!) 145/88   09/23/21 (!) 154/90   10/23/20 (!) 158/86   08/12/20 (!) 140/90   01/07/20 136/76     Pulse Readings from Last 1 Encounters:   11/17/21 70     Resp Readings from Last 1 Encounters:   11/17/21 18     Estimated body mass index is 39.48 kg/m  as calculated from the following:    Height as of 9/23/21: 1.626 m (5' 4\").    Weight as of 11/17/21: 104.3 kg (230 lb).    GEN: NAD.   HEENT: MMM.  Anicteric, noninjected sclera. No obvious external lesions of the ear and nose. Hearing intact.  PULM: No increased work of breathing  MSK:  Hands and wrists without swelling.     SKIN: No rash or jaundice seen  PSYCH: Alert. Appropriate.      Labs / Imaging (select studies)   RF/CCP  Recent Labs   Lab Test 09/18/17  0913   CCPIGG 1   RHF <20     CBC  Recent Labs   Lab Test 01/18/22  0701 10/02/21  1656 07/05/21  1519 04/06/21  0716 12/22/20  1754   WBC 6.3 9.1 6.3 7.2 8.4   RBC 4.84 4.62 4.89 4.89 4.83   HGB 14.6 13.7 14.2 14.4 14.3   HCT 43.4 41.4 43.6 43.5 " 43.3   MCV 90 90 89 89 90   RDW 13.3 12.9 13.1 12.8 12.8    333 363 302 317   MCH 30.2 29.7 29.0 29.4 29.6   MCHC 33.6 33.1 32.6 33.1 33.0   NEUTROPHIL 42 54 43.3 45.3 38.0   LYMPH 43 34 41.1 43.0 48.6   MONOCYTE 10 10 12.4 9.9 11.3   EOSINOPHIL 4 2 2.9 1.5 2.0   BASOPHIL 1 0 0.3 0.3 0.1   ANEU  --   --  2.7 3.3 3.2   ALYM  --   --  2.6 3.1 4.1   MIKE  --   --  0.8 0.7 1.0   AEOS  --   --  0.2 0.1 0.2   ABAS  --   --  0.0 0.0 0.0   ANEUTAUTO 2.7 4.9  --   --   --    ALYMPAUTO 2.7 3.1  --   --   --    AMONOAUTO 0.7 0.9  --   --   --    AEOSAUTO 0.3 0.2  --   --   --    ABSBASO 0.0 0.0  --   --   --      CMP  Recent Labs   Lab Test 01/18/22  0701 10/02/21  1656 07/05/21  1519 04/06/21  0716 12/22/20  1754 09/23/20  0749 12/31/19  0712 05/08/19  0745 12/04/17  0805 09/18/17  0913   NA  --   --   --   --   --  141  --  142  --  141   POTASSIUM  --   --   --   --   --  4.1  --  4.0  --  4.0   CHLORIDE  --   --   --   --   --  109  --  109  --  107   CO2  --   --   --   --   --  29  --  30  --  29   ANIONGAP  --   --   --   --   --  3  --  3  --  5   GLC  --   --   --   --   --  104*  --  99  --  96   BUN  --   --   --   --   --  16  --  14  --  14   CR 0.79 1.04 0.78 0.86 0.87 0.77   < > 0.74   < > 0.76   GFRESTIMATED 86 59* 84 75 74 85   < > >90   < > 79   GFRESTBLACK  --   --  >90 86 85 >90   < > >90   < > >90   AGUILAR  --   --   --   --   --  9.4  --  9.3  --  8.8   BILITOTAL 0.3 0.3 0.2 0.4 0.2  --    < > 0.3   < > 0.4   ALBUMIN 3.7 4.0 3.9 3.8 4.3  --    < > 4.0   < > 3.7   PROTTOTAL 7.2 7.4 7.5 7.6 7.8  --    < > 7.5   < > 7.4   ALKPHOS 69 76 76 76 72  --    < > 73   < > 71   AST 14 18 17 13 17  --    < > 15   < > 16   ALT 29 33 32 26 27  --    < > 31   < > 23    < > = values in this interval not displayed.     Calcium/VitaminD  Recent Labs   Lab Test 09/23/20  0749 05/08/19  0745 09/18/17  0913   AGUILAR 9.4 9.3 8.8     ESR/CRP  Recent Labs   Lab Test 01/18/22  0701 10/02/21  1656 07/05/21  1519   SED 7 7 7   CRP  4.2 3.0 2.9     Hepatitis B  Recent Labs   Lab Test 10/12/15  0923   AUSAB 0.18   HBCAB Nonreactive   HEPBANG Nonreactive     Hepatitis C  Recent Labs   Lab Test 10/07/16  0912   HCVAB Nonreactive   Assay performance characteristics have not been established for newborns,   infants, and children       Tuberculosis Screening  Recent Labs   Lab Test 01/18/22  0701 12/07/18  0724   TBRES Negative Negative     HIV Screening  Recent Labs   Lab Test 10/12/15  0923   HIAGAB Nonreactive   HIV-1 p24 Ag & HIV-1/HIV-2 Ab Not Detected                Chart documentation done in part with Dragon Voice recognition Software. Although reviewed after completion, some word and grammatical error may remain.      Video-Visit Details    Type of service:  Video Visit    Video Start Time: 9:44 AM    Video End Time: 9:56 AM    Originating Location (pt. Location): [place], [place]    Distant Location (provider location):  Home    Platform used for Video Visit: Celestine Resendiz MD

## 2022-02-10 NOTE — PROGRESS NOTES
[facility]    Outpatient Physical Therapy Discharge Note  Patient: Bria Haddad  : 1965    Beginning/End Dates of Reporting Period:  10/8/2021 to 10/22/2021    Referring Provider: Dr. Val Barrera    Therapy Diagnosis: B LE secondary lymphedema     Client Self Report: no complaints    Objective Measurements:  Objective Measure: girth  Details: R LE -0.6%, L LE +1.4%    Outcome Measures (most recent score):   LLIS unknown as no return to clinic    Goals:  Goal Identifier stg 1   Goal Description pt to have around the clock tolerance to BLE GCB for edema reduction response   Target Date 10/22/21   Date Met  10/15/21   Progress (detail required for progress note):       Goal Identifier ltg 1   Goal Description once appropriate, pt to be independent with donning, doffing and care of compression stocking/garment for longterm edema management for maintenance   Target Date 21   Date Met      Progress (detail required for progress note):       Goal Identifier ltg 2   Goal Description pt to have at least 3-5 point improvement on LLIS due to lymphedema reduction response in BLE   Target Date 21   Date Met      Progress (detail required for progress note):       Plan:  Discharge from therapy.    Discharge:    Reason for Discharge: Patient has failed to schedule further appointments.    Equipment Issued: knee hi custom 30-40 mmHg or 30-40 Mediven Forte or other thicker compression material    Discharge Plan: Patient to continue home program.    Araceli Peterson, PT, DPT, CLT  Physical Therapist & Certified Lymphedema Therapist  [facility]

## 2022-03-06 ENCOUNTER — HEALTH MAINTENANCE LETTER (OUTPATIENT)
Age: 57
End: 2022-03-06

## 2022-04-12 ENCOUNTER — LAB (OUTPATIENT)
Dept: LAB | Facility: CLINIC | Age: 57
End: 2022-04-12
Payer: COMMERCIAL

## 2022-04-12 DIAGNOSIS — M06.09 RHEUMATOID ARTHRITIS OF MULTIPLE SITES WITH NEGATIVE RHEUMATOID FACTOR (H): ICD-10-CM

## 2022-04-12 DIAGNOSIS — Z79.899 HIGH RISK MEDICATIONS (NOT ANTICOAGULANTS) LONG-TERM USE: ICD-10-CM

## 2022-04-12 LAB
ALBUMIN SERPL-MCNC: 3.9 G/DL (ref 3.4–5)
ALP SERPL-CCNC: 66 U/L (ref 40–150)
ALT SERPL W P-5'-P-CCNC: 25 U/L (ref 0–50)
AST SERPL W P-5'-P-CCNC: 16 U/L (ref 0–45)
BASOPHILS # BLD AUTO: 0 10E3/UL (ref 0–0.2)
BASOPHILS NFR BLD AUTO: 1 %
BILIRUB DIRECT SERPL-MCNC: <0.1 MG/DL (ref 0–0.2)
BILIRUB SERPL-MCNC: 0.3 MG/DL (ref 0.2–1.3)
CREAT SERPL-MCNC: 0.78 MG/DL (ref 0.52–1.04)
CRP SERPL-MCNC: <2.9 MG/L (ref 0–8)
EOSINOPHIL # BLD AUTO: 0.3 10E3/UL (ref 0–0.7)
EOSINOPHIL NFR BLD AUTO: 4 %
ERYTHROCYTE [DISTWIDTH] IN BLOOD BY AUTOMATED COUNT: 12.9 % (ref 10–15)
ERYTHROCYTE [SEDIMENTATION RATE] IN BLOOD BY WESTERGREN METHOD: 6 MM/HR (ref 0–30)
GFR SERPL CREATININE-BSD FRML MDRD: 88 ML/MIN/1.73M2
HCT VFR BLD AUTO: 43.2 % (ref 35–47)
HGB BLD-MCNC: 14.1 G/DL (ref 11.7–15.7)
LYMPHOCYTES # BLD AUTO: 2.5 10E3/UL (ref 0.8–5.3)
LYMPHOCYTES NFR BLD AUTO: 39 %
MCH RBC QN AUTO: 29.7 PG (ref 26.5–33)
MCHC RBC AUTO-ENTMCNC: 32.6 G/DL (ref 31.5–36.5)
MCV RBC AUTO: 91 FL (ref 78–100)
MONOCYTES # BLD AUTO: 0.6 10E3/UL (ref 0–1.3)
MONOCYTES NFR BLD AUTO: 10 %
NEUTROPHILS # BLD AUTO: 2.9 10E3/UL (ref 1.6–8.3)
NEUTROPHILS NFR BLD AUTO: 46 %
PLATELET # BLD AUTO: 311 10E3/UL (ref 150–450)
PROT SERPL-MCNC: 7.4 G/DL (ref 6.8–8.8)
RBC # BLD AUTO: 4.75 10E6/UL (ref 3.8–5.2)
WBC # BLD AUTO: 6.3 10E3/UL (ref 4–11)

## 2022-04-12 PROCEDURE — 85025 COMPLETE CBC W/AUTO DIFF WBC: CPT

## 2022-04-12 PROCEDURE — 85652 RBC SED RATE AUTOMATED: CPT

## 2022-04-12 PROCEDURE — 80076 HEPATIC FUNCTION PANEL: CPT

## 2022-04-12 PROCEDURE — 86140 C-REACTIVE PROTEIN: CPT

## 2022-04-12 PROCEDURE — 82565 ASSAY OF CREATININE: CPT

## 2022-04-12 PROCEDURE — 36415 COLL VENOUS BLD VENIPUNCTURE: CPT

## 2022-04-27 ENCOUNTER — VIRTUAL VISIT (OUTPATIENT)
Dept: RHEUMATOLOGY | Facility: CLINIC | Age: 57
End: 2022-04-27
Payer: COMMERCIAL

## 2022-04-27 DIAGNOSIS — M06.09 RHEUMATOID ARTHRITIS OF MULTIPLE SITES WITH NEGATIVE RHEUMATOID FACTOR (H): Primary | ICD-10-CM

## 2022-04-27 DIAGNOSIS — Z79.899 HIGH RISK MEDICATIONS (NOT ANTICOAGULANTS) LONG-TERM USE: ICD-10-CM

## 2022-04-27 PROCEDURE — 99214 OFFICE O/P EST MOD 30 MIN: CPT | Mod: GT | Performed by: INTERNAL MEDICINE

## 2022-04-27 RX ORDER — SULFASALAZINE 500 MG/1
1000 TABLET ORAL 2 TIMES DAILY
Qty: 360 TABLET | Refills: 2 | Status: SHIPPED | OUTPATIENT
Start: 2022-04-27 | End: 2022-11-15

## 2022-04-27 NOTE — PROGRESS NOTES
Bria Haddad  is a 57 year old year old who is being evaluated via a billable video visit.      How would you like to obtain your AVS? MyChart  If the video visit is dropped, the invitation should be resent by: Text to cell phone: [phone]   Will anyone else be joining your video visit? No     Bria Haddad  is a 56 year old year old female who is being evaluated via a billable video visit.      How would you like to obtain your AVS? MyChart  If the video visit is dropped, the invitation should be resent by: Text to cell phone: [phone]  Will anyone else be joining your video visit? No     Rheumatology Video Visit      Bria Haddad MRN# [id number]   YOB: 1965 Age: 57 year old      Date of visit: 4/27/22  PCP: Dr. Holli Marley    Chief Complaint   Patient presents with:  Rheumatoid Arthritis    Assessment and Plan     1.  Rheumatoid arthritis: Ms. Haddad initially presented to this clinic in 2015 with dependent edema of the bilateral lower extremities, fatigue, and a one time episode of right toe pain in the setting of a positive ROSEMARY.  On 9/18/2017 she presented with synovitis of her bilateral PIPs and pain without swelling of her MCPs, persistent fatigue, intermittent rash, and dependent edema. ROSEMARY positive but additional studies negative/normal including complement C3, complement C4, beta-2 glycoprotein IgM and IgG, cardiolipin IgM and IgG, lupus anticoagulant, RNP, Stuart, SSA, SSB, Scl-70.  Joint exam most consistent with rheumatoid arthritis.  Previously on HCQ (bloating), MTX (LFT elevation, alopecia, headache), leflunomide (LFT elevation). Currently on SSZ and Humira.  Doing well at this time with no joint pain, swelling, or stiffness.   Chronic illness, stable.    - Continue sulfasalazine 1000mg twice daily  - Continue Humira 40mg SQ every 14 days  - Labs in 3 months: CBC, Creatinine, Hepatic Panel, ESR, CRP    High risk medication requiring intensive toxicity monitoring at least  quarterly: labs ordered include CBC, Creatinine, Hepatic panel to monitor for cytopenia and hepatotoxicity; checking creatinine as it affects clearance of medication.      2. Left knee pain history: Not an issue currently.   Significant improvement with PT in the past and encouraged that she continue these exercises.  When she stopped the exercises in the past she had worsening left knee pain.       3. History of right third digit trigger finger; now with active right fourth digit trigger finger: She has had steroid injections of both the right third and fourth digit trigger finger in the past that were effective and she was asymptomatic for a period of time.  Now having return of symptoms of the right fourth digit.  Reviewed the diagnosis and treatment options.  She would like to come in next week for a steroid injection and an appointment was made on 5/5/2022.      4. History of Left shoulder pain, impingement syndrome: Resolved with combination of steroid injection and physical therapy.  Not an issue today.      5.  Vaccinations: Vaccinations reviewed with Ms. Haddad.  - Influenza: encouraged yearly vaccination  - COVID-19: has received the Pfizer COVID-19 vaccine on 3/19/2021 and 4/9/2021 and 10/26/2021. A 4th dose (1st booster) of the mRNA COVID-19 vaccination is recommended to be received 3 months after the third mRNA COVID-19 vaccination was received.  A 5th mRNA vaccine (2nd booster) may be received at least 4 months after the 4th dose.   An appointment at 1:30 PM on 5/5/2022 scheduled to receive the Moderna vaccine at the [facility] in [place]  Based on our current understanding (and this may change over time as we learn more), medications should be adjusted as noted below, if disease activity allows:  - NSAIDs and Acetaminophen: hold for 24 hours prior to vaccination if able to do so  - Sulfasalazine should be held for 1-2 weeks after each COVID-19 vaccine (as disease activity allows)     Total  minutes spent in evaluation with patient, documentation, , and review of pertinent studies and chart notes: 19      Ms. Haddad verbalized agreement with and understanding of the rational for the diagnosis and treatment plan.  All questions were answered to best of my ability and the patient's satisfaction. Ms. Haddad was advised to contact the clinic with any questions that may arise after the clinic visit.      Thank you for involving me in the care of the patient    Return to clinic: [id number]/52022 and 3 months after that      HPI   Bria Haddad is a 57 year old female with a past medical history significant for endometriosis, s/p carpal tunnel release surgery who present for follow up of rheumatoid arthritis.    Today, 4/27/2022: states that her  had salivary gland tumor removed and has been undergoing treatment for that; then he had pneumonia and is getting better; says that her 's cancer was very aggressive but they are hopeful that it has been completely removed; and recently her  had been dx'd with cancer on the kidney she says that will be addressed soon.  For herself, she reports that her joints are doing okay.  Aches and pains here and there but no joint swelling.  Morning stiffness for less than 30 minutes.  Cannot make a fist with the right 4th finger; triggering again; she would like to try a steroid injection again because it was effective previously.  Tolerating sulfasalazine and Humira well    Tobacco: none  EtOH: none  Drugs: none    ROS   12 point review of system was completed and negative except as noted in the HPI     Active Problem List     Patient Active Problem List   Diagnosis     CARDIOVASCULAR SCREENING; LDL GOAL LESS THAN 160     Endometriosis of uterus     Inflammatory polyarthropathy (H)     Morbid obesity (H)     Past Medical History     Past Medical History:   Diagnosis Date     Arthritis      Bilateral carpal tunnel syndrome 12/20/2015     NO ACTIVE PROBLEMS       Obesity      Polyarthritis     inflammatory poly arthritis.     S/P carpal tunnel release 12/29/2016     Past Surgical History     Past Surgical History:   Procedure Laterality Date     ABDOMEN SURGERY  1992    Laparoscopy     bunions       COLONOSCOPY N/A 11/9/2015    Procedure: COLONOSCOPY;  Surgeon: Andrew Adamson MD;  Location: [facility] OR     COLONOSCOPY WITH CO2 INSUFFLATION N/A 11/9/2015    Procedure: COLONOSCOPY WITH CO2 INSUFFLATION;  Surgeon: Andrew Adamson MD;  Location: [facility] OR     GYN SURGERY      endometryosis     RELEASE CARPAL TUNNEL Right 10/12/2016    Procedure: RELEASE CARPAL TUNNEL;  Surgeon: Colby Nobles MD;  Location: [facility] OR     RELEASE CARPAL TUNNEL Left 12/16/2016    Procedure: RELEASE CARPAL TUNNEL;  Surgeon: Colby Nobles MD;  Location: [facility] OR     TONSILLECTOMY & ADENOIDECTOMY       Allergy     Allergies   Allergen Reactions     Asa [Aspirin] Hives     Current Medication List     Current Outpatient Medications   Medication Sig     adalimumab (HUMIRA *CF*) 40 MG/0.4ML pen kit Inject 0.4 mLs (40 mg) Subcutaneous every 14 days     order for DME Equipment being ordered: compression stockings 1 pair, at 30mm Hg, to be worn during the day. For Bilateral leg edema [R60.0]     sulfaSALAzine (AZULFIDINE) 500 MG tablet Take 2 tablets (1,000 mg) by mouth 2 times daily     No current facility-administered medications for this visit.         Social History   See HPI    Family History     Family History   Problem Relation Age of Onset     Osteoporosis Mother      Respiratory Mother      Thyroid Disease Mother      Anxiety Disorder Mother      Asthma Mother      Heart Disease Father      Cancer - colorectal Maternal Grandmother      Colon Cancer Maternal Grandmother      Diabetes Maternal Grandfather      Heart Disease Maternal Grandfather      Cancer Paternal Grandmother      Heart Disease Paternal Grandfather      Respiratory Paternal Grandfather      Hypertension Brother       "Thyroid Disease Sister      Thyroid Disease Sister      Neurologic Disorder Brother      Hypertension Brother      Thyroid Disease Brother      Other Cancer Sister      Anxiety Disorder Sister      Mental Illness Sister      Anesthesia Reaction Sister      Thyroid Disease Sister      Physical Exam     Temp Readings from Last 3 Encounters:   11/17/21 97.6  F (36.4  C) (Tympanic)   10/23/20 98  F (36.7  C) (Oral)   08/12/20 99.2  F (37.3  C) (Tympanic)     BP Readings from Last 5 Encounters:   11/17/21 (!) 145/88   09/23/21 (!) 154/90   10/23/20 (!) 158/86   08/12/20 (!) 140/90   01/07/20 136/76     Pulse Readings from Last 1 Encounters:   11/17/21 70     Resp Readings from Last 1 Encounters:   11/17/21 18     Estimated body mass index is 39.48 kg/m  as calculated from the following:    Height as of 9/23/21: 1.626 m (5' 4\").    Weight as of 11/17/21: 104.3 kg (230 lb).    GEN: NAD.   HEENT: MMM.  Anicteric, noninjected sclera. No obvious external lesions of the ear and nose. Hearing intact.  PULM: No increased work of breathing  MSK:  Hands and wrists without swelling.   She points at the location of the A1 pulley of the right fourth finger as location of having pain and demonstrates that she has difficulty completely flexing the right fourth finger.  SKIN: No rash or jaundice seen  PSYCH: Alert. Appropriate.      Labs / Imaging (select studies)     RF/CCP  Recent Labs   Lab Test 09/18/17  0913   CCPIGG 1   RHF <20     CBC  Recent Labs   Lab Test 04/12/22  1120 01/18/22  0701 10/02/21  1656 07/05/21  1519 04/06/21  0716 12/22/20  1754   WBC 6.3 6.3 9.1 6.3 7.2 8.4   RBC 4.75 4.84 4.62 4.89 4.89 4.83   HGB 14.1 14.6 13.7 14.2 14.4 14.3   HCT 43.2 43.4 41.4 43.6 43.5 43.3   MCV 91 90 90 89 89 90   RDW 12.9 13.3 12.9 13.1 12.8 12.8    321 333 363 302 317   MCH 29.7 30.2 29.7 29.0 29.4 29.6   MCHC 32.6 33.6 33.1 32.6 33.1 33.0   NEUTROPHIL 46 42 54 43.3 45.3 38.0   LYMPH 39 43 34 41.1 43.0 48.6   MONOCYTE 10 10 10 " 12.4 9.9 11.3   EOSINOPHIL 4 4 2 2.9 1.5 2.0   BASOPHIL 1 1 0 0.3 0.3 0.1   ANEU  --   --   --  2.7 3.3 3.2   ALYM  --   --   --  2.6 3.1 4.1   MIKE  --   --   --  0.8 0.7 1.0   AEOS  --   --   --  0.2 0.1 0.2   ABAS  --   --   --  0.0 0.0 0.0   ANEUTAUTO 2.9 2.7 4.9  --   --   --    ALYMPAUTO 2.5 2.7 3.1  --   --   --    AMONOAUTO 0.6 0.7 0.9  --   --   --    AEOSAUTO 0.3 0.3 0.2  --   --   --    ABSBASO 0.0 0.0 0.0  --   --   --      CMP  Recent Labs   Lab Test 04/12/22  1120 01/18/22  0701 10/02/21  1656 07/05/21  1519 04/06/21  0716 12/22/20  1754 09/23/20  0749 12/31/19  0712 05/08/19  0745 12/04/17  0805 09/18/17  0913   NA  --   --   --   --   --   --  141  --  142  --  141   POTASSIUM  --   --   --   --   --   --  4.1  --  4.0  --  4.0   CHLORIDE  --   --   --   --   --   --  109  --  109  --  107   CO2  --   --   --   --   --   --  29  --  30  --  29   ANIONGAP  --   --   --   --   --   --  3  --  3  --  5   GLC  --   --   --   --   --   --  104*  --  99  --  96   BUN  --   --   --   --   --   --  16  --  14  --  14   CR 0.78 0.79 1.04 0.78 0.86 0.87 0.77   < > 0.74   < > 0.76   GFRESTIMATED 88 86 59* 84 75 74 85   < > >90   < > 79   GFRESTBLACK  --   --   --  >90 86 85 >90   < > >90   < > >90   AGUILAR  --   --   --   --   --   --  9.4  --  9.3  --  8.8   BILITOTAL 0.3 0.3 0.3 0.2 0.4 0.2  --    < > 0.3   < > 0.4   ALBUMIN 3.9 3.7 4.0 3.9 3.8 4.3  --    < > 4.0   < > 3.7   PROTTOTAL 7.4 7.2 7.4 7.5 7.6 7.8  --    < > 7.5   < > 7.4   ALKPHOS 66 69 76 76 76 72  --    < > 73   < > 71   AST 16 14 18 17 13 17  --    < > 15   < > 16   ALT 25 29 33 32 26 27  --    < > 31   < > 23    < > = values in this interval not displayed.     Calcium/VitaminD  Recent Labs   Lab Test 09/23/20  0749 05/08/19  0745 09/18/17  0913   AGUILAR 9.4 9.3 8.8     ESR/CRP  Recent Labs   Lab Test 04/12/22  1120 01/18/22  0701 10/02/21  1656   SED 6 7 7   CRP <2.9 4.2 3.0     Hepatitis B  Recent Labs   Lab Test 10/12/15  0923   AUSAB 0.18   HBCAB  Nonreactive   HEPBANG Nonreactive     Hepatitis C  Recent Labs   Lab Test 10/07/16  0912   HCVAB Nonreactive   Assay performance characteristics have not been established for newborns,   infants, and children       Tuberculosis Screening  Recent Labs   Lab Test 01/18/22  0701 12/07/18  0724   TBRES Negative Negative     HIV Screening  Recent Labs   Lab Test 10/12/15  0923   HIAGAB Nonreactive   HIV-1 p24 Ag & HIV-1/HIV-2 Ab Not Detected            Chart documentation done in part with Dragon Voice recognition Software. Although reviewed after completion, some word and grammatical error may remain.      Video-Visit Details    Type of service:  Video Visit    Video Start Time: 10:30 AM    Video End Time:10:42 AM    Originating Location (pt. Location): [place], [place]    Distant Location (provider location):  [place]    Platform used for Video Visit: Celestine Resendiz MD

## 2022-04-27 NOTE — PATIENT INSTRUCTIONS
RHEUMATOLOGY    Dr. Bill Resendiz    [address] [place] [address]  Phone number: [phone]  Fax number: [phone]      Thank you for choosing [facility]!    Jolynn Angulo CMA Rheumatology

## 2022-05-05 ENCOUNTER — OFFICE VISIT (OUTPATIENT)
Dept: RHEUMATOLOGY | Facility: CLINIC | Age: 57
End: 2022-05-05
Payer: COMMERCIAL

## 2022-05-05 ENCOUNTER — IMMUNIZATION (OUTPATIENT)
Dept: NURSING | Facility: CLINIC | Age: 57
End: 2022-05-05
Payer: COMMERCIAL

## 2022-05-05 VITALS
SYSTOLIC BLOOD PRESSURE: 123 MMHG | BODY MASS INDEX: 38.9 KG/M2 | OXYGEN SATURATION: 97 % | WEIGHT: 226.6 LBS | DIASTOLIC BLOOD PRESSURE: 79 MMHG | HEART RATE: 85 BPM

## 2022-05-05 VITALS — TEMPERATURE: 98.2 F

## 2022-05-05 DIAGNOSIS — M65.341 TRIGGER RING FINGER OF RIGHT HAND: Primary | ICD-10-CM

## 2022-05-05 DIAGNOSIS — Z23 HIGH PRIORITY FOR 2019-NCOV VACCINE: Primary | ICD-10-CM

## 2022-05-05 PROCEDURE — 91306 COVID-19,PF,MODERNA (18+ YRS BOOSTER .25ML): CPT

## 2022-05-05 PROCEDURE — 0064A COVID-19,PF,MODERNA (18+ YRS BOOSTER .25ML): CPT

## 2022-05-05 PROCEDURE — 99207 PR NO CHARGE NURSE ONLY: CPT

## 2022-05-05 PROCEDURE — 20550 NJX 1 TENDON SHEATH/LIGAMENT: CPT | Mod: RT | Performed by: INTERNAL MEDICINE

## 2022-05-05 RX ORDER — METHYLPREDNISOLONE ACETATE 40 MG/ML
10 INJECTION, SUSPENSION INTRA-ARTICULAR; INTRALESIONAL; INTRAMUSCULAR; SOFT TISSUE ONCE
Status: COMPLETED | OUTPATIENT
Start: 2022-05-05 | End: 2022-05-05

## 2022-05-05 RX ADMIN — METHYLPREDNISOLONE ACETATE 10 MG: 40 INJECTION, SUSPENSION INTRA-ARTICULAR; INTRALESIONAL; INTRAMUSCULAR; SOFT TISSUE at 14:55

## 2022-05-05 NOTE — PROGRESS NOTES
Prior to immunization administration, verified patients identity using patient s name and date of birth. Please see Immunization Activity for additional information.     Screening Questionnaire for Adult Immunization    Are you sick today?   No   Do you have allergies to medications, food, a vaccine component or latex?   No   Have you ever had a serious reaction after receiving a vaccination?   No   Do you have a long-term health problem with heart, lung, kidney, or metabolic disease (e.g., diabetes), asthma, a blood disorder, no spleen, complement component deficiency, a cochlear implant, or a spinal fluid leak?  Are you on long-term aspirin therapy?   No   Do you have cancer, leukemia, HIV/AIDS, or any other immune system problem?   No   Do you have a parent, brother, or sister with an immune system problem?   No   In the past 3 months, have you taken medications that affect  your immune system, such as prednisone, other steroids, or anticancer drugs; drugs for the treatment of rheumatoid arthritis, Crohn s disease, or psoriasis; or have you had radiation treatments?   No   Have you had a seizure, or a brain or other nervous system problem?   No   During the past year, have you received a transfusion of blood or blood    products, or been given immune (gamma) globulin or antiviral drug?   No   For women: Are you pregnant or is there a chance you could become       pregnant during the next month?   No   Have you received any vaccinations in the past 4 weeks?   No     Immunization questionnaire answers were all negative.        Per orders, injection of Moderna COVID-19 booster given by Nkechi Nielsen MA. Patient instructed to remain in clinic for 15 minutes afterwards, and to report any adverse reaction to me immediately.       Screening performed by Nkechi Nielsen MA on 5/5/2022 at 1:50 PM.  Nkechi Nielsen MA on 5/5/2022 at 1:50 PM

## 2022-05-05 NOTE — PROGRESS NOTES
Rheumatology Clinic Visit      Bria Haddad MRN# [id number]   YOB: 1965 Age: 57 year old      Date of visit: 5/05/22  PCP: Dr. Holli Marley    Chief Complaint   Patient presents with:  Rheumatoid Arthritis: Right 4th digit trigger finger injection    Assessment and Plan     1. Right fourth digit trigger finger: She has had steroid injections of both the right third and fourth digit trigger finger in the past that were effective and she was asymptomatic for a period of time.  Now having return of symptoms of the right fourth digit.  Reviewed the diagnosis and treatment options.  Here for planned steroid injection of the right 4th digit trigger finger    Total minutes spent in evaluation with patient, documentation, , and review of pertinent studies and chart notes: 19      Ms. Haddad verbalized agreement with and understanding of the rational for the diagnosis and treatment plan.  All questions were answered to best of my ability and the patient's satisfaction. Ms. Haddad was advised to contact the clinic with any questions that may arise after the clinic visit.      Thank you for involving me in the care of the patient    Return to clinic:  3 months      HPI   Bria Haddad is a 57 year old female with a past medical history significant for endometriosis, s/p carpal tunnel release surgery who present for planned steroid injection of the right 4th trigger finger.     Today, 5/5/2022: here for planned steroid injection of the right 4th trigger finger.     Tobacco: none  EtOH: none  Drugs: none    ROS   GEN: No fevers or chills  SKIN: No rash  CV: No chest pain, pressure, palpitations, or dyspnea on exertion.  PULM: No wheeze, cough, or shortness of breath.     Active Problem List     Patient Active Problem List   Diagnosis     CARDIOVASCULAR SCREENING; LDL GOAL LESS THAN 160     Endometriosis of uterus     Inflammatory polyarthropathy (H)     Morbid obesity (H)     Past Medical History      Past Medical History:   Diagnosis Date     Arthritis      Bilateral carpal tunnel syndrome 12/20/2015     NO ACTIVE PROBLEMS      Obesity      Polyarthritis     inflammatory poly arthritis.     S/P carpal tunnel release 12/29/2016     Past Surgical History     Past Surgical History:   Procedure Laterality Date     ABDOMEN SURGERY  1992    Laparoscopy     bunions       COLONOSCOPY N/A 11/9/2015    Procedure: COLONOSCOPY;  Surgeon: Andrew Adamson MD;  Location: [facility]     COLONOSCOPY WITH CO2 INSUFFLATION N/A 11/9/2015    Procedure: COLONOSCOPY WITH CO2 INSUFFLATION;  Surgeon: Andrew Adamson MD;  Location: [facility]     GYN SURGERY      endometryosis     RELEASE CARPAL TUNNEL Right 10/12/2016    Procedure: RELEASE CARPAL TUNNEL;  Surgeon: Colby Nobles MD;  Location: [facility]     RELEASE CARPAL TUNNEL Left 12/16/2016    Procedure: RELEASE CARPAL TUNNEL;  Surgeon: Colby Nobles MD;  Location: [facility]     TONSILLECTOMY & ADENOIDECTOMY       Allergy     Allergies   Allergen Reactions     Asa [Aspirin] Hives     Current Medication List     Current Outpatient Medications   Medication Sig     adalimumab (HUMIRA *CF*) 40 MG/0.4ML pen kit Inject 0.4 mLs (40 mg) Subcutaneous every 14 days     order for DME Equipment being ordered: compression stockings 1 pair, at 30mm Hg, to be worn during the day. For Bilateral leg edema [R60.0]     sulfaSALAzine (AZULFIDINE) 500 MG tablet Take 2 tablets (1,000 mg) by mouth 2 times daily     No current facility-administered medications for this visit.     Social History   See HPI    Family History     Family History   Problem Relation Age of Onset     Osteoporosis Mother      Respiratory Mother      Thyroid Disease Mother      Anxiety Disorder Mother      Asthma Mother      Heart Disease Father      Cancer - colorectal Maternal Grandmother      Colon Cancer Maternal Grandmother      Diabetes Maternal Grandfather      Heart Disease Maternal Grandfather      Cancer  "Paternal Grandmother      Heart Disease Paternal Grandfather      Respiratory Paternal Grandfather      Hypertension Brother      Thyroid Disease Sister      Thyroid Disease Sister      Neurologic Disorder Brother      Hypertension Brother      Thyroid Disease Brother      Other Cancer Sister      Anxiety Disorder Sister      Mental Illness Sister      Anesthesia Reaction Sister      Thyroid Disease Sister      Physical Exam     Temp Readings from Last 3 Encounters:   05/05/22 98.2  F (36.8  C) (Tympanic)   11/17/21 97.6  F (36.4  C) (Tympanic)   10/23/20 98  F (36.7  C) (Oral)     BP Readings from Last 5 Encounters:   05/05/22 123/79   11/17/21 (!) 145/88   09/23/21 (!) 154/90   10/23/20 (!) 158/86   08/12/20 (!) 140/90     Pulse Readings from Last 1 Encounters:   05/05/22 85     Resp Readings from Last 1 Encounters:   11/17/21 18     Estimated body mass index is 38.9 kg/m  as calculated from the following:    Height as of 9/23/21: 1.626 m (5' 4\").    Weight as of this encounter: 102.8 kg (226 lb 9.6 oz).    GEN: NAD.   HEENT: MMM.  Anicteric, noninjected sclera. No obvious external lesions of the ear and nose. Hearing intact.  PULM: No increased work of breathing  MSK:  Active triggering of the right 4th finger, with palpable bump near the A1 pulley that moves with flexion and extension of the finger.  SKIN: No rash or jaundice seen  PSYCH: Alert. Appropriate.      Labs / Imaging (select studies)     RF/CCP  Recent Labs   Lab Test 09/18/17  0913   CCPIGG 1   RHF <20     CBC  Recent Labs   Lab Test 04/12/22  1120 01/18/22  0701 10/02/21  1656 07/05/21  1519 04/06/21  0716 12/22/20  1754   WBC 6.3 6.3 9.1 6.3 7.2 8.4   RBC 4.75 4.84 4.62 4.89 4.89 4.83   HGB 14.1 14.6 13.7 14.2 14.4 14.3   HCT 43.2 43.4 41.4 43.6 43.5 43.3   MCV 91 90 90 89 89 90   RDW 12.9 13.3 12.9 13.1 12.8 12.8    321 333 363 302 317   MCH 29.7 30.2 29.7 29.0 29.4 29.6   MCHC 32.6 33.6 33.1 32.6 33.1 33.0   NEUTROPHIL 46 42 54 43.3 45.3 " 38.0   LYMPH 39 43 34 41.1 43.0 48.6   MONOCYTE 10 10 10 12.4 9.9 11.3   EOSINOPHIL 4 4 2 2.9 1.5 2.0   BASOPHIL 1 1 0 0.3 0.3 0.1   ANEU  --   --   --  2.7 3.3 3.2   ALYM  --   --   --  2.6 3.1 4.1   MIKE  --   --   --  0.8 0.7 1.0   AEOS  --   --   --  0.2 0.1 0.2   ABAS  --   --   --  0.0 0.0 0.0   ANEUTAUTO 2.9 2.7 4.9  --   --   --    ALYMPAUTO 2.5 2.7 3.1  --   --   --    AMONOAUTO 0.6 0.7 0.9  --   --   --    AEOSAUTO 0.3 0.3 0.2  --   --   --    ABSBASO 0.0 0.0 0.0  --   --   --      CMP  Recent Labs   Lab Test 04/12/22  1120 01/18/22  0701 10/02/21  1656 07/05/21  1519 04/06/21  0716 12/22/20  1754 09/23/20  0749 12/31/19  0712 05/08/19  0745 12/04/17  0805 09/18/17  0913   NA  --   --   --   --   --   --  141  --  142  --  141   POTASSIUM  --   --   --   --   --   --  4.1  --  4.0  --  4.0   CHLORIDE  --   --   --   --   --   --  109  --  109  --  107   CO2  --   --   --   --   --   --  29  --  30  --  29   ANIONGAP  --   --   --   --   --   --  3  --  3  --  5   GLC  --   --   --   --   --   --  104*  --  99  --  96   BUN  --   --   --   --   --   --  16  --  14  --  14   CR 0.78 0.79 1.04 0.78 0.86 0.87 0.77   < > 0.74   < > 0.76   GFRESTIMATED 88 86 59* 84 75 74 85   < > >90   < > 79   GFRESTBLACK  --   --   --  >90 86 85 >90   < > >90   < > >90   AGUILAR  --   --   --   --   --   --  9.4  --  9.3  --  8.8   BILITOTAL 0.3 0.3 0.3 0.2 0.4 0.2  --    < > 0.3   < > 0.4   ALBUMIN 3.9 3.7 4.0 3.9 3.8 4.3  --    < > 4.0   < > 3.7   PROTTOTAL 7.4 7.2 7.4 7.5 7.6 7.8  --    < > 7.5   < > 7.4   ALKPHOS 66 69 76 76 76 72  --    < > 73   < > 71   AST 16 14 18 17 13 17  --    < > 15   < > 16   ALT 25 29 33 32 26 27  --    < > 31   < > 23    < > = values in this interval not displayed.     Calcium/VitaminD  Recent Labs   Lab Test 09/23/20  0749 05/08/19  0745 09/18/17  0913   AGUILAR 9.4 9.3 8.8     ESR/CRP  Recent Labs   Lab Test 04/12/22  1120 01/18/22  0701 10/02/21  1656   SED 6 7 7   CRP <2.9 4.2 3.0     Hepatitis  B  Recent Labs   Lab Test 10/12/15  0923   AUSAB 0.18   HBCAB Nonreactive   HEPBANG Nonreactive     Hepatitis C  Recent Labs   Lab Test 10/07/16  0912   HCVAB Nonreactive   Assay performance characteristics have not been established for newborns,   infants, and children       Tuberculosis Screening  Recent Labs   Lab Test 01/18/22  0701 12/07/18  0724   TBRES Negative Negative     HIV Screening  Recent Labs   Lab Test 10/12/15  0923   HIAGAB Nonreactive   HIV-1 p24 Ag & HIV-1/HIV-2 Ab Not Detected       Procedure     Procedure: Trigger finger injection  Indication: Right fourth digit trigger finger    The procedure was explained in detail. Risks including infection, pain, structural damage such as cartilage damage and tendon rupture, and medication reaction were explained. The option of not doing the procedure was also provided. All questions were answered and the patient consented to the procedure.     A time-out was performed and the correct patient, procedure, and laterality were verified.    The right fourth digit was examined and location for injection was identified to be on the palmar aspect of the right hand just proximal to the fourth MCP at the location of the A1 pulley. The area was cleaned with chlorhexidine, twice.  Ethyl chloride was then used for topical anaesthetic. Then a mixture of lidocaine 1% 0.05mL and Depo-Medrol 10mg (0.25mL) was injected near the tendon sheath at the A1 pulley under ultrasound guidance.     The patient tolerated the procedure well. No complications.    1% Lidocaine  : Hospira  Lot #: [id number]  EXPIRATION DATE: 01 JUL 2023  NDC: 6965-7653-81    MEDICATION: Methylprednisolone  LOT #: [id number]  EXPIRATION DATE:  11/2023  NDC#: [address]-8           Chart documentation done in part with Dragon Voice recognition Software. Although reviewed after completion, some word and grammatical error may remain.    Bill Resendiz MD

## 2022-05-05 NOTE — PATIENT INSTRUCTIONS
RHEUMATOLOGY    Dr. Bill Resendiz    [address] [place] [address]  Phone number: [phone]  Fax number: [phone]      Thank you for choosing [facility]!    Jolynn Angulo CMA Rheumatology

## 2022-06-03 NOTE — TELEPHONE ENCOUNTER
Patient wanting a call to get test results from appt with  recently. Please call to advise. Thank you   Pt multiple vague complaints including general  L ear pain and Right neck pain. Upon assessment no lymphadenopathy appreciated. Mild tenderness to palpation. No erythema or crepitus, no   ear drainage. discussed with Dr. Pike. No acute interventions warranted at this time.

## 2022-06-07 ENCOUNTER — LAB (OUTPATIENT)
Dept: LAB | Facility: CLINIC | Age: 57
End: 2022-06-07
Payer: COMMERCIAL

## 2022-06-07 DIAGNOSIS — Z20.822 CLOSE EXPOSURE TO 2019 NOVEL CORONAVIRUS: ICD-10-CM

## 2022-06-07 LAB — SARS-COV-2 RNA RESP QL NAA+PROBE: NEGATIVE

## 2022-06-07 PROCEDURE — U0005 INFEC AGEN DETEC AMPLI PROBE: HCPCS

## 2022-06-07 PROCEDURE — U0003 INFECTIOUS AGENT DETECTION BY NUCLEIC ACID (DNA OR RNA); SEVERE ACUTE RESPIRATORY SYNDROME CORONAVIRUS 2 (SARS-COV-2) (CORONAVIRUS DISEASE [COVID-19]), AMPLIFIED PROBE TECHNIQUE, MAKING USE OF HIGH THROUGHPUT TECHNOLOGIES AS DESCRIBED BY CMS-2020-01-R: HCPCS

## 2022-08-11 ENCOUNTER — OFFICE VISIT (OUTPATIENT)
Dept: RHEUMATOLOGY | Facility: CLINIC | Age: 57
End: 2022-08-11
Payer: COMMERCIAL

## 2022-08-11 VITALS
BODY MASS INDEX: 39.31 KG/M2 | OXYGEN SATURATION: 98 % | HEART RATE: 90 BPM | SYSTOLIC BLOOD PRESSURE: 130 MMHG | WEIGHT: 229 LBS | DIASTOLIC BLOOD PRESSURE: 84 MMHG

## 2022-08-11 DIAGNOSIS — Z79.899 HIGH RISK MEDICATIONS (NOT ANTICOAGULANTS) LONG-TERM USE: ICD-10-CM

## 2022-08-11 DIAGNOSIS — M22.2X1 PATELLOFEMORAL SYNDROME OF BOTH KNEES: ICD-10-CM

## 2022-08-11 DIAGNOSIS — M22.2X2 PATELLOFEMORAL SYNDROME OF BOTH KNEES: ICD-10-CM

## 2022-08-11 DIAGNOSIS — Z23 NEED FOR VACCINATION: ICD-10-CM

## 2022-08-11 DIAGNOSIS — M06.09 RHEUMATOID ARTHRITIS OF MULTIPLE SITES WITH NEGATIVE RHEUMATOID FACTOR (H): Primary | ICD-10-CM

## 2022-08-11 LAB
ALBUMIN SERPL-MCNC: 3.9 G/DL (ref 3.4–5)
ALP SERPL-CCNC: 79 U/L (ref 40–150)
ALT SERPL W P-5'-P-CCNC: 31 U/L (ref 0–50)
AST SERPL W P-5'-P-CCNC: 19 U/L (ref 0–45)
BASOPHILS # BLD AUTO: 0 10E3/UL (ref 0–0.2)
BASOPHILS NFR BLD AUTO: 0 %
BILIRUB DIRECT SERPL-MCNC: <0.1 MG/DL (ref 0–0.2)
BILIRUB SERPL-MCNC: 0.4 MG/DL (ref 0.2–1.3)
CREAT SERPL-MCNC: 0.85 MG/DL (ref 0.52–1.04)
CRP SERPL-MCNC: <2.9 MG/L (ref 0–8)
EOSINOPHIL # BLD AUTO: 0.2 10E3/UL (ref 0–0.7)
EOSINOPHIL NFR BLD AUTO: 3 %
ERYTHROCYTE [DISTWIDTH] IN BLOOD BY AUTOMATED COUNT: 13.3 % (ref 10–15)
ERYTHROCYTE [SEDIMENTATION RATE] IN BLOOD BY WESTERGREN METHOD: 7 MM/HR (ref 0–30)
GFR SERPL CREATININE-BSD FRML MDRD: 78 ML/MIN/1.73M2
HCT VFR BLD AUTO: 44.1 % (ref 35–47)
HGB BLD-MCNC: 14.8 G/DL (ref 11.7–15.7)
LYMPHOCYTES # BLD AUTO: 2.8 10E3/UL (ref 0.8–5.3)
LYMPHOCYTES NFR BLD AUTO: 40 %
MCH RBC QN AUTO: 30.1 PG (ref 26.5–33)
MCHC RBC AUTO-ENTMCNC: 33.6 G/DL (ref 31.5–36.5)
MCV RBC AUTO: 90 FL (ref 78–100)
MONOCYTES # BLD AUTO: 0.7 10E3/UL (ref 0–1.3)
MONOCYTES NFR BLD AUTO: 10 %
NEUTROPHILS # BLD AUTO: 3.4 10E3/UL (ref 1.6–8.3)
NEUTROPHILS NFR BLD AUTO: 47 %
PLATELET # BLD AUTO: 326 10E3/UL (ref 150–450)
PROT SERPL-MCNC: 7.4 G/DL (ref 6.8–8.8)
RBC # BLD AUTO: 4.92 10E6/UL (ref 3.8–5.2)
WBC # BLD AUTO: 7.2 10E3/UL (ref 4–11)

## 2022-08-11 PROCEDURE — 82565 ASSAY OF CREATININE: CPT | Performed by: INTERNAL MEDICINE

## 2022-08-11 PROCEDURE — 90714 TD VACC NO PRESV 7 YRS+ IM: CPT | Performed by: INTERNAL MEDICINE

## 2022-08-11 PROCEDURE — 36415 COLL VENOUS BLD VENIPUNCTURE: CPT | Performed by: INTERNAL MEDICINE

## 2022-08-11 PROCEDURE — 85652 RBC SED RATE AUTOMATED: CPT | Performed by: INTERNAL MEDICINE

## 2022-08-11 PROCEDURE — 85025 COMPLETE CBC W/AUTO DIFF WBC: CPT | Performed by: INTERNAL MEDICINE

## 2022-08-11 PROCEDURE — 90471 IMMUNIZATION ADMIN: CPT | Performed by: INTERNAL MEDICINE

## 2022-08-11 PROCEDURE — 80076 HEPATIC FUNCTION PANEL: CPT | Performed by: INTERNAL MEDICINE

## 2022-08-11 PROCEDURE — 86140 C-REACTIVE PROTEIN: CPT | Performed by: INTERNAL MEDICINE

## 2022-08-11 PROCEDURE — 99214 OFFICE O/P EST MOD 30 MIN: CPT | Mod: 25 | Performed by: INTERNAL MEDICINE

## 2022-08-11 NOTE — PROGRESS NOTES
J  Rheumatology Clinic Visit      Bria Haddad MRN# [id number]   YOB: 1965 Age: 57 year old      Date of visit: 8/11/22  PCP: Dr. Holli Marley    Chief Complaint   Patient presents with:  Rheumatoid Arthritis    Assessment and Plan     1.  Rheumatoid arthritis: Ms. Haddad initially presented to this clinic in 2015 with dependent edema of the bilateral lower extremities, fatigue, and a one time episode of right toe pain in the setting of a positive ROSEMARY.  On 9/18/2017 she presented with synovitis of her bilateral PIPs and pain without swelling of her MCPs, persistent fatigue, intermittent rash, and dependent edema. ROSEMARY positive but additional studies negative/normal including complement C3, complement C4, beta-2 glycoprotein IgM and IgG, cardiolipin IgM and IgG, lupus anticoagulant, RNP, Stuart, SSA, SSB, Scl-70.  Joint exam most consistent with rheumatoid arthritis.  Previously on HCQ (bloating), MTX (LFT elevation, alopecia, headache), leflunomide (LFT elevation). Currently on SSZ and Humira.  RA controlled at this time.   Chronic illness, stable.    - Continue sulfasalazine 1000mg twice daily  - Continue Humira 40mg SQ every 14 days  - Labs today and in 3 months: CBC, Creatinine, Hepatic Panel, ESR, CRP    High risk medication requiring intensive toxicity monitoring at least quarterly: labs ordered include CBC, Creatinine, Hepatic panel to monitor for cytopenia and hepatotoxicity; checking creatinine as it affects clearance of medication.      2.  Bilateral patellofemoral syndrome: Previously had left knee pain that resolved with physical therapy exercises but she has stopped doing those and knee symptoms have worsened.  Advised physical therapy.    - PT referral     3. History of right third digit and later right fourth digit trigger finger: Resolved with steroid injections previously.  Not an issue today.      4. History of Left shoulder pain, impingement syndrome: Resolved with combination  of steroid injection and physical therapy.  Not an issue today.      5.  Vaccinations: Vaccinations reviewed with Ms. Haddad.  - Influenza: encouraged yearly vaccination   - Tetanus: to receive today  - Shingrix: advised receiving and she plans to receive at a later date  - COVID-19: has received the Pfizer COVID-19 vaccine on 3/19/2021 and 4/9/2021 and 10/26/2021; moderna on 5/5/2022.  A 5th mRNA vaccine (2nd booster) may be received at least 4 months after the 4th dose.   Based on our current understanding (and this may change over time as we learn more), medications should be adjusted as noted below, if disease activity allows:  - NSAIDs and Acetaminophen: hold for 24 hours prior to vaccination if able to do so  - Sulfasalazine should be held for 1-2 weeks after each COVID-19 vaccine (as disease activity allows)    Total minutes spent in evaluation with patient, documentation, , and review of pertinent studies and chart notes: 18      Ms. Haddad verbalized agreement with and understanding of the rational for the diagnosis and treatment plan.  All questions were answered to best of my ability and the patient's satisfaction. Ms. Haddad was advised to contact the clinic with any questions that may arise after the clinic visit.      Thank you for involving me in the care of the patient    Return to clinic: 3 mo      HPI   Bria Haddad is a 57 year old female with a past medical history significant for endometriosis, s/p carpal tunnel release surgery who present for follow up of rheumatoid arthritis.    Today, 8/11/2022: Trigger finger resolved with steroid injection.  Bilateral knee pain worse with getting up from the floor.  Not doing physical therapy exercises that she was previously taught for her knees.  Other joints are doing well.  Has good days and bad days; primarily affected by how well her  is doing who is dealing with cancer.  Morning stiffness that lasts for less than 30 minutes.  Tolerating  sulfasalazine and Humira well.    Tobacco: none  EtOH: none  Drugs: none    ROS   12 point review of system was completed and negative except as noted in the HPI     Active Problem List     Patient Active Problem List   Diagnosis     CARDIOVASCULAR SCREENING; LDL GOAL LESS THAN 160     Endometriosis of uterus     Inflammatory polyarthropathy (H)     Morbid obesity (H)     Past Medical History     Past Medical History:   Diagnosis Date     Arthritis      Bilateral carpal tunnel syndrome 12/20/2015     NO ACTIVE PROBLEMS      Obesity      Polyarthritis     inflammatory poly arthritis.     S/P carpal tunnel release 12/29/2016     Past Surgical History     Past Surgical History:   Procedure Laterality Date     ABDOMEN SURGERY  1992    Laparoscopy     bunions       COLONOSCOPY N/A 11/9/2015    Procedure: COLONOSCOPY;  Surgeon: Andrew Adamson MD;  Location: [facility]     COLONOSCOPY WITH CO2 INSUFFLATION N/A 11/9/2015    Procedure: COLONOSCOPY WITH CO2 INSUFFLATION;  Surgeon: Andrew Adamson MD;  Location: [facility]     GYN SURGERY      endometryosis     RELEASE CARPAL TUNNEL Right 10/12/2016    Procedure: RELEASE CARPAL TUNNEL;  Surgeon: Colby Nobles MD;  Location: [facility]     RELEASE CARPAL TUNNEL Left 12/16/2016    Procedure: RELEASE CARPAL TUNNEL;  Surgeon: Colby Nobles MD;  Location: [facility]     TONSILLECTOMY & ADENOIDECTOMY       Allergy     Allergies   Allergen Reactions     Asa [Aspirin] Hives     Current Medication List     Current Outpatient Medications   Medication Sig     adalimumab (HUMIRA *CF*) 40 MG/0.4ML pen kit Inject 0.4 mLs (40 mg) Subcutaneous every 14 days     sulfaSALAzine (AZULFIDINE) 500 MG tablet Take 2 tablets (1,000 mg) by mouth 2 times daily     order for DME Equipment being ordered: compression stockings 1 pair, at 30mm Hg, to be worn during the day. For Bilateral leg edema [R60.0]     No current facility-administered medications for this visit.         Social History  "  See HPI    Family History     Family History   Problem Relation Age of Onset     Osteoporosis Mother      Respiratory Mother      Thyroid Disease Mother      Anxiety Disorder Mother      Asthma Mother      Heart Disease Father      Cancer - colorectal Maternal Grandmother      Colon Cancer Maternal Grandmother      Diabetes Maternal Grandfather      Heart Disease Maternal Grandfather      Cancer Paternal Grandmother      Heart Disease Paternal Grandfather      Respiratory Paternal Grandfather      Hypertension Brother      Thyroid Disease Sister      Thyroid Disease Sister      Neurologic Disorder Brother      Hypertension Brother      Thyroid Disease Brother      Other Cancer Sister      Anxiety Disorder Sister      Mental Illness Sister      Anesthesia Reaction Sister      Thyroid Disease Sister      Physical Exam     Temp Readings from Last 3 Encounters:   05/05/22 98.2  F (36.8  C) (Tympanic)   11/17/21 97.6  F (36.4  C) (Tympanic)   10/23/20 98  F (36.7  C) (Oral)     BP Readings from Last 5 Encounters:   08/11/22 130/84   05/05/22 123/79   11/17/21 (!) 145/88   09/23/21 (!) 154/90   10/23/20 (!) 158/86     Pulse Readings from Last 1 Encounters:   08/11/22 90     Resp Readings from Last 1 Encounters:   11/17/21 18     Estimated body mass index is 39.31 kg/m  as calculated from the following:    Height as of 9/23/21: 1.626 m (5' 4\").    Weight as of this encounter: 103.9 kg (229 lb).      GEN: NAD.   HEENT:  Anicteric, noninjected sclera. No obvious external lesions of the ear and nose. Hearing intact.  CV: S1, S2. RRR. No m/r/g  PULM: No increased work of breathing. CTA bilaterally   MSK: MCPs, PIPs, DIPs without swelling or tenderness to palpation.  No active triggering of any finger.  Wrists without swelling or tenderness to palpation.  Elbows and shoulders without swelling or tenderness to palpation.  Knees, ankles, and MTPs without swelling or tenderness to palpation.    SKIN: No rash or jaundice " seen  PSYCH: Alert. Appropriate.         Labs / Imaging (select studies)     RF/CCP  Recent Labs   Lab Test 09/18/17  0913   CCPIGG 1   RHF <20     CBC  Recent Labs   Lab Test 04/12/22  1120 01/18/22  0701 10/02/21  1656 07/05/21  1519 04/06/21  0716 12/22/20  1754   WBC 6.3 6.3 9.1 6.3 7.2 8.4   RBC 4.75 4.84 4.62 4.89 4.89 4.83   HGB 14.1 14.6 13.7 14.2 14.4 14.3   HCT 43.2 43.4 41.4 43.6 43.5 43.3   MCV 91 90 90 89 89 90   RDW 12.9 13.3 12.9 13.1 12.8 12.8    321 333 363 302 317   MCH 29.7 30.2 29.7 29.0 29.4 29.6   MCHC 32.6 33.6 33.1 32.6 33.1 33.0   NEUTROPHIL 46 42 54 43.3 45.3 38.0   LYMPH 39 43 34 41.1 43.0 48.6   MONOCYTE 10 10 10 12.4 9.9 11.3   EOSINOPHIL 4 4 2 2.9 1.5 2.0   BASOPHIL 1 1 0 0.3 0.3 0.1   ANEU  --   --   --  2.7 3.3 3.2   ALYM  --   --   --  2.6 3.1 4.1   MIKE  --   --   --  0.8 0.7 1.0   AEOS  --   --   --  0.2 0.1 0.2   ABAS  --   --   --  0.0 0.0 0.0   ANEUTAUTO 2.9 2.7 4.9  --   --   --    ALYMPAUTO 2.5 2.7 3.1  --   --   --    AMONOAUTO 0.6 0.7 0.9  --   --   --    AEOSAUTO 0.3 0.3 0.2  --   --   --    ABSBASO 0.0 0.0 0.0  --   --   --      CMP  Recent Labs   Lab Test 04/12/22  1120 01/18/22  0701 10/02/21  1656 07/05/21  1519 04/06/21  0716 12/22/20  1754 09/23/20  0749 12/31/19  0712 05/08/19  0745 12/04/17  0805 09/18/17 0913   NA  --   --   --   --   --   --  141  --  142  --  141   POTASSIUM  --   --   --   --   --   --  4.1  --  4.0  --  4.0   CHLORIDE  --   --   --   --   --   --  109  --  109  --  107   CO2  --   --   --   --   --   --  29  --  30  --  29   ANIONGAP  --   --   --   --   --   --  3  --  3  --  5   GLC  --   --   --   --   --   --  104*  --  99  --  96   BUN  --   --   --   --   --   --  16  --  14  --  14   CR 0.78 0.79 1.04 0.78 0.86 0.87 0.77   < > 0.74   < > 0.76   GFRESTIMATED 88 86 59* 84 75 74 85   < > >90   < > 79   GFRESTBLACK  --   --   --  >90 86 85 >90   < > >90   < > >90   AGUILAR  --   --   --   --   --   --  9.4  --  9.3  --  8.8   BILITOTAL  0.3 0.3 0.3 0.2 0.4 0.2  --    < > 0.3   < > 0.4   ALBUMIN 3.9 3.7 4.0 3.9 3.8 4.3  --    < > 4.0   < > 3.7   PROTTOTAL 7.4 7.2 7.4 7.5 7.6 7.8  --    < > 7.5   < > 7.4   ALKPHOS 66 69 76 76 76 72  --    < > 73   < > 71   AST 16 14 18 17 13 17  --    < > 15   < > 16   ALT 25 29 33 32 26 27  --    < > 31   < > 23    < > = values in this interval not displayed.     Calcium/VitaminD  Recent Labs   Lab Test 09/23/20  0749 05/08/19  0745 09/18/17  0913   AGUILAR 9.4 9.3 8.8     ESR/CRP  Recent Labs   Lab Test 04/12/22  1120 01/18/22  0701 10/02/21  1656   SED 6 7 7   CRP <2.9 4.2 3.0     Hepatitis B  Recent Labs   Lab Test 10/12/15  0923   AUSAB 0.18   HBCAB Nonreactive   HEPBANG Nonreactive     Hepatitis C  Recent Labs   Lab Test 10/07/16  0912   HCVAB Nonreactive   Assay performance characteristics have not been established for newborns,   infants, and children         Tuberculosis Screening  Recent Labs   Lab Test 01/18/22  0701 12/07/18  0724   TBRES Negative Negative     HIV Screening  Recent Labs   Lab Test 10/12/15  0923   HIAGAB Nonreactive   HIV-1 p24 Ag & HIV-1/HIV-2 Ab Not Detected            Chart documentation done in part with Dragon Voice recognition Software. Although reviewed after completion, some word and grammatical error may remain.    Bill Resendiz MD

## 2022-08-11 NOTE — PATIENT INSTRUCTIONS
RHEUMATOLOGY    Dr. Bill Resendiz    [facility]  [address] [place] [address] [place] [address]  Phone number: [phone]  Fax number: [phone]      Thank you for choosing [facility]!    Jolynn Angulo CMA Rheumatology    -----------------------    COVID-19 vaccination    A 5th mRNA vaccine (2nd booster) may be received at least 4 months after the 4th dose.     Based on our current understanding (and this may change over time as we learn more), medications should be adjusted as noted below, if disease activity allows:  - NSAIDs and Acetaminophen: hold for 24 hours prior to vaccination if able to do so  - Sulfasalazine should be held for 1-2 weeks after each COVID-19 vaccine (as disease activity allows)

## 2022-09-02 ENCOUNTER — THERAPY VISIT (OUTPATIENT)
Dept: PHYSICAL THERAPY | Facility: CLINIC | Age: 57
End: 2022-09-02
Attending: INTERNAL MEDICINE
Payer: COMMERCIAL

## 2022-09-02 DIAGNOSIS — M22.2X2 PATELLOFEMORAL SYNDROME OF BOTH KNEES: ICD-10-CM

## 2022-09-02 DIAGNOSIS — M25.561 CHRONIC PAIN OF BOTH KNEES: ICD-10-CM

## 2022-09-02 DIAGNOSIS — G89.29 CHRONIC PAIN OF BOTH KNEES: ICD-10-CM

## 2022-09-02 DIAGNOSIS — M22.2X1 PATELLOFEMORAL SYNDROME OF BOTH KNEES: ICD-10-CM

## 2022-09-02 DIAGNOSIS — M25.562 CHRONIC PAIN OF BOTH KNEES: ICD-10-CM

## 2022-09-02 PROCEDURE — 97161 PT EVAL LOW COMPLEX 20 MIN: CPT | Mod: GP | Performed by: PHYSICAL THERAPIST

## 2022-09-02 PROCEDURE — 97110 THERAPEUTIC EXERCISES: CPT | Mod: GP | Performed by: PHYSICAL THERAPIST

## 2022-09-02 ASSESSMENT — ACTIVITIES OF DAILY LIVING (ADL)
HOW_WOULD_YOU_RATE_THE_OVERALL_FUNCTION_OF_YOUR_KNEE_DURING_YOUR_USUAL_DAILY_ACTIVITIES?: ABNORMAL
WALK: ACTIVITY IS SOMEWHAT DIFFICULT
PAIN: NOT ANSWERED
KNEEL ON THE FRONT OF YOUR KNEE: ACTIVITY IS FAIRLY DIFFICULT
GO UP STAIRS: ACTIVITY IS SOMEWHAT DIFFICULT
SIT WITH YOUR KNEE BENT: ACTIVITY IS NOT DIFFICULT
AS_A_RESULT_OF_YOUR_KNEE_INJURY,_HOW_WOULD_YOU_RATE_YOUR_CURRENT_LEVEL_OF_DAILY_ACTIVITY?: ABNORMAL
STIFFNESS: THE SYMPTOM AFFECTS MY ACTIVITY MODERATELY
RISE FROM A CHAIR: ACTIVITY IS MINIMALLY DIFFICULT
WEAKNESS: THE SYMPTOM AFFECTS MY ACTIVITY SEVERELY
KNEE_ACTIVITY_OF_DAILY_LIVING_SUM: 33
GO DOWN STAIRS: ACTIVITY IS SOMEWHAT DIFFICULT
SWELLING: THE SYMPTOM AFFECTS MY ACTIVITY MODERATELY
LIMPING: NOT ANSWERED
SQUAT: ACTIVITY IS FAIRLY DIFFICULT
STAND: ACTIVITY IS NOT DIFFICULT
GIVING WAY, BUCKLING OR SHIFTING OF KNEE: THE SYMPTOM AFFECTS MY ACTIVITY SEVERELY

## 2022-09-02 NOTE — PROGRESS NOTES
Physical Therapy Initial Evaluation  Subjective:  The history is provided by the patient.   Therapist Generated HPI Evaluation  Problem details: Clicking in L knee.  Sx for years overall.  Sx come and go..         Type of problem:  Left knee.    This is a recurrent condition.  Condition occurred with:  Insidious onset.  Where condition occurred: for unknown reasons.  Patient reports pain:  In the joint, lateral and medial.  Pain quality: discomfort. and is intermittent.  Pain is worse during the day.  Since onset symptoms are unchanged.  Associated symptoms:  Catching, loss of motion/stiffness and loss of strength. Symptoms are exacerbated by kneeling, transfers, descending stairs, ascending stairs and bending/squatting (getting off ground)  and relieved by rest (elevate legs).      Restrictions due to condition include:  Working in normal job without restrictions.      Patient Health History  Bria Haddad being seen for L knee.     Date of Onset: years.      Pain is reported as 1/10 on pain scale.  General health as reported by patient is fair.  Pertinent medical history includes: rheumatoid arthritis.      Other medical allergies details: aspirin.   Surgeries include:  None.    Current medications:  None.    Current occupation  - works from home.   Primary job tasks include:  Computer work and prolonged sitting.                                    Objective:  Standing Alignment:              Knee:  Normal      Gait:    Gait Type:  Normal                                                           Knee Evaluation:  ROM:  Prom wnl knee: crepitus on L; end range sx.  Strength wnl knee: fatigues quickly.          Strength:     Extension:  Left: 4+/5   Pain:      Right: 5-/5   Pain:  Flexion:  Left: 5-/5   Pain:      Right: 5/5   Pain:    Quad Set Left: Fair    Pain:   Quad Set Right: Good    Pain:  Ligament Testing:  Normal                Special Tests:   Left knee positive for the following special  tests:  Patellar Compression  Left knee negative for the following special tests:  Meninscal    Palpation:  Palpation of knee: L > R.  Left knee tenderness present at:  Medial Joint Line  Right knee tenderness present at:  Medial Joint Line  Edema:  Edema of the knee: general L LE swelling.            General     ROS    Assessment/Plan:    Patient is a 57 year old female with left side knee complaints.    Patient has the following significant findings with corresponding treatment plan.                Diagnosis 1:  L knee pain  Pain -  self management, education, directional preference exercise and home program  Decreased ROM/flexibility - manual therapy and therapeutic exercise  Decreased strength - therapeutic exercise and therapeutic activities  Impaired muscle performance - neuro re-education  Decreased function - therapeutic activities    Therapy Evaluation Codes:   1) History comprised of:   Personal factors that impact the plan of care:      None.    Comorbidity factors that impact the plan of care are:      Rheumatoid arthritis.     Medications impacting care: None.  2) Examination of Body Systems comprised of:   Body structures and functions that impact the plan of care:      Knee.   Activity limitations that impact the plan of care are:      Squatting/kneeling and Stairs.  3) Clinical presentation characteristics are:   Stable/Uncomplicated.  4) Decision-Making    Low complexity using standardized patient assessment instrument and/or measureable assessment of functional outcome.  Cumulative Therapy Evaluation is: Low complexity.    Previous and current functional limitations:  (See Goal Flow Sheet for this information)    Short term and Long term goals: (See Goal Flow Sheet for this information)     Communication ability:  Patient appears to be able to clearly communicate and understand verbal and written communication and follow directions correctly.  Treatment Explanation - The following has been  discussed with the patient:   RX ordered/plan of care  Anticipated outcomes  Possible risks and side effects  This patient would benefit from PT intervention to resume normal activities.   Rehab potential is good.    Frequency:  1 X week, once daily  Duration:  for 6 weeks  Discharge Plan:  Achieve all LTG.  Independent in home treatment program.  Reach maximal therapeutic benefit.    Please refer to the daily flowsheet for treatment today, total treatment time and time spent performing 1:1 timed codes.

## 2022-09-28 ENCOUNTER — TELEPHONE (OUTPATIENT)
Dept: OTHER | Facility: CLINIC | Age: 57
End: 2022-09-28

## 2022-09-28 NOTE — TELEPHONE ENCOUNTER
Order for OV expected 2022 is not .  Please schedule per order:      Follow up to 11/3/21     No labs prior.  May be virtual.     It does not appear patient had MRI of her liver as discussed at LOV, but she can talk to Dr. Barrera about that at her visit.

## 2022-09-28 NOTE — TELEPHONE ENCOUNTER
[facility] VASCULAR [facility] CENTER    Who is the name of the provider?:  Holly    What is the location you see this provider at/preferred location?: [place]  Person calling: Bria Haddad  Phone number:  [phone]  Nurse call back needed:  NO     Reason for call:   Patient wanting to follow up. Active request suggested May '22 with LV 11/03/21. Does patient need anything in advance of a visit?    Pharmacy location:  n/a  Outside Imaging: Not Applicable   Can we leave a detailed message on this number?  YES

## 2022-09-29 NOTE — TELEPHONE ENCOUNTER
Pt scheduled as follows;  Future Appointments   Date Time Provider Department Center   10/7/2022  3:00 PM Val Barrera MD [facility]     Video return visit .

## 2022-10-07 ENCOUNTER — VIRTUAL VISIT (OUTPATIENT)
Dept: OTHER | Facility: CLINIC | Age: 57
End: 2022-10-07
Attending: INTERNAL MEDICINE
Payer: COMMERCIAL

## 2022-10-07 DIAGNOSIS — R16.0 LIVER MASS: ICD-10-CM

## 2022-10-07 DIAGNOSIS — R06.83 SNORING: ICD-10-CM

## 2022-10-07 DIAGNOSIS — E66.01 MORBID OBESITY (H): ICD-10-CM

## 2022-10-07 DIAGNOSIS — I89.0 LYMPHEDEMA OF BOTH LOWER EXTREMITIES: Primary | ICD-10-CM

## 2022-10-07 PROCEDURE — 99214 OFFICE O/P EST MOD 30 MIN: CPT | Mod: GT | Performed by: INTERNAL MEDICINE

## 2022-10-07 NOTE — PATIENT INSTRUCTIONS
1.  Please utilize compression stockings and elevate the legs and lose weight  New prescription of both knee-high and thigh-high will be mailed to your home address  Follow lymphedema therapy recommendations self manual lymphatic drainage etc.  2.  Please see sleep clinic for sleep apnea evaluation referral was done  3.  Whenever you are ready to go for MRI of the liver we can prescribe antianxiety medication before the test  4.  Follow-up with me in 6 months or sooner  Follow-up with primary MD for all other issues

## 2022-10-07 NOTE — PROGRESS NOTES
Bria is a 57 year old who is being evaluated via a billable video visit.      How would you like to obtain your AVS? MyChart  If the video visit is dropped, the invitation should be resent by: Text to cell phone: [phone]  Will anyone else be joining your video visit? Kirsten Rubalcava    Provider visit note:    Chief complaint:  Follow-up visit  Known history of lymphedema  History of rheumatoid arthritis following up with rheumatologist  She has a claustrophobia and did not complete MRI of the liver  She went for 3 sessions for lymphedema treatment then she stopped  Requesting compression stockings  History of snoring and has not seen sleep clinic yet    (For full details please see my initial consult note on September 23, 2021)    History of present illness:  Bria Haddad is a 57 year old morbidly obese female BMI greater than 40 with history of rheumatoid arthritis following up with rheumatologist and recent inflammatory markers were unremarkable taking Humira and sulfasalazine, last seen in my clinic more than a year ago here today virtual visit for lymphedema for many years left more than right side an she underwent multiple venous duplexes negative for DVT and also underwent venous competency studies which were unremarkable in the past.  No recent travel.  No history of smoking.  She is also having pain in the legs and other areas due to polyarthritis etc..  She is a longstanding history of a snoring her  noticed but no daytime somnolence   Last visit order placed for sleep clinic referral and she has not completed she is requesting referral again.  No personal history of malignancy.  She underwent pelvic ultrasound few years ago endometrial thickening underwent biopsy which was unremarkable and she also has a 2 fibroids.     Previous CT showed possible hemangioma of the liver and radiologist suggested MRI of the liver I have placed an order and she could not complete due to claustrophobia  and she wanted to think about it in the future  Denies any abdominal pain      Review of systems: Reviewed all 12 point review of systems as per HPI otherwise unremarkable    Physical exam:( no physical exam done this is virtual visit)    Reviewed recent laboratory tests, imaging studies in the epic and updated chart    Assessment and plan:    1. Lymphedema of both lower extremities asymmetrical Left >Rt side      This is a very pleasant 56-year-old female postmenopausal morbidly obese BMI greater than 40 experiencing bilateral lower extremity swelling with pain discomfort and intermittent skin rash for many years and previous extensive work-up multiple venous duplexes negative for DVT and venous competency studies unremarkable.  She has no personal history of malignancy no previous history of a DVT.  No signs of arterial insufficiency she has a palpable peripheral pulses no varicose veins on her clinical exam last year   classical for lymphedema left more than right side with loss of contour of the left leg and dorsal hump and positive stemmer sign  She also history of endometrial thickness with a postmenopausal bleeding few years ago underwent pelvic ultrasound and also seen by OB/GYN biopsy was negative    She saw edema therapist 3 sessions then she could not make further appointments  Using compression stockings and requesting refills  Leg swelling improved a little bit then now again about the same  Offered referral to lymphedema therapy again she declined     At present my recommendations,  Continue self MLD, lose weight use compression stockings and elevate the legs  New prescription of compression stockings will be mailed to her home address  If she decides to see the edema therapist again will make referral     Return to clinic in 1 year          2. Inflammatory polyarthropathy (H)     Currently following up with rheumatologist taking Humira and also sulfasalazine recent inflammatory markers are  unremarkable continue the same plan        3. Morbid obesity (H)BMI >40  4. Snoring  Suggested weight loss decrease the calorie intake and increase activity  Consider intermittent fasting  Consider seeing medical weight loss clinic  Referral to sleep clinic done again     5.  Possible hemangioma of the liver noted on the CT, radiologist suggested MRI but patient had a claustrophobia could not complete.  She denies any abdominal pain and she is asymptomatic  We will arrange whenever she decides MRI again with premedication etc.      Video Visit Details    Type of Service: Video Visit    Video Start Time: 3:00     30 minutes spent on the date of the encounter doing chart review, history, documentation and further activities as noted above.    Originating Location (patient location): Home     Distant Location (provider location): [facility]    Mode of Communication:  Video Conference via [employer]      This visit is being conducted as a virtual visit due to the emphasis on mitigation of the COVID-19 virus pandemic. The clinician has decided that the risk of an in-office visit outweighs the benefit for this patient.     This note was dictated by utilizing Dragon software    Copy of this note to primary care physician    Val Barrera MD, YUMIKO, [place], [place]  Vascular medicine

## 2022-10-11 NOTE — NURSING NOTE
AVS printed and mail with compression stocking RX to patients home address.    Eva EDWARD, RN    [facility]  Vascular [facility]  Office: [phone]  Fax: [phone]

## 2022-11-10 ENCOUNTER — LAB (OUTPATIENT)
Dept: LAB | Facility: CLINIC | Age: 57
End: 2022-11-10
Payer: COMMERCIAL

## 2022-11-10 DIAGNOSIS — M06.09 RHEUMATOID ARTHRITIS OF MULTIPLE SITES WITH NEGATIVE RHEUMATOID FACTOR (H): ICD-10-CM

## 2022-11-10 DIAGNOSIS — Z79.899 HIGH RISK MEDICATIONS (NOT ANTICOAGULANTS) LONG-TERM USE: ICD-10-CM

## 2022-11-10 LAB
ALBUMIN SERPL-MCNC: 3.9 G/DL (ref 3.4–5)
ALP SERPL-CCNC: 73 U/L (ref 40–150)
ALT SERPL W P-5'-P-CCNC: 27 U/L (ref 0–50)
AST SERPL W P-5'-P-CCNC: 13 U/L (ref 0–45)
BASOPHILS # BLD AUTO: 0 10E3/UL (ref 0–0.2)
BASOPHILS NFR BLD AUTO: 0 %
BILIRUB DIRECT SERPL-MCNC: <0.1 MG/DL (ref 0–0.2)
BILIRUB SERPL-MCNC: 0.4 MG/DL (ref 0.2–1.3)
CREAT SERPL-MCNC: 0.74 MG/DL (ref 0.52–1.04)
CRP SERPL-MCNC: <2.9 MG/L (ref 0–8)
EOSINOPHIL # BLD AUTO: 0.2 10E3/UL (ref 0–0.7)
EOSINOPHIL NFR BLD AUTO: 4 %
ERYTHROCYTE [DISTWIDTH] IN BLOOD BY AUTOMATED COUNT: 13.3 % (ref 10–15)
ERYTHROCYTE [SEDIMENTATION RATE] IN BLOOD BY WESTERGREN METHOD: 5 MM/HR (ref 0–30)
GFR SERPL CREATININE-BSD FRML MDRD: >90 ML/MIN/1.73M2
HCT VFR BLD AUTO: 44.5 % (ref 35–47)
HGB BLD-MCNC: 14.5 G/DL (ref 11.7–15.7)
LYMPHOCYTES # BLD AUTO: 2.7 10E3/UL (ref 0.8–5.3)
LYMPHOCYTES NFR BLD AUTO: 40 %
MCH RBC QN AUTO: 30 PG (ref 26.5–33)
MCHC RBC AUTO-ENTMCNC: 32.6 G/DL (ref 31.5–36.5)
MCV RBC AUTO: 92 FL (ref 78–100)
MONOCYTES # BLD AUTO: 0.5 10E3/UL (ref 0–1.3)
MONOCYTES NFR BLD AUTO: 7 %
NEUTROPHILS # BLD AUTO: 3.3 10E3/UL (ref 1.6–8.3)
NEUTROPHILS NFR BLD AUTO: 49 %
PLATELET # BLD AUTO: 282 10E3/UL (ref 150–450)
PROT SERPL-MCNC: 7.1 G/DL (ref 6.8–8.8)
RBC # BLD AUTO: 4.84 10E6/UL (ref 3.8–5.2)
WBC # BLD AUTO: 6.8 10E3/UL (ref 4–11)

## 2022-11-10 PROCEDURE — 85025 COMPLETE CBC W/AUTO DIFF WBC: CPT

## 2022-11-10 PROCEDURE — 80076 HEPATIC FUNCTION PANEL: CPT

## 2022-11-10 PROCEDURE — 82565 ASSAY OF CREATININE: CPT

## 2022-11-10 PROCEDURE — 36415 COLL VENOUS BLD VENIPUNCTURE: CPT

## 2022-11-10 PROCEDURE — 86140 C-REACTIVE PROTEIN: CPT

## 2022-11-10 PROCEDURE — 85652 RBC SED RATE AUTOMATED: CPT

## 2022-11-15 ENCOUNTER — VIRTUAL VISIT (OUTPATIENT)
Dept: RHEUMATOLOGY | Facility: CLINIC | Age: 57
End: 2022-11-15
Payer: COMMERCIAL

## 2022-11-15 DIAGNOSIS — M65.341 TRIGGER RING FINGER OF RIGHT HAND: ICD-10-CM

## 2022-11-15 DIAGNOSIS — M06.09 RHEUMATOID ARTHRITIS OF MULTIPLE SITES WITH NEGATIVE RHEUMATOID FACTOR (H): Primary | ICD-10-CM

## 2022-11-15 DIAGNOSIS — M65.331 TRIGGER MIDDLE FINGER OF RIGHT HAND: ICD-10-CM

## 2022-11-15 DIAGNOSIS — Z79.899 HIGH RISK MEDICATIONS (NOT ANTICOAGULANTS) LONG-TERM USE: ICD-10-CM

## 2022-11-15 PROCEDURE — 99214 OFFICE O/P EST MOD 30 MIN: CPT | Mod: GT | Performed by: INTERNAL MEDICINE

## 2022-11-15 RX ORDER — SULFASALAZINE 500 MG/1
1000 TABLET ORAL 2 TIMES DAILY
Qty: 360 TABLET | Refills: 2 | Status: SHIPPED | OUTPATIENT
Start: 2022-11-15 | End: 2023-05-26

## 2022-11-15 NOTE — PATIENT INSTRUCTIONS
RHEUMATOLOGY    Dr. Bill Resendiz    [facility]  [address] [place] [address]  [place] [address]  Phone number: [phone]  Fax number: [phone]    You may schedule your FLU shot by calling [phone] or if you would like to get your shot at a [place] pharmacy you may schedule online at [email or web address].    Thank you for choosing [facility]!    Jolynn Angulo CMA Rheumatology

## 2022-11-15 NOTE — PROGRESS NOTES
Bria Haddad  is a 57 year old year old who is being evaluated via a billable video visit.      How would you like to obtain your AVS? MyChart  If the video visit is dropped, the invitation should be resent by: Text to cell phone: [phone]   Will anyone else be joining your video visit? No     Rheumatology Clinic Visit      Bria Haddad MRN# [id number]   YOB: 1965 Age: 57 year old      Date of visit: 11/15/22  PCP: Dr. Holli Marley    Chief Complaint   Patient presents with:  Rheumatoid Arthritis: Issues with right hand she will discuss with you    Assessment and Plan     1.  Rheumatoid arthritis: Ms. Haddad initially presented to this clinic in 2015 with dependent edema of the bilateral lower extremities, fatigue, and a one time episode of right toe pain in the setting of a positive ROSEMARY.  On 9/18/2017 she presented with synovitis of her bilateral PIPs and pain without swelling of her MCPs, persistent fatigue, intermittent rash, and dependent edema. ROSEMARY positive but additional studies negative/normal including complement C3, complement C4, beta-2 glycoprotein IgM and IgG, cardiolipin IgM and IgG, lupus anticoagulant, RNP, Stuart, SSA, SSB, Scl-70.  Joint exam most consistent with rheumatoid arthritis.  Previously on HCQ (bloating), MTX (LFT elevation, alopecia, headache), leflunomide (LFT elevation). Currently on SSZ and Humira.  RA controlled at this time.   Chronic illness, stable.    - Continue sulfasalazine 1000mg twice daily  - Continue Humira 40mg SQ every 14 days  - Labs  in 3 months: CBC, Creatinine, Hepatic Panel, ESR, CRP    High risk medication requiring intensive toxicity monitoring at least quarterly: labs ordered include CBC, Creatinine, Hepatic panel to monitor for cytopenia and hepatotoxicity; checking creatinine as it affects clearance of medication.      2.  Bilateral patellofemoral syndrome: Resolves with physical therapy exercises, typically recurring when she stops doing  the exercises on her own at home.    3. History of right third digit and later right fourth digit trigger finger, now with recurrence: Resolved with steroid injections previously but has become more symptomatic recently.  Advised hand surgeon evaluation and she is in agreement.  - Hand surgery referral     4. History of Left shoulder pain, impingement syndrome: Resolved with combination of steroid injection and physical therapy.  Not an issue today.      5.  Vaccinations: Vaccinations reviewed with Ms. Haddad.  - Influenza: encouraged yearly vaccination   - COVID-19: Advised updating, and to hold sulfasalazine for 1-2 weeks afterward    Total minutes spent in evaluation with patient, documentation, , and review of pertinent studies and chart notes: 16      Ms. Haddad verbalized agreement with and understanding of the rational for the diagnosis and treatment plan.  All questions were answered to best of my ability and the patient's satisfaction. Ms. Haddad was advised to contact the clinic with any questions that may arise after the clinic visit.      Thank you for involving me in the care of the patient    Return to clinic: 3 mo      HPI   Bria Haddad is a 57 year old female with a past medical history significant for endometriosis, s/p carpal tunnel release surgery who present for follow up of rheumatoid arthritis.    Today, 11/15/2022: Difficulty making a fist with the right hand because of stiffness at the right third and fourth fingers, still with some catching of the fingers.  This difficulty with making a fist due to these finger symptoms is not better or worse with activity and not different in the morning versus the evening.  Other joints are doing well.  Tolerating Humira and sulfasalazine well.    Tobacco: none  EtOH: none  Drugs: none    ROS   12 point review of system was completed and negative except as noted in the HPI     Active Problem List     Patient Active Problem List   Diagnosis      CARDIOVASCULAR SCREENING; LDL GOAL LESS THAN 160     Endometriosis of uterus     Inflammatory polyarthropathy (H)     Morbid obesity (H)     Chronic pain of both knees     Past Medical History     Past Medical History:   Diagnosis Date     Arthritis      Bilateral carpal tunnel syndrome 12/20/2015     NO ACTIVE PROBLEMS      Obesity      Polyarthritis     inflammatory poly arthritis.     S/P carpal tunnel release 12/29/2016     Past Surgical History     Past Surgical History:   Procedure Laterality Date     ABDOMEN SURGERY  1992    Laparoscopy     bunions       COLONOSCOPY N/A 11/9/2015    Procedure: COLONOSCOPY;  Surgeon: Andrew Adamson MD;  Location: [facility] OR     COLONOSCOPY WITH CO2 INSUFFLATION N/A 11/9/2015    Procedure: COLONOSCOPY WITH CO2 INSUFFLATION;  Surgeon: Andrew Adamson MD;  Location: [facility] OR     GYN SURGERY      endometryosis     RELEASE CARPAL TUNNEL Right 10/12/2016    Procedure: RELEASE CARPAL TUNNEL;  Surgeon: Colby Nobles MD;  Location: [facility] OR     RELEASE CARPAL TUNNEL Left 12/16/2016    Procedure: RELEASE CARPAL TUNNEL;  Surgeon: Colby Nobles MD;  Location: [facility] OR     TONSILLECTOMY & ADENOIDECTOMY       Allergy     Allergies   Allergen Reactions     Asa [Aspirin] Hives     Current Medication List     Current Outpatient Medications   Medication Sig     adalimumab (HUMIRA *CF*) 40 MG/0.4ML pen kit Inject 0.4 mLs (40 mg) Subcutaneous every 14 days     sulfaSALAzine (AZULFIDINE) 500 MG tablet Take 2 tablets (1,000 mg) by mouth 2 times daily     order for DME Equipment being ordered: compression stockings 1 pair, at 30mm Hg, to be worn during the day. For Bilateral leg edema [R60.0]     No current facility-administered medications for this visit.         Social History   See HPI    Family History     Family History   Problem Relation Age of Onset     Osteoporosis Mother      Respiratory Mother      Thyroid Disease Mother      Anxiety Disorder Mother      Asthma Mother   "    Heart Disease Father      Cancer - colorectal Maternal Grandmother      Colon Cancer Maternal Grandmother      Diabetes Maternal Grandfather      Heart Disease Maternal Grandfather      Cancer Paternal Grandmother      Heart Disease Paternal Grandfather      Respiratory Paternal Grandfather      Hypertension Brother      Thyroid Disease Sister      Thyroid Disease Sister      Neurologic Disorder Brother      Hypertension Brother      Thyroid Disease Brother      Other Cancer Sister      Anxiety Disorder Sister      Mental Illness Sister      Anesthesia Reaction Sister      Thyroid Disease Sister      Physical Exam     Temp Readings from Last 3 Encounters:   05/05/22 98.2  F (36.8  C) (Tympanic)   11/17/21 97.6  F (36.4  C) (Tympanic)   10/23/20 98  F (36.7  C) (Oral)     BP Readings from Last 5 Encounters:   08/11/22 130/84   05/05/22 123/79   11/17/21 (!) 145/88   09/23/21 (!) 154/90   10/23/20 (!) 158/86     Pulse Readings from Last 1 Encounters:   08/11/22 90     Resp Readings from Last 1 Encounters:   11/17/21 18     Estimated body mass index is 39.31 kg/m  as calculated from the following:    Height as of 9/23/21: 1.626 m (5' 4\").    Weight as of 8/11/22: 103.9 kg (229 lb).      GEN: NAD.   HEENT:.  Anicteric, noninjected sclera. No obvious external lesions of the ear and nose. Hearing intact.  PULM: No increased work of breathing  MSK:  Hands and wrists without swelling.   SKIN: No rash or jaundice seen  PSYCH: Alert. Appropriate.         Labs / Imaging (select studies)     RF/CCP  Recent Labs   Lab Test 09/18/17  0913   CCPIGG 1   RHF <20     CBC  Recent Labs   Lab Test 11/10/22  0658 08/11/22  1443 04/12/22  1120 10/02/21  1656 07/05/21  1519 04/06/21  0716 12/22/20  1754   WBC 6.8 7.2 6.3   < > 6.3 7.2 8.4   RBC 4.84 4.92 4.75   < > 4.89 4.89 4.83   HGB 14.5 14.8 14.1   < > 14.2 14.4 14.3   HCT 44.5 44.1 43.2   < > 43.6 43.5 43.3   MCV 92 90 91   < > 89 89 90   RDW 13.3 13.3 12.9   < > 13.1 12.8 12.8 "    326 311   < > 363 302 317   MCH 30.0 30.1 29.7   < > 29.0 29.4 29.6   MCHC 32.6 33.6 32.6   < > 32.6 33.1 33.0   NEUTROPHIL 49 47 46   < > 43.3 45.3 38.0   LYMPH 40 40 39   < > 41.1 43.0 48.6   MONOCYTE 7 10 10   < > 12.4 9.9 11.3   EOSINOPHIL 4 3 4   < > 2.9 1.5 2.0   BASOPHIL 0 0 1   < > 0.3 0.3 0.1   ANEU  --   --   --   --  2.7 3.3 3.2   ALYM  --   --   --   --  2.6 3.1 4.1   MIKE  --   --   --   --  0.8 0.7 1.0   AEOS  --   --   --   --  0.2 0.1 0.2   ABAS  --   --   --   --  0.0 0.0 0.0   ANEUTAUTO 3.3 3.4 2.9   < >  --   --   --    ALYMPAUTO 2.7 2.8 2.5   < >  --   --   --    AMONOAUTO 0.5 0.7 0.6   < >  --   --   --    AEOSAUTO 0.2 0.2 0.3   < >  --   --   --    ABSBASO 0.0 0.0 0.0   < >  --   --   --     < > = values in this interval not displayed.     CMP  Recent Labs   Lab Test 11/10/22  0658 08/11/22  1443 04/12/22  1120 10/02/21  1656 07/05/21  1519 04/06/21  0716 12/22/20  1754 09/23/20  0749 12/31/19  0712 05/08/19  0745 12/04/17  0805 09/18/17  0913   NA  --   --   --   --   --   --   --  141  --  142  --  141   POTASSIUM  --   --   --   --   --   --   --  4.1  --  4.0  --  4.0   CHLORIDE  --   --   --   --   --   --   --  109  --  109  --  107   CO2  --   --   --   --   --   --   --  29  --  30  --  29   ANIONGAP  --   --   --   --   --   --   --  3  --  3  --  5   GLC  --   --   --   --   --   --   --  104*  --  99  --  96   BUN  --   --   --   --   --   --   --  16  --  14  --  14   CR 0.74 0.85 0.78   < > 0.78 0.86 0.87 0.77   < > 0.74   < > 0.76   GFRESTIMATED >90 78 88   < > 84 75 74 85   < > >90   < > 79   GFRESTBLACK  --   --   --   --  >90 86 85 >90   < > >90   < > >90   AGUILAR  --   --   --   --   --   --   --  9.4  --  9.3  --  8.8   BILITOTAL 0.4 0.4 0.3   < > 0.2 0.4 0.2  --    < > 0.3   < > 0.4   ALBUMIN 3.9 3.9 3.9   < > 3.9 3.8 4.3  --    < > 4.0   < > 3.7   PROTTOTAL 7.1 7.4 7.4   < > 7.5 7.6 7.8  --    < > 7.5   < > 7.4   ALKPHOS 73 79 66   < > 76 76 72  --    < > 73   < > 71    AST 13 19 16   < > 17 13 17  --    < > 15   < > 16   ALT 27 31 25   < > 32 26 27  --    < > 31   < > 23    < > = values in this interval not displayed.     Calcium/VitaminD  Recent Labs   Lab Test 09/23/20  0749 05/08/19  0745 09/18/17  0913   AGUILAR 9.4 9.3 8.8     ESR/CRP  Recent Labs   Lab Test 11/10/22  0658 08/11/22  1443 04/12/22  1120   SED 5 7 6   CRP <2.9 <2.9 <2.9     Hepatitis B  Recent Labs   Lab Test 10/12/15  0923   AUSAB 0.18   HBCAB Nonreactive   HEPBANG Nonreactive     Hepatitis C  Recent Labs   Lab Test 10/07/16  0912   HCVAB Nonreactive   Assay performance characteristics have not been established for newborns,   infants, and children       Tuberculosis Screening  Recent Labs   Lab Test 01/18/22  0701 12/07/18  0724   TBRES Negative Negative     HIV Screening  Recent Labs   Lab Test 10/12/15  0923   HIAGAB Nonreactive   HIV-1 p24 Ag & HIV-1/HIV-2 Ab Not Detected              Chart documentation done in part with Dragon Voice recognition Software. Although reviewed after completion, some word and grammatical error may remain.      Video-Visit Details    Type of service:  Video Visit    Video Start Time: 11:48 AM    Video End Time:11:58 AM    Originating Location (pt. Location): Home, [place]    Distant Location (provider location):  off-site; [place]    Platform used for Video Visit: Celestine Resendiz MD

## 2022-11-21 ENCOUNTER — HEALTH MAINTENANCE LETTER (OUTPATIENT)
Age: 57
End: 2022-11-21

## 2023-01-18 ENCOUNTER — OFFICE VISIT (OUTPATIENT)
Dept: ORTHOPEDICS | Facility: CLINIC | Age: 58
End: 2023-01-18
Attending: INTERNAL MEDICINE
Payer: COMMERCIAL

## 2023-01-18 ENCOUNTER — TELEPHONE (OUTPATIENT)
Dept: SURGERY | Facility: CLINIC | Age: 58
End: 2023-01-18

## 2023-01-18 VITALS
DIASTOLIC BLOOD PRESSURE: 73 MMHG | WEIGHT: 226.4 LBS | BODY MASS INDEX: 37.72 KG/M2 | HEIGHT: 65 IN | SYSTOLIC BLOOD PRESSURE: 128 MMHG | HEART RATE: 89 BPM

## 2023-01-18 DIAGNOSIS — M65.341 TRIGGER FINGER, RIGHT RING FINGER: Primary | ICD-10-CM

## 2023-01-18 PROCEDURE — 99203 OFFICE O/P NEW LOW 30 MIN: CPT | Performed by: ORTHOPAEDIC SURGERY

## 2023-01-18 ASSESSMENT — PAIN SCALES - GENERAL: PAINLEVEL: MILD PAIN (2)

## 2023-01-18 NOTE — PROGRESS NOTES
CHIEF COMPLAINT:   Chief Complaint   Patient presents with     Right Middle Finger - Pain     3rd and 4th digit trigger finger. Last injection with Dr. Resendiz: 4th digit only 5/5/22. Onset: a couple of years. Patient notes the injections are not working anymore. Patient notes she is not able to fully bend her fingers, she has trouble writing. She will get random locking and snapping of the fingers.      Bria Haddad is seen today in the [facility] for evaluation of right hand finger pain at the request of Dr. Bill Resendiz       HISTORY:  Bria Haddad is a 57 year old female , Right -hand dominant who is seen in consultation at the request of Holli Marley,for Right hand pain and catching for a couple of years. Mainly the right finger.  The symptoms have been episodic, and previously helped by injections, right ring finger 5/5/2022 with Dr Resendiz. She states injections aren't helping anymore. She's unable to fully bend her finger. She states the finger hasn't locked for about a week now.    She has numbness and tingling in none of the digits.  History of bilateral carpal tunnel release surgery.     Suspected cause: Due to unknown factors.    Pain severity: 2/10  Frequency of symptoms: are constant.  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    She has no past medical history of Cancer (H), Cerebral infarction (H), Congestive heart failure (H), COPD (chronic obstructive pulmonary disease) (H), Depressive disorder, Diabetes (H), Heart disease, History of blood transfusion, Hypertension, Thyroid disease, or Uncomplicated asthma.  Patient Active Problem List   Diagnosis     CARDIOVASCULAR SCREENING; LDL GOAL LESS THAN 160     Endometriosis of uterus     Inflammatory  polyarthropathy (H)     Morbid obesity (H)     Chronic pain of both knees       Surgical Hx:  has a past surgical history that includes GYN surgery; tonsillectomy & adenoidectomy; bunions; Colonoscopy (N/A, 11/9/2015); Colonoscopy with CO2 insufflation (N/A, 11/9/2015); Release carpal tunnel (Right, 10/12/2016); Release carpal tunnel (Left, 12/16/2016); and Abdomen surgery (1992).    Medications:   Current Outpatient Medications:      adalimumab (HUMIRA *CF*) 40 MG/0.4ML pen kit, Inject 0.4 mLs (40 mg) Subcutaneous every 14 days, Disp: 2.4 mL, Rfl: 2     order for DME, Equipment being ordered: compression stockings 1 pair, at 30mm Hg, to be worn during the day. For Bilateral leg edema [R60.0], Disp: 2 Units, Rfl: 0     sulfaSALAzine (AZULFIDINE) 500 MG tablet, Take 2 tablets (1,000 mg) by mouth 2 times daily, Disp: 360 tablet, Rfl: 2    Allergies:   Allergies   Allergen Reactions     Asa [Aspirin] Hives       Social Hx: .   reports that she has never smoked. She has never used smokeless tobacco. She reports that she does not drink alcohol and does not use drugs.    Family Hx: family history includes Anesthesia Reaction in her sister; Anxiety Disorder in her mother and sister; Asthma in her mother; Cancer in her paternal grandmother; Cancer - colorectal in her maternal grandmother; Colon Cancer in her maternal grandmother; Diabetes in her maternal grandfather; Heart Disease in her father, maternal grandfather, and paternal grandfather; Hypertension in her brother and brother; Mental Illness in her sister; Neurologic Disorder in her brother; Osteoporosis in her mother; Other Cancer in her sister; Respiratory in her mother and paternal grandfather; Thyroid Disease in her brother, mother, sister, sister, and sister..     REVIEW OF SYSTEMS: 10 point ROS neg other than the symptoms noted above in the HPI and PMH. Notables include  CONSTITUTIONAL:NEGATIVE for fever, chills, change in  "weight  INTEGUMENTARY/SKIN: NEGATIVE for worrisome rashes, moles or lesions  MUSCULOSKELETAL:See HPI above  Neurology: see HPI above.      EXAM:  /73   Pulse 89   Ht 1.651 m (5' 5\")   Wt 102.7 kg (226 lb 6.4 oz)   BMI 37.67 kg/m      GENERAL APPEARANCE: healthy, alert and no distress   GAIT: NORMAL  SKIN: no suspicious lesions or rashes  RESPIRATORY: No increased work of breathing.  NEURO:  strength: decreased, thenar fasiculations:negative; Thenar atrophy: negative.. Sensation intact in all digits, reflexes normal in upper extremities.   PSYCH:  mentation appears normal and affect normal, not anxious.    MUSCULOSKELETAL:    RIGHT HAND/FINGERS:   Skin intact. Normal wear pattern, color and tone.   volar incisional carpal tunnel release scar.  No clubbing or nail pitting.  Range of Motion: decreased flexion of the ring finger at least 25%  Special tests wrist:    Finkelstein's test: negative    Good capillary refill to all fingers. 2+ radial pulse.    Triggering noted: none  Tenderness over A1 pulley of the ring finger with decreased flexion, pain. Unable to get quite full extension either.  Slight tender to palpation over the A1 pulley of the long and small fingers without triggering.    No observable or palpable masses of the fingers or palm or wrist.  No observable or palpable cords or nodules of the fingers or palm.    1st carpometacarpal grind: negative    Intact EPL, FPL, FDP, EDC, wrist flexion/extension, biceps/triceps  Intact sensation to light touch in median, radial and ulnar nerve distributions.      LEFT HAND/FINGERS:   Skin intact. Normal wear pattern, color and tone.   No clubbing or nail pitting.  Range of Motion All Normal    Good capillary refill to all fingers. 2+ radial pulse.    Triggering noted: none  Tenderness over A1 pulley of none of the finger.    No observable or palpable masses of the fingers or palm or wrist.  No observable or palpable cords or nodules of the fingers or " palm.    1st carpometacarpal grind: negative    Intact EPL, FPL, FDP, EDC, wrist flexion/extension, biceps/triceps  Intact sensation to light touch in median, radial and ulnar nerve distributions.      XRAYS: none indicated.      ASSESSMENT: 58yo RHD female with right ring trigger finger.        PLAN:     We talked about the options: taping the PIP, splinting the PIP joint, corticosteroid injections, hand therapy and surgery. I explained the risks and benefits of each, including recurrence. I have explained the nature of the surgical procedure, the risks and recovery time with the patient.  * did discuss there is notable stiffness in the finger, a result of pain, triggering, that even with surgery, will likely be persistent for a while as the pain, inflammation improve, but also that the finger has been limited for a couple years and the joints have stiffened up, and will need therapy and exercises to get range of motion back. Just releasing the triggering will not likely give immediate full range of motion or pain relief given duration of symptoms, contracture, etc.  * At this time, patient would like to proceed with: surgical release  * plan right ring finger trigger finger release, outpatient, MAC with local  * H+P per primary care provider.  * return to clinic 2 weeks postoperative for wound check, suture removal.  * Return to clinic as needed.    Danie Urban M.D., M.S.  Dept. of Orthopaedic Surgery  [facility]

## 2023-01-18 NOTE — LETTER
1/18/2023         RE: Bria Haddad  [address]  [facility] [address]        Dear Colleague,    Thank you for referring your patient, Bria Haddad, to the [facility]. Please see a copy of my visit note below.    CHIEF COMPLAINT:   Chief Complaint   Patient presents with     Right Middle Finger - Pain     3rd and 4th digit trigger finger. Last injection with Dr. Resendiz: 4th digit only 5/5/22. Onset: a couple of years. Patient notes the injections are not working anymore. Patient notes she is not able to fully bend her fingers, she has trouble writing. She will get random locking and snapping of the fingers.      Bria Haddad is seen today in the [facility] for evaluation of right hand finger pain at the request of Dr. Bill Resendiz       HISTORY:  Bria Haddad is a 57 year old female , Right -hand dominant who is seen in consultation at the request of Holli Marley,for Right hand pain and catching for a couple of years. Mainly the right finger.  The symptoms have been episodic, and previously helped by injections, right ring finger 5/5/2022 with Dr Resendiz. She states injections aren't helping anymore. She's unable to fully bend her finger. She states the finger hasn't locked for about a week now.    She has numbness and tingling in none of the digits.  History of bilateral carpal tunnel release surgery.     Suspected cause: Due to unknown factors.    Pain severity: 2/10  Frequency of symptoms: are constant.  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    She has no past medical history of Cancer (H), Cerebral infarction (H), Congestive heart failure (H), COPD (chronic obstructive pulmonary disease) (H), Depressive disorder, Diabetes (H), Heart  disease, History of blood transfusion, Hypertension, Thyroid disease, or Uncomplicated asthma.  Patient Active Problem List   Diagnosis     CARDIOVASCULAR SCREENING; LDL GOAL LESS THAN 160     Endometriosis of uterus     Inflammatory polyarthropathy (H)     Morbid obesity (H)     Chronic pain of both knees       Surgical Hx:  has a past surgical history that includes GYN surgery; tonsillectomy & adenoidectomy; bunions; Colonoscopy (N/A, 11/9/2015); Colonoscopy with CO2 insufflation (N/A, 11/9/2015); Release carpal tunnel (Right, 10/12/2016); Release carpal tunnel (Left, 12/16/2016); and Abdomen surgery (1992).    Medications:   Current Outpatient Medications:      adalimumab (HUMIRA *CF*) 40 MG/0.4ML pen kit, Inject 0.4 mLs (40 mg) Subcutaneous every 14 days, Disp: 2.4 mL, Rfl: 2     order for DME, Equipment being ordered: compression stockings 1 pair, at 30mm Hg, to be worn during the day. For Bilateral leg edema [R60.0], Disp: 2 Units, Rfl: 0     sulfaSALAzine (AZULFIDINE) 500 MG tablet, Take 2 tablets (1,000 mg) by mouth 2 times daily, Disp: 360 tablet, Rfl: 2    Allergies:   Allergies   Allergen Reactions     Asa [Aspirin] Hives       Social Hx: .   reports that she has never smoked. She has never used smokeless tobacco. She reports that she does not drink alcohol and does not use drugs.    Family Hx: family history includes Anesthesia Reaction in her sister; Anxiety Disorder in her mother and sister; Asthma in her mother; Cancer in her paternal grandmother; Cancer - colorectal in her maternal grandmother; Colon Cancer in her maternal grandmother; Diabetes in her maternal grandfather; Heart Disease in her father, maternal grandfather, and paternal grandfather; Hypertension in her brother and brother; Mental Illness in her sister; Neurologic Disorder in her brother; Osteoporosis in her mother; Other Cancer in her sister; Respiratory in her mother and paternal grandfather; Thyroid Disease in  "her brother, mother, sister, sister, and sister..     REVIEW OF SYSTEMS: 10 point ROS neg other than the symptoms noted above in the HPI and PMH. Notables include  CONSTITUTIONAL:NEGATIVE for fever, chills, change in weight  INTEGUMENTARY/SKIN: NEGATIVE for worrisome rashes, moles or lesions  MUSCULOSKELETAL:See HPI above  Neurology: see HPI above.      EXAM:  /73   Pulse 89   Ht 1.651 m (5' 5\")   Wt 102.7 kg (226 lb 6.4 oz)   BMI 37.67 kg/m      GENERAL APPEARANCE: healthy, alert and no distress   GAIT: NORMAL  SKIN: no suspicious lesions or rashes  RESPIRATORY: No increased work of breathing.  NEURO:  strength: decreased, thenar fasiculations:negative; Thenar atrophy: negative.. Sensation intact in all digits, reflexes normal in upper extremities.   PSYCH:  mentation appears normal and affect normal, not anxious.    MUSCULOSKELETAL:    RIGHT HAND/FINGERS:   Skin intact. Normal wear pattern, color and tone.   volar incisional carpal tunnel release scar.  No clubbing or nail pitting.  Range of Motion: decreased flexion of the ring finger at least 25%  Special tests wrist:    Finkelstein's test: negative    Good capillary refill to all fingers. 2+ radial pulse.    Triggering noted: none  Tenderness over A1 pulley of the ring finger with decreased flexion, pain. Unable to get quite full extension either.  Slight tender to palpation over the A1 pulley of the long and small fingers without triggering.    No observable or palpable masses of the fingers or palm or wrist.  No observable or palpable cords or nodules of the fingers or palm.    1st carpometacarpal grind: negative    Intact EPL, FPL, FDP, EDC, wrist flexion/extension, biceps/triceps  Intact sensation to light touch in median, radial and ulnar nerve distributions.      LEFT HAND/FINGERS:   Skin intact. Normal wear pattern, color and tone.   No clubbing or nail pitting.  Range of Motion All Normal    Good capillary refill to all fingers. 2+ radial " pulse.    Triggering noted: none  Tenderness over A1 pulley of none of the finger.    No observable or palpable masses of the fingers or palm or wrist.  No observable or palpable cords or nodules of the fingers or palm.    1st carpometacarpal grind: negative    Intact EPL, FPL, FDP, EDC, wrist flexion/extension, biceps/triceps  Intact sensation to light touch in median, radial and ulnar nerve distributions.      XRAYS: none indicated.      ASSESSMENT: 56yo RHD female with right ring trigger finger.        PLAN:     We talked about the options: taping the PIP, splinting the PIP joint, corticosteroid injections, hand therapy and surgery. I explained the risks and benefits of each, including recurrence. I have explained the nature of the surgical procedure, the risks and recovery time with the patient.  * did discuss there is notable stiffness in the finger, a result of pain, triggering, that even with surgery, will likely be persistent for a while as the pain, inflammation improve, but also that the finger has been limited for a couple years and the joints have stiffened up, and will need therapy and exercises to get range of motion back. Just releasing the triggering will not likely give immediate full range of motion or pain relief given duration of symptoms, contracture, etc.  * At this time, patient would like to proceed with: surgical release  * plan right ring finger trigger finger release, outpatient, MAC with local  * H+P per primary care provider.  * return to clinic 2 weeks postoperative for wound check, suture removal.  * Return to clinic as needed.    Danie Urban M.D., M.S.  Dept. of Orthopaedic Surgery  [facility]        Again, thank you for allowing me to participate in the care of your patient.        Sincerely,        Danie Urban MD

## 2023-01-18 NOTE — TELEPHONE ENCOUNTER
Type of surgery: RELEASE, TRIGGER FINGER right ring finger (Right)   CPT 19988   Trigger finger, right ring finger M65.341    Location of surgery: [facility]  Date and time of surgery: 02/09/2023  Surgeon: NEHA   Pre-Op Appt Date: 02/06/2023  Post-Op Appt Date: 02/23/2023   Packet sent out: Yes  Pre-cert/Authorization completed: Per [facility] portal, Notification/Prior Authorization not required if procedure performed in [facility] or Office; otherwise may be required for this service    Date: 1-19-23    Teresa Santillan  Financial Securing/Prior Auth Dept  [phone]

## 2023-02-06 ENCOUNTER — OFFICE VISIT (OUTPATIENT)
Dept: FAMILY MEDICINE | Facility: CLINIC | Age: 58
End: 2023-02-06
Payer: COMMERCIAL

## 2023-02-06 VITALS
TEMPERATURE: 98.1 F | DIASTOLIC BLOOD PRESSURE: 84 MMHG | RESPIRATION RATE: 16 BRPM | WEIGHT: 229 LBS | SYSTOLIC BLOOD PRESSURE: 138 MMHG | HEIGHT: 65 IN | OXYGEN SATURATION: 98 % | BODY MASS INDEX: 38.15 KG/M2 | HEART RATE: 73 BPM

## 2023-02-06 DIAGNOSIS — M06.09 RHEUMATOID ARTHRITIS OF MULTIPLE SITES WITH NEGATIVE RHEUMATOID FACTOR (H): ICD-10-CM

## 2023-02-06 DIAGNOSIS — M65.341 TRIGGER FINGER, RIGHT RING FINGER: ICD-10-CM

## 2023-02-06 DIAGNOSIS — Z01.818 PREOPERATIVE EXAMINATION: Primary | ICD-10-CM

## 2023-02-06 DIAGNOSIS — Z79.899 HIGH RISK MEDICATIONS (NOT ANTICOAGULANTS) LONG-TERM USE: ICD-10-CM

## 2023-02-06 LAB
ALBUMIN SERPL-MCNC: 3.9 G/DL (ref 3.4–5)
ALP SERPL-CCNC: 70 U/L (ref 40–150)
ALT SERPL W P-5'-P-CCNC: 28 U/L (ref 0–50)
ANION GAP SERPL CALCULATED.3IONS-SCNC: 4 MMOL/L (ref 3–14)
AST SERPL W P-5'-P-CCNC: 14 U/L (ref 0–45)
BASOPHILS # BLD AUTO: 0 10E3/UL (ref 0–0.2)
BASOPHILS NFR BLD AUTO: 1 %
BILIRUB DIRECT SERPL-MCNC: <0.1 MG/DL (ref 0–0.2)
BILIRUB SERPL-MCNC: 0.5 MG/DL (ref 0.2–1.3)
BUN SERPL-MCNC: 16 MG/DL (ref 7–30)
CALCIUM SERPL-MCNC: 9.6 MG/DL (ref 8.5–10.1)
CHLORIDE BLD-SCNC: 110 MMOL/L (ref 94–109)
CO2 SERPL-SCNC: 30 MMOL/L (ref 20–32)
CREAT SERPL-MCNC: 0.72 MG/DL (ref 0.52–1.04)
CRP SERPL-MCNC: 4.8 MG/L (ref 0–8)
EOSINOPHIL # BLD AUTO: 0.3 10E3/UL (ref 0–0.7)
EOSINOPHIL NFR BLD AUTO: 4 %
ERYTHROCYTE [DISTWIDTH] IN BLOOD BY AUTOMATED COUNT: 13.2 % (ref 10–15)
ERYTHROCYTE [SEDIMENTATION RATE] IN BLOOD BY WESTERGREN METHOD: 8 MM/HR (ref 0–30)
GFR SERPL CREATININE-BSD FRML MDRD: >90 ML/MIN/1.73M2
GLUCOSE BLD-MCNC: 107 MG/DL (ref 70–99)
HCT VFR BLD AUTO: 42.5 % (ref 35–47)
HGB BLD-MCNC: 14.3 G/DL (ref 11.7–15.7)
LYMPHOCYTES # BLD AUTO: 2.2 10E3/UL (ref 0.8–5.3)
LYMPHOCYTES NFR BLD AUTO: 34 %
MCH RBC QN AUTO: 30.4 PG (ref 26.5–33)
MCHC RBC AUTO-ENTMCNC: 33.6 G/DL (ref 31.5–36.5)
MCV RBC AUTO: 90 FL (ref 78–100)
MONOCYTES # BLD AUTO: 0.6 10E3/UL (ref 0–1.3)
MONOCYTES NFR BLD AUTO: 10 %
NEUTROPHILS # BLD AUTO: 3.3 10E3/UL (ref 1.6–8.3)
NEUTROPHILS NFR BLD AUTO: 52 %
PLATELET # BLD AUTO: 304 10E3/UL (ref 150–450)
POTASSIUM BLD-SCNC: 3.8 MMOL/L (ref 3.4–5.3)
PROT SERPL-MCNC: 7.2 G/DL (ref 6.8–8.8)
RBC # BLD AUTO: 4.7 10E6/UL (ref 3.8–5.2)
SODIUM SERPL-SCNC: 144 MMOL/L (ref 133–144)
WBC # BLD AUTO: 6.5 10E3/UL (ref 4–11)

## 2023-02-06 PROCEDURE — 80053 COMPREHEN METABOLIC PANEL: CPT | Performed by: PHYSICIAN ASSISTANT

## 2023-02-06 PROCEDURE — 85652 RBC SED RATE AUTOMATED: CPT | Performed by: PHYSICIAN ASSISTANT

## 2023-02-06 PROCEDURE — 93000 ELECTROCARDIOGRAM COMPLETE: CPT | Performed by: PHYSICIAN ASSISTANT

## 2023-02-06 PROCEDURE — 82248 BILIRUBIN DIRECT: CPT | Performed by: PHYSICIAN ASSISTANT

## 2023-02-06 PROCEDURE — 36415 COLL VENOUS BLD VENIPUNCTURE: CPT | Performed by: PHYSICIAN ASSISTANT

## 2023-02-06 PROCEDURE — 86140 C-REACTIVE PROTEIN: CPT | Performed by: PHYSICIAN ASSISTANT

## 2023-02-06 PROCEDURE — 85025 COMPLETE CBC W/AUTO DIFF WBC: CPT | Performed by: PHYSICIAN ASSISTANT

## 2023-02-06 PROCEDURE — 99214 OFFICE O/P EST MOD 30 MIN: CPT | Performed by: PHYSICIAN ASSISTANT

## 2023-02-06 ASSESSMENT — PAIN SCALES - GENERAL: PAINLEVEL: NO PAIN (0)

## 2023-02-06 NOTE — H&P (VIEW-ONLY)
[facility]  [address] [facility] [address]  Phone: [phone]  Primary Provider: Holli Marley  Pre-op Performing Provider: KYUNG MARTINEZ      PREOPERATIVE EVALUATION:  Today's date: 2/6/2023    Bria Haddad is a 57 year old female who presents for a preoperative evaluation.    Surgical Information:  Surgery/Procedure:RELEASE, TRIGGER FINGER right ring finger   Surgery Location: [place], [place]  Surgeon: Danie Urban MD  Surgery Date: 2/9/2023  Time of Surgery: 8:30 am  Where patient plans to recover: At home with family  Fax number for surgical facility: Note does not need to be faxed, will be available electronically in [facility]. [facility].     Type of Anesthesia Anticipated: General    Assessment & Plan     The proposed surgical procedure is considered LOW risk.    (Z01.818) Preoperative examination  (primary encounter diagnosis)  Comment: For preop examination.  Patient without any major complaints.  She tolerated anesthesia in the past for carpal tunnel release of the right arm.  Does not have trigger finger right fourth digit.   Plan: Hemoglobin, Basic metabolic panel  (Ca, Cl,         CO2, Creat, Gluc, K, Na, BUN), EKG 12-lead         complete w/read - Clinics            (M65.341) Trigger finger, right ring finger  Comment: Trigger finger right fourth digit.  Has undergone steroid injections without relief.      Possible Sleep Apnea:  No formal diagnosis. Does have sleep study pending.   No flowsheet data found.         Risks and Recommendations:  The patient has the following additional risks and recommendations for perioperative complications:  Infection:   On Humira    Medication Instructions:   - ibuprofen (Advil, Motrin): HOLD 7 day before surgery.     RECOMMENDATION:  APPROVAL GIVEN to proceed with proposed procedure pending review of diagnostic evaluation.    56}    Subjective     HPI related to upcoming procedure:     Trigger  finger for the last 2 years. Tried injections previously, however, progressive symptoms.     Preop Questions 2/6/2023   1. Have you ever had a heart attack or stroke? No   2. Have you ever had surgery on your heart or blood vessels, such as a stent placement, a coronary artery bypass, or surgery on an artery in your head, neck, heart, or legs? No   3. Do you have chest pain with activity? No   4. Do you have a history of  heart failure? No   5. Do you currently have a cold, bronchitis or symptoms of other infection? No   6. Do you have a cough, shortness of breath, or wheezing? No   7. Do you or anyone in your family have previous history of blood clots? YES - Grandmother with blood clots. No known history of factor V Leiden.    8. Do you or does anyone in your family have a serious bleeding problem such as prolonged bleeding following surgeries or cuts? No   9. Have you ever had problems with anemia or been told to take iron pills? No   10. Have you had any abnormal blood loss such as black, tarry or bloody stools, or abnormal vaginal bleeding? No   11. Have you ever had a blood transfusion? No   12. Are you willing to have a blood transfusion if it is medically needed before, during, or after your surgery? Yes   13. Have you or any of your relatives ever had problems with anesthesia? YES - Sister - allergic reaction   Patient with no known issues.    14. Do you have sleep apnea, excessive snoring or daytime drowsiness? UNKNOWN - no diagnosis of sleep apnea    15. Do you have any artifical heart valves or other implanted medical devices like a pacemaker, defibrillator, or continuous glucose monitor? No   16. Do you have artificial joints? No   17. Are you allergic to latex? No   18. Is there any chance that you may be pregnant? No       Health Care Directive:  Patient does not have a Health Care Directive or Living Will: Discussed advance care planning with patient; however, patient declined at this  time.    Preoperative Review of :   reviewed - no record of controlled substances prescribed.      Status of Chronic Conditions:  History of RA - on Humira (2/4/2023)     Review of Systems  CONSTITUTIONAL: NEGATIVE for fever, chills, change in weight  INTEGUMENTARY/SKIN: NEGATIVE for worrisome rashes, moles or lesions  EYES: NEGATIVE for vision changes or irritation  ENT/MOUTH: NEGATIVE for ear, mouth and throat problems  RESP: NEGATIVE for significant cough or SOB  CV: NEGATIVE for chest pain, palpitations or peripheral edema  GI: NEGATIVE for nausea, abdominal pain, heartburn, or change in bowel habits  : NEGATIVE for frequency, dysuria, or hematuria  MUSCULOSKELETAL: NEGATIVE for significant arthralgias or myalgia  NEURO: NEGATIVE for weakness, dizziness or paresthesias  ENDOCRINE: NEGATIVE for temperature intolerance, skin/hair changes  HEME: NEGATIVE for bleeding problems  PSYCHIATRIC: NEGATIVE for changes in mood or affect    Patient Active Problem List    Diagnosis Date Noted     Trigger finger, right ring finger 01/18/2023     Priority: Medium     Added automatically from request for surgery [id number]       Chronic pain of both knees 09/02/2022     Priority: Medium     Morbid obesity (H) 09/23/2021     Priority: Medium     Inflammatory polyarthropathy (H) 09/19/2017     Priority: Medium     Endometriosis of uterus 09/25/2012     Priority: Medium     CARDIOVASCULAR SCREENING; LDL GOAL LESS THAN 160 10/31/2010     Priority: Medium      Past Medical History:   Diagnosis Date     Arthritis      Bilateral carpal tunnel syndrome 12/20/2015     NO ACTIVE PROBLEMS      Obesity      Polyarthritis     inflammatory poly arthritis.     S/P carpal tunnel release 12/29/2016     Past Surgical History:   Procedure Laterality Date     ABDOMEN SURGERY  1992    Laparoscopy     bunions       COLONOSCOPY N/A 11/9/2015    Procedure: COLONOSCOPY;  Surgeon: Andrew Adamson MD;  Location: [facility]     COLONOSCOPY WITH CO2  INSUFFLATION N/A 11/9/2015    Procedure: COLONOSCOPY WITH CO2 INSUFFLATION;  Surgeon: Andrew Adamson MD;  Location: [facility]     GYN SURGERY      endometryosis     RELEASE CARPAL TUNNEL Right 10/12/2016    Procedure: RELEASE CARPAL TUNNEL;  Surgeon: Colby Nobles MD;  Location: [facility]     RELEASE CARPAL TUNNEL Left 12/16/2016    Procedure: RELEASE CARPAL TUNNEL;  Surgeon: Colby Nobles MD;  Location: [facility]     TONSILLECTOMY & ADENOIDECTOMY       Current Outpatient Medications   Medication Sig Dispense Refill     adalimumab (HUMIRA *CF*) 40 MG/0.4ML pen kit Inject 0.4 mLs (40 mg) Subcutaneous every 14 days 2.4 mL 2     order for DME Equipment being ordered: compression stockings 1 pair, at 30mm Hg, to be worn during the day. For Bilateral leg edema [R60.0] 2 Units 0     sulfaSALAzine (AZULFIDINE) 500 MG tablet Take 2 tablets (1,000 mg) by mouth 2 times daily 360 tablet 2       Allergies   Allergen Reactions     Asa [Aspirin] Hives        Social History     Tobacco Use     Smoking status: Never     Smokeless tobacco: Never   Substance Use Topics     Alcohol use: No     Alcohol/week: 0.0 standard drinks     Family History   Problem Relation Age of Onset     Osteoporosis Mother      Respiratory Mother      Thyroid Disease Mother      Anxiety Disorder Mother      Asthma Mother      Heart Disease Father      Cancer - colorectal Maternal Grandmother      Colon Cancer Maternal Grandmother      Diabetes Maternal Grandfather      Heart Disease Maternal Grandfather      Cancer Paternal Grandmother      Heart Disease Paternal Grandfather      Respiratory Paternal Grandfather      Hypertension Brother      Thyroid Disease Sister      Thyroid Disease Sister      Neurologic Disorder Brother      Hypertension Brother      Thyroid Disease Brother      Other Cancer Sister      Anxiety Disorder Sister      Mental Illness Sister      Anesthesia Reaction Sister      Thyroid Disease Sister      History   Drug Use No  "        Objective     BP (!) 160/71   Pulse 73   Temp 98.1  F (36.7  C) (Tympanic)   Resp 16   Ht 1.651 m (5' 5\")   Wt 103.9 kg (229 lb)   SpO2 98%   BMI 38.11 kg/m      Physical Exam    GENERAL APPEARANCE: alert, active and over weight     EYES: EOMI, PERRL     HENT: ear canals and TM's normal and nose and mouth without ulcers or lesions     NECK: no adenopathy, no asymmetry, masses, or scars and thyroid normal to palpation     RESP: lungs clear to auscultation - no rales, rhonchi or wheezes     CV: regular rates and rhythm, normal S1 S2, no S3 or S4 and no murmur, click or rub     ABDOMEN:  soft, nontender, no HSM or masses and bowel sounds normal     MS: extremities normal- no gross deformities noted, no evidence of inflammation in joints, Right 4th digit slightly flexed.      SKIN: no suspicious lesions or rashes     NEURO: Normal strength and tone, sensory exam grossly normal, mentation intact and speech normal     PSYCH: mentation appears normal. and affect normal/bright     LYMPHATICS: No cervical adenopathy    Recent Labs   Lab Test 11/10/22  0658 08/11/22  1443   HGB 14.5 14.8    326   CR 0.74 0.85      HGB 14.3    Diagnostics:  Labs pending at this time.  Results will be reviewed when available.   EKG: appears normal, NSR, unchanged from previous tracings    Revised Cardiac Risk Index (RCRI):  The patient has the following serious cardiovascular risks for perioperative complications:   - No serious cardiac risks = 0 points     RCRI Interpretation: 0 points: Class I (very low risk - 0.4% complication rate)           Signed Electronically by: Hood Brooks PA-C  Copy of this evaluation report is provided to requesting physician.      "

## 2023-02-06 NOTE — PROGRESS NOTES
[facility]  [address] [facility] [address]  Phone: [phone]  Primary Provider: Holli Marley  Pre-op Performing Provider: KYUNG MARTINEZ      PREOPERATIVE EVALUATION:  Today's date: 2/6/2023    Bria Haddad is a 57 year old female who presents for a preoperative evaluation.    Surgical Information:  Surgery/Procedure:RELEASE, TRIGGER FINGER right ring finger   Surgery Location: [place], [place]  Surgeon: Danie Urban MD  Surgery Date: 2/9/2023  Time of Surgery: 8:30 am  Where patient plans to recover: At home with family  Fax number for surgical facility: Note does not need to be faxed, will be available electronically in [facility]. [facility].     Type of Anesthesia Anticipated: General    Assessment & Plan     The proposed surgical procedure is considered LOW risk.    (Z01.818) Preoperative examination  (primary encounter diagnosis)  Comment: For preop examination.  Patient without any major complaints.  She tolerated anesthesia in the past for carpal tunnel release of the right arm.  Does not have trigger finger right fourth digit.   Plan: Hemoglobin, Basic metabolic panel  (Ca, Cl,         CO2, Creat, Gluc, K, Na, BUN), EKG 12-lead         complete w/read - Clinics            (M65.341) Trigger finger, right ring finger  Comment: Trigger finger right fourth digit.  Has undergone steroid injections without relief.      Possible Sleep Apnea:  No formal diagnosis. Does have sleep study pending.   No flowsheet data found.         Risks and Recommendations:  The patient has the following additional risks and recommendations for perioperative complications:  Infection:   On Humira    Medication Instructions:   - ibuprofen (Advil, Motrin): HOLD 7 day before surgery.     RECOMMENDATION:  APPROVAL GIVEN to proceed with proposed procedure pending review of diagnostic evaluation.    56}    Subjective     HPI related to upcoming procedure:     Trigger  finger for the last 2 years. Tried injections previously, however, progressive symptoms.     Preop Questions 2/6/2023   1. Have you ever had a heart attack or stroke? No   2. Have you ever had surgery on your heart or blood vessels, such as a stent placement, a coronary artery bypass, or surgery on an artery in your head, neck, heart, or legs? No   3. Do you have chest pain with activity? No   4. Do you have a history of  heart failure? No   5. Do you currently have a cold, bronchitis or symptoms of other infection? No   6. Do you have a cough, shortness of breath, or wheezing? No   7. Do you or anyone in your family have previous history of blood clots? YES - Grandmother with blood clots. No known history of factor V Leiden.    8. Do you or does anyone in your family have a serious bleeding problem such as prolonged bleeding following surgeries or cuts? No   9. Have you ever had problems with anemia or been told to take iron pills? No   10. Have you had any abnormal blood loss such as black, tarry or bloody stools, or abnormal vaginal bleeding? No   11. Have you ever had a blood transfusion? No   12. Are you willing to have a blood transfusion if it is medically needed before, during, or after your surgery? Yes   13. Have you or any of your relatives ever had problems with anesthesia? YES - Sister - allergic reaction   Patient with no known issues.    14. Do you have sleep apnea, excessive snoring or daytime drowsiness? UNKNOWN - no diagnosis of sleep apnea    15. Do you have any artifical heart valves or other implanted medical devices like a pacemaker, defibrillator, or continuous glucose monitor? No   16. Do you have artificial joints? No   17. Are you allergic to latex? No   18. Is there any chance that you may be pregnant? No       Health Care Directive:  Patient does not have a Health Care Directive or Living Will: Discussed advance care planning with patient; however, patient declined at this  time.    Preoperative Review of :   reviewed - no record of controlled substances prescribed.      Status of Chronic Conditions:  History of RA - on Humira (2/4/2023)     Review of Systems  CONSTITUTIONAL: NEGATIVE for fever, chills, change in weight  INTEGUMENTARY/SKIN: NEGATIVE for worrisome rashes, moles or lesions  EYES: NEGATIVE for vision changes or irritation  ENT/MOUTH: NEGATIVE for ear, mouth and throat problems  RESP: NEGATIVE for significant cough or SOB  CV: NEGATIVE for chest pain, palpitations or peripheral edema  GI: NEGATIVE for nausea, abdominal pain, heartburn, or change in bowel habits  : NEGATIVE for frequency, dysuria, or hematuria  MUSCULOSKELETAL: NEGATIVE for significant arthralgias or myalgia  NEURO: NEGATIVE for weakness, dizziness or paresthesias  ENDOCRINE: NEGATIVE for temperature intolerance, skin/hair changes  HEME: NEGATIVE for bleeding problems  PSYCHIATRIC: NEGATIVE for changes in mood or affect    Patient Active Problem List    Diagnosis Date Noted     Trigger finger, right ring finger 01/18/2023     Priority: Medium     Added automatically from request for surgery [id number]       Chronic pain of both knees 09/02/2022     Priority: Medium     Morbid obesity (H) 09/23/2021     Priority: Medium     Inflammatory polyarthropathy (H) 09/19/2017     Priority: Medium     Endometriosis of uterus 09/25/2012     Priority: Medium     CARDIOVASCULAR SCREENING; LDL GOAL LESS THAN 160 10/31/2010     Priority: Medium      Past Medical History:   Diagnosis Date     Arthritis      Bilateral carpal tunnel syndrome 12/20/2015     NO ACTIVE PROBLEMS      Obesity      Polyarthritis     inflammatory poly arthritis.     S/P carpal tunnel release 12/29/2016     Past Surgical History:   Procedure Laterality Date     ABDOMEN SURGERY  1992    Laparoscopy     bunions       COLONOSCOPY N/A 11/9/2015    Procedure: COLONOSCOPY;  Surgeon: Andrew Adamson MD;  Location: [facility]     COLONOSCOPY WITH CO2  INSUFFLATION N/A 11/9/2015    Procedure: COLONOSCOPY WITH CO2 INSUFFLATION;  Surgeon: Andrew Adamson MD;  Location: [facility]     GYN SURGERY      endometryosis     RELEASE CARPAL TUNNEL Right 10/12/2016    Procedure: RELEASE CARPAL TUNNEL;  Surgeon: Colby Nobles MD;  Location: [facility]     RELEASE CARPAL TUNNEL Left 12/16/2016    Procedure: RELEASE CARPAL TUNNEL;  Surgeon: Colby Nobles MD;  Location: [facility]     TONSILLECTOMY & ADENOIDECTOMY       Current Outpatient Medications   Medication Sig Dispense Refill     adalimumab (HUMIRA *CF*) 40 MG/0.4ML pen kit Inject 0.4 mLs (40 mg) Subcutaneous every 14 days 2.4 mL 2     order for DME Equipment being ordered: compression stockings 1 pair, at 30mm Hg, to be worn during the day. For Bilateral leg edema [R60.0] 2 Units 0     sulfaSALAzine (AZULFIDINE) 500 MG tablet Take 2 tablets (1,000 mg) by mouth 2 times daily 360 tablet 2       Allergies   Allergen Reactions     Asa [Aspirin] Hives        Social History     Tobacco Use     Smoking status: Never     Smokeless tobacco: Never   Substance Use Topics     Alcohol use: No     Alcohol/week: 0.0 standard drinks     Family History   Problem Relation Age of Onset     Osteoporosis Mother      Respiratory Mother      Thyroid Disease Mother      Anxiety Disorder Mother      Asthma Mother      Heart Disease Father      Cancer - colorectal Maternal Grandmother      Colon Cancer Maternal Grandmother      Diabetes Maternal Grandfather      Heart Disease Maternal Grandfather      Cancer Paternal Grandmother      Heart Disease Paternal Grandfather      Respiratory Paternal Grandfather      Hypertension Brother      Thyroid Disease Sister      Thyroid Disease Sister      Neurologic Disorder Brother      Hypertension Brother      Thyroid Disease Brother      Other Cancer Sister      Anxiety Disorder Sister      Mental Illness Sister      Anesthesia Reaction Sister      Thyroid Disease Sister      History   Drug Use No  "        Objective     BP (!) 160/71   Pulse 73   Temp 98.1  F (36.7  C) (Tympanic)   Resp 16   Ht 1.651 m (5' 5\")   Wt 103.9 kg (229 lb)   SpO2 98%   BMI 38.11 kg/m      Physical Exam    GENERAL APPEARANCE: alert, active and over weight     EYES: EOMI, PERRL     HENT: ear canals and TM's normal and nose and mouth without ulcers or lesions     NECK: no adenopathy, no asymmetry, masses, or scars and thyroid normal to palpation     RESP: lungs clear to auscultation - no rales, rhonchi or wheezes     CV: regular rates and rhythm, normal S1 S2, no S3 or S4 and no murmur, click or rub     ABDOMEN:  soft, nontender, no HSM or masses and bowel sounds normal     MS: extremities normal- no gross deformities noted, no evidence of inflammation in joints, Right 4th digit slightly flexed.      SKIN: no suspicious lesions or rashes     NEURO: Normal strength and tone, sensory exam grossly normal, mentation intact and speech normal     PSYCH: mentation appears normal. and affect normal/bright     LYMPHATICS: No cervical adenopathy    Recent Labs   Lab Test 11/10/22  0658 08/11/22  1443   HGB 14.5 14.8    326   CR 0.74 0.85      HGB 14.3    Diagnostics:  Labs pending at this time.  Results will be reviewed when available.   EKG: appears normal, NSR, unchanged from previous tracings    Revised Cardiac Risk Index (RCRI):  The patient has the following serious cardiovascular risks for perioperative complications:   - No serious cardiac risks = 0 points     RCRI Interpretation: 0 points: Class I (very low risk - 0.4% complication rate)           Signed Electronically by: Hood Brooks PA-C  Copy of this evaluation report is provided to requesting physician.      "

## 2023-02-07 ENCOUNTER — MYC MEDICAL ADVICE (OUTPATIENT)
Dept: ORTHOPEDICS | Facility: CLINIC | Age: 58
End: 2023-02-07
Payer: COMMERCIAL

## 2023-02-08 ENCOUNTER — ANESTHESIA EVENT (OUTPATIENT)
Dept: SURGERY | Facility: AMBULATORY SURGERY CENTER | Age: 58
End: 2023-02-08
Payer: COMMERCIAL

## 2023-02-08 RX ORDER — OXYCODONE HYDROCHLORIDE 5 MG/1
5 TABLET ORAL EVERY 4 HOURS PRN
Status: DISCONTINUED | OUTPATIENT
Start: 2023-02-08 | End: 2023-02-10 | Stop reason: HOSPADM

## 2023-02-08 RX ORDER — OXYCODONE HYDROCHLORIDE 5 MG/1
10 TABLET ORAL EVERY 4 HOURS PRN
Status: DISCONTINUED | OUTPATIENT
Start: 2023-02-08 | End: 2023-02-10 | Stop reason: HOSPADM

## 2023-02-08 NOTE — ANESTHESIA PREPROCEDURE EVALUATION
Anesthesia Pre-Procedure Evaluation    Patient: Bria Haddad   MRN: [id number] : 1965        Procedure : Procedure(s):  RELEASE, TRIGGER FINGER right ring finger          Past Medical History:   Diagnosis Date     Arthritis      Bilateral carpal tunnel syndrome 2015     NO ACTIVE PROBLEMS      Obesity      Polyarthritis     inflammatory poly arthritis.     S/P carpal tunnel release 2016      Past Surgical History:   Procedure Laterality Date     ABDOMEN SURGERY      Laparoscopy     bunions       COLONOSCOPY N/A 2015    Procedure: COLONOSCOPY;  Surgeon: Andrew Adamson MD;  Location: [facility]     COLONOSCOPY WITH CO2 INSUFFLATION N/A 2015    Procedure: COLONOSCOPY WITH CO2 INSUFFLATION;  Surgeon: Andrew Adamson MD;  Location: [facility]     GYN SURGERY      endometryosis     RELEASE CARPAL TUNNEL Right 10/12/2016    Procedure: RELEASE CARPAL TUNNEL;  Surgeon: Colby Nobles MD;  Location: [facility]     RELEASE CARPAL TUNNEL Left 2016    Procedure: RELEASE CARPAL TUNNEL;  Surgeon: Colby Nobles MD;  Location: [facility]     TONSILLECTOMY & ADENOIDECTOMY        Allergies   Allergen Reactions     Asa [Aspirin] Hives      Social History     Tobacco Use     Smoking status: Never     Smokeless tobacco: Never   Substance Use Topics     Alcohol use: No     Alcohol/week: 0.0 standard drinks      Wt Readings from Last 1 Encounters:   23 103.9 kg (229 lb)           Physical Exam    Airway        Mallampati: II   TM distance: > 3 FB   Neck ROM: full   Mouth opening: > 3 cm    Respiratory Devices and Support         Dental           Cardiovascular   cardiovascular exam normal          Pulmonary   pulmonary exam normal                OUTSIDE LABS:  CBC:   Lab Results   Component Value Date    WBC 6.5 2023    WBC 6.8 11/10/2022    HGB 14.3 2023    HGB 14.5 11/10/2022    HCT 42.5 2023    HCT 44.5 11/10/2022     2023     11/10/2022      BMP:   Lab Results   Component Value Date     02/06/2023     09/23/2020    POTASSIUM 3.8 02/06/2023    POTASSIUM 4.1 09/23/2020    CHLORIDE 110 (H) 02/06/2023    CHLORIDE 109 09/23/2020    CO2 30 02/06/2023    CO2 29 09/23/2020    BUN 16 02/06/2023    BUN 16 09/23/2020    CR 0.72 02/06/2023    CR 0.74 11/10/2022     (H) 02/06/2023     (H) 09/23/2020     COAGS: No results found for: PTT, INR, FIBR  POC: No results found for: BGM, HCG, HCGS  HEPATIC:   Lab Results   Component Value Date    ALBUMIN 3.9 02/06/2023    PROTTOTAL 7.2 02/06/2023    ALT 28 02/06/2023    AST 14 02/06/2023    ALKPHOS 70 02/06/2023    BILITOTAL 0.5 02/06/2023     OTHER:   Lab Results   Component Value Date    A1C 5.3 05/08/2019    AGUILAR 9.6 02/06/2023    TSH 3.77 09/23/2020    CRP 4.8 02/06/2023    SED 8 02/06/2023       Anesthesia Plan    ASA Status:  2   NPO Status:  NPO Appropriate    Anesthesia Type: MAC.     - Reason for MAC: straight local not clinically adequate   Induction: Intravenous, Propofol.   Maintenance: TIVA.        Consents    Anesthesia Plan(s) and associated risks, benefits, and realistic alternatives discussed. Questions answered and patient/representative(s) expressed understanding.    - Discussed:     - Discussed with:  Patient      - Extended Intubation/Ventilatory Support Discussed: No.      - Patient is DNR/DNI Status: No    Use of blood products discussed: No .     Postoperative Care    Pain management: IV analgesics, Oral pain medications, Multi-modal analgesia.   PONV prophylaxis: Dexamethasone or Solumedrol, Ondansetron (or other 5HT-3), Background Propofol Infusion     Comments:                Paco Cedeno MD

## 2023-02-09 ENCOUNTER — ANESTHESIA (OUTPATIENT)
Dept: SURGERY | Facility: AMBULATORY SURGERY CENTER | Age: 58
End: 2023-02-09
Payer: COMMERCIAL

## 2023-02-09 ENCOUNTER — HOSPITAL ENCOUNTER (OUTPATIENT)
Facility: AMBULATORY SURGERY CENTER | Age: 58
Discharge: HOME | End: 2023-02-09
Attending: ORTHOPAEDIC SURGERY | Admitting: ORTHOPAEDIC SURGERY
Payer: COMMERCIAL

## 2023-02-09 VITALS
OXYGEN SATURATION: 98 % | TEMPERATURE: 98.4 F | SYSTOLIC BLOOD PRESSURE: 142 MMHG | RESPIRATION RATE: 16 BRPM | DIASTOLIC BLOOD PRESSURE: 71 MMHG | HEART RATE: 77 BPM

## 2023-02-09 DIAGNOSIS — M65.341 TRIGGER FINGER, RIGHT RING FINGER: Primary | ICD-10-CM

## 2023-02-09 PROCEDURE — 26055 INCISE FINGER TENDON SHEATH: CPT | Mod: F8 | Performed by: ORTHOPAEDIC SURGERY

## 2023-02-09 PROCEDURE — G8916 PT W IV AB GIVEN ON TIME: HCPCS

## 2023-02-09 PROCEDURE — G8907 PT DOC NO EVENTS ON DISCHARG: HCPCS

## 2023-02-09 PROCEDURE — 26055 INCISE FINGER TENDON SHEATH: CPT | Mod: F8

## 2023-02-09 RX ORDER — ONDANSETRON 2 MG/ML
4 INJECTION INTRAMUSCULAR; INTRAVENOUS EVERY 30 MIN PRN
Status: DISCONTINUED | OUTPATIENT
Start: 2023-02-09 | End: 2023-02-10 | Stop reason: HOSPADM

## 2023-02-09 RX ORDER — KETOROLAC TROMETHAMINE 30 MG/ML
INJECTION, SOLUTION INTRAMUSCULAR; INTRAVENOUS PRN
Status: DISCONTINUED | OUTPATIENT
Start: 2023-02-09 | End: 2023-02-09

## 2023-02-09 RX ORDER — LIDOCAINE HYDROCHLORIDE 20 MG/ML
INJECTION, SOLUTION INFILTRATION; PERINEURAL PRN
Status: DISCONTINUED | OUTPATIENT
Start: 2023-02-09 | End: 2023-02-09

## 2023-02-09 RX ORDER — PROPOFOL 10 MG/ML
INJECTION, EMULSION INTRAVENOUS CONTINUOUS PRN
Status: DISCONTINUED | OUTPATIENT
Start: 2023-02-09 | End: 2023-02-09

## 2023-02-09 RX ORDER — OXYCODONE HYDROCHLORIDE 5 MG/1
10 TABLET ORAL EVERY 4 HOURS PRN
Status: DISCONTINUED | OUTPATIENT
Start: 2023-02-09 | End: 2023-02-10 | Stop reason: HOSPADM

## 2023-02-09 RX ORDER — ONDANSETRON 4 MG/1
4 TABLET, ORALLY DISINTEGRATING ORAL
Status: DISCONTINUED | OUTPATIENT
Start: 2023-02-09 | End: 2023-02-10 | Stop reason: HOSPADM

## 2023-02-09 RX ORDER — PROPOFOL 10 MG/ML
INJECTION, EMULSION INTRAVENOUS PRN
Status: DISCONTINUED | OUTPATIENT
Start: 2023-02-09 | End: 2023-02-09

## 2023-02-09 RX ORDER — SODIUM CHLORIDE, SODIUM LACTATE, POTASSIUM CHLORIDE, CALCIUM CHLORIDE 600; 310; 30; 20 MG/100ML; MG/100ML; MG/100ML; MG/100ML
INJECTION, SOLUTION INTRAVENOUS CONTINUOUS
Status: DISCONTINUED | OUTPATIENT
Start: 2023-02-09 | End: 2023-02-10 | Stop reason: HOSPADM

## 2023-02-09 RX ORDER — OXYCODONE HYDROCHLORIDE 5 MG/1
5 TABLET ORAL
Status: DISCONTINUED | OUTPATIENT
Start: 2023-02-09 | End: 2023-02-10 | Stop reason: HOSPADM

## 2023-02-09 RX ORDER — HYDROCODONE BITARTRATE AND ACETAMINOPHEN 5; 325 MG/1; MG/1
1-2 TABLET ORAL EVERY 6 HOURS PRN
Qty: 8 TABLET | Refills: 0 | Status: SHIPPED | OUTPATIENT
Start: 2023-02-09 | End: 2023-02-23

## 2023-02-09 RX ORDER — OXYCODONE HYDROCHLORIDE 5 MG/1
5 TABLET ORAL EVERY 4 HOURS PRN
Status: DISCONTINUED | OUTPATIENT
Start: 2023-02-09 | End: 2023-02-10 | Stop reason: HOSPADM

## 2023-02-09 RX ORDER — FENTANYL CITRATE 50 UG/ML
50 INJECTION, SOLUTION INTRAMUSCULAR; INTRAVENOUS EVERY 5 MIN PRN
Status: DISCONTINUED | OUTPATIENT
Start: 2023-02-09 | End: 2023-02-10 | Stop reason: HOSPADM

## 2023-02-09 RX ORDER — HYDROXYZINE HYDROCHLORIDE 25 MG/1
25 TABLET, FILM COATED ORAL
Status: DISCONTINUED | OUTPATIENT
Start: 2023-02-09 | End: 2023-02-10 | Stop reason: HOSPADM

## 2023-02-09 RX ORDER — ONDANSETRON 2 MG/ML
INJECTION INTRAMUSCULAR; INTRAVENOUS PRN
Status: DISCONTINUED | OUTPATIENT
Start: 2023-02-09 | End: 2023-02-09

## 2023-02-09 RX ORDER — FENTANYL CITRATE 50 UG/ML
INJECTION, SOLUTION INTRAMUSCULAR; INTRAVENOUS PRN
Status: DISCONTINUED | OUTPATIENT
Start: 2023-02-09 | End: 2023-02-09

## 2023-02-09 RX ORDER — FENTANYL CITRATE 50 UG/ML
25 INJECTION, SOLUTION INTRAMUSCULAR; INTRAVENOUS EVERY 5 MIN PRN
Status: DISCONTINUED | OUTPATIENT
Start: 2023-02-09 | End: 2023-02-10 | Stop reason: HOSPADM

## 2023-02-09 RX ORDER — CEFAZOLIN SODIUM 2 G/100ML
2 INJECTION, SOLUTION INTRAVENOUS
Status: COMPLETED | OUTPATIENT
Start: 2023-02-09 | End: 2023-02-09

## 2023-02-09 RX ORDER — AMOXICILLIN 250 MG
1-2 CAPSULE ORAL 2 TIMES DAILY
Qty: 30 TABLET | Refills: 0 | Status: SHIPPED | OUTPATIENT
Start: 2023-02-09 | End: 2023-02-23

## 2023-02-09 RX ORDER — LIDOCAINE 40 MG/G
CREAM TOPICAL
Status: DISCONTINUED | OUTPATIENT
Start: 2023-02-09 | End: 2023-02-10 | Stop reason: HOSPADM

## 2023-02-09 RX ORDER — ACETAMINOPHEN 325 MG/1
975 TABLET ORAL ONCE
Status: COMPLETED | OUTPATIENT
Start: 2023-02-09 | End: 2023-02-09

## 2023-02-09 RX ORDER — ONDANSETRON 4 MG/1
4 TABLET, ORALLY DISINTEGRATING ORAL EVERY 30 MIN PRN
Status: DISCONTINUED | OUTPATIENT
Start: 2023-02-09 | End: 2023-02-10 | Stop reason: HOSPADM

## 2023-02-09 RX ADMIN — ONDANSETRON 4 MG: 2 INJECTION INTRAMUSCULAR; INTRAVENOUS at 08:42

## 2023-02-09 RX ADMIN — ACETAMINOPHEN 975 MG: 325 TABLET ORAL at 08:06

## 2023-02-09 RX ADMIN — CEFAZOLIN SODIUM 2 G: 2 INJECTION, SOLUTION INTRAVENOUS at 08:21

## 2023-02-09 RX ADMIN — PROPOFOL 100 MG: 10 INJECTION, EMULSION INTRAVENOUS at 08:25

## 2023-02-09 RX ADMIN — KETOROLAC TROMETHAMINE 30 MG: 30 INJECTION, SOLUTION INTRAMUSCULAR; INTRAVENOUS at 08:42

## 2023-02-09 RX ADMIN — LIDOCAINE HYDROCHLORIDE 60 MG: 20 INJECTION, SOLUTION INFILTRATION; PERINEURAL at 08:25

## 2023-02-09 RX ADMIN — FENTANYL CITRATE 25 MCG: 50 INJECTION, SOLUTION INTRAMUSCULAR; INTRAVENOUS at 08:23

## 2023-02-09 RX ADMIN — SODIUM CHLORIDE, SODIUM LACTATE, POTASSIUM CHLORIDE, CALCIUM CHLORIDE: 600; 310; 30; 20 INJECTION, SOLUTION INTRAVENOUS at 08:06

## 2023-02-09 RX ADMIN — PROPOFOL 100 MCG/KG/MIN: 10 INJECTION, EMULSION INTRAVENOUS at 08:25

## 2023-02-09 NOTE — INTERVAL H&P NOTE
"I have reviewed the surgical (or preoperative) H&P that is linked to this encounter, and examined the patient. There are no significant changes    Clinical Conditions Present on Arrival:  Clinically Significant Risk Factors Present on Admission                  # Obesity: Estimated body mass index is 38.11 kg/m  as calculated from the following:    Height as of 2/6/23: 1.651 m (5' 5\").    Weight as of 2/6/23: 103.9 kg (229 lb).     The History and Physical on patient's chart was personally reviewed today with the patient. there have been no interval changes in patient's history since H+P performed.    History:  Bria Haddad is a 57 year old female , Right -hand dominant who is seen in consultation at the request of Holli Marley,for Right hand pain and catching for a couple of years. Mainly the right finger.  The symptoms have been episodic, and previously helped by injections, right ring finger 5/5/2022 with Dr Resendiz. She states injections aren't helping anymore. She's unable to fully bend her finger. She states the finger hasn't locked for about a week now.     She has numbness and tingling in none of the digits.  History of bilateral carpal tunnel release surgery.     ASSESSMENT: 56yo RHD female with right ring trigger finger.           PLAN:      We talked about the options: taping the PIP, splinting the PIP joint, corticosteroid injections, hand therapy and surgery. I explained the risks and benefits of each, including recurrence. I have explained the nature of the surgical procedure, the risks and recovery time with the patient.  * did discuss there is notable stiffness in the finger, a result of pain, triggering, that even with surgery, will likely be persistent for a while as the pain, inflammation improve, but also that the finger has been limited for a couple years and the joints have stiffened up, and will need therapy and exercises to get range of motion back. Just releasing the triggering " will not likely give immediate full range of motion or pain relief given duration of symptoms, contracture, etc.  * At this time, patient would like to proceed with: surgical release  * plan right ring finger trigger finger release, outpatient, MAC with local      Risks and perceived benefits of surgery again discussed with patient. Patient's questions addressed and answered. Written informed consent obtained and reviewed. Surgical site marked with indelible marker with patient's participation after confirming site with patient.      Danie Urban M.D., M.S.  Dept. of Orthopaedic Surgery  [facility]

## 2023-02-09 NOTE — ANESTHESIA CARE TRANSFER NOTE
Patient: Bria Haddad    Procedure: Procedure(s):  RELEASE, TRIGGER FINGER right ring finger       Diagnosis: Trigger finger, right ring finger [M65.341]  Diagnosis Additional Information: No value filed.    Anesthesia Type:   MAC     Note:    Oropharynx: oropharynx clear of all foreign objects  Level of Consciousness: awake  Oxygen Supplementation: room air    Independent Airway: airway patency satisfactory and stable  Dentition: dentition unchanged  Vital Signs Stable: post-procedure vital signs reviewed and stable  Report to RN Given: handoff report given  Patient transferred to: Phase II    Handoff Report: Identifed the Patient, Identified the Reponsible Provider, Reviewed the pertinent medical history, Discussed the surgical course, Reviewed Intra-OP anesthesia mangement and issues during anesthesia, Set expectations for post-procedure period and Allowed opportunity for questions and acknowledgement of understanding      Vitals:  Vitals Value Taken Time   BP     Temp     Pulse     Resp     SpO2         Electronically Signed By: ABRAHAM Begum CRNA  February 9, 2023  8:53 AM

## 2023-02-09 NOTE — DISCHARGE INSTRUCTIONS
Name: Bria Haddad MRN #: [id number]  Date: 2/9/2023  Procedure: right ring trigger finger release  Discharge to home when stable, tolerating clear liquids, and patient has urinated  Call for follow-up appointment, [phone], with Dr. Urban in: 2 weeks.   WOUND CARE  The bandage may be slightly bloody. This is normal.  Ice:  Keep an ice bag on your hand for 20 minutes at a time.  Keep incisions clean and dry following surgery for:  72 hours   Change all bandages in:  72 hours       If bandages are changed before follow-up, cover all incisions with fresh bandages or bandaids.  O.K. to shower (may get incision wet) in:  not until wound healed, sutures removed.  No tub baths, swimming pools, hot tubs, etc. for a minimum of 2 weeks following surgery  ACTIVITY  Keep hand elevated above heart at all times for the first few days after surgery, then as much as possible.  Weight-bearing ([place]):  Partial weight-bearing 30%     Exercises:  Perform exercises 3 times a day for a minimum of 25 reps each time (start today or tomorrow):             Finger range of motion   OK to drive:  Not for 24 hours  When going back to driving, be sure to test braking/acceleration maneuvers in an empty parking lot before entry into any traffic areas.    ABSOLUTELY NO DRIVING WHILE TAKING NARCOTICS!    DISCHARGE MEDICATIONS:   Norco (5/325), 1 to 2 tablets, take every 6 hours as needed for pain  Other: stool softeners while on pain medications.   Ok to take over the counter pain medications such as ibuprofen or acetaminophen instead of prescription pain medications as needed.  Strong pain medication has been prescribed. Use as directed. Do not combine with alcohol. Be careful as you walk or climb stairs.   DIET:  If no nausea, clear liquids should be taken initially.  Then progress to solid foods when clear liquids are tolerated.   RESPONSE TO SURGERY: It is normal to have pain and swelling in your hand after surgery. It may take 4 weeks  or longer for the swelling to go away. It is also common to notice some bruising around the hand, wrist and forearm as the swelling resolves.  EMERGENCY: Call or return for any fevers (temperature greater than 101.5   or sustained fevers greater than 100.5   that haven t resolved within 3 to 4 days following surgery) or chills, increasing pain, swelling, redness, drainage (especially if yellow, green, or foul smelling), excessive bleeding), chest pain, shortness of breath:  Phone #: [phone]; If emergency, go to local ER or dial 911.    Danie Ubran M.D., M.S.  Dept. of Orthopaedic Surgery  [facility]      2/9/2023        Exercises should be performed at least 3 times per day. Move in pain-free range.           Finger Exercises    Perform 1 set of 20 reps, each exercise, 3 times a day  Perform 1 rep every 4 seconds  Rest 1 minute between sets                                Elbow Flexion/Extension   Begin with arm at side, elbow straight, palm up  Bend elbow upward  Return to starting position  Perform 1 set of 20 reps, 3 times a day  Perform 1 rep every 4 seconds  Rest 1 minute between sets           Wrist Flexion/Extension   Place forearm on table with hand off edge, palm up  Move hand upward  Return to start position  Perform 1 set of 20 reps, 3 times a day  Perform 1 rep every 4 seconds  Rest 1 minute between sets             [facility]  Same-Day Surgery Adult Discharge Orders & Instructions   For 24 hours after surgery  Get plenty of rest.  A responsible adult must stay with you for at least 24 hours after you leave the hospital.   Do not drive or use heavy equipment.  If you have weakness or tingling, don't drive or use heavy equipment until this feeling goes away.  Do not drink alcohol.  Avoid strenuous or risky activities.  Ask for help when climbing stairs.   You may feel lightheaded.  IF so, sit for a few minutes before standing.  Have someone help you get up.    If you have nausea (feel sick to your stomach): Drink only clear liquids such as apple juice, ginger ale, broth or 7-Up.  Rest may also help.  Be sure to drink enough fluids.  Move to a regular diet as you feel able.  You may have a slight fever. Call the doctor if your fever is over 100 F (37.7 C) (taken under the tongue) or lasts longer than 24 hours.  You may have a dry mouth, a sore throat, muscle aches or trouble sleeping.  These should go away after 24 hours.  Do not make important or legal decisions.   Call your doctor for any of the followin.  Signs of infection (fever, growing tenderness at the surgery site, a large amount of drainage or bleeding, severe pain, foul-smelling drainage, redness, swelling).  2. It has been over 8 to 10 hours since surgery and you are still not able to urinate (pass water).  Tylenol 975 mg was given at 8:06 am.    You should not take more then 4,000 mg of tylenol/acetaminophen in a 24 hour period.

## 2023-02-09 NOTE — ANESTHESIA POSTPROCEDURE EVALUATION
Patient: Bria Haddad    Procedure: Procedure(s):  RELEASE, TRIGGER FINGER right ring finger       Anesthesia Type:  MAC    Note:  Disposition: Outpatient   Postop Pain Control: Uneventful            Sign Out: Well controlled pain   PONV:    Neuro/Psych: Uneventful            Sign Out: Acceptable/Baseline neuro status   Airway/Respiratory: Uneventful            Sign Out: Acceptable/Baseline resp. status   CV/Hemodynamics: Uneventful            Sign Out: Acceptable CV status; No obvious hypovolemia; No obvious fluid overload   Other NRE:    DID A NON-ROUTINE EVENT OCCUR?            Last vitals:  Vitals Value Taken Time   /70 02/09/23 0850   Temp 98.1  F (36.7  C) 02/09/23 0850   Pulse     Resp 16 02/09/23 0850   SpO2 94 % 02/09/23 0850       Electronically Signed By: Paco Cedeno MD  February 9, 2023  8:57 AM

## 2023-02-09 NOTE — INTERVAL H&P NOTE
"I have reviewed the surgical (or preoperative) H&P that is linked to this encounter, and examined the patient. There are no significant changes    Clinical Conditions Present on Arrival:  Clinically Significant Risk Factors Present on Admission                    # Obesity: Estimated body mass index is 38.11 kg/m  as calculated from the following:    Height as of 2/6/23: 1.651 m (5' 5\").    Weight as of 2/6/23: 103.9 kg (229 lb).       "

## 2023-02-09 NOTE — BRIEF OP NOTE
[facility] Brief Operative Note    Pre-operative diagnosis: Trigger finger, right ring finger [M65.341]   Post-operative diagnosis right ring finger trigger finger     Procedure: Procedure(s):  RELEASE, TRIGGER FINGER right ring finger   Surgeon(s): Surgeon(s) and Role:     * Danie Urban MD - Primary     * Hugo Barakat PA - Assisting   Estimated blood loss: 1 mL    Specimens: * No specimens in log *   Findings: Thick A1 pulley

## 2023-02-09 NOTE — OP NOTE
OPERATIVE / PROCEDURE    DATE OF SERVICE:  2/9/2023    PREOPERATIVE DIAGNOSIS   right hand, ring finger trigger finger.     POSTOPERATIVE DIAGNOSIS  right hand, ring finger trigger finger.     OPERATIVE PROCEDURE   right hand, ring finger trigger finger release.     SURGEON  Danie Urban M.D., M.S.     ANESTHESIA  Local with MAC    EBL: 1mL    IVF: 200mL    ANTIBIOTICS: cefazolin 2g iv    TOURNIQUET TIME: 4 minutes at 200 mmHg    COMPLICATIONS: None apparent.    SPECIMENS: none    DRAINS: none    IMPLANTS: none    INDICATIONS  This is a  57 year old year-old female with locking of the right hand, ring finger, consistent with a trigger finger. The patient had failed conservative treatment including splints, therapy, and injections. Risks of surgery, including but not limited to: bleeding, infection, pain, scar, damage to adjacent structures (nerves, vessels, tendons), tendon injury or rupture, temporary vs permanent nerve damage, failure to relieve symptoms, recurrence of symptoms, need for further surgery, risks of anesthesia, and death were discussed. All questions were addressed to patient satisfaction. The patient elected to proceed with this surgery understanding the risks and perceived benefits. Informed consent was obtained for the procedure.      PROCEDURE IN DETAIL  The patient was identified in the preoperative holding area. The correct procedure and procedural site was confirmed with the patient. Consent was reviewed. The correct surgical site was marked with an indelible marker by the attending surgeon, Danie Urban MD.      The patient was then taken to the operating room and placed on the operating table in the supine position.  Tourniquet was placed around the proximal arm. All bony prominences well padded. IV sedation was given by Anesthesia.  Local anesthetic using a combination of 0.25% Marcaine and 1% lidocaine was injected in the anticipated incision site in a sterile technique. The limb was  prepped and draped in the usual sterile fashion. The limb was exsanguinated and tourniquet inflated to 200 mmHg.    A transverse   incision was made in the flexion crease of the palm over the A1 pulley of the right ring flexor tendon. Once through the dermal  layer, spreading technique was used and retractors inserted to avoid any  neurovascular injury. I then incised the palmar fascia and identified the  flexor tendon sheath. I identified the A1 pulley and incised it longitudinally with a #15  blade. I used a Michelle to make sure that it had been  completely, and then removed the loose flaps of the A1 pulley. The wound was then copiously irrigated and the tourniquet deflated.  Minimal bleeders were encountered and hemostasis was achieved with bipolar electrocautery.     Then the wound was closed with interrupted 4-0 nylon sutures placed in a  horizontal mattress fashion. Sterile dressings were applied. The patient was then  transferred to the hospital cart and to the recovery area in stable condition. Norco versus over the counter for postoperative pain control. Return to clinic 2 weeks postoperative for wound check, suture removal.      Danie Urban M.D., M.S.  Dept. of Orthopaedic Surgery  [facility]

## 2023-02-12 PROBLEM — E66.01 MORBID OBESITY (H): Chronic | Status: ACTIVE | Noted: 2021-09-23

## 2023-02-12 PROBLEM — G89.29 CHRONIC PAIN OF BOTH KNEES: Status: ACTIVE | Noted: 2022-09-02

## 2023-02-12 PROBLEM — M25.562 CHRONIC PAIN OF BOTH KNEES: Status: ACTIVE | Noted: 2022-09-02

## 2023-02-12 PROBLEM — M25.561 CHRONIC PAIN OF BOTH KNEES: Status: ACTIVE | Noted: 2022-09-02

## 2023-02-12 PROBLEM — M06.9 RHEUMATOID ARTHRITIS (H): Chronic | Status: ACTIVE | Noted: 2017-09-19

## 2023-02-12 PROBLEM — I89.0 LYMPHEDEMA: Status: ACTIVE | Noted: 2023-02-12

## 2023-02-12 PROBLEM — M06.4 INFLAMMATORY POLYARTHROPATHY (H): Status: ACTIVE | Noted: 2017-09-19

## 2023-02-13 ENCOUNTER — VIRTUAL VISIT (OUTPATIENT)
Dept: SLEEP MEDICINE | Facility: CLINIC | Age: 58
End: 2023-02-13
Attending: INTERNAL MEDICINE
Payer: COMMERCIAL

## 2023-02-13 VITALS — WEIGHT: 226 LBS | HEIGHT: 65 IN | BODY MASS INDEX: 37.65 KG/M2

## 2023-02-13 DIAGNOSIS — R06.83 SNORING: Primary | ICD-10-CM

## 2023-02-13 DIAGNOSIS — E66.01 MORBID OBESITY (H): Chronic | ICD-10-CM

## 2023-02-13 DIAGNOSIS — G47.9 DISTURBANCE IN SLEEP BEHAVIOR: ICD-10-CM

## 2023-02-13 DIAGNOSIS — R06.89 DYSPNEA AND RESPIRATORY ABNORMALITY: ICD-10-CM

## 2023-02-13 DIAGNOSIS — F51.04 CHRONIC INSOMNIA: ICD-10-CM

## 2023-02-13 DIAGNOSIS — R06.00 DYSPNEA AND RESPIRATORY ABNORMALITY: ICD-10-CM

## 2023-02-13 PROCEDURE — 99204 OFFICE O/P NEW MOD 45 MIN: CPT | Mod: VID | Performed by: INTERNAL MEDICINE

## 2023-02-13 RX ORDER — ZOLPIDEM TARTRATE 5 MG/1
TABLET ORAL
Qty: 1 TABLET | Refills: 0 | Status: SHIPPED | OUTPATIENT
Start: 2023-02-13 | End: 2024-03-01

## 2023-02-13 ASSESSMENT — SLEEP AND FATIGUE QUESTIONNAIRES
HOW LIKELY ARE YOU TO NOD OFF OR FALL ASLEEP IN A CAR, WHILE STOPPED FOR A FEW MINUTES IN TRAFFIC: WOULD NEVER DOZE
HOW LIKELY ARE YOU TO NOD OFF OR FALL ASLEEP WHILE SITTING AND TALKING TO SOMEONE: WOULD NEVER DOZE
HOW LIKELY ARE YOU TO NOD OFF OR FALL ASLEEP WHILE WATCHING TV: WOULD NEVER DOZE
HOW LIKELY ARE YOU TO NOD OFF OR FALL ASLEEP WHILE SITTING QUIETLY AFTER LUNCH WITHOUT ALCOHOL: WOULD NEVER DOZE
HOW LIKELY ARE YOU TO NOD OFF OR FALL ASLEEP WHILE LYING DOWN TO REST IN THE AFTERNOON WHEN CIRCUMSTANCES PERMIT: WOULD NEVER DOZE
HOW LIKELY ARE YOU TO NOD OFF OR FALL ASLEEP WHEN YOU ARE A PASSENGER IN A CAR FOR AN HOUR WITHOUT A BREAK: HIGH CHANCE OF DOZING
HOW LIKELY ARE YOU TO NOD OFF OR FALL ASLEEP WHILE SITTING AND READING: WOULD NEVER DOZE
HOW LIKELY ARE YOU TO NOD OFF OR FALL ASLEEP WHILE SITTING INACTIVE IN A PUBLIC PLACE: WOULD NEVER DOZE

## 2023-02-13 ASSESSMENT — PAIN SCALES - GENERAL: PAINLEVEL: NO PAIN (0)

## 2023-02-13 NOTE — NURSING NOTE
Is the patient currently in the state of [place]? YES    Visit mode:VIDEO    If the visit is dropped, the patient can be reconnected by: VIDEO VISIT: Text to cell phone: [phone]    Will anyone else be joining the visit? NO      How would you like to obtain your AVS? MyChart    Are changes needed to the allergy or medication list? NO    Comments or concerns regarding today's visit: lashell Stuart

## 2023-02-13 NOTE — PATIENT INSTRUCTIONS
Read the book Say Good Night To Insomnia          Your BMI is Body mass index is 37.61 kg/m .    What is BMI?  Body mass index (BMI) is one way to tell whether you are at a healthy weight, overweight, or obese. It measures your weight in relation to your height.  A BMI of 18.5 to 24.9 is in the healthy range. A person with a BMI of 25 to 29.9 is considered overweight, and someone with a BMI of 30 or greater is considered obese.  Another way to find out if you are at risk for health problems caused by overweight and obesity is to measure your waist. If you are a woman and your waist is more than 35 inches, or if you are a man and your waist is more than 40 inches, your risk of disease may be higher.  More than two-thirds of [place] adults are considered overweight or obese. Being overweight or obese increases the risk for further weight gain.  Excess weight may lead to heart disease and diabetes. Creating and following plans for healthy eating and physical activity may help you improve your health.    Methods for maintaining or losing weight.  Weight control is part of healthy lifestyle and includes exercise, emotional health, and healthy eating habits.  Careful eating habits lifelong is the mainstay of weight control.  Though there are significant health benefits from weight loss, long-term weight loss with diet alone may be very difficult to achieve- studies show long-term success with dietary management in less than 10% of people. Attaining a healthy weight may be especially difficult to achieve in those with severe obesity. In some cases, medications, devices and surgical management might be considered.    What can you do?  If you are overweight or obese and are interested in methods for weight loss, you should discuss this with your provider. In addition, we recommend that you review healthy life styles and methods for weight loss available through the National [employer] of [employer] patient information sites:    http://win.niddk.nih.gov/publications/index.htm

## 2023-02-13 NOTE — PROGRESS NOTES
Outpatient Sleep Medicine Consultation:      Name: Bria Haddad MRN# [id number]   Age: 57 year old YOB: 1965     Date of Consultation: February 13, 2023  Consultation is requested by: Val Barrera MD  [id number]  [place] [address] Val Barrera  Primary care provider: Holli Marley       Reason for Sleep Consult:     Bria Haddad is sent by Val Barrera for a sleep consultation regarding 'snoring'.    Patient s Reason for visit  Bria Haddad main reason for visit: I wake up every two hours in the evening, and I have to use the restroom  Patient states problem(s) started: Ongoing issue  Bria Haddad's goals for this visit: Receive information about options available to create better sleep           Assessment and Plan:     Snoring, sleep maintenance difficulties, nocturia, heartburn at night, fatigue (ESS 3), morbid obesity, elevated bicarbonate (30).   - Polysomnogram (using 4% desaturation/Medicare/ AASM 1B scoring rules) with transcutaneous C02 monitoring, for moderate probability obstructive sleep apnea. Ambien if needed. Patient is a poor candidate for Home Sleep Testing due to symptoms of JESUS but not high pre-test probability of JESUS  (STOPBANG 3-5), chronic severe insomnia (MARLA score 20) and possible Obesity Hypoventilation Syndrome    - Elect CPAP for mild-moderate obstructive sleep apnea, follow-up to discuss options if mild    Sleep onset, maintenance difficulties (MARLA 20)  Suspect psychophysiologic insomnia. We discussed stimulus control, sleep restriction and regular wake times.   - Read the book Say Good Night To Insomnia    - Consider referral for cognitive behavioral training      Obesity  We discussed the link between obesity, sleep apnea  - See patient instructions      Summary Counseling:    Sleep Testing Reviewed  Obstructive Sleep Apnea Reviewed  Complications of Untreated Sleep Apnea Reviewed  Treatment options  reviewed      I spent 45 minutes with patient including counseling, and 10 minutes with chart review, and documentation     CC: Val Acosta Kellytatum          History of Present Illness:       SLEEP-WAKE SCHEDULE:     Work/School Days: Patient goes to school/work: Yes   Usually gets into bed at 10:00 pm  Takes patient about 1 hour to fall asleep  Has trouble falling asleep 7 nights per week due to an over-active mind    This has been a problem for 2 years, no trigger identified  Wakes up in the middle of the night 3-4 times.  Wakes up due to Use the bathroom 3-4 x  She has trouble falling back asleep 14 times a week due to an over-active mind    It usually takes 1hour to get back to sleep  Patient is usually up at 6:30 a.m.  Uses alarm: No    Weekends/Non-work Days/All Other Days:  Usually gets into bed at Same   Takes patient about 2hour to fall asleep  Patient is usually up at 6:30 a.m.  Uses alarm: No    Sleep Need  Patient gets  4 hiurs sleep on average   Patient thinks she needs about 8 hours sleep    Bria Haddad prefers to sleep in this position(s): Side   Patient states they do the following activities in bed: Read    Naps  Patient takes a purposeful nap Never    She feels better after a nap:    She dozes off unintentionally [place]    Patient has had a driving accident or near-miss due to sleepiness/drowsiness: No      SLEEP DISRUPTIONS:    Breathing/Snoring  Patient snores:Yes, has a bed-partner with hearing aides  Other people complain about her snoring: No  Patient has been told she stops breathing in her sleep:No  She has issues with the following: Morning mouth dryness;Stuffy nose when you wake up;Heartburn or reflux at night;Getting up to urinate more than once    Movement:  Patient gets pain, discomfort, with an urge to move:  Yes   Has 'issues with her legs' edema, venous insufficiency   She denies a classic description of restless leg syndrome   Patient has been told she kicks her legs at night:   No     Behaviors in Sleep:  No dream enactment  Occasional 'yells out'         CAFFEINE AND OTHER SUBSTANCES:    Patient consumes caffeinated beverages per day:   1 coffee, 2-3 sodas  Last caffeine use is usually:  7    Family History:  Patient has a family member been diagnosed with a sleep disorder:              SCALES:    EPWORTH SLEEPINESS SCALE      [place] Sleepiness Scale ( XAVI Michaud  [id number]<br>ESS - [place]/English - Final version - 21 Nov 07 - [facility] [place].) 2/13/2023   Sitting and reading Would never doze   Watching TV Would never doze   Sitting, inactive in a public place (e.g. a theatre or a meeting) Would never doze   As a passenger in a car for an hour without a break High chance of dozing   Lying down to rest in the afternoon when circumstances permit Would never doze   Sitting and talking to someone Would never doze   Sitting quietly after a lunch without alcohol Would never doze   In a car, while stopped for a few minutes in traffic Would never doze   [place] Score (MC) 3   [place] Score (Sleep) 3         INSOMNIA SEVERITY INDEX (MARLA)      Insomnia Severity Index (MARLA) 2/13/2023   Difficulty falling asleep 2   Difficulty staying asleep 3   Problems waking up too early 3   How SATISFIED/DISSATISFIED are you with your CURRENT sleep pattern? 4   How NOTICEABLE to others do you think your sleep problem is in terms of impairing the quality of your life? 2   How WORRIED/DISTRESSED are you about your current sleep problem? 3   To what extent do you consider your sleep problem to INTERFERE with your daily functioning (e.g. daytime fatigue, mood, ability to function at work/daily chores, concentration, memory, mood, etc.) CURRENTLY? 3   MARLA Total Score 20       Guidelines for Scoring/Interpretation:  Total score categories:  0-7 = No clinically significant insomnia   8-14 = Subthreshold insomnia   15-21 = Clinical insomnia (moderate severity)  22-28 = Clinical insomnia (severe)  Used via courtesy  of www.[facility].va.gov with permission from Eliseo Blackwell PhD., [facility]      Allergies:    Allergies   Allergen Reactions     Asa [Aspirin] Hives       Medications:    Current Outpatient Medications   Medication Sig Dispense Refill     adalimumab (HUMIRA *CF*) 40 MG/0.4ML pen kit Inject 0.4 mLs (40 mg) Subcutaneous every 14 days 2.4 mL 2     HYDROcodone-acetaminophen (NORCO) 5-325 MG tablet Take 1-2 tablets by mouth every 6 hours as needed for moderate to severe pain 8 tablet 0     order for DME Equipment being ordered: compression stockings 1 pair, at 30mm Hg, to be worn during the day. For Bilateral leg edema [R60.0] 2 Units 0     senna-docusate (SENOKOT-S/PERICOLACE) 8.6-50 MG tablet Take 1-2 tablets by mouth 2 times daily 30 tablet 0     sulfaSALAzine (AZULFIDINE) 500 MG tablet Take 2 tablets (1,000 mg) by mouth 2 times daily 360 tablet 2       Problem List:  Patient Active Problem List    Diagnosis Date Noted     Rheumatoid arthritis (H) 09/19/2017     Priority: High     Rheumatoid arthritis       Morbid obesity (H) 09/23/2021     Priority: Medium     Lymphedema 02/12/2023     Priority: Low     Trigger finger, right ring finger 01/18/2023     Priority: Low     Chronic pain of both knees 09/02/2022     Priority: Low     Endometriosis of uterus 09/25/2012     Priority: Low     CARDIOVASCULAR SCREENING; LDL GOAL LESS THAN 160 10/31/2010     Priority: Low        Past Medical/Surgical History:  Past Medical History:   Diagnosis Date     Bilateral carpal tunnel syndrome 12/20/2015     Obesity      RA (rheumatoid arthritis) (H)     inflammatory poly arthritis.     S/P carpal tunnel release 12/29/2016     Past Surgical History:   Procedure Laterality Date     ABDOMEN SURGERY  1992    Laparoscopy     bunions       COLONOSCOPY N/A 11/9/2015    Procedure: COLONOSCOPY;  Surgeon: Andrew Adamson MD;  Location: [facility] OR     COLONOSCOPY WITH CO2 INSUFFLATION N/A 11/9/2015    Procedure: COLONOSCOPY WITH CO2  INSUFFLATION;  Surgeon: Andrew Adamson MD;  Location: [facility] OR     GYN SURGERY      endometryosis     RELEASE CARPAL TUNNEL Right 10/12/2016    Procedure: RELEASE CARPAL TUNNEL;  Surgeon: Colby Nobles MD;  Location: [facility] OR     RELEASE CARPAL TUNNEL Left 12/16/2016    Procedure: RELEASE CARPAL TUNNEL;  Surgeon: Colby Nobles MD;  Location: [facility] OR     RELEASE TRIGGER FINGER Right 2/9/2023    Procedure: RELEASE, TRIGGER FINGER right ring finger;  Surgeon: Danie Urban MD;  Location: [facility] OR     TONSILLECTOMY & ADENOIDECTOMY         Social History:  Social History     Socioeconomic History     Marital status:      Spouse name: Not on file     Number of children: Not on file     Years of education: Not on file     Highest education level: Not on file   Occupational History     Occupation:      Comment: sitting job   Tobacco Use     Smoking status: Never     Smokeless tobacco: Never   Vaping Use     Vaping Use: Never used   Substance and Sexual Activity     Alcohol use: No     Alcohol/week: 0.0 standard drinks     Drug use: No     Sexual activity: Not Currently     Partners: Male     Birth control/protection: Abstinence, Male Surgical   Other Topics Concern     Parent/sibling w/ CABG, MI or angioplasty before 65F 55M? Yes     Comment: Father at age 45   Social History Narrative     Not on file     Social Determinants of Health     Financial Resource Strain: Not on file   Food Insecurity: Not on file   Transportation Needs: Not on file   Physical Activity: Not on file   Stress: Not on file   Social Connections: Not on file   Intimate Partner Violence: Not on file   Housing Stability: Not on file       Family History:  Family History   Problem Relation Age of Onset     Osteoporosis Mother      Respiratory Mother      Thyroid Disease Mother      Anxiety Disorder Mother      Asthma Mother      Heart Disease Father      Cancer - colorectal Maternal Grandmother      Colon Cancer  "Maternal Grandmother      Diabetes Maternal Grandfather      Heart Disease Maternal Grandfather      Cancer Paternal Grandmother      Heart Disease Paternal Grandfather      Respiratory Paternal Grandfather      Hypertension Brother      Thyroid Disease Sister      Thyroid Disease Sister      Neurologic Disorder Brother      Hypertension Brother      Thyroid Disease Brother      Other Cancer Sister      Anxiety Disorder Sister      Mental Illness Sister      Anesthesia Reaction Sister      Thyroid Disease Sister          Physical Examination:  Vitals: Ht 1.651 m (5' 5\")   Wt 102.5 kg (226 lb)   BMI 37.61 kg/m    BMI= Body mass index is 37.61 kg/m .    SpO2 Readings from Last 4 Encounters:   02/09/23 98%   02/06/23 98%   08/11/22 98%   05/05/22 97%       GENERAL APPEARANCE: alert and no distress  EYES: Eyes grossly normal to inspection  HENT: mouth without ulcers or lesions  NECK: generous size  LUNGS: no shortness of breath , cough  NEURO: mentation intact, speech normal and cranial nerves 2-12 appear intact  PSYCH: affect normal/bright  Mallampati Class: probably 3-4 (poor lighting              Data: All pertinent previous laboratory data reviewed     Recent Labs   Lab Test 02/06/23  0755 11/10/22  0658 12/22/20  1754 09/23/20  0749     --   --  141   POTASSIUM 3.8  --   --  4.1   CHLORIDE 110*  --   --  109   CO2 30  --   --  29   ANIONGAP 4  --   --  3   *  --   --  104*   BUN 16  --   --  16   CR 0.72 0.74   < > 0.77   AGUILAR 9.6  --   --  9.4    < > = values in this interval not displayed.       Recent Labs   Lab Test 02/06/23  0755   WBC 6.5   RBC 4.70   HGB 14.3   HCT 42.5   MCV 90   MCH 30.4   MCHC 33.6   RDW 13.2          Recent Labs   Lab Test 02/06/23  0755   PROTTOTAL 7.2   ALBUMIN 3.9   BILITOTAL 0.5   ALKPHOS 70   AST 14   ALT 28       TSH (mU/L)   Date Value   09/23/2020 3.77   12/31/2019 3.17         Tj Chow MD 2/13/2023         "

## 2023-02-13 NOTE — PROGRESS NOTES
Video-Visit Details    Type of service:  Video Visit    Video Start Time (time video started): 11:09 AM     Video End Time (time video stopped): 12:00 PM     Originating Location (pt. Location): Home        Distant Location (provider location):  Off-site    Mode of Communication:  Video Conference via [employer]

## 2023-02-17 NOTE — PROGRESS NOTES
chief complaint:   Chief Complaint   Patient presents with     Right Ring Finger - Surgical Followup     Right ring trigger release DOS 02-06-23       SURGERY: Right ring finger trigger release    DATE OF SURGERY: 2/9/2023    HISTORY:  Bria Haddad is a 57 year old female , Right -hand dominant who returns for postoperative visit of Right ring finger trigger release. She returns now 2 weeks following surgery. Since surgery, no issues. Denies fevers, chills, night sweats. Denies numbness or tingling. Pain has been controlled and currently mild. No concerns today.    Other PMH:  has a past medical history of Bilateral carpal tunnel syndrome (12/20/2015), Obesity, RA (rheumatoid arthritis) (H), and S/P carpal tunnel release (12/29/2016).    She has no past medical history of Cancer (H), Cerebral infarction (H), Congestive heart failure (H), COPD (chronic obstructive pulmonary disease) (H), Depressive disorder, Diabetes (H), Heart disease, History of blood transfusion, Hypertension, Thyroid disease, or Uncomplicated asthma.    Surgical Hx:  has a past surgical history that includes GYN surgery (1992); tonsillectomy & adenoidectomy (1973); Colonoscopy (N/A, 11/09/2015); Colonoscopy with CO2 insufflation (N/A, 11/09/2015); Release carpal tunnel (Right, 10/12/2016); Release carpal tunnel (Left, 12/16/2016); Release trigger finger (Right, 02/09/2023); and Bunionectomy (Bilateral, 1977).    Medications:   Current Outpatient Medications:      adalimumab (HUMIRA *CF*) 40 MG/0.4ML pen kit, Inject 0.4 mLs (40 mg) Subcutaneous every 14 days, Disp: 2.4 mL, Rfl: 2     HYDROcodone-acetaminophen (NORCO) 5-325 MG tablet, Take 1-2 tablets by mouth every 6 hours as needed for moderate to severe pain, Disp: 8 tablet, Rfl: 0     order for DME, Equipment being ordered: compression stockings 1 pair, at 30mm Hg, to be worn during the day. For Bilateral leg edema [R60.0], Disp: 2 Units, Rfl: 0     senna-docusate (SENOKOT-S/PERICOLACE) 8.6-50  "MG tablet, Take 1-2 tablets by mouth 2 times daily, Disp: 30 tablet, Rfl: 0     sulfaSALAzine (AZULFIDINE) 500 MG tablet, Take 2 tablets (1,000 mg) by mouth 2 times daily, Disp: 360 tablet, Rfl: 2     zolpidem (AMBIEN) 5 MG tablet, Take tablet by mouth 15 minutes prior to sleep, for Sleep Study, Disp: 1 tablet, Rfl: 0    Allergies:   Allergies   Allergen Reactions     Asa [Aspirin] Hives           REVIEW OF SYSTEMS:    Denies numbness, tingling, parasthesias.   Denies headaches.   Denies fevers, chills, night sweats   Denies chest pain.   Denies shortness of breath.   Denies any skin problems, abrasions, rashes, irritation.      EXAM:  BP (!) 146/82 (BP Location: Left arm, Patient Position: Sitting, Cuff Size: Adult Large)   Pulse 68   Ht 1.651 m (5' 5\")   Wt 103.9 kg (229 lb)   SpO2 100%   BMI 38.11 kg/m      GENERAL APPEARANCE: healthy, alert and no distress   GAIT: NORMAL  SKIN: no suspicious lesions or rashes  RESPIRATORY: No increased work of breathing.  PSYCH:  mentation appears normal and affect normal/bright, not anxious.    MUSCULOSKELETAL:    Right HAND/FINGERS:   Skin intact. Normal wear pattern, color and tone.   Incision/wound is healing well with skin edges well approximated. No erythema or drainage. Sutures in place.  No palpable triggering noted at A1 pulley of Right ring finger.  Mild ttp at incision.  There is mild swelling in the ring finger, hand.  There is mild tenderness in the palm, incision.  There is mild ecchymosis.  There is no erythema of the surrounding skin.  There is no maceration of the skin.  There is no gross deformity in the area.    Intact EPL, FPL, FDP, EDC, wrist flexion/extension, biceps/triceps  Intact sensation to light touch in median, radial and ulnar nerve distributions of the hand and specifically the radial and ulnar digital nerves of the Right ring finger.      XRAYS: none indicated.    ASSESSMENT: 57 year old female , Right -hand dominant,  2 weeks s/p trigger " release of Right ring finger, doing well.      PLAN: routine postoperative trigger release  * sutures removed  * ok to get incision wet. No soaking for another week  * gradual return to use of hand and fingers  * scar massage and desensitization. Vit E oil in 3 weeks.  * return to clinic as needed      Danie Urban M.D., M.S.  Dept. of Orthopaedic Surgery  [facility]

## 2023-02-19 NOTE — NURSING NOTE
"Chief Complaint   Patient presents with     Derm Problem     rash on top of both feet with itching, also has swelling       Initial Pulse 72  Temp 97.6  F (36.4  C) (Oral)  Wt 217 lb (98.4 kg)  SpO2 98%  BMI 36.39 kg/m2 Estimated body mass index is 36.39 kg/(m^2) as calculated from the following:    Height as of 7/12/17: 5' 4.75\" (1.645 m).    Weight as of this encounter: 217 lb (98.4 kg).  Medication Reconciliation: complete   Stella Armenta CMA      "
IMPROVE-DD Application Not Available

## 2023-02-23 ENCOUNTER — OFFICE VISIT (OUTPATIENT)
Dept: ORTHOPEDICS | Facility: CLINIC | Age: 58
End: 2023-02-23
Payer: COMMERCIAL

## 2023-02-23 VITALS
HEART RATE: 68 BPM | SYSTOLIC BLOOD PRESSURE: 146 MMHG | OXYGEN SATURATION: 100 % | WEIGHT: 229 LBS | HEIGHT: 65 IN | DIASTOLIC BLOOD PRESSURE: 82 MMHG | BODY MASS INDEX: 38.15 KG/M2

## 2023-02-23 DIAGNOSIS — Z98.890 S/P TRIGGER FINGER RELEASE: Primary | ICD-10-CM

## 2023-02-23 PROCEDURE — 99024 POSTOP FOLLOW-UP VISIT: CPT | Performed by: ORTHOPAEDIC SURGERY

## 2023-02-23 ASSESSMENT — PAIN SCALES - GENERAL: PAINLEVEL: NO PAIN (1)

## 2023-02-23 NOTE — LETTER
2/23/2023         RE: Bria Haddad  [address] [facility] [address]        Dear Colleague,    Thank you for referring your patient, Bria Haddad, to the [facility]. Please see a copy of my visit note below.    chief complaint:   Chief Complaint   Patient presents with     Right Ring Finger - Surgical Followup     Right ring trigger release DOS 02-06-23       SURGERY: Right ring finger trigger release    DATE OF SURGERY: 2/9/2023    HISTORY:  Bria Haddad is a 57 year old female , Right -hand dominant who returns for postoperative visit of Right ring finger trigger release. She returns now 2 weeks following surgery. Since surgery, no issues. Denies fevers, chills, night sweats. Denies numbness or tingling. Pain has been controlled and currently mild. No concerns today.    Other PMH:  has a past medical history of Bilateral carpal tunnel syndrome (12/20/2015), Obesity, RA (rheumatoid arthritis) (H), and S/P carpal tunnel release (12/29/2016).    She has no past medical history of Cancer (H), Cerebral infarction (H), Congestive heart failure (H), COPD (chronic obstructive pulmonary disease) (H), Depressive disorder, Diabetes (H), Heart disease, History of blood transfusion, Hypertension, Thyroid disease, or Uncomplicated asthma.    Surgical Hx:  has a past surgical history that includes GYN surgery (1992); tonsillectomy & adenoidectomy (1973); Colonoscopy (N/A, 11/09/2015); Colonoscopy with CO2 insufflation (N/A, 11/09/2015); Release carpal tunnel (Right, 10/12/2016); Release carpal tunnel (Left, 12/16/2016); Release trigger finger (Right, 02/09/2023); and Bunionectomy (Bilateral, 1977).    Medications:   Current Outpatient Medications:      adalimumab (HUMIRA *CF*) 40 MG/0.4ML pen kit, Inject 0.4 mLs (40 mg) Subcutaneous every 14 days, Disp: 2.4 mL, Rfl: 2     HYDROcodone-acetaminophen (NORCO) 5-325 MG tablet, Take 1-2 tablets by mouth every 6 hours as needed for moderate to  "severe pain, Disp: 8 tablet, Rfl: 0     order for DME, Equipment being ordered: compression stockings 1 pair, at 30mm Hg, to be worn during the day. For Bilateral leg edema [R60.0], Disp: 2 Units, Rfl: 0     senna-docusate (SENOKOT-S/PERICOLACE) 8.6-50 MG tablet, Take 1-2 tablets by mouth 2 times daily, Disp: 30 tablet, Rfl: 0     sulfaSALAzine (AZULFIDINE) 500 MG tablet, Take 2 tablets (1,000 mg) by mouth 2 times daily, Disp: 360 tablet, Rfl: 2     zolpidem (AMBIEN) 5 MG tablet, Take tablet by mouth 15 minutes prior to sleep, for Sleep Study, Disp: 1 tablet, Rfl: 0    Allergies:   Allergies   Allergen Reactions     Asa [Aspirin] Hives           REVIEW OF SYSTEMS:    Denies numbness, tingling, parasthesias.   Denies headaches.   Denies fevers, chills, night sweats   Denies chest pain.   Denies shortness of breath.   Denies any skin problems, abrasions, rashes, irritation.      EXAM:  BP (!) 146/82 (BP Location: Left arm, Patient Position: Sitting, Cuff Size: Adult Large)   Pulse 68   Ht 1.651 m (5' 5\")   Wt 103.9 kg (229 lb)   SpO2 100%   BMI 38.11 kg/m      GENERAL APPEARANCE: healthy, alert and no distress   GAIT: NORMAL  SKIN: no suspicious lesions or rashes  RESPIRATORY: No increased work of breathing.  PSYCH:  mentation appears normal and affect normal/bright, not anxious.    MUSCULOSKELETAL:    Right HAND/FINGERS:   Skin intact. Normal wear pattern, color and tone.   Incision/wound is healing well with skin edges well approximated. No erythema or drainage. Sutures in place.  No palpable triggering noted at A1 pulley of Right ring finger.  Mild ttp at incision.  There is mild swelling in the ring finger, hand.  There is mild tenderness in the palm, incision.  There is mild ecchymosis.  There is no erythema of the surrounding skin.  There is no maceration of the skin.  There is no gross deformity in the area.    Intact EPL, FPL, FDP, EDC, wrist flexion/extension, biceps/triceps  Intact sensation to light " touch in median, radial and ulnar nerve distributions of the hand and specifically the radial and ulnar digital nerves of the Right ring finger.      XRAYS: none indicated.    ASSESSMENT: 57 year old female , Right -hand dominant,  2 weeks s/p trigger release of Right ring finger, doing well.      PLAN: routine postoperative trigger release  * sutures removed  * ok to get incision wet. No soaking for another week  * gradual return to use of hand and fingers  * scar massage and desensitization. Vit E oil in 3 weeks.  * return to clinic as needed      Danie Urban M.D., M.S.  Dept. of Orthopaedic Surgery  [facility]      Again, thank you for allowing me to participate in the care of your patient.        Sincerely,        Danie Urban MD

## 2023-02-28 ENCOUNTER — TELEPHONE (OUTPATIENT)
Dept: RHEUMATOLOGY | Facility: CLINIC | Age: 58
End: 2023-02-28

## 2023-02-28 ENCOUNTER — OFFICE VISIT (OUTPATIENT)
Dept: RHEUMATOLOGY | Facility: CLINIC | Age: 58
End: 2023-02-28
Payer: COMMERCIAL

## 2023-02-28 VITALS
BODY MASS INDEX: 48.06 KG/M2 | OXYGEN SATURATION: 98 % | HEART RATE: 74 BPM | WEIGHT: 288.8 LBS | DIASTOLIC BLOOD PRESSURE: 85 MMHG | SYSTOLIC BLOOD PRESSURE: 147 MMHG

## 2023-02-28 DIAGNOSIS — M06.09 RHEUMATOID ARTHRITIS OF MULTIPLE SITES WITH NEGATIVE RHEUMATOID FACTOR (H): Primary | ICD-10-CM

## 2023-02-28 DIAGNOSIS — Z79.899 HIGH RISK MEDICATIONS (NOT ANTICOAGULANTS) LONG-TERM USE: ICD-10-CM

## 2023-02-28 PROCEDURE — 99214 OFFICE O/P EST MOD 30 MIN: CPT | Performed by: INTERNAL MEDICINE

## 2023-02-28 NOTE — PROGRESS NOTES
Rheumatology Clinic Visit      Bria Haddad MRN# [id number]   YOB: 1965 Age: 57 year old      Date of visit: 2/28/23  PCP: Dr. Holli Marley    Chief Complaint   Patient presents with:  Rheumatoid Arthritis    Assessment and Plan     1.  Rheumatoid arthritis: Ms. Haddad initially presented to this clinic in 2015 with dependent edema of the bilateral lower extremities, fatigue, and a one time episode of right toe pain in the setting of a positive ROSEMARY.  On 9/18/2017 she presented with synovitis of her bilateral PIPs and pain without swelling of her MCPs, persistent fatigue, intermittent rash, and dependent edema. ROSEMARY positive but additional studies negative/normal including complement C3, complement C4, beta-2 glycoprotein IgM and IgG, cardiolipin IgM and IgG, lupus anticoagulant, RNP, Stuart, SSA, SSB, Scl-70.  Joint exam most consistent with rheumatoid arthritis.  Previously on HCQ (bloating), MTX (LFT elevation, alopecia, headache), leflunomide (LFT elevation). Currently on SSZ and Humira.  RA controlled at this time.   Chronic illness, stable.    - Continue sulfasalazine 1000mg twice daily  - Continue Humira 40mg SQ every 14 days  - Labs in 3 months: CBC, Creatinine, Hepatic Panel, ESR, CRP    High risk medication requiring intensive toxicity monitoring at least quarterly: labs ordered include CBC, Creatinine, Hepatic panel to monitor for cytopenia and hepatotoxicity; checking creatinine as it affects clearance of medication.      2.  Bilateral patellofemoral syndrome: Resolves with physical therapy exercises, typically recurring when she stops doing the exercises on her own at home.    3. History of Left shoulder pain, impingement syndrome: Resolved with combination of steroid injection and physical therapy.  Not an issue today.      4.  Vaccinations: Vaccinations reviewed with Ms. Haddad.  - Influenza: encouraged yearly vaccination   - COVID-19: Up to date    5. Elevated blood pressure:   Bria to follow up with Primary Care provider regarding elevated blood pressure.     Total minutes spent in evaluation with patient, documentation, , and review of pertinent studies and chart notes: 16      Ms. Haddad verbalized agreement with and understanding of the rational for the diagnosis and treatment plan.  All questions were answered to best of my ability and the patient's satisfaction. Ms. Haddad was advised to contact the clinic with any questions that may arise after the clinic visit.      Thank you for involving me in the care of the patient    Return to clinic: 3 months    HPI   Bria Haddad is a 57 year old female with a past medical history significant for endometriosis, s/p carpal tunnel release surgery, trigger finger requiring surgery, and rheumatoid arthritis who present for follow up of rheumatoid arthritis.    Today, 2/28/2023: Patient reports that rheumatoid arthritis is well controlled.  Sulfasalazine and Humira are effective and well-tolerated.  Arthritis does not limit her daily activities.  She had trigger finger surgery and the incision is healing well.  Morning stiffness for no more than 20 minutes.  No fevers or chills, nausea or vomiting, constipation or diarrhea.  No chest pain/pressure, palpitations, or shortness of breath.  No eye pain or redness.  No black or bloody stools.  No rash.    Tobacco: none  EtOH: none  Drugs: none    ROS   12 point review of system was completed and negative except as noted in the HPI     Active Problem List     Patient Active Problem List   Diagnosis     CARDIOVASCULAR SCREENING; LDL GOAL LESS THAN 160     Endometriosis of uterus     Rheumatoid arthritis (H)     Morbid obesity (H)     Chronic pain of both knees     Trigger finger, right ring finger     Lymphedema     Chronic insomnia     Past Medical History     Past Medical History:   Diagnosis Date     Bilateral carpal tunnel syndrome 12/20/2015     Obesity      RA (rheumatoid arthritis) (H)      inflammatory poly arthritis.     S/P carpal tunnel release 12/29/2016     Past Surgical History     Past Surgical History:   Procedure Laterality Date     BUNIONECTOMY Bilateral 1977    10th grade     COLONOSCOPY N/A 11/09/2015    Procedure: COLONOSCOPY;  Surgeon: Andrew Adamson MD;  Location: [facility]     COLONOSCOPY WITH CO2 INSUFFLATION N/A 11/09/2015    Procedure: COLONOSCOPY WITH CO2 INSUFFLATION;  Surgeon: Andrew Adamson MD;  Location: [facility]     GYN SURGERY  1992    endometryosis     RELEASE CARPAL TUNNEL Right 10/12/2016    Procedure: RELEASE CARPAL TUNNEL;  Surgeon: Colby Nobles MD;  Location: [facility]     RELEASE CARPAL TUNNEL Left 12/16/2016    Procedure: RELEASE CARPAL TUNNEL;  Surgeon: Colby Nobles MD;  Location: [facility]     RELEASE TRIGGER FINGER Right 02/09/2023    Procedure: RELEASE, TRIGGER FINGER right ring finger;  Surgeon: Danie Urban MD;  Location: [facility]     TONSILLECTOMY & ADENOIDECTOMY  1973     Allergy     Allergies   Allergen Reactions     Asa [Aspirin] Hives     Current Medication List     Current Outpatient Medications   Medication Sig     adalimumab (HUMIRA *CF*) 40 MG/0.4ML pen kit Inject 0.4 mLs (40 mg) Subcutaneous every 14 days     sulfaSALAzine (AZULFIDINE) 500 MG tablet Take 2 tablets (1,000 mg) by mouth 2 times daily     zolpidem (AMBIEN) 5 MG tablet Take tablet by mouth 15 minutes prior to sleep, for Sleep Study     order for DME Equipment being ordered: compression stockings 1 pair, at 30mm Hg, to be worn during the day. For Bilateral leg edema [R60.0]     No current facility-administered medications for this visit.     Social History   See HPI    Family History     Family History   Problem Relation Age of Onset     Osteoporosis Mother      Respiratory Mother      Thyroid Disease Mother      Anxiety Disorder Mother      Asthma Mother      Heart Disease Father      Cancer - colorectal Maternal Grandmother      Colon Cancer Maternal Grandmother   "    Diabetes Maternal Grandfather      Heart Disease Maternal Grandfather      Cancer Paternal Grandmother      Heart Disease Paternal Grandfather      Respiratory Paternal Grandfather      Hypertension Brother      Thyroid Disease Sister      Thyroid Disease Sister      Neurologic Disorder Brother      Hypertension Brother      Thyroid Disease Brother      Other Cancer Sister      Anxiety Disorder Sister      Mental Illness Sister      Anesthesia Reaction Sister      Thyroid Disease Sister      Physical Exam     Temp Readings from Last 3 Encounters:   02/09/23 98.4  F (36.9  C) (Temporal)   02/06/23 98.1  F (36.7  C) (Tympanic)   05/05/22 98.2  F (36.8  C) (Tympanic)     BP Readings from Last 5 Encounters:   02/28/23 (!) 147/85   02/23/23 (!) 146/82   02/09/23 (!) 142/71   02/06/23 138/84   01/18/23 128/73     Pulse Readings from Last 1 Encounters:   02/28/23 74     Resp Readings from Last 1 Encounters:   02/09/23 16     Estimated body mass index is 48.06 kg/m  as calculated from the following:    Height as of 2/23/23: 1.651 m (5' 5\").    Weight as of this encounter: 131 kg (288 lb 12.8 oz).    GEN: NAD.   HEENT:  Anicteric, noninjected sclera. No obvious external lesions of the ear and nose. Hearing intact.  CV: S1, S2. RRR. No m/r/g  PULM: No increased work of breathing. CTA bilaterally   MSK: MCPs, PIPs, DIPs without swelling or tenderness to palpation.  Wrists without swelling or tenderness to palpation.  Elbows and shoulders without swelling or tenderness to palpation.  Knees, ankles, and MTPs without swelling or tenderness to palpation.    SKIN: No rash or jaundice seen  PSYCH: Alert. Appropriate.       Labs / Imaging (select studies)     RF/CCP  Recent Labs   Lab Test 09/18/17  0913   CCPIGG 1   RHF <20     CBC  Recent Labs   Lab Test 02/06/23  0755 11/10/22  0658 08/11/22  1443 10/02/21  1656 07/05/21  1519 04/06/21  0716 12/22/20  1754   WBC 6.5 6.8 7.2   < > 6.3 7.2 8.4   RBC 4.70 4.84 4.92   < > 4.89 " 4.89 4.83   HGB 14.3 14.5 14.8   < > 14.2 14.4 14.3   HCT 42.5 44.5 44.1   < > 43.6 43.5 43.3   MCV 90 92 90   < > 89 89 90   RDW 13.2 13.3 13.3   < > 13.1 12.8 12.8    282 326   < > 363 302 317   MCH 30.4 30.0 30.1   < > 29.0 29.4 29.6   MCHC 33.6 32.6 33.6   < > 32.6 33.1 33.0   NEUTROPHIL 52 49 47   < > 43.3 45.3 38.0   LYMPH 34 40 40   < > 41.1 43.0 48.6   MONOCYTE 10 7 10   < > 12.4 9.9 11.3   EOSINOPHIL 4 4 3   < > 2.9 1.5 2.0   BASOPHIL 1 0 0   < > 0.3 0.3 0.1   ANEU  --   --   --   --  2.7 3.3 3.2   ALYM  --   --   --   --  2.6 3.1 4.1   MIKE  --   --   --   --  0.8 0.7 1.0   AEOS  --   --   --   --  0.2 0.1 0.2   ABAS  --   --   --   --  0.0 0.0 0.0   ANEUTAUTO 3.3 3.3 3.4   < >  --   --   --    ALYMPAUTO 2.2 2.7 2.8   < >  --   --   --    AMONOAUTO 0.6 0.5 0.7   < >  --   --   --    AEOSAUTO 0.3 0.2 0.2   < >  --   --   --    ABSBASO 0.0 0.0 0.0   < >  --   --   --     < > = values in this interval not displayed.     CMP  Recent Labs   Lab Test 02/06/23  0755 11/10/22  0658 08/11/22  1443 10/02/21  1656 07/05/21  1519 04/06/21  0716 12/22/20  1754 09/23/20  0749 12/31/19  0712 05/08/19  0745     --   --   --   --   --   --  141  --  142   POTASSIUM 3.8  --   --   --   --   --   --  4.1  --  4.0   CHLORIDE 110*  --   --   --   --   --   --  109  --  109   CO2 30  --   --   --   --   --   --  29  --  30   ANIONGAP 4  --   --   --   --   --   --  3  --  3   *  --   --   --   --   --   --  104*  --  99   BUN 16  --   --   --   --   --   --  16  --  14   CR 0.72 0.74 0.85   < > 0.78 0.86 0.87 0.77   < > 0.74   GFRESTIMATED >90 >90 78   < > 84 75 74 85   < > >90   GFRESTBLACK  --   --   --   --  >90 86 85 >90   < > >90   AGUILAR 9.6  --   --   --   --   --   --  9.4  --  9.3   BILITOTAL 0.5 0.4 0.4   < > 0.2 0.4 0.2  --    < > 0.3   ALBUMIN 3.9 3.9 3.9   < > 3.9 3.8 4.3  --    < > 4.0   PROTTOTAL 7.2 7.1 7.4   < > 7.5 7.6 7.8  --    < > 7.5   ALKPHOS 70 73 79   < > 76 76 72  --    < > 73   AST 14  13 19   < > 17 13 17  --    < > 15   ALT 28 27 31   < > 32 26 27  --    < > 31    < > = values in this interval not displayed.     Calcium/VitaminD  Recent Labs   Lab Test 02/06/23  0755 09/23/20  0749 05/08/19  0745   AGUILAR 9.6 9.4 9.3     ESR/CRP  Recent Labs   Lab Test 02/06/23  0755 11/10/22  0658 08/11/22  1443   SED 8 5 7   CRP 4.8 <2.9 <2.9     Hepatitis B  Recent Labs   Lab Test 10/12/15  0923   AUSAB 0.18   HBCAB Nonreactive   HEPBANG Nonreactive     Hepatitis C  Recent Labs   Lab Test 10/07/16  0912   HCVAB Nonreactive   Assay performance characteristics have not been established for newborns,   infants, and children       Tuberculosis Screening  Recent Labs   Lab Test 01/18/22  0701 12/07/18  0724   TBRES Negative Negative     HIV Screening  Recent Labs   Lab Test 10/12/15  0923   HIAGAB Nonreactive   HIV-1 p24 Ag & HIV-1/HIV-2 Ab Not Detected            Chart documentation done in part with Dragon Voice recognition Software. Although reviewed after completion, some word and grammatical error may remain.    Bill Resendiz MD

## 2023-02-28 NOTE — PATIENT INSTRUCTIONS
RHEUMATOLOGY    Dr. Bill Resendiz    [facility] [place]  [address] [place] [address]  [place] [address]  Phone number: [phone]  Fax number: [phone]    You may schedule your FLU shot by calling [phone] or if you would like to get your shot at a [facility] pharmacy you may schedule online at [email or web address].    Thank you for choosing [facility]!

## 2023-02-28 NOTE — TELEPHONE ENCOUNTER
Prior Authorization Retail Medication Request    Medication/Dose: adalimumab (HUMIRA *CF*) 40 MG/0.4ML pen kit  ICD code (if different than what is on RX):    Previously Tried and Failed:    Rationale:      Insurance Name:    Insurance ID:        Pharmacy Information (if different than what is on RX)  Name:    Phone:

## 2023-03-02 NOTE — TELEPHONE ENCOUNTER
PA Initiation    Medication: adalimumab (HUMIRA *CF*) 40 MG/0.4ML pen kit  Insurance Company: [employer] - Phone [phone] Fax [phone]  [facility], [address]  Filling Pharmacy Phone: [phone]  Filling Pharmacy Fax: [phone]  Start Date: 3/2/2023    Bria Haddad ([place]: [id number])

## 2023-03-02 NOTE — TELEPHONE ENCOUNTER
PA Initiation    Medication: adalimumab (HUMIRA *CF*) 40 MG/0.4ML pen kit  Insurance Company: [employer] - Phone [phone] Fax [phone]  Pharmacy Filling the Rx: [facility] [address]  Filling Pharmacy Phone: [phone]  Filling Pharmacy Fax: [phone]  Start Date: 3/2/2023   Fax request received from  Plan completed and faxed back to plan with chart notes

## 2023-03-02 NOTE — TELEPHONE ENCOUNTER
[facility] calling to make sure the provider completed the PA on the medication.  They will be faxing over the PA form to [facility] Fax: [phone]

## 2023-03-03 NOTE — TELEPHONE ENCOUNTER
Prior Authorization Approval    Authorization Effective Date: 1/31/2023  Authorization Expiration Date: 3/1/2024  Medication: adalimumab (HUMIRA *CF*) 40 MG/0.4ML pen kit  Approved Dose/Quantity:   Reference #: [id number]   Insurance Company: [employer] - Phone [phone] Fax [phone]  Which Pharmacy is filling the prescription (Not needed for infusion/clinic administered): [address]  Pharmacy Notified: Yes  Patient Notified: Yes- Sent patient a aitainment message regarding approval.

## 2023-04-16 ENCOUNTER — HEALTH MAINTENANCE LETTER (OUTPATIENT)
Age: 58
End: 2023-04-16

## 2023-04-17 ENCOUNTER — TELEPHONE (OUTPATIENT)
Dept: FAMILY MEDICINE | Facility: CLINIC | Age: 58
End: 2023-04-17
Payer: COMMERCIAL

## 2023-04-17 NOTE — TELEPHONE ENCOUNTER
Patient Quality Outreach    Patient is due for the following:   Colon Cancer Screening  Breast Cancer Screening - Mammogram  Physical Preventive Adult Physical      Topic Date Due     Hepatitis B Vaccine (1 of 3 - 3-dose series) Never done     Pneumococcal Vaccine (1 - PCV) Never done     Zoster (Shingles) Vaccine (1 of 2) Never done     Flu Vaccine (1) 09/01/2022       Next Steps:   Schedule a Adult Preventative    Type of outreach:    Sent [employer] message.      Questions for provider review:         Tarik Milner MA

## 2023-05-22 ENCOUNTER — LAB (OUTPATIENT)
Dept: LAB | Facility: CLINIC | Age: 58
End: 2023-05-22
Payer: COMMERCIAL

## 2023-05-22 DIAGNOSIS — M06.09 RHEUMATOID ARTHRITIS OF MULTIPLE SITES WITH NEGATIVE RHEUMATOID FACTOR (H): ICD-10-CM

## 2023-05-22 DIAGNOSIS — Z79.899 HIGH RISK MEDICATIONS (NOT ANTICOAGULANTS) LONG-TERM USE: ICD-10-CM

## 2023-05-22 LAB
ALBUMIN SERPL-MCNC: 3.7 G/DL (ref 3.4–5)
ALP SERPL-CCNC: 68 U/L (ref 40–150)
ALT SERPL W P-5'-P-CCNC: 34 U/L (ref 0–50)
AST SERPL W P-5'-P-CCNC: 25 U/L (ref 0–45)
BASOPHILS # BLD AUTO: 0 10E3/UL (ref 0–0.2)
BASOPHILS NFR BLD AUTO: 0 %
BILIRUB DIRECT SERPL-MCNC: <0.1 MG/DL (ref 0–0.2)
BILIRUB SERPL-MCNC: 0.3 MG/DL (ref 0.2–1.3)
CREAT SERPL-MCNC: 0.79 MG/DL (ref 0.52–1.04)
CRP SERPL-MCNC: <2.9 MG/L (ref 0–8)
EOSINOPHIL # BLD AUTO: 0.2 10E3/UL (ref 0–0.7)
EOSINOPHIL NFR BLD AUTO: 4 %
ERYTHROCYTE [DISTWIDTH] IN BLOOD BY AUTOMATED COUNT: 12.9 % (ref 10–15)
ERYTHROCYTE [SEDIMENTATION RATE] IN BLOOD BY WESTERGREN METHOD: 7 MM/HR (ref 0–30)
GFR SERPL CREATININE-BSD FRML MDRD: 85 ML/MIN/1.73M2
HCT VFR BLD AUTO: 44 % (ref 35–47)
HGB BLD-MCNC: 14.4 G/DL (ref 11.7–15.7)
IMM GRANULOCYTES # BLD: 0 10E3/UL
IMM GRANULOCYTES NFR BLD: 0 %
LYMPHOCYTES # BLD AUTO: 2.4 10E3/UL (ref 0.8–5.3)
LYMPHOCYTES NFR BLD AUTO: 42 %
MCH RBC QN AUTO: 29.6 PG (ref 26.5–33)
MCHC RBC AUTO-ENTMCNC: 32.7 G/DL (ref 31.5–36.5)
MCV RBC AUTO: 91 FL (ref 78–100)
MONOCYTES # BLD AUTO: 0.5 10E3/UL (ref 0–1.3)
MONOCYTES NFR BLD AUTO: 9 %
NEUTROPHILS # BLD AUTO: 2.6 10E3/UL (ref 1.6–8.3)
NEUTROPHILS NFR BLD AUTO: 45 %
PLATELET # BLD AUTO: 311 10E3/UL (ref 150–450)
PROT SERPL-MCNC: 7.1 G/DL (ref 6.8–8.8)
RBC # BLD AUTO: 4.86 10E6/UL (ref 3.8–5.2)
WBC # BLD AUTO: 5.8 10E3/UL (ref 4–11)

## 2023-05-22 PROCEDURE — 85652 RBC SED RATE AUTOMATED: CPT

## 2023-05-22 PROCEDURE — 86140 C-REACTIVE PROTEIN: CPT

## 2023-05-22 PROCEDURE — 82565 ASSAY OF CREATININE: CPT

## 2023-05-22 PROCEDURE — 80076 HEPATIC FUNCTION PANEL: CPT

## 2023-05-22 PROCEDURE — 36415 COLL VENOUS BLD VENIPUNCTURE: CPT

## 2023-05-22 PROCEDURE — 85025 COMPLETE CBC W/AUTO DIFF WBC: CPT

## 2023-05-26 ENCOUNTER — TELEPHONE (OUTPATIENT)
Dept: SLEEP MEDICINE | Facility: CLINIC | Age: 58
End: 2023-05-26

## 2023-05-26 ENCOUNTER — VIRTUAL VISIT (OUTPATIENT)
Dept: RHEUMATOLOGY | Facility: CLINIC | Age: 58
End: 2023-05-26
Payer: COMMERCIAL

## 2023-05-26 DIAGNOSIS — M06.09 RHEUMATOID ARTHRITIS OF MULTIPLE SITES WITH NEGATIVE RHEUMATOID FACTOR (H): Primary | ICD-10-CM

## 2023-05-26 DIAGNOSIS — Z72.820 POOR SLEEP: ICD-10-CM

## 2023-05-26 DIAGNOSIS — Z79.899 HIGH RISK MEDICATIONS (NOT ANTICOAGULANTS) LONG-TERM USE: ICD-10-CM

## 2023-05-26 DIAGNOSIS — I89.0 LYMPHEDEMA: ICD-10-CM

## 2023-05-26 PROCEDURE — 99214 OFFICE O/P EST MOD 30 MIN: CPT | Mod: VID | Performed by: INTERNAL MEDICINE

## 2023-05-26 RX ORDER — SULFASALAZINE 500 MG/1
1000 TABLET ORAL 2 TIMES DAILY
Qty: 360 TABLET | Refills: 2 | Status: SHIPPED | OUTPATIENT
Start: 2023-05-26 | End: 2023-12-01

## 2023-05-26 NOTE — Clinical Note
JOHNNIE: Bria continues to struggle with lymphedema; I referred her back to the lymphedema therapist.  I also asked her to reschedule her sleep medicine visit where she says a CPAP was going to be tried; she had canceled because she was frustrated that her follow-up appointment was rescheduled to a later date.

## 2023-05-26 NOTE — PATIENT INSTRUCTIONS
RHEUMATOLOGY    [facility]  [address]  [place] [address]    Phone number: [phone]  Fax number: [phone]      Thank you for choosing [facility]!    Jolynn Angulo CMA

## 2023-05-26 NOTE — PROGRESS NOTES
Bria Haddad  is a 58 year old year old who is being evaluated via a billable video visit.      How would you like to obtain your AVS? MyChart  If the video visit is dropped, the invitation should be resent by: Text to cell phone: [phone]   Will anyone else be joining your video visit? No     Rheumatology Video Visit      Bria Haddad MRN# [id number]   YOB: 1965 Age: 58 year old      Date of visit: 5/26/23  PCP: Dr. Holli Marley    Chief Complaint   Patient presents with:  Rheumatoid Arthritis    Assessment and Plan     1.  Rheumatoid arthritis: Ms. Haddad initially presented to this clinic in 2015 with dependent edema of the bilateral lower extremities, fatigue, and a one time episode of right toe pain in the setting of a positive ROSEMARY.  On 9/18/2017 she presented with synovitis of her bilateral PIPs and pain without swelling of her MCPs, persistent fatigue, intermittent rash, and dependent edema. ROSEMARY positive but additional studies negative/normal including complement C3, complement C4, beta-2 glycoprotein IgM and IgG, cardiolipin IgM and IgG, lupus anticoagulant, RNP, Stuart, SSA, SSB, Scl-70.  Joint exam most consistent with rheumatoid arthritis.  Previously on HCQ (bloating), MTX (LFT elevation, alopecia, headache), leflunomide (LFT elevation). Currently on SSZ and Humira.  RA controlled.   Chronic illness, stable.    - Continue sulfasalazine 1000mg twice daily  - Continue Humira 40mg SQ every 14 days  - Labs in 3 months: CBC, Creatinine, Hepatic Panel, ESR, CRP    High risk medication requiring intensive toxicity monitoring at least quarterly.     2.  Bilateral patellofemoral syndrome: Resolves with physical therapy exercises, typically recurring when she stops doing the exercises on her own at home.  Not an issue at this time    3. History of Left shoulder pain, impingement syndrome: Resolved with combination of steroid injection and physical therapy.  Not an issue at this time.     4.   Lymphedema: Bilateral lower extremity edema continues to be an issue and she states that the compression socks she has purchased are insufficient.  She had a good result with the lymphedema therapist that was recommended by Dr. Barrera, vascular medicine, and she would like to return to the therapist for a follow-up visit.  I advised her to call to schedule an appointment with the lymphedema therapist and to follow-up with Dr. Barrera and/or her PCP for this issue in the future if needed.  - Lymphedema referral    5.  Sleep apnea: 2/13/2023 sleep medicine note documents moderate probability of obstructive sleep apnea and the patient reports that the neck step was to use a CPAP in office but the next sleep medicine appointment needed to be rescheduled and she was frustrated with that so she canceled the appointment.  We discussed the importance of her health and the importance of obstructive sleep apnea treatment if needed, so I advised her to reschedule the follow-up sleep medicine appointment.    6.  Vaccinations: Vaccinations reviewed with Ms. Haddad.  - Influenza: encouraged yearly vaccination   - COVID-19: Up to date    7. Elevated blood pressure:  Bria to follow up with Primary Care provider regarding elevated blood pressure.     Total minutes spent in evaluation with patient, documentation, , and review of pertinent studies and chart notes: 20      Ms. Haddad verbalized agreement with and understanding of the rational for the diagnosis and treatment plan.  All questions were answered to best of my ability and the patient's satisfaction. Ms. Haddad was advised to contact the clinic with any questions that may arise after the clinic visit.      Thank you for involving me in the care of the patient    Return to clinic: 3 months    HPI   Bria Haddad is a 58 year old female with a past medical history significant for endometriosis, s/p carpal tunnel release surgery, trigger finger requiring surgery, and  rheumatoid arthritis who present for follow up of rheumatoid arthritis.    Today, 5/26/2023: Joints are doing well.  No joint pain.  Morning stiffness for less than 20 minutes.  Trigger finger surgery went well and she has healed well.  Lymphedema of both lower extremities has been an issue; she has been using compression socks but they are not of the same quality as what she had received from the vascular medicine specialist, per patient.  The lymphedema therapist was very effective and she would like to return.  She canceled her follow-up sleep medicine evaluation where a CPAP was going to be used, per patient, because the provider had to reschedule and she was frustrated with that because she had waited so long to be seen in the first place.    No fevers or chills, nausea or vomiting, constipation or diarrhea.  No chest pain/pressure, palpitations, or shortness of breath.  No eye pain or redness.  No black or bloody stools.  No rash.    Tobacco: none  EtOH: none  Drugs: none    ROS   12 point review of system was completed and negative except as noted in the HPI     Active Problem List     Patient Active Problem List   Diagnosis     CARDIOVASCULAR SCREENING; LDL GOAL LESS THAN 160     Endometriosis of uterus     Rheumatoid arthritis (H)     Morbid obesity (H)     Chronic pain of both knees     Trigger finger, right ring finger     Lymphedema     Chronic insomnia     Past Medical History     Past Medical History:   Diagnosis Date     Bilateral carpal tunnel syndrome 12/20/2015     Obesity      RA (rheumatoid arthritis) (H)     inflammatory poly arthritis.     S/P carpal tunnel release 12/29/2016     Past Surgical History     Past Surgical History:   Procedure Laterality Date     BUNIONECTOMY Bilateral 1977    10th grade     COLONOSCOPY N/A 11/09/2015    Procedure: COLONOSCOPY;  Surgeon: Andrew Adamson MD;  Location: [facility]     COLONOSCOPY WITH CO2 INSUFFLATION N/A 11/09/2015    Procedure: COLONOSCOPY WITH  CO2 INSUFFLATION;  Surgeon: Andrew Adamson MD;  Location: [facility]     GYN SURGERY  1992    endometryosis     RELEASE CARPAL TUNNEL Right 10/12/2016    Procedure: RELEASE CARPAL TUNNEL;  Surgeon: Colby Nobles MD;  Location: [facility]     RELEASE CARPAL TUNNEL Left 12/16/2016    Procedure: RELEASE CARPAL TUNNEL;  Surgeon: Colby Nobles MD;  Location: [facility]     RELEASE TRIGGER FINGER Right 02/09/2023    Procedure: RELEASE, TRIGGER FINGER right ring finger;  Surgeon: Danie Urban MD;  Location: [facility]     TONSILLECTOMY & ADENOIDECTOMY  1973     Allergy     Allergies   Allergen Reactions     Asa [Aspirin] Hives     Current Medication List     Current Outpatient Medications   Medication Sig     adalimumab (HUMIRA *CF*) 40 MG/0.4ML pen kit Inject 0.4 mLs (40 mg) Subcutaneous every 14 days     order for DME Equipment being ordered: compression stockings 1 pair, at 30mm Hg, to be worn during the day. For Bilateral leg edema [R60.0]     sulfaSALAzine (AZULFIDINE) 500 MG tablet Take 2 tablets (1,000 mg) by mouth 2 times daily     zolpidem (AMBIEN) 5 MG tablet Take tablet by mouth 15 minutes prior to sleep, for Sleep Study     No current facility-administered medications for this visit.     Social History   See HPI    Family History     Family History   Problem Relation Age of Onset     Osteoporosis Mother      Respiratory Mother      Thyroid Disease Mother      Anxiety Disorder Mother      Asthma Mother      Heart Disease Father      Cancer - colorectal Maternal Grandmother      Colon Cancer Maternal Grandmother      Diabetes Maternal Grandfather      Heart Disease Maternal Grandfather      Cancer Paternal Grandmother      Heart Disease Paternal Grandfather      Respiratory Paternal Grandfather      Hypertension Brother      Thyroid Disease Sister      Thyroid Disease Sister      Neurologic Disorder Brother      Hypertension Brother      Thyroid Disease Brother      Other Cancer Sister      Anxiety  "Disorder Sister      Mental Illness Sister      Anesthesia Reaction Sister      Thyroid Disease Sister      Physical Exam     Temp Readings from Last 3 Encounters:   02/09/23 98.4  F (36.9  C) (Temporal)   02/06/23 98.1  F (36.7  C) (Tympanic)   05/05/22 98.2  F (36.8  C) (Tympanic)     BP Readings from Last 5 Encounters:   02/28/23 (!) 147/85   02/23/23 (!) 146/82   02/09/23 (!) 142/71   02/06/23 138/84   01/18/23 128/73     Pulse Readings from Last 1 Encounters:   02/28/23 74     Resp Readings from Last 1 Encounters:   02/09/23 16     Estimated body mass index is 48.06 kg/m  as calculated from the following:    Height as of 2/23/23: 1.651 m (5' 5\").    Weight as of 2/28/23: 131 kg (288 lb 12.8 oz).      GEN: NAD.   HEENT: Anicteric, noninjected sclera. No obvious external lesions of the ear and nose. Hearing intact.  PULM: No increased work of breathing  MSK:  Hands and wrists without swelling.   SKIN: No rash or jaundice seen  PSYCH: Alert. Appropriate.          Labs / Imaging (select studies)     RF/CCP  Recent Labs   Lab Test 09/18/17  0913   CCPIGG 1   RHF <20     CBC  Recent Labs   Lab Test 05/22/23  0719 02/06/23  0755 11/10/22  0658 10/02/21  1656 07/05/21  1519 04/06/21  0716 12/22/20  1754   WBC 5.8 6.5 6.8   < > 6.3 7.2 8.4   RBC 4.86 4.70 4.84   < > 4.89 4.89 4.83   HGB 14.4 14.3 14.5   < > 14.2 14.4 14.3   HCT 44.0 42.5 44.5   < > 43.6 43.5 43.3   MCV 91 90 92   < > 89 89 90   RDW 12.9 13.2 13.3   < > 13.1 12.8 12.8    304 282   < > 363 302 317   MCH 29.6 30.4 30.0   < > 29.0 29.4 29.6   MCHC 32.7 33.6 32.6   < > 32.6 33.1 33.0   NEUTROPHIL 45 52 49   < > 43.3 45.3 38.0   LYMPH 42 34 40   < > 41.1 43.0 48.6   MONOCYTE 9 10 7   < > 12.4 9.9 11.3   EOSINOPHIL 4 4 4   < > 2.9 1.5 2.0   BASOPHIL 0 1 0   < > 0.3 0.3 0.1   ANEU  --   --   --   --  2.7 3.3 3.2   ALYM  --   --   --   --  2.6 3.1 4.1   MIKE  --   --   --   --  0.8 0.7 1.0   AEOS  --   --   --   --  0.2 0.1 0.2   ABAS  --   --   --   --  " 0.0 0.0 0.0   ANEUTAUTO 2.6 3.3 3.3   < >  --   --   --    ALYMPAUTO 2.4 2.2 2.7   < >  --   --   --    AMONOAUTO 0.5 0.6 0.5   < >  --   --   --    AEOSAUTO 0.2 0.3 0.2   < >  --   --   --    ABSBASO 0.0 0.0 0.0   < >  --   --   --     < > = values in this interval not displayed.     CMP  Recent Labs   Lab Test 05/22/23  0719 02/06/23  0755 11/10/22  0658 10/02/21  1656 07/05/21  1519 04/06/21  0716 12/22/20  1754 09/23/20  0749 12/31/19  0712 05/08/19  0745   NA  --  144  --   --   --   --   --  141  --  142   POTASSIUM  --  3.8  --   --   --   --   --  4.1  --  4.0   CHLORIDE  --  110*  --   --   --   --   --  109  --  109   CO2  --  30  --   --   --   --   --  29  --  30   ANIONGAP  --  4  --   --   --   --   --  3  --  3   GLC  --  107*  --   --   --   --   --  104*  --  99   BUN  --  16  --   --   --   --   --  16  --  14   CR 0.79 0.72 0.74   < > 0.78 0.86 0.87 0.77   < > 0.74   GFRESTIMATED 85 >90 >90   < > 84 75 74 85   < > >90   GFRESTBLACK  --   --   --   --  >90 86 85 >90   < > >90   AGUILAR  --  9.6  --   --   --   --   --  9.4  --  9.3   BILITOTAL 0.3 0.5 0.4   < > 0.2 0.4 0.2  --    < > 0.3   ALBUMIN 3.7 3.9 3.9   < > 3.9 3.8 4.3  --    < > 4.0   PROTTOTAL 7.1 7.2 7.1   < > 7.5 7.6 7.8  --    < > 7.5   ALKPHOS 68 70 73   < > 76 76 72  --    < > 73   AST 25 14 13   < > 17 13 17  --    < > 15   ALT 34 28 27   < > 32 26 27  --    < > 31    < > = values in this interval not displayed.     Calcium/VitaminD  Recent Labs   Lab Test 02/06/23  0755 09/23/20  0749 05/08/19  0745   AGUILAR 9.6 9.4 9.3     ESR/CRP  Recent Labs   Lab Test 05/22/23  0719 02/06/23  0755 11/10/22  0658   SED 7 8 5   CRP <2.9 4.8 <2.9     Hepatitis B  Recent Labs   Lab Test 10/12/15  0923   AUSAB 0.18   HBCAB Nonreactive   HEPBANG Nonreactive     Hepatitis C  Recent Labs   Lab Test 10/07/16  0912   HCVAB Nonreactive   Assay performance characteristics have not been established for newborns,   infants, and children       Tuberculosis  Screening  Recent Labs   Lab Test 01/18/22  0701 12/07/18  0724   TBRES Negative Negative     HIV Screening  Recent Labs   Lab Test 10/12/15  0923   HIAGAB Nonreactive   HIV-1 p24 Ag & HIV-1/HIV-2 Ab Not Detected            Chart documentation done in part with Dragon Voice recognition Software. Although reviewed after completion, some word and grammatical error may remain.      Video-Visit Details    Type of service:  Video Visit    Video Start Time: 7:19 AM    Video End Time: 7:32 AM    Originating Location (pt. Location): Home, [place]    Distant Location (provider location):  Off site, [place]    Platform used for Video Visit: Celestine Resendiz MD

## 2023-06-26 ENCOUNTER — THERAPY VISIT (OUTPATIENT)
Dept: PHYSICAL THERAPY | Facility: CLINIC | Age: 58
End: 2023-06-26
Attending: INTERNAL MEDICINE
Payer: COMMERCIAL

## 2023-06-26 DIAGNOSIS — I89.0 LYMPHEDEMA: ICD-10-CM

## 2023-06-26 PROCEDURE — 97110 THERAPEUTIC EXERCISES: CPT | Mod: GP | Performed by: PHYSICAL THERAPIST

## 2023-06-26 PROCEDURE — 97535 SELF CARE MNGMENT TRAINING: CPT | Mod: GP | Performed by: PHYSICAL THERAPIST

## 2023-06-26 PROCEDURE — 97140 MANUAL THERAPY 1/> REGIONS: CPT | Mod: GP | Performed by: PHYSICAL THERAPIST

## 2023-06-26 PROCEDURE — 97162 PT EVAL MOD COMPLEX 30 MIN: CPT | Mod: GP | Performed by: PHYSICAL THERAPIST

## 2023-06-26 NOTE — PROGRESS NOTES
PHYSICAL THERAPY EVALUATION  Type of Visit: Evaluation    See electronic medical record for Abuse and Falls Screening details.    Subjective      Presenting condition or subjective complaint: swelling  Date of onset: 06/26/23    Relevant medical history:   RA with medication every 3mo, did have surgery for trigger finger back in March  Dates & types of surgery:  R Finger surgery March 2023    Prior diagnostic imaging/testing results:   US, no DVT, but no incompetence either   Prior therapy history for the same diagnosis, illness or injury: Yes 2021 in [place] at [facility].  Pt didn't get consistent care at the site and was not able to continue because not making progress.   Pt reports that she was wrapping on her own but they kind of wore out.  She has swelling even in the morning, doesn't go away over night. Sometimes uses compression socks however when legs swell during day she has much difficulty doffing at night    Prior Level of Function   Transfers: Independent  Ambulation: Independent  ADL: Independent  IADL: Driving, Finances, Housekeeping, Laundry, Meal preparation, Medication management, Work    Living Environment  Social support: With family members (son)   Employment: Yes 3 days in office and [employer] other days.  Patient goals for therapy:  Have to do something about the swelling, it's getting worse and painful    Pain assessment: Pain present  Feet hurt when walking on them when they are swollen     Objective      EDEMA EVALUATION  Additional history:  Body part affected by edema:  B LE  If not cancer related, problems with veins or cause of swelling:  possible RA, or venous insufficiency.   Distance able to walk:  uknown  Time able to stand:  undknown  Sensation problems in hands/feet:     Edema etiology: Chronic Venous Insufficiency, Unknown, RA    Cognitive Status Examination  Orientation: Oriented to person, place and time   Level of Consciousness: Alert  Follows Commands and Answers Questions: 100% of the  time  Personal Safety and Judgement: Intact  Memory: Intact    EDEMA  Skin Condition: Dryness, Intact, Pitting, slight redness pointed out by patient, CLT not concerned but pt reports legs get painful when they are red.  Scar: Yes, B feet from 10th grade bunion surgeries  Capillary Refill: Symmetrical  Stemmer Sign: +  Ulceration: No    GIRTH MEASUREMENTS: Refer to separate girth measurement flowsheet.     VOLUME LE  Right LE (mL) 4495.16   Left LE (mL) 4763.55   LE Volume Comparison LLE volume greater than RLE volume   % Difference 6.0     RANGE OF MOTION: LE ROM WNL, ankles however are tight when full of fluid  STRENGTH: LE Strength WFL  SENSATION: intact, can be painful   MOBILITY: Independent but tolerance can be limited by pain when swelling present    Assessment & Plan   CLINICAL IMPRESSIONS   Medical Diagnosis: Lymphedema    Treatment Diagnosis: Lymphedema   Impression/Assessment: Patient is a 58 year old female with edema complaints.  The following significant findings have been identified: Pain, Decreased ROM/flexibility and Edema. These impairments interfere with their ability to perform self care tasks, driving  and community mobility as compared to previous level of function.     Clinical Decision Making (Complexity):   Clinical Presentation: Evolving/Changing  Clinical Presentation Rationale: based on medical and personal factors listed in PT evaluation  Clinical Decision Making (Complexity): Moderate complexity    PLAN OF CARE  Treatment Interventions:  Interventions: Manual Therapy, Therapeutic Exercise, Self-Care/Home Management, Gradient Compression Bandaging, fit for garment    Long Term Goals     PT Goal 1  Goal Identifier: Lymphedema Home Program  Goal Description: Pt will be independent with drainage exercise, self massage, and skin care to best manage swelling to decrease symptoms.  Target Date: 08/24/23  PT Goal 2  Goal Identifier: Volume  Goal Description: Pt will have 8% reduction in L LE  volume and 5% reduction in R LE volume for improved tissue integrity, decreased risk of infection, and improved fit of clothing  Target Date: 08/24/23  PT Goal 3  Goal Identifier: LLIS  Goal Description: Pt will have at least 7 point reduction in score on subsequent assessment to reflect the MCID in impact of swelling on quality of life.  Goal Progress: will issue next visit  Target Date: 09/23/23  PT Goal 4  Goal Identifier: Maintenance  Goal Description: Pt will use compression garments as instructed AND home management tools/program for long term management of chronic condition to prevent tissue fibrosis, infection, wound and other secondary sequelae  Target Date: 09/23/23      Frequency of Treatment: 2x/week (modified intensive treatment as pt would like to get started ASAP even though CLT not consistently available for about 4 weeks (week of 7/24))  Duration of Treatment: 90 days    Education Assessment:   Learner/Method: Patient;Demonstration;Pictures/Video;Listening;Reading    Risks and benefits of evaluation/treatment have been explained.   Patient/Family/caregiver agrees with Plan of Care.     Evaluation Time:     PT Eval, Moderate Complexity Minutes (10811): 15    Signing Clinician: Bria Ordaz, PT

## 2023-06-28 ENCOUNTER — THERAPY VISIT (OUTPATIENT)
Dept: PHYSICAL THERAPY | Facility: CLINIC | Age: 58
End: 2023-06-28
Attending: INTERNAL MEDICINE
Payer: COMMERCIAL

## 2023-06-28 DIAGNOSIS — I89.0 LYMPHEDEMA: Primary | ICD-10-CM

## 2023-06-28 PROCEDURE — 97535 SELF CARE MNGMENT TRAINING: CPT | Mod: GP | Performed by: PHYSICAL THERAPIST

## 2023-06-28 PROCEDURE — 97140 MANUAL THERAPY 1/> REGIONS: CPT | Mod: GP | Performed by: PHYSICAL THERAPIST

## 2023-06-29 ENCOUNTER — TELEPHONE (OUTPATIENT)
Dept: PHYSICAL THERAPY | Facility: CLINIC | Age: 58
End: 2023-06-29
Payer: COMMERCIAL

## 2023-07-05 ENCOUNTER — THERAPY VISIT (OUTPATIENT)
Dept: PHYSICAL THERAPY | Facility: CLINIC | Age: 58
End: 2023-07-05
Attending: INTERNAL MEDICINE
Payer: COMMERCIAL

## 2023-07-05 DIAGNOSIS — I89.0 LYMPHEDEMA: Primary | ICD-10-CM

## 2023-07-05 PROCEDURE — 97140 MANUAL THERAPY 1/> REGIONS: CPT | Mod: GP | Performed by: PHYSICAL THERAPIST

## 2023-07-07 ENCOUNTER — THERAPY VISIT (OUTPATIENT)
Dept: PHYSICAL THERAPY | Facility: CLINIC | Age: 58
End: 2023-07-07
Attending: INTERNAL MEDICINE
Payer: COMMERCIAL

## 2023-07-07 DIAGNOSIS — I89.0 LYMPHEDEMA: Primary | ICD-10-CM

## 2023-07-07 PROCEDURE — 97535 SELF CARE MNGMENT TRAINING: CPT | Mod: GP | Performed by: PHYSICAL THERAPIST

## 2023-07-07 PROCEDURE — 97140 MANUAL THERAPY 1/> REGIONS: CPT | Mod: GP | Performed by: PHYSICAL THERAPIST

## 2023-07-10 ENCOUNTER — THERAPY VISIT (OUTPATIENT)
Dept: PHYSICAL THERAPY | Facility: CLINIC | Age: 58
End: 2023-07-10
Attending: INTERNAL MEDICINE
Payer: COMMERCIAL

## 2023-07-10 DIAGNOSIS — I89.0 LYMPHEDEMA: Primary | ICD-10-CM

## 2023-07-10 PROCEDURE — 97140 MANUAL THERAPY 1/> REGIONS: CPT | Mod: GP | Performed by: PHYSICAL THERAPIST

## 2023-07-12 ENCOUNTER — THERAPY VISIT (OUTPATIENT)
Dept: PHYSICAL THERAPY | Facility: CLINIC | Age: 58
End: 2023-07-12
Attending: INTERNAL MEDICINE
Payer: COMMERCIAL

## 2023-07-12 DIAGNOSIS — I89.0 LYMPHEDEMA: Primary | ICD-10-CM

## 2023-07-12 PROCEDURE — 97140 MANUAL THERAPY 1/> REGIONS: CPT | Mod: GP | Performed by: PHYSICAL THERAPIST

## 2023-07-12 PROCEDURE — 97535 SELF CARE MNGMENT TRAINING: CPT | Mod: GP | Performed by: PHYSICAL THERAPIST

## 2023-07-21 ENCOUNTER — THERAPY VISIT (OUTPATIENT)
Dept: PHYSICAL THERAPY | Facility: CLINIC | Age: 58
End: 2023-07-21
Attending: INTERNAL MEDICINE
Payer: COMMERCIAL

## 2023-07-21 DIAGNOSIS — I89.0 LYMPHEDEMA: Primary | ICD-10-CM

## 2023-07-21 PROCEDURE — 97140 MANUAL THERAPY 1/> REGIONS: CPT | Mod: GP | Performed by: PHYSICAL THERAPIST

## 2023-07-21 PROCEDURE — 97535 SELF CARE MNGMENT TRAINING: CPT | Mod: GP | Performed by: PHYSICAL THERAPIST

## 2023-09-01 ENCOUNTER — VIRTUAL VISIT (OUTPATIENT)
Dept: RHEUMATOLOGY | Facility: CLINIC | Age: 58
End: 2023-09-01
Payer: COMMERCIAL

## 2023-09-01 DIAGNOSIS — M06.09 RHEUMATOID ARTHRITIS OF MULTIPLE SITES WITH NEGATIVE RHEUMATOID FACTOR (H): Primary | ICD-10-CM

## 2023-09-01 DIAGNOSIS — Z79.899 HIGH RISK MEDICATIONS (NOT ANTICOAGULANTS) LONG-TERM USE: ICD-10-CM

## 2023-09-01 PROCEDURE — 99214 OFFICE O/P EST MOD 30 MIN: CPT | Mod: VID | Performed by: INTERNAL MEDICINE

## 2023-09-01 NOTE — PATIENT INSTRUCTIONS
RHEUMATOLOGY    [facility] [place]  [address] [place] [address]  [place] [address]    Phone number: [phone]  Fax number: [phone]    If you need a medication refill, please contact us as you may need lab work and/or a follow up visit prior to your refill.      Thank you for choosing [facility]!    Jolynn Angulo CMA Rheumatology

## 2023-09-01 NOTE — PROGRESS NOTES
Bria Haddad  is a 58 year old year old who is being evaluated via a billable video visit.      How would you like to obtain your AVS? MyChart  If the video visit is dropped, the invitation should be resent by: Text to cell phone: [phone]   Will anyone else be joining your video visit? No      Rheumatology Video Visit      Bria Haddad MRN# [id number]   YOB: 1965 Age: 58 year old      Date of visit: 9/01/23  PCP: Dr. Holli Marley    Chief Complaint   Patient presents with:  Rheumatoid Arthritis    Assessment and Plan     1.  Rheumatoid arthritis: Ms. Haddad initially presented to this clinic in 2015 with dependent edema of the bilateral lower extremities, fatigue, and a one time episode of right toe pain in the setting of a positive ROSEMARY.  On 9/18/2017 she presented with synovitis of her bilateral PIPs and pain without swelling of her MCPs, persistent fatigue, intermittent rash, and dependent edema. ROSEMARY positive but additional studies negative/normal including complement C3, complement C4, beta-2 glycoprotein IgM and IgG, cardiolipin IgM and IgG, lupus anticoagulant, RNP, Stuart, SSA, SSB, Scl-70.  Joint exam most consistent with rheumatoid arthritis.  Previously on HCQ (bloating), MTX (LFT elevation, alopecia, headache), leflunomide (LFT elevation). Currently on SSZ and Humira.  RA controlled.   Chronic illness, stable.    - Continue sulfasalazine 1000mg twice daily  - Continue Humira 40mg SQ every 14 days  - Labs now and in 3 months: CBC, Creatinine, Hepatic Panel    High risk medication requiring intensive toxicity monitoring at least quarterly.     2.  History of bilateral patellofemoral syndrome: Resolves with physical therapy exercises, typically recurring when she stops doing the exercises on her own at home.  Not an issue at this time    3. History of Left shoulder pain, impingement syndrome: Resolved with combination of steroid injection and physical therapy.  Not an issue at this  time.     4.  Lymphedema: Bilateral lower extremity edema continues to be an issue and the compression socks she was purchasing were not sufficient.  She reports having great results with the lymphedema therapist and now has prescribed compression socks from the lymphedema clinic with significant improvement.  She is to follow-up with her vascular medicine specialist and/or PCP in the future if this is an issue again.    5.  Sleep apnea: 2/13/2023 sleep medicine note documents moderate probability of obstructive sleep apnea and the patient reports that the next step was to use a CPAP in office but the next sleep medicine appointment needed to be rescheduled and she was frustrated with that so she canceled the appointment.  We again discussed the importance of her health and the importance of obstructive sleep apnea treatment if needed, so I advised her to reschedule the follow-up sleep medicine appointment.    6.  Vaccinations: Vaccinations reviewed with Ms. Haddad.  - Influenza: refused by patient because of reaction in the past   - COVID-19: U advised keeping updated, and to hold sulfasalazine for 1-2 weeks afterward    7. Elevated blood pressure:  Bria to follow up with Primary Care provider regarding elevated blood pressure.     Total minutes spent in evaluation with patient, documentation, , and review of pertinent studies and chart notes: 18      Ms. Haddad verbalized agreement with and understanding of the rational for the diagnosis and treatment plan.  All questions were answered to best of my ability and the patient's satisfaction. Ms. Haddad was advised to contact the clinic with any questions that may arise after the clinic visit.      Thank you for involving me in the care of the patient    Return to clinic: 3 months    HPI   Bria Haddad is a 58 year old female with a past medical history significant for endometriosis, s/p carpal tunnel release surgery, trigger finger requiring surgery, and  rheumatoid arthritis who present for follow up of rheumatoid arthritis.    5/26/2023: Joints are doing well.  No joint pain.  Morning stiffness for less than 20 minutes.  Trigger finger surgery went well and she has healed well.  Lymphedema of both lower extremities has been an issue; she has been using compression socks but they are not of the same quality as what she had received from the vascular medicine specialist, per patient.  The lymphedema therapist was very effective and she would like to return.  She canceled her follow-up sleep medicine evaluation where a CPAP was going to be used, per patient, because the provider had to reschedule and she was frustrated with that because she had waited so long to be seen in the first place.    Today, 9/1/2023: Arthritis is doing well.  No joint pain or swelling.  No morning stiffness or gelling phenomenon.  No active trigger fingers.  Has not yet followed up with sleep medicine for a CPAP.  Does not monitor blood pressure at home.    No fevers or chills, nausea or vomiting, constipation or diarrhea.  No chest pain/pressure, palpitations, or shortness of breath.  No eye pain or redness.  No black or bloody stools.  No rash.    Tobacco: none  EtOH: none  Drugs: none    ROS   12 point review of system was completed and negative except as noted in the HPI     Active Problem List     Patient Active Problem List   Diagnosis    CARDIOVASCULAR SCREENING; LDL GOAL LESS THAN 160    Endometriosis of uterus    Rheumatoid arthritis (H)    Morbid obesity (H)    Chronic pain of both knees    Trigger finger, right ring finger    Lymphedema    Chronic insomnia     Past Medical History     Past Medical History:   Diagnosis Date    Bilateral carpal tunnel syndrome 12/20/2015    Obesity     RA (rheumatoid arthritis) (H)     inflammatory poly arthritis.    S/P carpal tunnel release 12/29/2016     Past Surgical History     Past Surgical History:   Procedure Laterality Date    BUNIONECTOMY  Bilateral 1977    10th grade    COLONOSCOPY N/A 11/09/2015    Procedure: COLONOSCOPY;  Surgeon: Andrew Adamson MD;  Location: [facility]    COLONOSCOPY WITH CO2 INSUFFLATION N/A 11/09/2015    Procedure: COLONOSCOPY WITH CO2 INSUFFLATION;  Surgeon: Andrew Adamson MD;  Location: [facility]    GYN SURGERY  1992    endometryosis    RELEASE CARPAL TUNNEL Right 10/12/2016    Procedure: RELEASE CARPAL TUNNEL;  Surgeon: Colby Nobles MD;  Location: [facility]    RELEASE CARPAL TUNNEL Left 12/16/2016    Procedure: RELEASE CARPAL TUNNEL;  Surgeon: Colby Nobles MD;  Location: [facility]    RELEASE TRIGGER FINGER Right 02/09/2023    Procedure: RELEASE, TRIGGER FINGER right ring finger;  Surgeon: Danie Urban MD;  Location: [facility]    TONSILLECTOMY & ADENOIDECTOMY  1973     Allergy     Allergies   Allergen Reactions    Asa [Aspirin] Hives     Current Medication List     Current Outpatient Medications   Medication Sig    adalimumab (HUMIRA *CF*) 40 MG/0.4ML pen kit Inject 0.4 mLs (40 mg) Subcutaneous every 14 days    sulfaSALAzine (AZULFIDINE) 500 MG tablet Take 2 tablets (1,000 mg) by mouth 2 times daily    zolpidem (AMBIEN) 5 MG tablet Take tablet by mouth 15 minutes prior to sleep, for Sleep Study    order for DME Equipment being ordered: compression stockings 1 pair, at 30mm Hg, to be worn during the day. For Bilateral leg edema [R60.0]     No current facility-administered medications for this visit.     Social History   See HPI    Family History     Family History   Problem Relation Age of Onset    Osteoporosis Mother     Respiratory Mother     Thyroid Disease Mother     Anxiety Disorder Mother     Asthma Mother     Heart Disease Father     Cancer - colorectal Maternal Grandmother     Colon Cancer Maternal Grandmother     Diabetes Maternal Grandfather     Heart Disease Maternal Grandfather     Cancer Paternal Grandmother     Heart Disease Paternal Grandfather     Respiratory Paternal Grandfather      "Hypertension Brother     Thyroid Disease Sister     Thyroid Disease Sister     Neurologic Disorder Brother     Hypertension Brother     Thyroid Disease Brother     Other Cancer Sister     Anxiety Disorder Sister     Mental Illness Sister     Anesthesia Reaction Sister     Thyroid Disease Sister      Physical Exam     Temp Readings from Last 3 Encounters:   02/09/23 98.4  F (36.9  C) (Temporal)   02/06/23 98.1  F (36.7  C) (Tympanic)   05/05/22 98.2  F (36.8  C) (Tympanic)     BP Readings from Last 5 Encounters:   02/28/23 (!) 147/85   02/23/23 (!) 146/82   02/09/23 (!) 142/71   02/06/23 138/84   01/18/23 128/73     Pulse Readings from Last 1 Encounters:   02/28/23 74     Resp Readings from Last 1 Encounters:   02/09/23 16     Estimated body mass index is 48.06 kg/m  as calculated from the following:    Height as of 2/23/23: 1.651 m (5' 5\").    Weight as of 2/28/23: 131 kg (288 lb 12.8 oz).      GEN: NAD.   HEENT: Anicteric, noninjected sclera. No obvious external lesions of the ear and nose. Hearing intact.  PULM: No increased work of breathing  MSK:  Hands and wrists without swelling.   SKIN: No rash or jaundice seen  PSYCH: Alert. Appropriate.      Labs / Imaging (select studies)     RF/CCP  Recent Labs   Lab Test 09/18/17  0913   CCPIGG 1   RHF <20     CBC  Recent Labs   Lab Test 05/22/23  0719 02/06/23  0755 11/10/22  0658 10/02/21  1656 07/05/21  1519 04/06/21  0716 12/22/20  1754   WBC 5.8 6.5 6.8   < > 6.3 7.2 8.4   RBC 4.86 4.70 4.84   < > 4.89 4.89 4.83   HGB 14.4 14.3 14.5   < > 14.2 14.4 14.3   HCT 44.0 42.5 44.5   < > 43.6 43.5 43.3   MCV 91 90 92   < > 89 89 90   RDW 12.9 13.2 13.3   < > 13.1 12.8 12.8    304 282   < > 363 302 317   MCH 29.6 30.4 30.0   < > 29.0 29.4 29.6   MCHC 32.7 33.6 32.6   < > 32.6 33.1 33.0   NEUTROPHIL 45 52 49   < > 43.3 45.3 38.0   LYMPH 42 34 40   < > 41.1 43.0 48.6   MONOCYTE 9 10 7   < > 12.4 9.9 11.3   EOSINOPHIL 4 4 4   < > 2.9 1.5 2.0   BASOPHIL 0 1 0   < > 0.3 " 0.3 0.1   ANEU  --   --   --   --  2.7 3.3 3.2   ALYM  --   --   --   --  2.6 3.1 4.1   MIKE  --   --   --   --  0.8 0.7 1.0   AEOS  --   --   --   --  0.2 0.1 0.2   ABAS  --   --   --   --  0.0 0.0 0.0   ANEUTAUTO 2.6 3.3 3.3   < >  --   --   --    ALYMPAUTO 2.4 2.2 2.7   < >  --   --   --    AMONOAUTO 0.5 0.6 0.5   < >  --   --   --    AEOSAUTO 0.2 0.3 0.2   < >  --   --   --    ABSBASO 0.0 0.0 0.0   < >  --   --   --     < > = values in this interval not displayed.     CMP  Recent Labs   Lab Test 05/22/23  0719 02/06/23  0755 11/10/22  0658 10/02/21  1656 07/05/21  1519 04/06/21  0716 12/22/20  1754 09/23/20  0749 12/31/19  0712 05/08/19  0745   NA  --  144  --   --   --   --   --  141  --  142   POTASSIUM  --  3.8  --   --   --   --   --  4.1  --  4.0   CHLORIDE  --  110*  --   --   --   --   --  109  --  109   CO2  --  30  --   --   --   --   --  29  --  30   ANIONGAP  --  4  --   --   --   --   --  3  --  3   GLC  --  107*  --   --   --   --   --  104*  --  99   BUN  --  16  --   --   --   --   --  16  --  14   CR 0.79 0.72 0.74   < > 0.78 0.86 0.87 0.77   < > 0.74   GFRESTIMATED 85 >90 >90   < > 84 75 74 85   < > >90   GFRESTBLACK  --   --   --   --  >90 86 85 >90   < > >90   AGUILAR  --  9.6  --   --   --   --   --  9.4  --  9.3   BILITOTAL 0.3 0.5 0.4   < > 0.2 0.4 0.2  --    < > 0.3   ALBUMIN 3.7 3.9 3.9   < > 3.9 3.8 4.3  --    < > 4.0   PROTTOTAL 7.1 7.2 7.1   < > 7.5 7.6 7.8  --    < > 7.5   ALKPHOS 68 70 73   < > 76 76 72  --    < > 73   AST 25 14 13   < > 17 13 17  --    < > 15   ALT 34 28 27   < > 32 26 27  --    < > 31    < > = values in this interval not displayed.     Calcium/VitaminD  Recent Labs   Lab Test 02/06/23  0755 09/23/20  0749 05/08/19  0745   AGUILAR 9.6 9.4 9.3     ESR/CRP  Recent Labs   Lab Test 05/22/23  0719 02/06/23  0755 11/10/22  0658   SED 7 8 5   CRP <2.9 4.8 <2.9       Hepatitis B  Recent Labs   Lab Test 10/12/15  0923   AUSAB 0.18   HBCAB Nonreactive   HEPBANG Nonreactive      Hepatitis C  Recent Labs   Lab Test 10/07/16  0912   HCVAB Nonreactive   Assay performance characteristics have not been established for newborns,   infants, and children         Tuberculosis Screening  Recent Labs   Lab Test 01/18/22  0701 12/07/18  0724   TBRES Negative Negative     HIV Screening  Recent Labs   Lab Test 10/12/15  0923   HIAGAB Nonreactive   HIV-1 p24 Ag & HIV-1/HIV-2 Ab Not Detected              Chart documentation done in part with Dragon Voice recognition Software. Although reviewed after completion, some word and grammatical error may remain.      Video-Visit Details    Type of service:  Video Visit    Video Start Time: 8:03 AM    Video End Time:8:11 AM    Originating Location (pt. Location): [place], [place]    Distant Location (provider location):  off site, [place]    Platform used for Video Visit: Celestine Resendiz MD

## 2023-09-11 ENCOUNTER — TELEPHONE (OUTPATIENT)
Dept: FAMILY MEDICINE | Facility: CLINIC | Age: 58
End: 2023-09-11
Payer: COMMERCIAL

## 2023-09-11 NOTE — TELEPHONE ENCOUNTER
Patient Quality Outreach    Patient is due for the following:   Colon Cancer Screening  Breast Cancer Screening - Mammogram  Cervical Cancer Screening - PAP Needed  Physical Preventive Adult Physical      Topic Date Due    Pneumococcal Vaccine (1 - PCV) Never done    Zoster (Shingles) Vaccine (1 of 2) Never done    COVID-19 Vaccine (6 - Pfizer risk series) 02/21/2023    Flu Vaccine (1) 09/01/2023       Next Steps:   Schedule a Adult Preventative    Type of outreach:    Sent Jellynote message.      Questions for provider review:               Tarik Milner MA

## 2023-09-12 ENCOUNTER — LAB (OUTPATIENT)
Dept: LAB | Facility: CLINIC | Age: 58
End: 2023-09-12
Payer: COMMERCIAL

## 2023-09-12 DIAGNOSIS — M06.09 RHEUMATOID ARTHRITIS OF MULTIPLE SITES WITH NEGATIVE RHEUMATOID FACTOR (H): ICD-10-CM

## 2023-09-12 DIAGNOSIS — Z79.899 HIGH RISK MEDICATIONS (NOT ANTICOAGULANTS) LONG-TERM USE: ICD-10-CM

## 2023-09-12 LAB
BASOPHILS # BLD AUTO: 0 10E3/UL (ref 0–0.2)
BASOPHILS NFR BLD AUTO: 1 %
EOSINOPHIL # BLD AUTO: 0.3 10E3/UL (ref 0–0.7)
EOSINOPHIL NFR BLD AUTO: 4 %
ERYTHROCYTE [DISTWIDTH] IN BLOOD BY AUTOMATED COUNT: 12.7 % (ref 10–15)
HCT VFR BLD AUTO: 43 % (ref 35–47)
HGB BLD-MCNC: 14.3 G/DL (ref 11.7–15.7)
IMM GRANULOCYTES # BLD: 0 10E3/UL
IMM GRANULOCYTES NFR BLD: 0 %
LYMPHOCYTES # BLD AUTO: 2.8 10E3/UL (ref 0.8–5.3)
LYMPHOCYTES NFR BLD AUTO: 38 %
MCH RBC QN AUTO: 30.1 PG (ref 26.5–33)
MCHC RBC AUTO-ENTMCNC: 33.3 G/DL (ref 31.5–36.5)
MCV RBC AUTO: 91 FL (ref 78–100)
MONOCYTES # BLD AUTO: 0.7 10E3/UL (ref 0–1.3)
MONOCYTES NFR BLD AUTO: 9 %
NEUTROPHILS # BLD AUTO: 3.6 10E3/UL (ref 1.6–8.3)
NEUTROPHILS NFR BLD AUTO: 49 %
PLATELET # BLD AUTO: 310 10E3/UL (ref 150–450)
RBC # BLD AUTO: 4.75 10E6/UL (ref 3.8–5.2)
WBC # BLD AUTO: 7.5 10E3/UL (ref 4–11)

## 2023-09-12 PROCEDURE — 82565 ASSAY OF CREATININE: CPT

## 2023-09-12 PROCEDURE — 36415 COLL VENOUS BLD VENIPUNCTURE: CPT

## 2023-09-12 PROCEDURE — 85025 COMPLETE CBC W/AUTO DIFF WBC: CPT

## 2023-09-12 PROCEDURE — 80076 HEPATIC FUNCTION PANEL: CPT

## 2023-09-13 LAB
ALBUMIN SERPL BCG-MCNC: 4.3 G/DL (ref 3.5–5.2)
ALP SERPL-CCNC: 72 U/L (ref 35–104)
ALT SERPL W P-5'-P-CCNC: 20 U/L (ref 0–50)
AST SERPL W P-5'-P-CCNC: 22 U/L (ref 0–45)
BILIRUB DIRECT SERPL-MCNC: <0.2 MG/DL (ref 0–0.3)
BILIRUB SERPL-MCNC: 0.2 MG/DL
CREAT SERPL-MCNC: 0.84 MG/DL (ref 0.51–0.95)
EGFRCR SERPLBLD CKD-EPI 2021: 79 ML/MIN/1.73M2
PROT SERPL-MCNC: 6.9 G/DL (ref 6.4–8.3)

## 2023-11-05 ENCOUNTER — NURSE TRIAGE (OUTPATIENT)
Dept: NURSING | Facility: CLINIC | Age: 58
End: 2023-11-05
Payer: COMMERCIAL

## 2023-11-05 DIAGNOSIS — U07.1 INFECTION DUE TO 2019 NOVEL CORONAVIRUS: Primary | ICD-10-CM

## 2023-11-05 NOTE — TELEPHONE ENCOUNTER
COVID Positive/Requesting COVID treatment    Patient is positive for COVID and requesting treatment options.    Date of positive COVID test (PCR or at home) 11/5/23  Current COVID symptoms: cough, fatigue, muscle or body aches, headache, sore throat, and congestion or runny nose  Date COVID symptoms began: 11/4/23  Reporting 2 family members in home COVID 19 positive.    O2 sats 96 % 98 Pulse during triage. Afebrile.  Patient denies any shortness of breath or chest pain/pressure.    Message should be routed to clinic RN pool. Best phone number to use for call back: [phone] (home)     Reason for Disposition   [1] HIGH RISK patient (e.g., weak immune system, age > 64 years, obesity with BMI 30 or higher, pregnant, chronic lung disease or other chronic medical condition) AND [2] COVID symptoms (e.g., cough, fever)  (Exceptions: Already seen by PCP and no new or worsening symptoms.)    Additional Information   Negative: SEVERE difficulty breathing (e.g., struggling for each breath, speaks in single words)   Negative: Difficult to awaken or acting confused (e.g., disoriented, slurred speech)   Negative: Bluish (or gray) lips or face now   Negative: Shock suspected (e.g., cold/pale/clammy skin, too weak to stand, low BP, rapid pulse)   Negative: Sounds like a life-threatening emergency to the triager   Negative: [1] Diagnosed or suspected COVID-19 AND [2] symptoms lasting 3 or more weeks   Negative: [1] COVID-19 exposure AND [2] no symptoms   Negative: COVID-19 vaccine reaction suspected (e.g., fever, headache, muscle aches) occurring 1 to 3 days after getting vaccine   Negative: COVID-19 vaccine, questions about   Negative: [1] Lives with someone known to have influenza (flu test positive) AND [2] flu-like symptoms (e.g., cough, runny nose, sore throat, SOB; with or without fever)   Negative: [1] Possible COVID-19 symptoms AND [2] triager concerned about severity of symptoms or other causes   Negative: COVID-19 and  breastfeeding, questions about   Negative: SEVERE or constant chest pain or pressure  (Exception: Mild central chest pain, present only when coughing.)   Negative: MODERATE difficulty breathing (e.g., speaks in phrases, SOB even at rest, pulse 100-120)   Negative: [1] Headache AND [2] stiff neck (can't touch chin to chest)   Negative: Oxygen level (e.g., pulse oximetry) 90 percent or lower   Negative: Chest pain or pressure  (Exception: MILD central chest pain, present only when coughing.)   Negative: [1] Drinking very little AND [2] dehydration suspected (e.g., no urine > 12 hours, very dry mouth, very lightheaded)   Negative: Patient sounds very sick or weak to the triager   Negative: MILD difficulty breathing (e.g., minimal/no SOB at rest, SOB with walking, pulse <100)   Negative: Fever > 103 F (39.4 C)   Negative: [1] Fever > 101 F (38.3 C) AND [2] age > 60 years   Negative: [1] Fever > 100.0 F (37.8 C) AND [2] bedridden (e.g., CVA, chronic illness, recovering from surgery)   Negative: Oxygen level (e.g., pulse oximetry) 91 to 94 percent    Protocols used: Coronavirus (COVID-19) Diagnosed or [facility]qunrche-D-EH

## 2023-11-06 ENCOUNTER — MYC MEDICAL ADVICE (OUTPATIENT)
Dept: FAMILY MEDICINE | Facility: CLINIC | Age: 58
End: 2023-11-06
Payer: COMMERCIAL

## 2023-11-06 NOTE — TELEPHONE ENCOUNTER
RN COVID TREATMENT VISIT  11/06/23    The patient has been triaged and does not require a higher level of care.    Bria Haddad  58 year old  Current weight? 288 lb    Has the patient been seen by a primary care provider at an [facility] or [facility] within the past two years? Yes.   Have you been in close proximity to/do you have a known exposure to a person with a confirmed case of influenza? No.     General treatment eligibility:  Date of positive COVID test (PCR or at home)?  11/5/23    Are you or have you been hospitalized for this COVID-19 infection? No.   Have you received monoclonal antibodies or antiviral treatment for COVID-19 since this positive test? No.   Do you have any of the following conditions that place you at risk of being very sick from COVID-19?   - Age 50 years or older  Yes, patient has at least one high risk condition as noted above.     Current COVID symptoms:   - cough  - fatigue  - muscle or body aches  - headache  - sore throat  - congestion or runny nose  Yes. Patient has at least one symptom as selected.     How many days since symptoms started? 5 days or less. Established patient, 12 years or older weighing at least 88.2 lbs, who has symptoms that started in the past 5 days, has not been hospitalized nor received treatment already, and is at risk for being very sick from COVID-19.     Treatment eligibility by RN:  Are you currently pregnant or nursing? No  Do you have a clinically significant hypersensitivity to nirmatrelvir or ritonavir, or toxic epidermal necrolysis (TEN) or Love-Kev Syndrome? No  Do you have a history of hepatitis, any hepatic impairment on the Problem List (such as Child-Pedersen Class C, cirrhosis, fatty liver disease, alcoholic liver disease), or was the last liver lab (hepatic panel, ALT, AST, ALK Phos, bilirubin) elevated in the past 6 months? No  Do you have any history of severe renal impairment (eGFR < 30mL/min)? No    Is  patient eligible to continue? Yes, patient meets all eligibility requirements for the RN COVID treatment (as denoted by all no responses above).     Current Outpatient Medications   Medication Sig Dispense Refill    adalimumab (HUMIRA *CF*) 40 MG/0.4ML pen kit Inject 0.4 mLs (40 mg) Subcutaneous every 14 days 2.4 mL 2    order for DME Equipment being ordered: compression stockings 1 pair, at 30mm Hg, to be worn during the day. For Bilateral leg edema [R60.0] 2 Units 0    sulfaSALAzine (AZULFIDINE) 500 MG tablet Take 2 tablets (1,000 mg) by mouth 2 times daily 360 tablet 2    zolpidem (AMBIEN) 5 MG tablet Take tablet by mouth 15 minutes prior to sleep, for Sleep Study 1 tablet 0       Medications from List 1 of the standing order (on medications that exclude the use of Paxlovid) that patient is taking: NONE. Is patient taking Manele's Wort? No  Is patient taking Manele's Wort or any meds from List 1? No.   Medications from List 2 of the standing order (on meds that provider needs to adjust) that patient is taking: NONE. Is patient on any of the meds from List 2? No.   Medications from List 3 of standing order (on meds that a RN needs to adjust) that patient is taking: NONE. Is patient on any meds from List 3? No.     Paxlovid has an approximate 90% reduction in hospitalization. Paxlovid can possibly cause altered sense of taste, diarrhea (loose, watery stools), high blood pressure, muscle aches.     Would patient like a Paxlovid prescription?   Yes.   Lab Results   Component Value Date    GFRESTIMATED 79 09/12/2023       Was last eGFR reduced? No, eGFR 60 or greater/ No Result on record. Patient can receive the normal renal function dose. Paxlovid Rx sent to [facility]   [facility]    Temporary change to home medications: None    All medication adjustments (holds, etc) were discussed with the patient and patient was asked to repeat back (teachback) their med adjustment.  Did patient understand med  adjustment? No medication adjustments needed.         Reviewed the following instructions with the patient:    Paxlovid (nimatrelvir and ritonavir)    How it works  Two medicines (nirmatrelvir and ritonavir) are taken together. They stop the virus from growing. Less amount of virus is easier for your body to fight.    How to take  Medicine comes in a daily container with both medicine tablets. Take by mouth twice daily (once in the morning, once at night) for 5 days.  The number of tablets to take varies by patient.  Don't chew or break capsules. Swallow whole.    When to take  Take as soon as possible after positive COVID-19 test result, and within 5 days of your first symptoms.    Possible side effects  Can cause altered sense of taste, diarrhea (loose, watery stools), high blood pressure, muscle aches.    Kelin Lemus RN

## 2023-11-08 NOTE — TELEPHONE ENCOUNTER
Follow up to [birth year]    Results for TRENT GARNER (MRN [id number]) as of 11/3/2021 12:59   Ref. Range 10/2/2021 16:56   Creatinine Latest Ref Range: 0.52 - 1.04 mg/dL 1.04   GFR Estimate Latest Ref Range: >60 mL/min/1.73m2 59 (L)   No contrast allergy per chart review.      Called patient to review MR order questions.  Patient has claustrophobia and would like ativan prescribed for use prior to exam.    Rx loaded for review/signature.    Patient has confirmed that receiving results of this exam via [employer] is preferable to her.    Radha Arciniega RN BSN  [facility]  [phone]      
[facility]    Who is the name of the provider?:  Holly      What is the location you see this provider at?: [place]    Reason for call:  Called to schedule an MRI as discussed in today's appt, was told orders have not been entered.     Can we leave a detailed message on this number?  YES       
Yes - the patient is able to be screened

## 2023-11-25 ENCOUNTER — LAB (OUTPATIENT)
Dept: LAB | Facility: CLINIC | Age: 58
End: 2023-11-25
Payer: COMMERCIAL

## 2023-11-25 DIAGNOSIS — M06.09 RHEUMATOID ARTHRITIS OF MULTIPLE SITES WITH NEGATIVE RHEUMATOID FACTOR (H): ICD-10-CM

## 2023-11-25 DIAGNOSIS — Z79.899 HIGH RISK MEDICATIONS (NOT ANTICOAGULANTS) LONG-TERM USE: ICD-10-CM

## 2023-11-25 LAB
ALBUMIN SERPL BCG-MCNC: 4.2 G/DL (ref 3.5–5.2)
ALP SERPL-CCNC: 78 U/L (ref 40–150)
ALT SERPL W P-5'-P-CCNC: 19 U/L (ref 0–50)
AST SERPL W P-5'-P-CCNC: 23 U/L (ref 0–45)
BASOPHILS # BLD AUTO: 0 10E3/UL (ref 0–0.2)
BASOPHILS NFR BLD AUTO: 0 %
BILIRUB DIRECT SERPL-MCNC: <0.2 MG/DL (ref 0–0.3)
BILIRUB SERPL-MCNC: 0.3 MG/DL
CREAT SERPL-MCNC: 0.86 MG/DL (ref 0.51–0.95)
EGFRCR SERPLBLD CKD-EPI 2021: 77 ML/MIN/1.73M2
EOSINOPHIL # BLD AUTO: 0.3 10E3/UL (ref 0–0.7)
EOSINOPHIL NFR BLD AUTO: 3 %
ERYTHROCYTE [DISTWIDTH] IN BLOOD BY AUTOMATED COUNT: 11.9 % (ref 10–15)
HCT VFR BLD AUTO: 43.5 % (ref 35–47)
HGB BLD-MCNC: 14.5 G/DL (ref 11.7–15.7)
IMM GRANULOCYTES # BLD: 0 10E3/UL
IMM GRANULOCYTES NFR BLD: 0 %
LYMPHOCYTES # BLD AUTO: 3 10E3/UL (ref 0.8–5.3)
LYMPHOCYTES NFR BLD AUTO: 41 %
MCH RBC QN AUTO: 29.7 PG (ref 26.5–33)
MCHC RBC AUTO-ENTMCNC: 33.3 G/DL (ref 31.5–36.5)
MCV RBC AUTO: 89 FL (ref 78–100)
MONOCYTES # BLD AUTO: 0.7 10E3/UL (ref 0–1.3)
MONOCYTES NFR BLD AUTO: 9 %
NEUTROPHILS # BLD AUTO: 3.4 10E3/UL (ref 1.6–8.3)
NEUTROPHILS NFR BLD AUTO: 46 %
PLATELET # BLD AUTO: 299 10E3/UL (ref 150–450)
PROT SERPL-MCNC: 7.2 G/DL (ref 6.4–8.3)
RBC # BLD AUTO: 4.88 10E6/UL (ref 3.8–5.2)
WBC # BLD AUTO: 7.3 10E3/UL (ref 4–11)

## 2023-11-25 PROCEDURE — 36415 COLL VENOUS BLD VENIPUNCTURE: CPT

## 2023-11-25 PROCEDURE — 85025 COMPLETE CBC W/AUTO DIFF WBC: CPT

## 2023-11-25 PROCEDURE — 80076 HEPATIC FUNCTION PANEL: CPT

## 2023-11-25 PROCEDURE — 82565 ASSAY OF CREATININE: CPT

## 2023-12-01 ENCOUNTER — VIRTUAL VISIT (OUTPATIENT)
Dept: RHEUMATOLOGY | Facility: CLINIC | Age: 58
End: 2023-12-01
Payer: COMMERCIAL

## 2023-12-01 DIAGNOSIS — Z79.899 HIGH RISK MEDICATIONS (NOT ANTICOAGULANTS) LONG-TERM USE: ICD-10-CM

## 2023-12-01 DIAGNOSIS — M06.09 RHEUMATOID ARTHRITIS OF MULTIPLE SITES WITH NEGATIVE RHEUMATOID FACTOR (H): Primary | ICD-10-CM

## 2023-12-01 PROCEDURE — 99214 OFFICE O/P EST MOD 30 MIN: CPT | Mod: VID | Performed by: INTERNAL MEDICINE

## 2023-12-01 RX ORDER — SULFASALAZINE 500 MG/1
1000 TABLET ORAL 2 TIMES DAILY
Qty: 360 TABLET | Refills: 2 | Status: SHIPPED | OUTPATIENT
Start: 2023-12-01 | End: 2024-06-07

## 2023-12-01 NOTE — PROGRESS NOTES
Bria Haddad  is a 58 year old year old who is being evaluated via a billable video visit.      How would you like to obtain your AVS? MyChart  If the video visit is dropped, the invitation should be resent by: Text to cell phone: [phone]   Will anyone else be joining your video visit? No      Rheumatology Video Visit      Bria Haddad MRN# [id number]   YOB: 1965 Age: 58 year old      Date of visit: 12/01/23  PCP: Dr. Holli Marley    Chief Complaint   Patient presents with:  Rheumatoid Arthritis    Assessment and Plan     1.  Rheumatoid arthritis: Ms. Haddad initially presented to this clinic in 2015 with dependent edema of the bilateral lower extremities, fatigue, and a one time episode of right toe pain in the setting of a positive ROSEMARY.  On 9/18/2017 she presented with synovitis of her bilateral PIPs and pain without swelling of her MCPs, persistent fatigue, intermittent rash, and dependent edema. ROSEMARY positive but additional studies negative/normal including complement C3, complement C4, beta-2 glycoprotein IgM and IgG, cardiolipin IgM and IgG, lupus anticoagulant, RNP, Stuart, SSA, SSB, Scl-70.  Joint exam most consistent with rheumatoid arthritis.  Previously on HCQ (bloating), MTX (LFT elevation, alopecia, headache), leflunomide (LFT elevation). Currently on SSZ and Humira.  RA controlled.   Chronic illness, stable.    - Continue sulfasalazine 1000mg twice daily  - Continue Humira 40mg SQ every 14 days  - Labs in 3 months: CBC, Creatinine, Hepatic Panel, ESR, CRP    High risk medication requiring intensive toxicity monitoring at least quarterly.     2.  History of bilateral patellofemoral syndrome: Resolves with physical therapy exercises, typically recurring when she stops doing the exercises on her own at home.  Not an issue at this time    3. History of Left shoulder pain, impingement syndrome: Resolved with combination of steroid injection and physical therapy.  Not an issue at this  time.     4.  Lymphedema: Bilateral lower extremity edema continues to be an issue and the compression socks she was purchasing were not sufficient.  She then went to lymphedema therapist with significant improvement and now has prescribed compression socks from the lymphedema clinic with significant improvement.  She is to follow-up with her vascular medicine specialist and/or PCP in the future if this is an issue again.    5.  Sleep apnea: 2/13/2023 sleep medicine note documents moderate probability of obstructive sleep apnea and the patient reports that the next step was to use a CPAP in office but the next sleep medicine appointment needed to be rescheduled and she was frustrated with that so she canceled the appointment.  We again discussed the importance of her health and the importance of obstructive sleep apnea treatment if needed, so I advised her to reschedule the follow-up sleep medicine appointment.    6.  Vaccinations: Vaccinations reviewed with Ms. Haddad.  - Influenza: refused by patient because of reaction in the past   - COVID-19:  advised keeping updated, and to hold sulfasalazine for 1-2 weeks afterward  - [id number]: advised vaccination     7. Elevated blood pressure:  Bria to follow up with Primary Care provider regarding elevated blood pressure.     Total minutes spent in evaluation with patient, documentation, , and review of pertinent studies and chart notes: 14      Ms. Haddad verbalized agreement with and understanding of the rational for the diagnosis and treatment plan.  All questions were answered to best of my ability and the patient's satisfaction. Ms. Haddad was advised to contact the clinic with any questions that may arise after the clinic visit.      Thank you for involving me in the care of the patient    Return to clinic: 3 months    HPI   Bria Haddad is a 58 year old female with a past medical history significant for endometriosis, s/p carpal tunnel release surgery,  trigger finger requiring surgery, and rheumatoid arthritis who present for follow up of rheumatoid arthritis.    5/26/2023: Joints are doing well.  No joint pain.  Morning stiffness for less than 20 minutes.  Trigger finger surgery went well and she has healed well.  Lymphedema of both lower extremities has been an issue; she has been using compression socks but they are not of the same quality as what she had received from the vascular medicine specialist, per patient.  The lymphedema therapist was very effective and she would like to return.  She canceled her follow-up sleep medicine evaluation where a CPAP was going to be used, per patient, because the provider had to reschedule and she was frustrated with that because she had waited so long to be seen in the first place.    9/1/2023: Arthritis is doing well.  No joint pain or swelling.  No morning stiffness or gelling phenomenon.  No active trigger fingers.  Has not yet followed up with sleep medicine for a CPAP.  Does not monitor blood pressure at home.    Today, 12/1/2023: RA controlled.  No joint pain or swelling. No morning stiffness or gelling. Arthritis does not limit daily activities.  Has not yet seen the sleep medicine specialist but says that she plans to schedule eventually.  Lymphedema controlled with compression socks.    No fevers or chills, nausea or vomiting, constipation or diarrhea.  No chest pain/pressure, palpitations, or shortness of breath.  No eye pain or redness.  No black or bloody stools.  No rash.    Tobacco: none  EtOH: none  Drugs: none    ROS   12 point review of system was completed and negative except as noted in the HPI     Active Problem List     Patient Active Problem List   Diagnosis    CARDIOVASCULAR SCREENING; LDL GOAL LESS THAN 160    Endometriosis of uterus    Rheumatoid arthritis (H)    Morbid obesity (H)    Chronic pain of both knees    Trigger finger, right ring finger    Lymphedema    Chronic insomnia     Past Medical  History     Past Medical History:   Diagnosis Date    Bilateral carpal tunnel syndrome 12/20/2015    Obesity     RA (rheumatoid arthritis) (H)     inflammatory poly arthritis.    S/P carpal tunnel release 12/29/2016     Past Surgical History     Past Surgical History:   Procedure Laterality Date    BUNIONECTOMY Bilateral 1977    10th grade    COLONOSCOPY N/A 11/09/2015    Procedure: COLONOSCOPY;  Surgeon: Andrwe Adamson MD;  Location: [facility]    COLONOSCOPY WITH CO2 INSUFFLATION N/A 11/09/2015    Procedure: COLONOSCOPY WITH CO2 INSUFFLATION;  Surgeon: Andrew Adamson MD;  Location: [facility]    GYN SURGERY  1992    endometryosis    RELEASE CARPAL TUNNEL Right 10/12/2016    Procedure: RELEASE CARPAL TUNNEL;  Surgeon: Colby Nobles MD;  Location: [facility]    RELEASE CARPAL TUNNEL Left 12/16/2016    Procedure: RELEASE CARPAL TUNNEL;  Surgeon: Colby Nobles MD;  Location: [facility]    RELEASE TRIGGER FINGER Right 02/09/2023    Procedure: RELEASE, TRIGGER FINGER right ring finger;  Surgeon: Danie Urban MD;  Location: [facility] OR    TONSILLECTOMY & ADENOIDECTOMY  1973     Allergy     Allergies   Allergen Reactions    Asa [Aspirin] Hives     Current Medication List     Current Outpatient Medications   Medication Sig    adalimumab (HUMIRA *CF*) 40 MG/0.4ML pen kit Inject 0.4 mLs (40 mg) Subcutaneous every 14 days    sulfaSALAzine (AZULFIDINE) 500 MG tablet Take 2 tablets (1,000 mg) by mouth 2 times daily    zolpidem (AMBIEN) 5 MG tablet Take tablet by mouth 15 minutes prior to sleep, for Sleep Study    order for DME Equipment being ordered: compression stockings 1 pair, at 30mm Hg, to be worn during the day. For Bilateral leg edema [R60.0]     No current facility-administered medications for this visit.     Social History   See HPI    Family History     Family History   Problem Relation Age of Onset    Osteoporosis Mother     Respiratory Mother     Thyroid Disease Mother     Anxiety Disorder Mother      "Asthma Mother     Heart Disease Father     Cancer - colorectal Maternal Grandmother     Colon Cancer Maternal Grandmother     Diabetes Maternal Grandfather     Heart Disease Maternal Grandfather     Cancer Paternal Grandmother     Heart Disease Paternal Grandfather     Respiratory Paternal Grandfather     Hypertension Brother     Thyroid Disease Sister     Thyroid Disease Sister     Neurologic Disorder Brother     Hypertension Brother     Thyroid Disease Brother     Other Cancer Sister     Anxiety Disorder Sister     Mental Illness Sister     Anesthesia Reaction Sister     Thyroid Disease Sister      Physical Exam     Temp Readings from Last 3 Encounters:   02/09/23 98.4  F (36.9  C) (Temporal)   02/06/23 98.1  F (36.7  C) (Tympanic)   05/05/22 98.2  F (36.8  C) (Tympanic)     BP Readings from Last 5 Encounters:   02/28/23 (!) 147/85   02/23/23 (!) 146/82   02/09/23 (!) 142/71   02/06/23 138/84   01/18/23 128/73     Pulse Readings from Last 1 Encounters:   02/28/23 74     Resp Readings from Last 1 Encounters:   02/09/23 16     Estimated body mass index is 48.06 kg/m  as calculated from the following:    Height as of 2/23/23: 1.651 m (5' 5\").    Weight as of 2/28/23: 131 kg (288 lb 12.8 oz).      GEN: NAD.   HEENT: Anicteric, noninjected sclera. No obvious external lesions of the ear and nose. Hearing intact.  PULM: No increased work of breathing  MSK:  Hands and wrists without swelling.   SKIN: No rash or jaundice seen  PSYCH: Alert. Appropriate.      Labs / Imaging (select studies)     RF/CCP  Recent Labs   Lab Test 09/18/17  0913   CCPIGG 1   RHF <20     CBC  Recent Labs   Lab Test 11/25/23  1139 09/12/23  1435 05/22/23  0719 10/02/21  1656 07/05/21  1519 04/06/21  0716 12/22/20  1754   WBC 7.3 7.5 5.8   < > 6.3 7.2 8.4   RBC 4.88 4.75 4.86   < > 4.89 4.89 4.83   HGB 14.5 14.3 14.4   < > 14.2 14.4 14.3   HCT 43.5 43.0 44.0   < > 43.6 43.5 43.3   MCV 89 91 91   < > 89 89 90   RDW 11.9 12.7 12.9   < > 13.1 12.8 12.8 "    310 311   < > 363 302 317   MCH 29.7 30.1 29.6   < > 29.0 29.4 29.6   MCHC 33.3 33.3 32.7   < > 32.6 33.1 33.0   NEUTROPHIL 46 49 45   < > 43.3 45.3 38.0   LYMPH 41 38 42   < > 41.1 43.0 48.6   MONOCYTE 9 9 9   < > 12.4 9.9 11.3   EOSINOPHIL 3 4 4   < > 2.9 1.5 2.0   BASOPHIL 0 1 0   < > 0.3 0.3 0.1   ANEU  --   --   --   --  2.7 3.3 3.2   ALYM  --   --   --   --  2.6 3.1 4.1   MIKE  --   --   --   --  0.8 0.7 1.0   AEOS  --   --   --   --  0.2 0.1 0.2   ABAS  --   --   --   --  0.0 0.0 0.0   ANEUTAUTO 3.4 3.6 2.6   < >  --   --   --    ALYMPAUTO 3.0 2.8 2.4   < >  --   --   --    AMONOAUTO 0.7 0.7 0.5   < >  --   --   --    AEOSAUTO 0.3 0.3 0.2   < >  --   --   --    ABSBASO 0.0 0.0 0.0   < >  --   --   --     < > = values in this interval not displayed.     CMP  Recent Labs   Lab Test 11/25/23  1139 09/12/23  1435 05/22/23  0719 02/06/23  0755 10/02/21  1656 07/05/21  1519 04/06/21  0716 12/22/20  1754 09/23/20  0749 12/31/19  0712 05/08/19  0745   NA  --   --   --  144  --   --   --   --  141  --  142   POTASSIUM  --   --   --  3.8  --   --   --   --  4.1  --  4.0   CHLORIDE  --   --   --  110*  --   --   --   --  109  --  109   CO2  --   --   --  30  --   --   --   --  29  --  30   ANIONGAP  --   --   --  4  --   --   --   --  3  --  3   GLC  --   --   --  107*  --   --   --   --  104*  --  99   BUN  --   --   --  16  --   --   --   --  16  --  14   CR 0.86 0.84 0.79 0.72   < > 0.78 0.86 0.87 0.77   < > 0.74   GFRESTIMATED 77 79 85 >90   < > 84 75 74 85   < > >90   GFRESTBLACK  --   --   --   --   --  >90 86 85 >90   < > >90   AGUILAR  --   --   --  9.6  --   --   --   --  9.4  --  9.3   BILITOTAL 0.3 0.2 0.3 0.5   < > 0.2 0.4 0.2  --    < > 0.3   ALBUMIN 4.2 4.3 3.7 3.9   < > 3.9 3.8 4.3  --    < > 4.0   PROTTOTAL 7.2 6.9 7.1 7.2   < > 7.5 7.6 7.8  --    < > 7.5   ALKPHOS 78 72 68 70   < > 76 76 72  --    < > 73   AST 23 22 25 14   < > 17 13 17  --    < > 15   ALT 19 20 34 28   < > 32 26 27  --    < > 31     < > = values in this interval not displayed.     Calcium/VitaminD  Recent Labs   Lab Test 02/06/23  0755 09/23/20  0749 05/08/19  0745   AGUILAR 9.6 9.4 9.3     ESR/CRP  Recent Labs   Lab Test 05/22/23  0719 02/06/23  0755 11/10/22  0658   SED 7 8 5   CRP <2.9 4.8 <2.9       Hepatitis B  Recent Labs   Lab Test 10/12/15  0923   AUSAB 0.18   HBCAB Nonreactive   HEPBANG Nonreactive     Hepatitis C  Recent Labs   Lab Test 10/07/16  0912   HCVAB Nonreactive   Assay performance characteristics have not been established for newborns,   infants, and children       Tuberculosis Screening  Recent Labs   Lab Test 01/18/22  0701 12/07/18  0724   TBRES Negative Negative     HIV Screening  Recent Labs   Lab Test 10/12/15  0923   HIAGAB Nonreactive   HIV-1 p24 Ag & HIV-1/HIV-2 Ab Not Detected            Chart documentation done in part with Dragon Voice recognition Software. Although reviewed after completion, some word and grammatical error may remain.      Video-Visit Details    Type of service:  Video Visit    Video Start Time: 8:50 AM     Video End Time:9:02 AM    Originating Location (pt. Location): Home, [place]    Distant Location (provider location):  off site, [place]    Platform used for Video Visit: Celestine Resendiz MD

## 2023-12-01 NOTE — PATIENT INSTRUCTIONS
RHEUMATOLOGY    [facility]  [address] [place] [address]  [place] [address]    Phone number: [phone]  Fax number: [phone]    If you need a medication refill, please contact us as you may need lab work and/or a follow up visit prior to your refill.      Thank you for choosing [facility]!    Jolynn Angulo CMA Rheumatology

## 2023-12-14 NOTE — PROGRESS NOTES
[facility] Rehabilitation Service    Outpatient Physical Therapy Discharge Note      DISCHARGE  Reason for Discharge: Patient has failed to schedule further appointments.  Pt with improvement in condition, cancelled follow-up visits so unable to complete final assessment of goal achievement     Equipment Issued: home program, bandaging supplies, custom compressin 20-30mmHg and velcro bandaging alternative    Discharge Plan: Patient to continue home program.    Referring Provider:  Bill Resendiz     07/21/23 0500   Appointment Info   Signing clinician's name / credentials Huyen Elliott PT, CLT-JEAN-CLAUDE   Total/Authorized Visits 7   Medical Diagnosis Lymphedema   PT Tx Diagnosis Lymphedema   Progress Note/Certification   Onset of illness/injury or Date of Surgery 06/26/23   Therapy Frequency 2x/week  (modified intensive treatment as pt would like to get started ASAP even though CLT not consistently available for about 4 weeks (week of 7/24))   Predicted Duration 90 days   Progress Note Completed Date 06/26/23   PT Goal 1   Goal Identifier Lymphedema Home Program   Goal Description Pt will be independent with drainage exercise, self massage, and skin care to best manage swelling to decrease symptoms.   Target Date 08/24/23   PT Goal 2   Goal Identifier Volume   Goal Description Pt will have 8% reduction in L LE volume and 5% reduction in R LE volume for improved tissue integrity, decreased risk of infection, and improved fit of clothing   Goal Progress nice reduction noted to goal %, will assess for stability   Target Date 08/24/23   PT Goal 3   Goal Identifier LLIS   Goal Description Pt will have at least 7 point reduction in score on subsequent assessment to reflect the MCID in impact of swelling on quality of life.   Goal Progress initial: 39   Target Date 09/23/23   PT Goal 4   Goal Identifier Maintenance   Goal Description Pt will use  compression garments as instructed AND home management tools/program for long term management of chronic condition to prevent tissue fibrosis, infection, wound and other secondary sequelae   Goal Progress Reduction sufficent for fitting completed 7/7/23   Target Date 09/23/23   Subjective Report   Subjective Report Pt arriving in custom knee highs, no new concerns. very pleased with fit of compression socks, has not yet tried velcro garments   Objective Measure 1   Objective Measure R LE Volume 10-50cm   Details 4213.06 mL   Objective Measure 2   Objective Measure L LE volume 10-50cm   Details 4219 mL   Objective Measure 3   Objective Measure Pitting   Details Minimal pitting, 1+ at pretibial   Manual Therapy   Manual Therapy: Mobilization, MFR, MLD, friction massage minutes (55127) 11   Manual Therapy 3 Measurements   Manual Therapy 3 - Details Measurements taken; show further reduction to LLE, slightly up/ stable to RLE   Self Care/home Management   ADL/Home Mgmt Training (45088) 40   Self Care 2 Garment training   Self Care 2 - Details Pt arriving in custom compression socks, reports use over the last 2 days since receiving. Reviewed wear schedule with recommendation for leg compression to be donned in AM, removed in PM with use of velcro garments overnight. Training completed on donning/ use of juxtalite compression garments with CLT donning first, then removing and having pt don x2 to both legs to ensure good understanding. Pt educated on use of measuring guide to ensure correct compression. Encouraged pt to try compression socks for a few days/ week and if she likes the fit to reach out to the  to order additional pairs. Overall wear schedule recommendations for socks or Velcro AM/ daytime with nightime Velcro garments   Patient Response/Progress demonstrates good understanding   Education   Learner/Method Patient;Listening;Demonstration   Education Comments Need to continue consistent  compression use   Plan   Home program drainage ex/MLD daily, daytime compression socks/ velcro overnight   Updates to plan of care '   Plan for next session LLIS, follow up, final review   Total Session Time   Timed Code Treatment Minutes 51   Total Treatment Time (sum of timed and untimed services) 51

## 2024-02-16 ENCOUNTER — TELEPHONE (OUTPATIENT)
Dept: RHEUMATOLOGY | Facility: CLINIC | Age: 59
End: 2024-02-16
Payer: COMMERCIAL

## 2024-02-16 NOTE — TELEPHONE ENCOUNTER
PA Initiation    Medication: HUMIRA *CF* PEN 40 MG/0.4ML SC PNKT  Insurance Company: [employer] - Phone [phone] Fax [phone]  [facility] [facility] [place] [facility] [address] [facility]  Filling Pharmacy Phone: [phone]  Filling Pharmacy Fax: [phone]  Start Date: 2/16/2024  PASCUAL (Porter: [email or web address])        Thank You,     Sonny Norman [facility]  Specialty Pharmacy Clinic [facility] Specialty  [email or web address]  [email or web address]  Phone: [phone]  Fax: [phone]

## 2024-02-19 NOTE — TELEPHONE ENCOUNTER
Prior Authorization Approval    Medication: HUMIRA *CF* PEN 40 MG/0.4ML SC PNKT  Authorization Effective Date: 1/17/2024  Authorization Expiration Date: 2/15/2025  Approved Dose/Quantity: 6 kit / 84 day supply  Reference #: PASCUAL (Porter: [id number])   Insurance Company: [employer] - Phone [phone] Fax [phone]  Expected CoPay: $ 5  CoPay Card Available: Other (see comments)    Financial Assistance Needed: [email or web address].[email or web address]/humira-[email or web address]/cost-[email or web address]-copay  [facility] [place] [facility] [address] [place] [address]  Pharmacy Notified: renewal  Patient Notified: renewal          Thank You,     Sonny Norman [facility]  Specialty Pharmacy Clinic [facility] Specialty  [email or web address]  [email or web address]  Phone: [phone]  Fax: [phone]

## 2024-02-26 ENCOUNTER — LAB (OUTPATIENT)
Dept: LAB | Facility: CLINIC | Age: 59
End: 2024-02-26
Payer: COMMERCIAL

## 2024-02-26 DIAGNOSIS — M06.09 RHEUMATOID ARTHRITIS OF MULTIPLE SITES WITH NEGATIVE RHEUMATOID FACTOR (H): ICD-10-CM

## 2024-02-26 DIAGNOSIS — Z79.899 HIGH RISK MEDICATIONS (NOT ANTICOAGULANTS) LONG-TERM USE: ICD-10-CM

## 2024-02-26 LAB
ALBUMIN SERPL BCG-MCNC: 4.1 G/DL (ref 3.5–5.2)
ALP SERPL-CCNC: 73 U/L (ref 40–150)
ALT SERPL W P-5'-P-CCNC: 23 U/L (ref 0–50)
AST SERPL W P-5'-P-CCNC: 26 U/L (ref 0–45)
BASOPHILS # BLD AUTO: 0 10E3/UL (ref 0–0.2)
BASOPHILS NFR BLD AUTO: 1 %
BILIRUB DIRECT SERPL-MCNC: <0.2 MG/DL (ref 0–0.3)
BILIRUB SERPL-MCNC: 0.3 MG/DL
CREAT SERPL-MCNC: 0.86 MG/DL (ref 0.51–0.95)
CRP SERPL-MCNC: 3.61 MG/L
EGFRCR SERPLBLD CKD-EPI 2021: 77 ML/MIN/1.73M2
EOSINOPHIL # BLD AUTO: 0.3 10E3/UL (ref 0–0.7)
EOSINOPHIL NFR BLD AUTO: 5 %
ERYTHROCYTE [DISTWIDTH] IN BLOOD BY AUTOMATED COUNT: 12.7 % (ref 10–15)
ERYTHROCYTE [SEDIMENTATION RATE] IN BLOOD BY WESTERGREN METHOD: 8 MM/HR (ref 0–30)
HCT VFR BLD AUTO: 43.2 % (ref 35–47)
HGB BLD-MCNC: 14.4 G/DL (ref 11.7–15.7)
IMM GRANULOCYTES # BLD: 0 10E3/UL
IMM GRANULOCYTES NFR BLD: 0 %
LYMPHOCYTES # BLD AUTO: 2.5 10E3/UL (ref 0.8–5.3)
LYMPHOCYTES NFR BLD AUTO: 38 %
MCH RBC QN AUTO: 30.1 PG (ref 26.5–33)
MCHC RBC AUTO-ENTMCNC: 33.3 G/DL (ref 31.5–36.5)
MCV RBC AUTO: 90 FL (ref 78–100)
MONOCYTES # BLD AUTO: 0.5 10E3/UL (ref 0–1.3)
MONOCYTES NFR BLD AUTO: 8 %
NEUTROPHILS # BLD AUTO: 3.1 10E3/UL (ref 1.6–8.3)
NEUTROPHILS NFR BLD AUTO: 48 %
PLATELET # BLD AUTO: 300 10E3/UL (ref 150–450)
PROT SERPL-MCNC: 6.9 G/DL (ref 6.4–8.3)
RBC # BLD AUTO: 4.79 10E6/UL (ref 3.8–5.2)
WBC # BLD AUTO: 6.4 10E3/UL (ref 4–11)

## 2024-02-26 PROCEDURE — 36415 COLL VENOUS BLD VENIPUNCTURE: CPT

## 2024-02-26 PROCEDURE — 82565 ASSAY OF CREATININE: CPT

## 2024-02-26 PROCEDURE — 80076 HEPATIC FUNCTION PANEL: CPT

## 2024-02-26 PROCEDURE — 85025 COMPLETE CBC W/AUTO DIFF WBC: CPT

## 2024-02-26 PROCEDURE — 85652 RBC SED RATE AUTOMATED: CPT

## 2024-02-26 PROCEDURE — 86140 C-REACTIVE PROTEIN: CPT

## 2024-03-01 ENCOUNTER — VIRTUAL VISIT (OUTPATIENT)
Dept: RHEUMATOLOGY | Facility: CLINIC | Age: 59
End: 2024-03-01
Payer: COMMERCIAL

## 2024-03-01 DIAGNOSIS — Z79.899 HIGH RISK MEDICATIONS (NOT ANTICOAGULANTS) LONG-TERM USE: ICD-10-CM

## 2024-03-01 DIAGNOSIS — M06.09 RHEUMATOID ARTHRITIS OF MULTIPLE SITES WITH NEGATIVE RHEUMATOID FACTOR (H): Primary | ICD-10-CM

## 2024-03-01 PROCEDURE — 99214 OFFICE O/P EST MOD 30 MIN: CPT | Mod: 95 | Performed by: INTERNAL MEDICINE

## 2024-03-01 PROCEDURE — G2211 COMPLEX E/M VISIT ADD ON: HCPCS | Mod: 95 | Performed by: INTERNAL MEDICINE

## 2024-03-01 NOTE — PROGRESS NOTES
Bria Haddad  is a 58 year old year old who is being evaluated via a billable video visit.      How would you like to obtain your AVS? MyChart  If the video visit is dropped, the invitation should be resent by: Text to cell phone: [phone]   Will anyone else be joining your video visit? No      Rheumatology Video Visit      Bria Haddad MRN# [id number]   YOB: 1965 Age: 58 year old      Date of visit: 3/01/24  PCP: Dr. Holli Marley    Chief Complaint   Patient presents with:  Video Visit    Assessment and Plan     1.  Rheumatoid arthritis: Ms. Haddad initially presented to this clinic in 2015 with dependent edema of the bilateral lower extremities, fatigue, and a one time episode of right toe pain in the setting of a positive ROSEMARY.  On 9/18/2017 she presented with synovitis of her bilateral PIPs and pain without swelling of her MCPs, persistent fatigue, intermittent rash, and dependent edema. ROSEMARY positive but additional studies negative/normal including complement C3, complement C4, beta-2 glycoprotein IgM and IgG, cardiolipin IgM and IgG, lupus anticoagulant, RNP, Stuart, SSA, SSB, Scl-70.  Joint exam most consistent with rheumatoid arthritis.  Previously on HCQ (bloating), MTX (LFT elevation, alopecia, headache), leflunomide (LFT elevation). Currently on SSZ and Humira.  RA controlled.   Chronic illness, stable.    - Continue sulfasalazine 1000mg twice daily  - Continue Humira 40mg SQ every 14 days  - Labs in 3 months: CBC, Creatinine, Hepatic Panel, ESR, CRP, hepatitis B core antibody and surface antigen, hepatitis C antibody, QuantiFERON-TB gold plus    High risk medication requiring intensive toxicity monitoring at least quarterly.     2.  History of bilateral patellofemoral syndrome: Resolves with physical therapy exercises, typically recurring when she stops doing the exercises on her own at home.  Not an issue at this time    3. History of Left shoulder pain, impingement syndrome:  Resolved with combination of steroid injection and physical therapy.  Not an issue at this time.     4.  Lymphedema: Bilateral lower extremity edema continues to be an issue and the compression socks she was purchasing were not sufficient.  She then went to lymphedema therapist with significant improvement and now has prescribed compression socks from the lymphedema clinic with significant improvement.  She is to follow-up with her vascular medicine specialist and/or PCP in the future if this is an issue again.    5.  Sleep apnea: 2/13/2023 sleep medicine note documents moderate probability of obstructive sleep apnea and the patient reports that the next step was to use a CPAP in office but the next sleep medicine appointment needed to be rescheduled and she was frustrated with that so she canceled the appointment.  We previously discussed the importance of her health and the importance of obstructive sleep apnea treatment if needed, so I previously advised her to reschedule the follow-up sleep medicine appointment.  She has not yet done this but plans to do so.    6.  Vaccinations: Vaccinations reviewed with Ms. Haddad.  - Influenza: refused by patient because of reaction in the past   - COVID-19:  advised keeping updated, and to hold sulfasalazine for 1-2 weeks afterward  - [id number]: advised vaccination     7. Elevated blood pressure: Bria to follow up with Primary Care provider regarding elevated blood pressure.     Total minutes spent in evaluation with patient, documentation, , and review of pertinent studies and chart notes: 16  The longitudinal plan of care for the rheumatology problem(s) were addressed during this visit.  Due to added complexity of care, we will continue to support the patient and the subsequent management of this condition with ongoing continuity of care.         Ms. Haddad verbalized agreement with and understanding of the rational for the diagnosis and treatment plan.  All  questions were answered to best of my ability and the patient's satisfaction. Ms. Haddad was advised to contact the clinic with any questions that may arise after the clinic visit.      Thank you for involving me in the care of the patient    Return to clinic: 3 months    HPI   Bria Haddad is a 58 year old female with a past medical history significant for endometriosis, s/p carpal tunnel release surgery, trigger finger requiring surgery, and rheumatoid arthritis who present for follow up of rheumatoid arthritis.    5/26/2023: Joints are doing well.  No joint pain.  Morning stiffness for less than 20 minutes.  Trigger finger surgery went well and she has healed well.  Lymphedema of both lower extremities has been an issue; she has been using compression socks but they are not of the same quality as what she had received from the vascular medicine specialist, per patient.  The lymphedema therapist was very effective and she would like to return.  She canceled her follow-up sleep medicine evaluation where a CPAP was going to be used, per patient, because the provider had to reschedule and she was frustrated with that because she had waited so long to be seen in the first place.    9/1/2023: Arthritis is doing well.  No joint pain or swelling.  No morning stiffness or gelling phenomenon.  No active trigger fingers.  Has not yet followed up with sleep medicine for a CPAP.  Does not monitor blood pressure at home.    12/1/2023: RA controlled.  No joint pain or swelling. No morning stiffness or gelling. Arthritis does not limit daily activities.  Has not yet seen the sleep medicine specialist but says that she plans to schedule eventually.  Lymphedema controlled with compression socks.    Today, 3/1/2024: RA controlled.  No joint pain or swelling.  No morning stiffness.  Mild gelling phenomenon that resolves quickly, only occurring if she sits at her desk for several hours.  No side effects from Humira or sulfasalazine.   Arthritis does not limit daily activities.    No fevers or chills, nausea or vomiting, constipation or diarrhea.  No chest pain/pressure, palpitations, or shortness of breath.  No eye pain or redness.  No black or bloody stools.  No rash.    Tobacco: none  EtOH: none  Drugs: none    ROS   12 point review of system was completed and negative except as noted in the HPI     Active Problem List     Patient Active Problem List   Diagnosis    CARDIOVASCULAR SCREENING; LDL GOAL LESS THAN 160    Endometriosis of uterus    Rheumatoid arthritis (H)    Morbid obesity (H)    Chronic pain of both knees    Trigger finger, right ring finger    Lymphedema    Chronic insomnia     Past Medical History     Past Medical History:   Diagnosis Date    Bilateral carpal tunnel syndrome 12/20/2015    Obesity     RA (rheumatoid arthritis) (H)     inflammatory poly arthritis.    S/P carpal tunnel release 12/29/2016     Past Surgical History     Past Surgical History:   Procedure Laterality Date    BUNIONECTOMY Bilateral 1977    10th grade    COLONOSCOPY N/A 11/09/2015    Procedure: COLONOSCOPY;  Surgeon: Andrew Adamson MD;  Location: [facility]    COLONOSCOPY WITH CO2 INSUFFLATION N/A 11/09/2015    Procedure: COLONOSCOPY WITH CO2 INSUFFLATION;  Surgeon: Andrew Adamson MD;  Location: [facility]    GYN SURGERY  1992    endometryosis    RELEASE CARPAL TUNNEL Right 10/12/2016    Procedure: RELEASE CARPAL TUNNEL;  Surgeon: Colby Nobles MD;  Location: [facility]    RELEASE CARPAL TUNNEL Left 12/16/2016    Procedure: RELEASE CARPAL TUNNEL;  Surgeon: Colby Nobles MD;  Location: [facility]    RELEASE TRIGGER FINGER Right 02/09/2023    Procedure: RELEASE, TRIGGER FINGER right ring finger;  Surgeon: Danie Urban MD;  Location: [facility]    TONSILLECTOMY & ADENOIDECTOMY  1973     Allergy     Allergies   Allergen Reactions    Asa [Aspirin] Hives     Current Medication List     Current Outpatient Medications   Medication Sig    adalimumab  "(HUMIRA *CF*) 40 MG/0.4ML pen kit Inject 0.4 mLs (40 mg) Subcutaneous every 14 days    sulfaSALAzine (AZULFIDINE) 500 MG tablet Take 2 tablets (1,000 mg) by mouth 2 times daily    order for DME Equipment being ordered: compression stockings 1 pair, at 30mm Hg, to be worn during the day. For Bilateral leg edema [R60.0]     No current facility-administered medications for this visit.     Social History   See HPI    Family History     Family History   Problem Relation Age of Onset    Osteoporosis Mother     Respiratory Mother     Thyroid Disease Mother     Anxiety Disorder Mother     Asthma Mother     Heart Disease Father     Cancer - colorectal Maternal Grandmother     Colon Cancer Maternal Grandmother     Diabetes Maternal Grandfather     Heart Disease Maternal Grandfather     Cancer Paternal Grandmother     Heart Disease Paternal Grandfather     Respiratory Paternal Grandfather     Hypertension Brother     Thyroid Disease Sister     Thyroid Disease Sister     Neurologic Disorder Brother     Hypertension Brother     Thyroid Disease Brother     Other Cancer Sister     Anxiety Disorder Sister     Mental Illness Sister     Anesthesia Reaction Sister     Thyroid Disease Sister      Physical Exam     Temp Readings from Last 3 Encounters:   02/09/23 98.4  F (36.9  C) (Temporal)   02/06/23 98.1  F (36.7  C) (Tympanic)   05/05/22 98.2  F (36.8  C) (Tympanic)     BP Readings from Last 5 Encounters:   02/28/23 (!) 147/85   02/23/23 (!) 146/82   02/09/23 (!) 142/71   02/06/23 138/84   01/18/23 128/73     Pulse Readings from Last 1 Encounters:   02/28/23 74     Resp Readings from Last 1 Encounters:   02/09/23 16     Estimated body mass index is 48.06 kg/m  as calculated from the following:    Height as of 2/23/23: 1.651 m (5' 5\").    Weight as of 2/28/23: 131 kg (288 lb 12.8 oz).      GEN: NAD.   HEENT: Anicteric, noninjected sclera. No obvious external lesions of the ear and nose. Hearing intact.  PULM: No increased work of " breathing  MSK:  Hands and wrists without swelling.  Able to make a complete fist with each hand  SKIN: No rash or jaundice seen  PSYCH: Alert. Appropriate.      Labs / Imaging (select studies)   RF/CCP  Recent Labs   Lab Test 09/18/17  0913   CCPIGG 1   RHF <20     CBC  Recent Labs   Lab Test 02/26/24  0730 11/25/23  1139 09/12/23  1435 10/02/21  1656 07/05/21  1519 04/06/21  0716 12/22/20  1754   WBC 6.4 7.3 7.5   < > 6.3 7.2 8.4   RBC 4.79 4.88 4.75   < > 4.89 4.89 4.83   HGB 14.4 14.5 14.3   < > 14.2 14.4 14.3   HCT 43.2 43.5 43.0   < > 43.6 43.5 43.3   MCV 90 89 91   < > 89 89 90   RDW 12.7 11.9 12.7   < > 13.1 12.8 12.8    299 310   < > 363 302 317   MCH 30.1 29.7 30.1   < > 29.0 29.4 29.6   MCHC 33.3 33.3 33.3   < > 32.6 33.1 33.0   NEUTROPHIL 48 46 49   < > 43.3 45.3 38.0   LYMPH 38 41 38   < > 41.1 43.0 48.6   MONOCYTE 8 9 9   < > 12.4 9.9 11.3   EOSINOPHIL 5 3 4   < > 2.9 1.5 2.0   BASOPHIL 1 0 1   < > 0.3 0.3 0.1   ANEU  --   --   --   --  2.7 3.3 3.2   ALYM  --   --   --   --  2.6 3.1 4.1   MIKE  --   --   --   --  0.8 0.7 1.0   AEOS  --   --   --   --  0.2 0.1 0.2   ABAS  --   --   --   --  0.0 0.0 0.0   ANEUTAUTO 3.1 3.4 3.6   < >  --   --   --    ALYMPAUTO 2.5 3.0 2.8   < >  --   --   --    AMONOAUTO 0.5 0.7 0.7   < >  --   --   --    AEOSAUTO 0.3 0.3 0.3   < >  --   --   --    ABSBASO 0.0 0.0 0.0   < >  --   --   --     < > = values in this interval not displayed.     CMP  Recent Labs   Lab Test 02/26/24  0730 11/25/23  1139 09/12/23  1435 05/22/23  0719 02/06/23  0755 10/02/21  1656 07/05/21  1519 04/06/21  0716 12/22/20  1754 09/23/20  0749 12/31/19  0712 05/08/19  0745   NA  --   --   --   --  144  --   --   --   --  141  --  142   POTASSIUM  --   --   --   --  3.8  --   --   --   --  4.1  --  4.0   CHLORIDE  --   --   --   --  110*  --   --   --   --  109  --  109   CO2  --   --   --   --  30  --   --   --   --  29  --  30   ANIONGAP  --   --   --   --  4  --   --   --   --  3  --  3   GLC   --   --   --   --  107*  --   --   --   --  104*  --  99   BUN  --   --   --   --  16  --   --   --   --  16  --  14   CR 0.86 0.86 0.84   < > 0.72   < > 0.78 0.86 0.87 0.77   < > 0.74   GFRESTIMATED 77 77 79   < > >90   < > 84 75 74 85   < > >90   GFRESTBLACK  --   --   --   --   --   --  >90 86 85 >90   < > >90   AGUILAR  --   --   --   --  9.6  --   --   --   --  9.4  --  9.3   BILITOTAL 0.3 0.3 0.2   < > 0.5   < > 0.2 0.4 0.2  --    < > 0.3   ALBUMIN 4.1 4.2 4.3   < > 3.9   < > 3.9 3.8 4.3  --    < > 4.0   PROTTOTAL 6.9 7.2 6.9   < > 7.2   < > 7.5 7.6 7.8  --    < > 7.5   ALKPHOS 73 78 72   < > 70   < > 76 76 72  --    < > 73   AST 26 23 22   < > 14   < > 17 13 17  --    < > 15   ALT 23 19 20   < > 28   < > 32 26 27  --    < > 31    < > = values in this interval not displayed.     Calcium/VitaminD  Recent Labs   Lab Test 02/06/23  0755 09/23/20  0749 05/08/19  0745   AGUILAR 9.6 9.4 9.3     ESR/CRP  Recent Labs   Lab Test 02/26/24  0730 05/22/23  0719 02/06/23  0755 11/10/22  0658   SED 8 7 8 5   CRP  --  <2.9 4.8 <2.9   CRPI 3.61  --   --   --      Hepatitis C  Recent Labs   Lab Test 10/07/16  0912   HCVAB Nonreactive   Assay performance characteristics have not been established for newborns,   infants, and children       Tuberculosis Screening  Recent Labs   Lab Test 01/18/22  0701 12/07/18  0724   TBRES Negative Negative          Chart documentation done in part with Dragon Voice recognition Software. Although reviewed after completion, some word and grammatical error may remain.      Video-Visit Details    Type of service:  Video Visit    Video Start Time: 8:04 AM    Video End Time:8:16 AM    Originating Location (pt. Location): Home, [place]    Distant Location (provider location):  off site, [place]    Platform used for Video Visit: Doximity (AmWell connection was poor quality)    Bill Resendiz MD

## 2024-04-14 ENCOUNTER — HEALTH MAINTENANCE LETTER (OUTPATIENT)
Age: 59
End: 2024-04-14

## 2024-04-17 NOTE — PATIENT INSTRUCTIONS
Preventive Care Advice   This is general advice given by our system to help you stay healthy. However, your care team may have specific advice just for you. Please talk to your care team about your preventive care needs.  Nutrition  Eat 5 or more servings of fruits and vegetables each day.  Try wheat bread, brown rice and whole grain pasta (instead of white bread, rice, and pasta).  Get enough calcium and vitamin D. Check the label on foods and aim for 100% of the RDA (recommended daily allowance).  Lifestyle  Exercise at least 150 minutes each week   (30 minutes a day, 5 days a week).  Do muscle strengthening activities 2 days a week. These help control your weight and prevent disease.  No smoking.  Wear sunscreen to prevent skin cancer.  Have a dental exam and cleaning every 6 months.  Yearly exams  See your health care team every year to talk about:  Any changes in your health.  Any medicines your care team has prescribed.  Preventive care, family planning, and ways to prevent chronic diseases.  Shots (vaccines)   HPV shots (up to age 26), if you've never had them before.  Hepatitis B shots (up to age 59), if you've never had them before.  COVID-19 shot: Get this shot when it's due.  Flu shot: Get a flu shot every year.  Tetanus shot: Get a tetanus shot every 10 years.  Pneumococcal, hepatitis A, and RSV shots: Ask your care team if you need these based on your risk.  Shingles shot (for age 50 and up).  General health tests  Diabetes screening:  Starting at age 35, Get screened for diabetes at least every 3 years.  If you are younger than age 35, ask your care team if you should be screened for diabetes.  Cholesterol test: At age 39, start having a cholesterol test every 5 years, or more often if advised.  Bone density scan (DEXA): At age 50, ask your care team if you should have this scan for osteoporosis (brittle bones).  Hepatitis C: Get tested at least once in your life.  STIs (sexually transmitted  infections)  Before age 24: Ask your care team if you should be screened for STIs.  After age 24: Get screened for STIs if you're at risk. You are at risk for STIs (including HIV) if:  You are sexually active with more than one person.  You don't use condoms every time.  You or a partner was diagnosed with a sexually transmitted infection.  If you are at risk for HIV, ask about PrEP medicine to prevent HIV.  Get tested for HIV at least once in your life, whether you are at risk for HIV or not.  Cancer screening tests  Cervical cancer screening: If you have a cervix, begin getting regular cervical cancer screening tests at age 21. Most people who have regular screenings with normal results can stop after age 65. Talk about this with your provider.  Breast cancer scan (mammogram): If you've ever had breasts, begin having regular mammograms starting at age 40. This is a scan to check for breast cancer.  Colon cancer screening: It is important to start screening for colon cancer at age 45.  Have a colonoscopy test every 10 years (or more often if you're at risk) Or, ask your provider about stool tests like a FIT test every year or Cologuard test every 3 years.  To learn more about your testing options, visit: https://www.Ketchuppp/664206.pdf.  For help making a decision, visit: https://bit.ly/hg29800.  Prostate cancer screening test: If you have a prostate and are age 55 to 69, ask your provider if you would benefit from a yearly prostate cancer screening test.  Lung cancer screening: If you are a current or former smoker age 50 to 80, ask your care team if ongoing lung cancer screenings are right for you.  For informational purposes only. Not to replace the advice of your health care provider. Copyright   2023 [employer]. All rights reserved. Clinically reviewed by the [facility]. Food Reporter 751123 - REV 01/24.

## 2024-04-23 SDOH — HEALTH STABILITY: PHYSICAL HEALTH: ON AVERAGE, HOW MANY DAYS PER WEEK DO YOU ENGAGE IN MODERATE TO STRENUOUS EXERCISE (LIKE A BRISK WALK)?: 0 DAYS

## 2024-04-23 SDOH — HEALTH STABILITY: PHYSICAL HEALTH: ON AVERAGE, HOW MANY MINUTES DO YOU ENGAGE IN EXERCISE AT THIS LEVEL?: 0 MIN

## 2024-04-23 ASSESSMENT — SOCIAL DETERMINANTS OF HEALTH (SDOH): HOW OFTEN DO YOU GET TOGETHER WITH FRIENDS OR RELATIVES?: ONCE A WEEK

## 2024-04-24 ENCOUNTER — OFFICE VISIT (OUTPATIENT)
Dept: FAMILY MEDICINE | Facility: CLINIC | Age: 59
End: 2024-04-24
Payer: COMMERCIAL

## 2024-04-24 VITALS
DIASTOLIC BLOOD PRESSURE: 78 MMHG | HEIGHT: 65 IN | BODY MASS INDEX: 39.99 KG/M2 | WEIGHT: 240 LBS | HEART RATE: 75 BPM | TEMPERATURE: 98 F | RESPIRATION RATE: 14 BRPM | OXYGEN SATURATION: 99 % | SYSTOLIC BLOOD PRESSURE: 137 MMHG

## 2024-04-24 DIAGNOSIS — Z12.31 VISIT FOR SCREENING MAMMOGRAM: ICD-10-CM

## 2024-04-24 DIAGNOSIS — Z13.6 CARDIOVASCULAR SCREENING; LDL GOAL LESS THAN 130: ICD-10-CM

## 2024-04-24 DIAGNOSIS — Z12.4 CERVICAL CANCER SCREENING: ICD-10-CM

## 2024-04-24 DIAGNOSIS — I89.0 LYMPHEDEMA: ICD-10-CM

## 2024-04-24 DIAGNOSIS — R63.5 WEIGHT GAIN: ICD-10-CM

## 2024-04-24 DIAGNOSIS — Z12.11 SCREEN FOR COLON CANCER: ICD-10-CM

## 2024-04-24 DIAGNOSIS — Z00.00 ROUTINE GENERAL MEDICAL EXAMINATION AT A HEALTH CARE FACILITY: Primary | ICD-10-CM

## 2024-04-24 PROBLEM — Z71.89 ADVANCED DIRECTIVES, COUNSELING/DISCUSSION: Status: ACTIVE | Noted: 2024-04-24

## 2024-04-24 LAB
CHOLEST SERPL-MCNC: 288 MG/DL
FASTING STATUS PATIENT QL REPORTED: NO
HDLC SERPL-MCNC: 43 MG/DL
LDLC SERPL CALC-MCNC: 211 MG/DL
NONHDLC SERPL-MCNC: 245 MG/DL
TRIGL SERPL-MCNC: 169 MG/DL
TSH SERPL DL<=0.005 MIU/L-ACNC: 3.19 UIU/ML (ref 0.3–4.2)

## 2024-04-24 PROCEDURE — 87624 HPV HI-RISK TYP POOLED RSLT: CPT | Performed by: PHYSICIAN ASSISTANT

## 2024-04-24 PROCEDURE — 84443 ASSAY THYROID STIM HORMONE: CPT | Performed by: PHYSICIAN ASSISTANT

## 2024-04-24 PROCEDURE — 99396 PREV VISIT EST AGE 40-64: CPT | Mod: 25 | Performed by: PHYSICIAN ASSISTANT

## 2024-04-24 PROCEDURE — 80061 LIPID PANEL: CPT | Performed by: PHYSICIAN ASSISTANT

## 2024-04-24 PROCEDURE — 99213 OFFICE O/P EST LOW 20 MIN: CPT | Mod: 25 | Performed by: PHYSICIAN ASSISTANT

## 2024-04-24 PROCEDURE — 90677 PCV20 VACCINE IM: CPT | Performed by: PHYSICIAN ASSISTANT

## 2024-04-24 PROCEDURE — G0145 SCR C/V CYTO,THINLAYER,RESCR: HCPCS | Performed by: PHYSICIAN ASSISTANT

## 2024-04-24 PROCEDURE — 90471 IMMUNIZATION ADMIN: CPT | Performed by: PHYSICIAN ASSISTANT

## 2024-04-24 PROCEDURE — 36415 COLL VENOUS BLD VENIPUNCTURE: CPT | Performed by: PHYSICIAN ASSISTANT

## 2024-04-24 ASSESSMENT — PAIN SCALES - GENERAL: PAINLEVEL: NO PAIN (0)

## 2024-04-24 NOTE — COMMUNITY RESOURCES LIST (ENGLISH)
April 24, 2024           YOUR PERSONALIZED LIST OF SERVICES & PROGRAMS           & RECREATION    Sports      of [place] - Sports clubs and recreational activities  [address] [place] [address] (Distance: 2.6 miles)  Phone: [phone]  Website: https://www.[facility].gov/363/[employer]  Language: English  Fee: Self pay      of [place] - Sports clubs and recreational activities - [employer] [facility]  [address] [place] [address] (Distance: 7.1 miles)  Language: English  Fee: Self pay, Sliding scale      [employer]ASE[employer] AND SOFTBALL  Website: http://www.[employer]    Classes/Groups      [facility] - Personal training  [address] [place] [address] (Distance: 11.0 miles)  Phone: [phone]  Website: https://www.[employer]/  Language: English, Liberian  Fee: Sliding scale, Self pay, Insurance      - Group fitness classes  [address] [place] [address] (Distance: 9.8 miles)  Phone: [phone]  Website: https://"[employer]/163/Senior-Center  Language: English  Fee: Self pay  Accessibility: [facility] - Adult Enrichment  Phone: [phone]  Website: https://Partigi/adults-seniors/adult-enrichment/  Language: English  Hours: Mon 7:30 AM - 4:00 PM Tue 7:30 AM - 4:00 PM Wed 7:30 AM - 4:00 PM Thu 7:30 AM - 4:00 PM Fri 7:30 AM - 4:00 PM               IMPORTANT NUMBERS & WEBSITES        Emergency Services  911  .   United Way  211 http://211unitedway.org  .   Poison Control  (143) 667-4826 http://[place]poison.org http://wisconsinpoison.org  .     Suicide and Crisis Lifeline  988 http://988lifeline.org  .   Childhelp National Child Abuse Hotline  762.979.2318 http://Childhelphotline.org   .   National Sexual Assault Hotline  (582) 233-2747 (HOPE) http://Rainn.org   .     National Runaway Safeline  (756) 967-3420 (RUNAWAY) http://[address] [place], [place] [address]Civolution.org  .    Pregnancy & Postpartum Support  Call/text 898-020-2153  [place]: http://ppsupportmn.org  [place]: http://Dataium.com/wi  .   Substance Abuse National Helpline ([facility])  309-533-HELP (6995) http://Findtreatment.gov   .                DISCLAIMER: These resources have been generated via the [employer] Platform. [employer] does not endorse any service providers mentioned in this resource list. [employer] does not guarantee that the services mentioned in this resource list will be available to you or will improve your health or wellness.    [facility]

## 2024-04-24 NOTE — PROGRESS NOTES
"Preventive Care Visit  [facility]  Bebeto Box PA-C, Physician Assistant - Medical  Apr 24, 2024      Assessment & Plan     Routine general medical examination at a health care facility  Completed  Pap completed, mammo ordered.    Weight gain  New   - TSH with free T4 reflex; Future  - TSH with free T4 reflex  Likely stressful eating causing this. To work on overall diet, exercise. Discussed a few options to try which would not affect RA    Lymphedema  Ongoing   - Orthotics, Mastectomy and Custom Compression Orders  New orders placed for patient     Cervical cancer screening  Completed  - Pap Screen with HPV - recommended age 30 - 65 years    Visit for screening mammogram  Ordered     Screen for colon cancer  Ordered   - Colonoscopy Screening  Referral; Future    CARDIOVASCULAR SCREENING; LDL GOAL LESS THAN 130  Ordered   - Lipid panel reflex to direct LDL Fasting; Future  - Lipid panel reflex to direct LDL Fasting    Patient has been advised of split billing requirements and indicates understanding: Yes          BMI  Estimated body mass index is 39.94 kg/m  as calculated from the following:    Height as of this encounter: 1.651 m (5' 5\").    Weight as of this encounter: 108.9 kg (240 lb).   Weight management plan: Discussed healthy diet and exercise guidelines    Counseling  Appropriate preventive services were discussed with this patient, including applicable screening as appropriate for fall prevention, nutrition, physical activity, Tobacco-use cessation, weight loss and cognition.  Checklist reviewing preventive services available has been given to the patient.  Reviewed patient's diet, addressing concerns and/or questions.   She is at risk for psychosocial distress and has been provided with information to reduce risk.       Follow up yearly for prevent visit     Masoud Fraser is a 59 year old, presenting for the following:  Physical, Weight Check, and Shortness of " Breath          4/24/2024     7:45 AM   Additional Questions   Roomed by Ralf Arambula MA   Accompanied by Self       Health Care Directive  Patient does not have a Health Care Directive or Living Will: Discussed advance care planning with patient; however, patient declined at this time.        HPI          4/23/2024   General Health   How would you rate your overall physical health? Good   Feel stress (tense, anxious, or unable to sleep) Only a little   (!) STRESS CONCERN      4/23/2024   Nutrition   Three or more servings of calcium each day? Yes   Diet: Regular (no restrictions)   How many servings of fruit and vegetables per day? (!) 2-3   How many sweetened beverages each day? 0-1         4/23/2024   Exercise   Days per week of moderate/strenous exercise 0 days   Average minutes spent exercising at this level 0 min   (!) EXERCISE CONCERN      4/23/2024   Social Factors   Frequency of gathering with friends or relatives Once a week   Worry food won't last until get money to buy more No   Food not last or not have enough money for food? No   Do you have housing?  Yes   Are you worried about losing your housing? No   Lack of transportation? No   Unable to get utilities (heat,electricity)? No         4/23/2024   Fall Risk   Fallen 2 or more times in the past year? No   Trouble with walking or balance? Yes           4/23/2024   Dental   Dentist two times every year? Yes            Today's PHQ-2 Score:       4/23/2024    11:11 PM   PHQ-2 ( 1999 [employer])   Q1: Little interest or pleasure in doing things 0   Q2: Feeling down, depressed or hopeless 0   PHQ-2 Score 0   Q1: Little interest or pleasure in doing things Not at all   Q2: Feeling down, depressed or hopeless Not at all   PHQ-2 Score 0           4/23/2024   Substance Use   Alcohol more than 3/day or more than 7/wk No   Do you use any other substances recreationally? No     Social History     Tobacco Use    Smoking status: Never    Smokeless tobacco: Never   Vaping  Use    Vaping status: Never Used   Substance Use Topics    Alcohol use: No    Drug use: No           2/6/2023   LAST FHS-7 RESULTS   1st degree relative breast or ovarian cancer No   Any relative bilateral breast cancer No   Any male have breast cancer No        Mammogram Screening - Mammogram every 1-2 years updated in Health Maintenance based on mutual decision making        4/23/2024   STI Screening   New sexual partner(s) since last STI/HIV test? No     History of abnormal Pap smear: NO - age 30-65 PAP every 5 years with negative HPV co-testing recommended        Latest Ref Rng & Units 11/25/2015     8:56 AM 11/25/2015    12:00 AM 9/25/2012     8:45 AM   PAP / HPV   PAP (Historical)   NIL  NIL    HPV 16 DNA NEG Negative      HPV 18 DNA NEG Negative      Other HR HPV NEG Negative        ASCVD Risk   The 10-year ASCVD risk score (Jero GABRIEL, et al., 2019) is: 6.1%    Values used to calculate the score:      Age: 59 years      Sex: Female      Is Non- : No      Diabetic: No      Tobacco smoker: No      Systolic Blood Pressure: 137 mmHg      Is BP treated: No      HDL Cholesterol: 42 mg/dL      Total Cholesterol: 270 mg/dL           Reviewed and updated as needed this visit by Provider                    Past Medical History:   Diagnosis Date    Bilateral carpal tunnel syndrome 12/20/2015    Obesity     RA (rheumatoid arthritis) (H)     inflammatory poly arthritis.    S/P carpal tunnel release 12/29/2016     Past Surgical History:   Procedure Laterality Date    ABDOMEN SURGERY  1992    Laparoscopy    BUNIONECTOMY Bilateral 1977    10th grade    COLONOSCOPY N/A 11/09/2015    Procedure: COLONOSCOPY;  Surgeon: Andrew Adamson MD;  Location: [facility]    COLONOSCOPY WITH CO2 INSUFFLATION N/A 11/09/2015    Procedure: COLONOSCOPY WITH CO2 INSUFFLATION;  Surgeon: Andrew Adamson MD;  Location: [facility]    GYN SURGERY  1992    endometryosis    RELEASE CARPAL TUNNEL Right 10/12/2016  "   Procedure: RELEASE CARPAL TUNNEL;  Surgeon: Colby Nobles MD;  Location: [facility]    RELEASE CARPAL TUNNEL Left 12/16/2016    Procedure: RELEASE CARPAL TUNNEL;  Surgeon: Colby Nobles MD;  Location: [facility]    RELEASE TRIGGER FINGER Right 02/09/2023    Procedure: RELEASE, TRIGGER FINGER right ring finger;  Surgeon: Danie Urban MD;  Location: [facility]    TONSILLECTOMY & ADENOIDECTOMY  1973     Lab work is in process  Labs reviewed in EPIC      Review of Systems  Constitutional, neuro, ENT, endocrine, pulmonary, cardiac, gastrointestinal, genitourinary, musculoskeletal, integument and psychiatric systems are negative, except as otherwise noted.     Objective    Exam  /78   Pulse 75   Temp 98  F (36.7  C) (Tympanic)   Resp 14   Ht 1.651 m (5' 5\")   Wt 108.9 kg (240 lb)   LMP 08/03/2018   SpO2 99%   Breastfeeding No   BMI 39.94 kg/m     Estimated body mass index is 39.94 kg/m  as calculated from the following:    Height as of this encounter: 1.651 m (5' 5\").    Weight as of this encounter: 108.9 kg (240 lb).    Physical Exam  GENERAL: alert and no distress  EYES: Eyes grossly normal to inspection, PERRL and conjunctivae and sclerae normal  HENT: ear canals and TM's normal, nose and mouth without ulcers or lesions  NECK: no adenopathy, no asymmetry, masses, or scars  RESP: lungs clear to auscultation - no rales, rhonchi or wheezes  CV: regular rate and rhythm, normal S1 S2, no S3 or S4, no murmur, click or rub, no peripheral edema  ABDOMEN: soft, nontender, no hepatosplenomegaly, no masses and bowel sounds normal   (female): normal female external genitalia, normal urethral meatus, normal vaginal mucosa  MS: no gross musculoskeletal defects noted, no edema  PSYCH: mentation appears normal, affect normal/bright        Signed Electronically by: Bebeto Box PA-C    "

## 2024-04-26 LAB
BKR LAB AP GYN ADEQUACY: NORMAL
BKR LAB AP GYN INTERPRETATION: NORMAL
BKR LAB AP HPV REFLEX: NORMAL
BKR LAB AP PREVIOUS ABNORMAL: NORMAL
PATH REPORT.COMMENTS IMP SPEC: NORMAL
PATH REPORT.COMMENTS IMP SPEC: NORMAL
PATH REPORT.RELEVANT HX SPEC: NORMAL

## 2024-04-29 LAB
HUMAN PAPILLOMA VIRUS 16 DNA: NEGATIVE
HUMAN PAPILLOMA VIRUS 18 DNA: NEGATIVE
HUMAN PAPILLOMA VIRUS FINAL DIAGNOSIS: NORMAL
HUMAN PAPILLOMA VIRUS OTHER HR: NEGATIVE

## 2024-04-30 PROBLEM — Z12.4 SCREENING FOR CERVICAL CANCER: Status: ACTIVE | Noted: 2024-04-30

## 2024-05-24 ENCOUNTER — LAB (OUTPATIENT)
Dept: LAB | Facility: CLINIC | Age: 59
End: 2024-05-24
Payer: COMMERCIAL

## 2024-05-24 DIAGNOSIS — Z79.899 HIGH RISK MEDICATIONS (NOT ANTICOAGULANTS) LONG-TERM USE: ICD-10-CM

## 2024-05-24 DIAGNOSIS — M06.09 RHEUMATOID ARTHRITIS OF MULTIPLE SITES WITH NEGATIVE RHEUMATOID FACTOR (H): ICD-10-CM

## 2024-05-24 LAB
BASOPHILS # BLD AUTO: 0 10E3/UL (ref 0–0.2)
BASOPHILS NFR BLD AUTO: 0 %
EOSINOPHIL # BLD AUTO: 0.3 10E3/UL (ref 0–0.7)
EOSINOPHIL NFR BLD AUTO: 4 %
ERYTHROCYTE [DISTWIDTH] IN BLOOD BY AUTOMATED COUNT: 12.7 % (ref 10–15)
ERYTHROCYTE [SEDIMENTATION RATE] IN BLOOD BY WESTERGREN METHOD: 7 MM/HR (ref 0–30)
HCT VFR BLD AUTO: 44.2 % (ref 35–47)
HGB BLD-MCNC: 14.6 G/DL (ref 11.7–15.7)
IMM GRANULOCYTES # BLD: 0 10E3/UL
IMM GRANULOCYTES NFR BLD: 0 %
LYMPHOCYTES # BLD AUTO: 2.6 10E3/UL (ref 0.8–5.3)
LYMPHOCYTES NFR BLD AUTO: 41 %
MCH RBC QN AUTO: 30 PG (ref 26.5–33)
MCHC RBC AUTO-ENTMCNC: 33 G/DL (ref 31.5–36.5)
MCV RBC AUTO: 91 FL (ref 78–100)
MONOCYTES # BLD AUTO: 0.5 10E3/UL (ref 0–1.3)
MONOCYTES NFR BLD AUTO: 7 %
NEUTROPHILS # BLD AUTO: 3 10E3/UL (ref 1.6–8.3)
NEUTROPHILS NFR BLD AUTO: 47 %
PLATELET # BLD AUTO: 297 10E3/UL (ref 150–450)
RBC # BLD AUTO: 4.87 10E6/UL (ref 3.8–5.2)
WBC # BLD AUTO: 6.4 10E3/UL (ref 4–11)

## 2024-05-24 PROCEDURE — 36415 COLL VENOUS BLD VENIPUNCTURE: CPT

## 2024-05-24 PROCEDURE — 86803 HEPATITIS C AB TEST: CPT

## 2024-05-24 PROCEDURE — 80076 HEPATIC FUNCTION PANEL: CPT

## 2024-05-24 PROCEDURE — 82565 ASSAY OF CREATININE: CPT

## 2024-05-24 PROCEDURE — 86140 C-REACTIVE PROTEIN: CPT

## 2024-05-24 PROCEDURE — 86704 HEP B CORE ANTIBODY TOTAL: CPT

## 2024-05-24 PROCEDURE — 86481 TB AG RESPONSE T-CELL SUSP: CPT

## 2024-05-24 PROCEDURE — 87340 HEPATITIS B SURFACE AG IA: CPT

## 2024-05-24 PROCEDURE — 85025 COMPLETE CBC W/AUTO DIFF WBC: CPT

## 2024-05-24 PROCEDURE — 85652 RBC SED RATE AUTOMATED: CPT

## 2024-05-25 LAB
ALBUMIN SERPL BCG-MCNC: 4.2 G/DL (ref 3.5–5.2)
ALP SERPL-CCNC: 72 U/L (ref 40–150)
ALT SERPL W P-5'-P-CCNC: 32 U/L (ref 0–50)
AST SERPL W P-5'-P-CCNC: 24 U/L (ref 0–45)
BILIRUB DIRECT SERPL-MCNC: <0.2 MG/DL (ref 0–0.3)
BILIRUB SERPL-MCNC: 0.3 MG/DL
CREAT SERPL-MCNC: 0.77 MG/DL (ref 0.51–0.95)
CRP SERPL-MCNC: <3 MG/L
EGFRCR SERPLBLD CKD-EPI 2021: 87 ML/MIN/1.73M2
HBV CORE AB SERPL QL IA: NONREACTIVE
HBV SURFACE AG SERPL QL IA: NONREACTIVE
HCV AB SERPL QL IA: NONREACTIVE
PROT SERPL-MCNC: 6.8 G/DL (ref 6.4–8.3)

## 2024-05-28 LAB
GAMMA INTERFERON BACKGROUND BLD IA-ACNC: 0.03 IU/ML
M TB IFN-G BLD-IMP: NEGATIVE
M TB IFN-G CD4+ BCKGRND COR BLD-ACNC: 9.97 IU/ML
MITOGEN IGNF BCKGRD COR BLD-ACNC: 0.01 IU/ML
MITOGEN IGNF BCKGRD COR BLD-ACNC: 0.04 IU/ML
QUANTIFERON MITOGEN: 10 IU/ML
QUANTIFERON NIL TUBE: 0.03 IU/ML
QUANTIFERON TB1 TUBE: 0.07 IU/ML
QUANTIFERON TB2 TUBE: 0.04

## 2024-06-07 ENCOUNTER — VIRTUAL VISIT (OUTPATIENT)
Dept: RHEUMATOLOGY | Facility: CLINIC | Age: 59
End: 2024-06-07
Payer: COMMERCIAL

## 2024-06-07 DIAGNOSIS — Z79.899 HIGH RISK MEDICATIONS (NOT ANTICOAGULANTS) LONG-TERM USE: ICD-10-CM

## 2024-06-07 DIAGNOSIS — M06.09 RHEUMATOID ARTHRITIS OF MULTIPLE SITES WITH NEGATIVE RHEUMATOID FACTOR (H): Primary | ICD-10-CM

## 2024-06-07 DIAGNOSIS — M35.00 SECONDARY SJOGREN'S SYNDROME (H): ICD-10-CM

## 2024-06-07 PROCEDURE — G2211 COMPLEX E/M VISIT ADD ON: HCPCS | Mod: 95 | Performed by: INTERNAL MEDICINE

## 2024-06-07 PROCEDURE — 99214 OFFICE O/P EST MOD 30 MIN: CPT | Mod: 95 | Performed by: INTERNAL MEDICINE

## 2024-06-07 RX ORDER — SULFASALAZINE 500 MG/1
1000 TABLET ORAL 2 TIMES DAILY
Qty: 360 TABLET | Refills: 2 | Status: SHIPPED | OUTPATIENT
Start: 2024-06-07

## 2024-06-07 NOTE — PROGRESS NOTES
Bria Haddad  is a 59 year old year old who is being evaluated via a billable video visit.      How would you like to obtain your AVS? MyChart  If the video visit is dropped, the invitation should be resent by: Text to cell phone: [phone]   Will anyone else be joining your video visit? No      Rheumatology Video Visit      Bria Haddad MRN# [id number]   YOB: 1965 Age: 59 year old      Date of visit: 6/07/24  PCP: Dr. Holli Marley    Chief Complaint   Patient presents with:  Rheumatoid Arthritis    Assessment and Plan     1.  Rheumatoid arthritis: Ms. Haddad initially presented to this clinic in 2015 with dependent edema of the bilateral lower extremities, fatigue, and a one time episode of right toe pain in the setting of a positive ROSEMARY.  On 9/18/2017 she presented with synovitis of her bilateral PIPs and pain without swelling of her MCPs, persistent fatigue, intermittent rash, and dependent edema. ROSEMARY positive but additional studies negative/normal including complement C3, complement C4, beta-2 glycoprotein IgM and IgG, cardiolipin IgM and IgG, lupus anticoagulant, RNP, Stuart, SSA, SSB, Scl-70.  Joint exam most consistent with rheumatoid arthritis.  Previously on HCQ (bloating), MTX (LFT elevation, alopecia, headache), leflunomide (LFT elevation). Currently on SSZ and Humira (started 2018).  RA controlled.   Chronic illness, stable.    - Continue sulfasalazine 1000mg twice daily  - Continue Humira 40mg SQ every 14 days  - Labs in 3 months: CBC, Creatinine, Hepatic Panel, ESR, CRP    High risk medication requiring intensive toxicity monitoring at least quarterly.     2.  Secondary Sjogren's syndrome: SSA and SSB negative previously.  Dry eye and dry mouth symptoms likely secondary to rheumatoid arthritis.  Preservative-free artificial tears from her eye doctor have been sufficient for dry eye management.  Dry mouth is mild per patient.  She follows with her dentist regularly and reports  having good dentition except for recent mild gingivitis that was treated by her dentist..  Reviewed secondary Sjogren's syndrome in detail today.    3.  History of bilateral patellofemoral syndrome: Resolves with physical therapy exercises, typically recurring when she stops doing the exercises on her own at home.  Not an issue at this time    4. History of Left shoulder pain, impingement syndrome: Resolved with combination of steroid injection and physical therapy.  Not an issue at this time.     5.  Lymphedema: Bilateral lower extremity edema continues to be an issue and the compression socks she was purchasing were not sufficient.  She then went to lymphedema therapist with significant improvement and now has prescribed compression socks from the lymphedema clinic with significant improvement.  She is to follow-up with her vascular medicine specialist and/or PCP in the future if this is an issue again.    6.  Sleep apnea: 2/13/2023 sleep medicine note documents moderate probability of obstructive sleep apnea and the patient reports that the next step was to use a CPAP in office but the next sleep medicine appointment needed to be rescheduled and she was frustrated with that so she canceled the appointment.  We previously discussed the importance of her health and the importance of obstructive sleep apnea treatment if needed, so I previously advised her to reschedule the follow-up sleep medicine appointment.  She has not yet done this but plans to do so.    7.  Vaccinations: Vaccinations reviewed with MsAkash Boo.  - Influenza: refused by patient because of reaction in the past   - [id number]: up to date   - Shingrix: advised vaccination   - COVID-19:  advised keeping updated, and to hold sulfasalazine for 1-2 weeks afterward    Total minutes spent in evaluation with patient, documentation, , and review of pertinent studies and chart notes: 22  The longitudinal plan of care for the rheumatology problem(s)  were addressed during this visit.  Due to added complexity of care, we will continue to support the patient and the subsequent management of this condition with ongoing continuity of care.         Ms. Haddad verbalized agreement with and understanding of the rational for the diagnosis and treatment plan.  All questions were answered to best of my ability and the patient's satisfaction. Ms. Haddad was advised to contact the clinic with any questions that may arise after the clinic visit.      Thank you for involving me in the care of the patient    Return to clinic: 3 months    HPI   Bria Haddad is a 59 year old female with a past medical history significant for endometriosis, s/p carpal tunnel release surgery, trigger finger requiring surgery, and rheumatoid arthritis who present for follow up of rheumatoid arthritis.    5/26/2023: Joints are doing well.  No joint pain.  Morning stiffness for less than 20 minutes.  Trigger finger surgery went well and she has healed well.  Lymphedema of both lower extremities has been an issue; she has been using compression socks but they are not of the same quality as what she had received from the vascular medicine specialist, per patient.  The lymphedema therapist was very effective and she would like to return.  She canceled her follow-up sleep medicine evaluation where a CPAP was going to be used, per patient, because the provider had to reschedule and she was frustrated with that because she had waited so long to be seen in the first place.    9/1/2023: Arthritis is doing well.  No joint pain or swelling.  No morning stiffness or gelling phenomenon.  No active trigger fingers.  Has not yet followed up with sleep medicine for a CPAP.  Does not monitor blood pressure at home.    12/1/2023: RA controlled.  No joint pain or swelling. No morning stiffness or gelling. Arthritis does not limit daily activities.  Has not yet seen the sleep medicine specialist but says that she plans to  schedule eventually.  Lymphedema controlled with compression socks.    3/1/2024: RA controlled.  No joint pain or swelling.  No morning stiffness.  Mild gelling phenomenon that resolves quickly, only occurring if she sits at her desk for several hours.  No side effects from Humira or sulfasalazine.  Arthritis does not limit daily activities.    Today, 6/7/2024: Currently doing well.  No joint pain or swelling.  No morning stiffness or gelling phenomenon.  Reports that she does have dry eye and dry mouth.  Dry mouth does not require treatment; follows with a dentist regularly and has good dentition except for mild gingivitis that was recently treated by her dentist.  Dry eyes are treated with artificial tears as needed and this is effective.    No fevers or chills, nausea or vomiting, constipation or diarrhea.  No chest pain/pressure, palpitations, or shortness of breath.  No eye pain or redness.  No black or bloody stools.  No rash.    Tobacco: none  EtOH: none  Drugs: none    ROS   12 point review of system was completed and negative except as noted in the HPI     Active Problem List     Patient Active Problem List   Diagnosis    CARDIOVASCULAR SCREENING; LDL GOAL LESS THAN 160    Endometriosis of uterus    Rheumatoid arthritis (H)    Morbid obesity (H)    Chronic pain of both knees    Trigger finger, right ring finger    Lymphedema    Chronic insomnia    Advanced directives, counseling/discussion    Screening for cervical cancer     Past Medical History     Past Medical History:   Diagnosis Date    Bilateral carpal tunnel syndrome 12/20/2015    Obesity     RA (rheumatoid arthritis) (H)     inflammatory poly arthritis.    S/P carpal tunnel release 12/29/2016     Past Surgical History     Past Surgical History:   Procedure Laterality Date    ABDOMEN SURGERY  1992    Laparoscopy    BUNIONECTOMY Bilateral 1977    10th grade    COLONOSCOPY N/A 11/09/2015    Procedure: COLONOSCOPY;  Surgeon: Andrew Adamson MD;   Location: [facility]    COLONOSCOPY WITH CO2 INSUFFLATION N/A 11/09/2015    Procedure: COLONOSCOPY WITH CO2 INSUFFLATION;  Surgeon: Andrew Adamson MD;  Location: [facility]    GYN SURGERY  1992    endometryosis    RELEASE CARPAL TUNNEL Right 10/12/2016    Procedure: RELEASE CARPAL TUNNEL;  Surgeon: Colby Nobles MD;  Location: [facility]    RELEASE CARPAL TUNNEL Left 12/16/2016    Procedure: RELEASE CARPAL TUNNEL;  Surgeon: Colby Nobles MD;  Location: [facility]    RELEASE TRIGGER FINGER Right 02/09/2023    Procedure: RELEASE, TRIGGER FINGER right ring finger;  Surgeon: Danie Urban MD;  Location: [facility]    TONSILLECTOMY & ADENOIDECTOMY  1973     Allergy     Allergies   Allergen Reactions    Asa [Aspirin] Hives     Current Medication List     Current Outpatient Medications   Medication Sig Dispense Refill    adalimumab (HUMIRA *CF*) 40 MG/0.4ML pen kit Inject 0.4 mLs (40 mg) Subcutaneous every 14 days 2.4 mL 2    sulfaSALAzine (AZULFIDINE) 500 MG tablet Take 2 tablets (1,000 mg) by mouth 2 times daily 360 tablet 2    order for DME Equipment being ordered: compression stockings 1 pair, at 30mm Hg, to be worn during the day. For Bilateral leg edema [R60.0] 2 Units 0     No current facility-administered medications for this visit.     Social History   See HPI    Family History     Family History   Problem Relation Age of Onset    Osteoporosis Mother     Respiratory Mother     Thyroid Disease Mother     Anxiety Disorder Mother     Asthma Mother     Heart Disease Father     Cancer - colorectal Maternal Grandmother     Colon Cancer Maternal Grandmother     Diabetes Maternal Grandfather     Heart Disease Maternal Grandfather     Cancer Paternal Grandmother     Heart Disease Paternal Grandfather     Respiratory Paternal Grandfather     Hypertension Brother     Thyroid Disease Sister     Thyroid Disease Sister     Neurologic Disorder Brother     Hypertension Brother     Thyroid Disease Brother     Other Cancer  "Sister     Anxiety Disorder Sister     Mental Illness Sister     Anesthesia Reaction Sister     Thyroid Disease Sister      Physical Exam     Temp Readings from Last 3 Encounters:   04/24/24 98  F (36.7  C) (Tympanic)   02/09/23 98.4  F (36.9  C) (Temporal)   02/06/23 98.1  F (36.7  C) (Tympanic)     BP Readings from Last 5 Encounters:   04/24/24 137/78   02/28/23 (!) 147/85   02/23/23 (!) 146/82   02/09/23 (!) 142/71   02/06/23 138/84     Pulse Readings from Last 1 Encounters:   04/24/24 75     Resp Readings from Last 1 Encounters:   04/24/24 14     Estimated body mass index is 39.94 kg/m  as calculated from the following:    Height as of 4/24/24: 1.651 m (5' 5\").    Weight as of 4/24/24: 108.9 kg (240 lb).      GEN: NAD.   HEENT: Anicteric, noninjected sclera. No obvious external lesions of the ear and nose. Hearing intact.  PULM: No increased work of breathing  MSK:  Hands and wrists without swelling.  Able to make a complete fist with each hand  SKIN: No rash or jaundice seen  PSYCH: Alert. Appropriate.      Labs / Imaging (select studies)     RF/CCP  Recent Labs   Lab Test 09/18/17  0913   CCPIGG 1   RHF <20     CBC  Recent Labs   Lab Test 05/24/24  0722 02/26/24  0730 11/25/23  1139 10/02/21  1656 07/05/21  1519 04/06/21  0716 12/22/20  1754   WBC 6.4 6.4 7.3   < > 6.3 7.2 8.4   RBC 4.87 4.79 4.88   < > 4.89 4.89 4.83   HGB 14.6 14.4 14.5   < > 14.2 14.4 14.3   HCT 44.2 43.2 43.5   < > 43.6 43.5 43.3   MCV 91 90 89   < > 89 89 90   RDW 12.7 12.7 11.9   < > 13.1 12.8 12.8    300 299   < > 363 302 317   MCH 30.0 30.1 29.7   < > 29.0 29.4 29.6   MCHC 33.0 33.3 33.3   < > 32.6 33.1 33.0   NEUTROPHIL 47 48 46   < > 43.3 45.3 38.0   LYMPH 41 38 41   < > 41.1 43.0 48.6   MONOCYTE 7 8 9   < > 12.4 9.9 11.3   EOSINOPHIL 4 5 3   < > 2.9 1.5 2.0   BASOPHIL 0 1 0   < > 0.3 0.3 0.1   ANEU  --   --   --   --  2.7 3.3 3.2   ALYM  --   --   --   --  2.6 3.1 4.1   MIKE  --   --   --   --  0.8 0.7 1.0   AEOS  --   --   " --   --  0.2 0.1 0.2   ABAS  --   --   --   --  0.0 0.0 0.0   ANEUTAUTO 3.0 3.1 3.4   < >  --   --   --    ALYMPAUTO 2.6 2.5 3.0   < >  --   --   --    AMONOAUTO 0.5 0.5 0.7   < >  --   --   --    AEOSAUTO 0.3 0.3 0.3   < >  --   --   --    ABSBASO 0.0 0.0 0.0   < >  --   --   --     < > = values in this interval not displayed.     CMP  Recent Labs   Lab Test 05/24/24  0722 02/26/24  0730 11/25/23  1139 05/22/23  0719 02/06/23  0755 10/02/21  1656 07/05/21  1519 04/06/21  0716 12/22/20  1754 09/23/20  0749 12/31/19  0712 05/08/19  0745   NA  --   --   --   --  144  --   --   --   --  141  --  142   POTASSIUM  --   --   --   --  3.8  --   --   --   --  4.1  --  4.0   CHLORIDE  --   --   --   --  110*  --   --   --   --  109  --  109   CO2  --   --   --   --  30  --   --   --   --  29  --  30   ANIONGAP  --   --   --   --  4  --   --   --   --  3  --  3   GLC  --   --   --   --  107*  --   --   --   --  104*  --  99   BUN  --   --   --   --  16  --   --   --   --  16  --  14   CR 0.77 0.86 0.86   < > 0.72   < > 0.78 0.86 0.87 0.77   < > 0.74   GFRESTIMATED 87 77 77   < > >90   < > 84 75 74 85   < > >90   GFRESTBLACK  --   --   --   --   --   --  >90 86 85 >90   < > >90   AGUILAR  --   --   --   --  9.6  --   --   --   --  9.4  --  9.3   BILITOTAL 0.3 0.3 0.3   < > 0.5   < > 0.2 0.4 0.2  --    < > 0.3   ALBUMIN 4.2 4.1 4.2   < > 3.9   < > 3.9 3.8 4.3  --    < > 4.0   PROTTOTAL 6.8 6.9 7.2   < > 7.2   < > 7.5 7.6 7.8  --    < > 7.5   ALKPHOS 72 73 78   < > 70   < > 76 76 72  --    < > 73   AST 24 26 23   < > 14   < > 17 13 17  --    < > 15   ALT 32 23 19   < > 28   < > 32 26 27  --    < > 31    < > = values in this interval not displayed.     Calcium/VitaminD  Recent Labs   Lab Test 02/06/23  0755 09/23/20  0749 05/08/19  0745   AGUILAR 9.6 9.4 9.3     ESR/CRP  Recent Labs   Lab Test 05/24/24  0722 02/26/24  0730 05/22/23  0719 02/06/23  0755 11/10/22  0658   SED 7 8 7 8 5   CRP  --   --  <2.9 4.8 <2.9   CRPI <3.00 3.61  --    --   --      Lipid Panel  Recent Labs   Lab Test 04/24/24  0848 01/18/22  0709   CHOL 288* 270*   TRIG 169* 191*   HDL 43* 42*   * 190*   NHDL 245* 228*     Hepatitis B  Recent Labs   Lab Test 05/24/24  0722   HBCAB Nonreactive   HEPBANG Nonreactive     Hepatitis C  Recent Labs   Lab Test 05/24/24  0722 10/07/16  0912   HCVAB Nonreactive Nonreactive   Assay performance characteristics have not been established for newborns,   infants, and children       Tuberculosis Screening  Recent Labs   Lab Test 05/24/24  0722 01/18/22  0701 12/07/18  0724   TBRES Negative Negative Negative          Chart documentation done in part with Dragon Voice recognition Software. Although reviewed after completion, some word and grammatical error may remain.      Video-Visit Details    Type of service:  Video Visit    Video Start Time: 7:22 AM    Video End Time:7:43 AM    Originating Location (pt. Location): Home, [place]    Distant Location (provider location):  off site, [place]    Platform used for Video Visit: Faustino Resendiz MD

## 2024-06-07 NOTE — PATIENT INSTRUCTIONS
RHEUMATOLOGY    [facility]  [address] [place] [address]  [place] [address]    Phone number: [phone]  Fax number: [phone]    If you need a medication refill, please contact us as you may need lab work and/or a follow up visit prior to your refill.      Thank you for choosing [facility]!    Jolynn Angulo CMA Rheumatology

## 2024-06-10 ENCOUNTER — TELEPHONE (OUTPATIENT)
Dept: GASTROENTEROLOGY | Facility: CLINIC | Age: 59
End: 2024-06-10
Payer: COMMERCIAL

## 2024-06-10 DIAGNOSIS — Z12.11 SCREEN FOR COLON CANCER: Primary | ICD-10-CM

## 2024-06-10 NOTE — TELEPHONE ENCOUNTER
"Endoscopy Scheduling Screen    Have you had a positive Covid test in the last 14 days?  No    What is your communication preference for Instructions and/or Bowel Prep?   MyChart    What insurance is in the chart?  Other:  [facility]    Ordering/Referring Provider: SAUL MORRIS    (If ordering provider performs procedure, schedule with ordering provider unless otherwise instructed. )    BMI: Estimated body mass index is 39.94 kg/m  as calculated from the following:    Height as of 4/24/24: 1.651 m (5' 5\").    Weight as of 4/24/24: 108.9 kg (240 lb).     Sedation Ordered  moderate sedation.   If patient BMI > 50 do not schedule in [facility].    If patient BMI > 45 do not schedule at [facility].    Are you taking methadone or Suboxone?  No    Have you had difficulties, pain, or discomfort during past endoscopy procedures?  No    Are you taking any prescription medications for pain 3 or more times per week?   NO, No RN review required.    Do you have a history of malignant hyperthermia?  No    (Females) Are you currently pregnant?        Have you been diagnosed or told you have pulmonary hypertension?   No    Do you have an LVAD?  No    Have you been told you have moderate to severe sleep apnea?  No    Have you been told you have COPD, asthma, or any other lung disease?  No    Do you have any heart conditions?  No     Have you ever had or are you waiting for an organ transplant?  No. Continue scheduling, no site restrictions.    Have you had a stroke or transient ischemic attack (TIA aka \"mini stroke\" in the last 6 months?   No    Have you been diagnosed with or been told you have cirrhosis of the liver?   No    Are you currently on dialysis?   No    Do you need assistance transferring?   No    BMI: Estimated body mass index is 39.94 kg/m  as calculated from the following:    Height as of 4/24/24: 1.651 m (5' 5\").    Weight as of 4/24/24: 108.9 kg (240 lb).     Is patients BMI > 40 and scheduling location [facility]?  No    Do you take an " injectable medication for weight loss or diabetes (excluding insulin)?  No    Do you take the medication Naltrexone?  No    Do you take blood thinners?  No       Prep   Are you currently on dialysis or do you have chronic kidney disease?  No    Do you have a diagnosis of diabetes?  No    Do you have a diagnosis of cystic fibrosis (CF)?  No    On a regular basis do you go 3 -5 days between bowel movements?  No    BMI > 40?  No    Preferred Pharmacy:    [id number] - [place], [place] - [address]  [address]  [place] [address]  Phone: [phone] Fax: [phone]      Final Scheduling Details     Procedure scheduled  Colonoscopy    Surgeon:  KEVAN     Date of procedure:  8/16     Pre-OP / PAC:   No - Not required for this site.    Location  [facility] - [facility] - Per order.    Sedation   Moderate Sedation - Per order.      Patient Reminders:   You will receive a call from a Nurse to review instructions and health history.  This assessment must be completed prior to your procedure.  Failure to complete the Nurse assessment may result in the procedure being cancelled.      On the day of your procedure, please designate an adult(s) who can drive you home stay with you for the next 24 hours. The medicines used in the exam will make you sleepy. You will not be able to drive.      You cannot take public transportation, ride share services, or non-medical taxi service without a responsible caregiver.  Medical transport services are allowed with the requirement that a responsible caregiver will receive you at your destination.  We require that drivers and caregivers are confirmed prior to your procedure.

## 2024-06-17 PROBLEM — Z71.89 ADVANCED DIRECTIVES, COUNSELING/DISCUSSION: Status: RESOLVED | Noted: 2024-04-24 | Resolved: 2024-06-17

## 2024-06-18 NOTE — MR AVS SNAPSHOT
After Visit Summary   10/6/2017    Bria Haddad    MRN: [id number]           Patient Information     Date Of Birth          1965        Visit Information        Provider Department      10/6/2017 7:40 AM Bill Perez PT [facility] [place] [facility]        Today's Diagnoses     Acute pain of left knee           Follow-ups after your visit        Your next 10 appointments already scheduled     Oct 13, 2017  7:40 AM CDT   ETHAN Extremity with SU[facility] [place] [facility] ([facility] [place])    [address]  [place] [address]   [phone]            Oct 27, 2017  7:00 AM CDT   ETHAN Extremity with Bill Perez PT   [facility] [place] [facility] ([facility] [place])    [address]  [place] [address]   [phone]            Dec 18, 2017  7:20 AM CST   Return Visit with Bill Resendiz MD   [facility] [place] ([facility] [place])    [address]  [place] [address]   [phone]              Who to contact     If you have questions or need follow up information about today's clinic visit or your schedule please contact [facility] directly at [phone].  Normal or non-critical lab and imaging results will be communicated to you by [facility], letter or phone within 4 business days after the clinic has received the results. If you do not hear from us within 7 days, please contact the clinic through [facility] or phone. If you have a critical or abnormal lab result, we will notify you by phone as soon as possible.  Submit refill requests through [employer] or call your pharmacy and they will forward the refill request to us. Please allow 3 business days for your refill to be completed.          Additional Information About Your Visit        [facility] Information     [employer] gives you secure access to your electronic health record. If you see a primary  care provider, you can also send messages to your care team and make appointments. If you have questions, please call your primary care clinic.  If you do not have a primary care provider, please call [phone] and they will assist you.        Care EveryWhere ID     This is your Care EveryWhere ID. This could be used by other organizations to access your [facility] medical records  [id number]         Blood Pressure from Last 3 Encounters:   09/18/17 136/84   07/12/17 126/70   06/19/17 115/66    Weight from Last 3 Encounters:   09/18/17 97.5 kg (215 lb)   08/14/17 98.4 kg (217 lb)   07/12/17 97.3 kg (214 lb 9.6 oz)              We Performed the Following     THERAPEUTIC ACTIVITIES     THERAPEUTIC EXERCISES        Primary Care Provider Office Phone # Fax #    Stephanie Saldana [phone] [address] [address]        Equal Access to Services     [facility] : Hadii aad ku hadasho Soomaali, waaxda luqadaha, qaybta kaalmada adeegyada, waxay idiin hayaan desean maheraralin nicholson . So [facility] [phone].    ATENCIÓN: Si habla español, tiene a lopez disposición servicios gratuitos de asistencia lingüística. Llame al [phone].    We comply with applicable federal civil rights laws and [place] laws. We do not discriminate on the basis of race, color, national origin, age, disability, sex, sexual orientation, or gender identity.            Thank you!     Thank you for choosing [facility] [place]   for your care. Our goal is always to provide you with excellent care. Hearing back from our patients is one way we can continue to improve our services. Please take a few minutes to complete the written survey that you may receive in the mail after your visit with us. Thank you!             Your Updated Medication List - Protect others around you: Learn how to safely use, store and throw away your medicines at www.disposemymeds.org.          This list is  accurate as of: 10/6/17  8:53 AM.  Always use your most recent med list.                   Brand Name Dispense Instructions for use Diagnosis    hydroxychloroquine 200 MG tablet    PLAQUENIL    60 tablet    Take 1 tablet (200 mg) by mouth 2 times daily    Inflammatory polyarthropathy (H)       mometasone 0.1 % cream    ELOCON    45 g    Apply sparingly to affected area twice daily as needed.  Do not apply to face.    Dermatitis       order for DME     2 Units    Equipment being ordered: compression stockings 1 pair, at 30mm Hg, to be worn during the day. For Bilateral leg edema [R60.0]    Bilateral leg edema          RAHUL/[place]

## 2024-07-26 RX ORDER — BISACODYL 5 MG/1
TABLET, DELAYED RELEASE ORAL
Qty: 4 TABLET | Refills: 0 | Status: SHIPPED | OUTPATIENT
Start: 2024-07-26

## 2024-07-26 NOTE — TELEPHONE ENCOUNTER
Extended Golytely Bowel Prep  recommended due to BMI > 40.  Instructions were sent via [employer]. Bowel prep was sent 7/26/2024 to      [id number] - [place], [place] - [address].

## 2024-08-09 ENCOUNTER — TELEPHONE (OUTPATIENT)
Dept: GASTROENTEROLOGY | Facility: CLINIC | Age: 59
End: 2024-08-09

## 2024-08-09 ENCOUNTER — ANCILLARY PROCEDURE (OUTPATIENT)
Dept: GENERAL RADIOLOGY | Facility: CLINIC | Age: 59
End: 2024-08-09
Attending: PHYSICIAN ASSISTANT
Payer: COMMERCIAL

## 2024-08-09 ENCOUNTER — OFFICE VISIT (OUTPATIENT)
Dept: FAMILY MEDICINE | Facility: CLINIC | Age: 59
End: 2024-08-09
Payer: COMMERCIAL

## 2024-08-09 VITALS
WEIGHT: 247.4 LBS | DIASTOLIC BLOOD PRESSURE: 80 MMHG | HEART RATE: 74 BPM | TEMPERATURE: 97.8 F | SYSTOLIC BLOOD PRESSURE: 134 MMHG | BODY MASS INDEX: 41.22 KG/M2 | RESPIRATION RATE: 16 BRPM | HEIGHT: 65 IN | OXYGEN SATURATION: 97 %

## 2024-08-09 DIAGNOSIS — Z79.899 HIGH RISK MEDICATIONS (NOT ANTICOAGULANTS) LONG-TERM USE: ICD-10-CM

## 2024-08-09 DIAGNOSIS — R05.3 CHRONIC COUGH: Primary | ICD-10-CM

## 2024-08-09 DIAGNOSIS — E66.01 MORBID OBESITY (H): Chronic | ICD-10-CM

## 2024-08-09 DIAGNOSIS — R06.83 SNORING: ICD-10-CM

## 2024-08-09 DIAGNOSIS — R05.3 CHRONIC COUGH: ICD-10-CM

## 2024-08-09 DIAGNOSIS — M06.09 RHEUMATOID ARTHRITIS OF MULTIPLE SITES WITH NEGATIVE RHEUMATOID FACTOR (H): ICD-10-CM

## 2024-08-09 DIAGNOSIS — T18.128A FOOD IMPACTION OF ESOPHAGUS, INITIAL ENCOUNTER: ICD-10-CM

## 2024-08-09 DIAGNOSIS — R06.09 DOE (DYSPNEA ON EXERTION): ICD-10-CM
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
## 2024-08-09 LAB
ALBUMIN SERPL BCG-MCNC: 4.2 G/DL (ref 3.5–5.2)
ALP SERPL-CCNC: 67 U/L (ref 40–150)
ALT SERPL W P-5'-P-CCNC: 17 U/L (ref 0–50)
AST SERPL W P-5'-P-CCNC: 29 U/L (ref 0–45)
BASOPHILS # BLD AUTO: 0 10E3/UL (ref 0–0.2)
BASOPHILS NFR BLD AUTO: 0 %
BILIRUB DIRECT SERPL-MCNC: <0.2 MG/DL (ref 0–0.3)
BILIRUB SERPL-MCNC: 0.2 MG/DL
CREAT SERPL-MCNC: 0.84 MG/DL (ref 0.51–0.95)
CRP SERPL-MCNC: <3 MG/L
EGFRCR SERPLBLD CKD-EPI 2021: 79 ML/MIN/1.73M2
EOSINOPHIL # BLD AUTO: 0.2 10E3/UL (ref 0–0.7)
EOSINOPHIL NFR BLD AUTO: 3 %
ERYTHROCYTE [DISTWIDTH] IN BLOOD BY AUTOMATED COUNT: 12.7 % (ref 10–15)
ERYTHROCYTE [SEDIMENTATION RATE] IN BLOOD BY WESTERGREN METHOD: 8 MM/HR (ref 0–30)
HCT VFR BLD AUTO: 44.1 % (ref 35–47)
HGB BLD-MCNC: 14.6 G/DL (ref 11.7–15.7)
IMM GRANULOCYTES # BLD: 0 10E3/UL
IMM GRANULOCYTES NFR BLD: 0 %
LYMPHOCYTES # BLD AUTO: 2 10E3/UL (ref 0.8–5.3)
LYMPHOCYTES NFR BLD AUTO: 38 %
MCH RBC QN AUTO: 29.9 PG (ref 26.5–33)
MCHC RBC AUTO-ENTMCNC: 33.1 G/DL (ref 31.5–36.5)
MCV RBC AUTO: 90 FL (ref 78–100)
MONOCYTES # BLD AUTO: 0.5 10E3/UL (ref 0–1.3)
MONOCYTES NFR BLD AUTO: 9 %
NEUTROPHILS # BLD AUTO: 2.7 10E3/UL (ref 1.6–8.3)
NEUTROPHILS NFR BLD AUTO: 50 %
NT-PROBNP SERPL-MCNC: <36 PG/ML (ref 0–900)
PLATELET # BLD AUTO: 330 10E3/UL (ref 150–450)
PROT SERPL-MCNC: 7.1 G/DL (ref 6.4–8.3)
RBC # BLD AUTO: 4.89 10E6/UL (ref 3.8–5.2)
WBC # BLD AUTO: 5.3 10E3/UL (ref 4–11)

## 2024-08-09 PROCEDURE — G2211 COMPLEX E/M VISIT ADD ON: HCPCS | Performed by: PHYSICIAN ASSISTANT

## 2024-08-09 PROCEDURE — 85652 RBC SED RATE AUTOMATED: CPT | Performed by: PHYSICIAN ASSISTANT

## 2024-08-09 PROCEDURE — 80076 HEPATIC FUNCTION PANEL: CPT | Performed by: PHYSICIAN ASSISTANT

## 2024-08-09 PROCEDURE — 71046 X-RAY EXAM CHEST 2 VIEWS: CPT | Mod: TC | Performed by: RADIOLOGY

## 2024-08-09 PROCEDURE — 85025 COMPLETE CBC W/AUTO DIFF WBC: CPT | Performed by: PHYSICIAN ASSISTANT

## 2024-08-09 PROCEDURE — 83880 ASSAY OF NATRIURETIC PEPTIDE: CPT | Performed by: PHYSICIAN ASSISTANT

## 2024-08-09 PROCEDURE — 86140 C-REACTIVE PROTEIN: CPT | Performed by: PHYSICIAN ASSISTANT

## 2024-08-09 PROCEDURE — 99215 OFFICE O/P EST HI 40 MIN: CPT | Performed by: PHYSICIAN ASSISTANT

## 2024-08-09 PROCEDURE — 82565 ASSAY OF CREATININE: CPT | Performed by: PHYSICIAN ASSISTANT

## 2024-08-09 PROCEDURE — 36415 COLL VENOUS BLD VENIPUNCTURE: CPT | Performed by: PHYSICIAN ASSISTANT

## 2024-08-09 RX ORDER — FAMOTIDINE 20 MG/1
20 TABLET, FILM COATED ORAL 2 TIMES DAILY
Qty: 60 TABLET | Refills: 1 | Status: SHIPPED | OUTPATIENT
Start: 2024-08-09

## 2024-08-09 RX ORDER — FLUTICASONE PROPIONATE 50 MCG
1 SPRAY, SUSPENSION (ML) NASAL DAILY
Qty: 9.9 ML | Refills: 0 | Status: SHIPPED | OUTPATIENT
Start: 2024-08-09

## 2024-08-09 ASSESSMENT — PAIN SCALES - GENERAL: PAINLEVEL: NO PAIN (0)

## 2024-08-09 ASSESSMENT — ENCOUNTER SYMPTOMS: COUGH: 1

## 2024-08-09 NOTE — TELEPHONE ENCOUNTER
Pt needs arrival time confirmed, and if both EGD /colon is to be done     --------------------------------------------------------------------------------------------------------------------        Pre assessment completed for upcoming procedure.   (Please see previous telephone encounter notes for complete details)    Procedure details:    Arrival time and facility location reviewed.    Pre op exam needed? No.    Designated  policy reviewed. Instructed to have someone stay 6  hours post procedure.       Medication review:    Medications reviewed. Please see supporting documentation below. Holding recommendations discussed (if applicable).       Prep for procedure:     Procedure prep instructions reviewed.        Any additional information needed:  N/A      Patient  verbalized understanding and had no questions or concerns at this time.      Dayami Robles, RN  [phone] option 4

## 2024-08-09 NOTE — PROGRESS NOTES
Assessment & Plan   Problem List Items Addressed This Visit          Digestive    Morbid obesity (H) (Chronic)    Relevant Orders    Adult Nutrition  Referral       Immune    Rheumatoid arthritis (H) (Chronic)    Relevant Medications    fluticasone (FLONASE) 50 MCG/ACT nasal spray     Other Visit Diagnoses       Chronic cough    -  Primary    Relevant Medications    famotidine (PEPCID) 20 MG tablet    fluticasone (FLONASE) 50 MCG/ACT nasal spray    Other Relevant Orders    XR Chest 2 Views (Completed)    Adult Pulmonary Medicine  Referral    BERNARD (dyspnea on exertion)        Relevant Medications    fluticasone (FLONASE) 50 MCG/ACT nasal spray    Other Relevant Orders    BNP-N terminal pro    Adult Pulmonary Medicine  Referral    Snoring        Relevant Orders    Adult Sleep Eval & Management Referral    Food impaction of esophagus, initial encounter        Relevant Orders    Adult GI  Referral - Procedure Only    High risk medications (not anticoagulants) long-term use               Chronic cough and BERNARD of unknown etiology. Currently asymptomatic. Previous echo shows no signs of CHF, but BNP pending. CXR shows no pneumonia, pneumothorax, pleural effusion, edema, or other worrisome process. CBC reassuring. Low suspicion for ACS. Favor GERD and will order EGD given history of food impaction. Will trial Pepcid and also trial Flonase/Zyrtec in the case this is post nasal drip or seasonal allergies. No wheezing on exam, so lower suspicion for RAD or asthma. No tobacco history. Will also refer to sleep and pulm for eval if not improving. Follow up with primary or myself in the next month if not improving. Appears well and non-toxic and I have low suspicion for systemic illness, impending airway obstruction or respiratory distress.    Complete history and physical exam as below. Afebrile with normal vital signs.    DDx and Dx discussed with and explained to the pt to their  satisfaction.  All questions were answered at this time. Pt expressed understanding of and agreement with this dx, tx, and plan. No further workup warranted and standard medication warnings given. I have given the patient a list of pertinent indications for re-evaluation. Will go to the Emergency Department if symptoms worsen or new concerning symptoms arise. Patient left in no apparent distress.     The longitudinal plan of care for the diagnosis(es)/condition(s) as documented were addressed during this visit. Due to the added complexity in care, I will continue to support Bria in the subsequent management and with ongoing continuity of care.  Ordering of each unique test  Prescription drug management  40 minutes spent by me on the date of the encounter doing chart review, history and exam, documentation and further activities per the note     See Patient Instructions      Subjective   Bria is a 59 year old, presenting for the following health issues:  Cough        8/9/2024     6:50 AM   Additional Questions   Roomed by Segundo Oliveros CMA   Accompanied by N/A         8/9/2024   Forms   Any forms needing to be completed Yes          8/9/2024     6:50 AM   Patient Reported Additional Medications   Patient reports taking the following new medications No new medications     History of Present Illness       Reason for visit:  Cough and shortness of breath    She eats 0-1 servings of fruits and vegetables daily.She consumes 0 sweetened beverage(s) daily.She exercises with enough effort to increase her heart rate 9 or less minutes per day.  She exercises with enough effort to increase her heart rate 3 or less days per week.   She is taking medications regularly.     Patient is coming in today due to shortness of breath and a constant cough intermittently for several months, but worse the last month. Patient states that cough is dry and can make her fatigued and feeling like she needs to vomit. No fevers, aches, sob at  "rest, chest pain, or other symptoms besides those listed here.     Patient would also like to discuss getting a handicap sticker due to shortness of breath with exertion and lymphedema. Has also had a few episode of food getting stuck in her throat and she cannot swallow liquid. It only resolves when she vomits up whatever she swallowed. Has tried TUMS for cough without relief. Denies seasonal component to cough.  Hoping for disability parking pass as she has difficulty walking 100ft without resting due to BERNARD and edema.    Review of Systems  Constitutional, HEENT, cardiovascular, pulmonary, gi and gu systems are negative, except as otherwise noted.      Objective    /80   Pulse 74   Temp 97.8  F (36.6  C) (Temporal)   Resp 16   Ht 1.655 m (5' 5.16\")   Wt 112.2 kg (247 lb 6.4 oz)   LMP 08/03/2018   SpO2 97%   BMI 40.97 kg/m    Body mass index is 40.97 kg/m .  Physical Exam  Vitals and nursing note reviewed.   Constitutional:       General: She is not in acute distress.     Appearance: She is not ill-appearing or diaphoretic.   HENT:      Head: Normocephalic and atraumatic.      Mouth/Throat:      Mouth: Mucous membranes are moist.   Eyes:      Conjunctiva/sclera: Conjunctivae normal.   Cardiovascular:      Rate and Rhythm: Normal rate and regular rhythm.      Heart sounds: Normal heart sounds. No murmur heard.     No friction rub. No gallop.      Comments: 2+ symmetric radial/PT pulses. Baseline BLE edema without tenderness.    Pulmonary:      Effort: Pulmonary effort is normal. No respiratory distress.      Breath sounds: Normal breath sounds. No stridor. No wheezing, rhonchi or rales.   Skin:     General: Skin is warm and dry.   Neurological:      General: No focal deficit present.      Mental Status: She is alert. Mental status is at baseline.   Psychiatric:         Mood and Affect: Mood normal.         Behavior: Behavior normal.          Results for orders placed or performed in visit on 08/09/24 "   XR Chest 2 Views     Status: None    Narrative    XR CHEST 2 VIEWS 8/9/2024 7:54 AM    HISTORY: Chronic cough    COMPARISON: 12/3/2018      Impression    IMPRESSION: No infiltrate, pleural effusion or pneumothorax. The  cardiac and mediastinal silhouettes are normal.    SHAHEEN ZARATE MD         SYSTEM ID:  [id number]   Results for orders placed or performed in visit on 08/09/24   Erythrocyte sedimentation rate auto     Status: Normal   Result Value Ref Range    Erythrocyte Sedimentation Rate 8 0 - 30 mm/hr   CBC with platelets and differential     Status: None   Result Value Ref Range    WBC Count 5.3 4.0 - 11.0 10e3/uL    RBC Count 4.89 3.80 - 5.20 10e6/uL    Hemoglobin 14.6 11.7 - 15.7 g/dL    Hematocrit 44.1 35.0 - 47.0 %    MCV 90 78 - 100 fL    MCH 29.9 26.5 - 33.0 pg    MCHC 33.1 31.5 - 36.5 g/dL    RDW 12.7 10.0 - 15.0 %    Platelet Count 330 150 - 450 10e3/uL    % Neutrophils 50 %    % Lymphocytes 38 %    % Monocytes 9 %    % Eosinophils 3 %    % Basophils 0 %    % Immature Granulocytes 0 %    Absolute Neutrophils 2.7 1.6 - 8.3 10e3/uL    Absolute Lymphocytes 2.0 0.8 - 5.3 10e3/uL    Absolute Monocytes 0.5 0.0 - 1.3 10e3/uL    Absolute Eosinophils 0.2 0.0 - 0.7 10e3/uL    Absolute Basophils 0.0 0.0 - 0.2 10e3/uL    Absolute Immature Granulocytes 0.0 <=0.4 10e3/uL   CBC with Platelets & Differential     Status: None    Narrative    The following orders were created for panel order CBC with Platelets & Differential.  Procedure                               Abnormality         Status                     ---------                               -----------         ------                     CBC with platelets and d...[[id number]]                      Final result                 Please view results for these tests on the individual orders.           Signed Electronically by: LEONIDES Vizcarra

## 2024-08-09 NOTE — TELEPHONE ENCOUNTER
Pre visit planning completed.      Procedure details:    Patient scheduled for Colonoscopy on 8/16/24.     Arrival time: 0950. Procedure time 1035    Facility location: [facility] [place]; [address] Floor, [place] [address]. Check in location: 2nd Floor at Surgery desk.    Sedation type: Conscious sedation     Pre op exam needed? No.    Indication for procedure: Screening,   Food impaction of esophagus            Chart review:     Electronic implanted devices? No    Recent diagnosis of diverticulitis within the last 6 weeks? No      Medication review:    Diabetic? No    Anticoagulants? No    Weight loss medication/injectable? No GLP 1 medications per patient's medication list.  RN will verify with pre-assessment call.    NSAIDS? No NSAID medications per patient's medication list.  RN will verify with pre-assessment call.    Other medication HOLDING recommendations:  N/A      Prep for procedure:     Bowel prep recommendation: Extended Golytely. Bowel prep prescription sent to      [id number] - [place], [place] - [address]   Due to: BMI > 40.     Prep instructions sent via [email or web address]         Ame Bonilla RN  Endoscopy Procedure Pre Assessment RN  [phone] option 4

## 2024-08-09 NOTE — TELEPHONE ENCOUNTER
Called Pt with correct arrival time of 0950, procedure at 1035.     Will send staff message to scheduling to see if EGD (which is ordered) can be added on to procedure.     Ame Bonilla, RN   Endoscopy Procedure Pre Assessment RN

## 2024-08-09 NOTE — TELEPHONE ENCOUNTER
EGD added on for the Pt.     New arrival time of 0940, procedure begins at 1025.     Called and informed the Pt of this. No questions at this time. Also sent [employer] message.     Ame Bonilla RN   Endoscopy Procedure Pre Assessment RN

## 2024-08-09 NOTE — PATIENT INSTRUCTIONS
Aly Fraser,    Thank you for allowing [facility] to manage your care.    I am unsure of the cause of your symptoms, but your exam is reassuring. We will see what our workup shows.    If you develop worsening/changing symptoms at any time, please be seen in clinic/urgent care or call 911/go to the emergency department for evaluation.    I ordered some lab work. Please go to the laboratory to get your studies.    I ordered some xrays. Please go to our radiology department to get your xrays.    I sent your prescriptions to your pharmacy.    I made a referral to gastroenterology to get an endoscopy, sleep medicine, nutrition, and pulmonary medicine. They will be calling in approximately 1 week to set up your appointment.  If you do not hear from them, please call the specialty number on your after visit summary.     Please allow 1-2 business days for our office to contact you in regards to your laboratory/radiological studies.  If not done so, I encourage you to login into [email or web address] ([email or web address] to review your results as well.     If you have any questions or concerns, please feel free to call us at [phone]    Sincerely,    Iraj Yan PA-C    Did you know?      You can schedule a video visit for follow-up appointments as well as future appointments for certain conditions.  Please see the below link.     [email or web address]    If you have not already done so,  I encourage you to sign up for [email or web address] ([email or web address]).  This will allow you to review your results, securely communicate with a provider, and schedule virtual visits as well.

## 2024-08-16 ENCOUNTER — HOSPITAL ENCOUNTER (OUTPATIENT)
Facility: AMBULATORY SURGERY CENTER | Age: 59
Discharge: HOME | End: 2024-08-16
Attending: INTERNAL MEDICINE | Admitting: INTERNAL MEDICINE
Payer: COMMERCIAL

## 2024-08-16 ENCOUNTER — PATIENT OUTREACH (OUTPATIENT)
Dept: GASTROENTEROLOGY | Facility: CLINIC | Age: 59
End: 2024-08-16
Payer: COMMERCIAL

## 2024-08-16 VITALS
SYSTOLIC BLOOD PRESSURE: 139 MMHG | OXYGEN SATURATION: 95 % | TEMPERATURE: 97.4 F | DIASTOLIC BLOOD PRESSURE: 89 MMHG | RESPIRATION RATE: 18 BRPM | HEART RATE: 64 BPM

## 2024-08-16 DIAGNOSIS — K21.00 GASTROESOPHAGEAL REFLUX DISEASE WITH ESOPHAGITIS WITHOUT HEMORRHAGE: Primary | ICD-10-CM

## 2024-08-16 LAB
COLONOSCOPY: NORMAL
UPPER GI ENDOSCOPY: NORMAL

## 2024-08-16 PROCEDURE — G8907 PT DOC NO EVENTS ON DISCHARG: HCPCS

## 2024-08-16 PROCEDURE — 45378 DIAGNOSTIC COLONOSCOPY: CPT

## 2024-08-16 PROCEDURE — 88342 IMHCHEM/IMCYTCHM 1ST ANTB: CPT | Performed by: PATHOLOGY

## 2024-08-16 PROCEDURE — 43239 EGD BIOPSY SINGLE/MULTIPLE: CPT

## 2024-08-16 PROCEDURE — G8918 PT W/O PREOP ORDER IV AB PRO: HCPCS

## 2024-08-16 PROCEDURE — 88305 TISSUE EXAM BY PATHOLOGIST: CPT | Performed by: PATHOLOGY

## 2024-08-16 PROCEDURE — 88312 SPECIAL STAINS GROUP 1: CPT | Performed by: PATHOLOGY

## 2024-08-16 RX ORDER — OMEPRAZOLE 40 MG/1
40 CAPSULE, DELAYED RELEASE ORAL 2 TIMES DAILY
Qty: 60 CAPSULE | Refills: 1 | Status: SHIPPED | OUTPATIENT
Start: 2024-08-16

## 2024-08-16 RX ORDER — PROCHLORPERAZINE MALEATE 10 MG
10 TABLET ORAL EVERY 6 HOURS PRN
Status: DISCONTINUED | OUTPATIENT
Start: 2024-08-16 | End: 2024-08-17 | Stop reason: HOSPADM

## 2024-08-16 RX ORDER — LIDOCAINE 40 MG/G
CREAM TOPICAL
Status: DISCONTINUED | OUTPATIENT
Start: 2024-08-16 | End: 2024-08-17 | Stop reason: HOSPADM

## 2024-08-16 RX ORDER — ONDANSETRON 2 MG/ML
4 INJECTION INTRAMUSCULAR; INTRAVENOUS EVERY 6 HOURS PRN
Status: DISCONTINUED | OUTPATIENT
Start: 2024-08-16 | End: 2024-08-17 | Stop reason: HOSPADM

## 2024-08-16 RX ORDER — ONDANSETRON 2 MG/ML
4 INJECTION INTRAMUSCULAR; INTRAVENOUS
Status: DISCONTINUED | OUTPATIENT
Start: 2024-08-16 | End: 2024-08-17 | Stop reason: HOSPADM

## 2024-08-16 RX ORDER — NALOXONE HYDROCHLORIDE 0.4 MG/ML
0.2 INJECTION, SOLUTION INTRAMUSCULAR; INTRAVENOUS; SUBCUTANEOUS
Status: DISCONTINUED | OUTPATIENT
Start: 2024-08-16 | End: 2024-08-17 | Stop reason: HOSPADM

## 2024-08-16 RX ORDER — NALOXONE HYDROCHLORIDE 0.4 MG/ML
0.4 INJECTION, SOLUTION INTRAMUSCULAR; INTRAVENOUS; SUBCUTANEOUS
Status: DISCONTINUED | OUTPATIENT
Start: 2024-08-16 | End: 2024-08-17 | Stop reason: HOSPADM

## 2024-08-16 RX ORDER — FENTANYL CITRATE 50 UG/ML
INJECTION, SOLUTION INTRAMUSCULAR; INTRAVENOUS PRN
Status: DISCONTINUED | OUTPATIENT
Start: 2024-08-16 | End: 2024-08-16 | Stop reason: HOSPADM

## 2024-08-16 RX ORDER — ONDANSETRON 4 MG/1
4 TABLET, ORALLY DISINTEGRATING ORAL EVERY 6 HOURS PRN
Status: DISCONTINUED | OUTPATIENT
Start: 2024-08-16 | End: 2024-08-17 | Stop reason: HOSPADM

## 2024-08-16 RX ORDER — FLUMAZENIL 0.1 MG/ML
0.2 INJECTION, SOLUTION INTRAVENOUS
Status: ACTIVE | OUTPATIENT
Start: 2024-08-16 | End: 2024-08-16

## 2024-08-16 NOTE — H&P
[facility] Anesthesia Pre-op History and Physical    Bria Haddad MRN# [id number]   Age: 59 year old YOB: 1965            Date of Exam 8/16/2024         Primary care provider: Holli Marley         Chief Complaint and/or Reason for Procedure:     Dysphagia to solids and liquids, chronic cough  CRC screening - maternal grandmother and uncles with CRC         Active problem list:     Patient Active Problem List    Diagnosis Date Noted    Screening for cervical cancer 04/30/2024     Priority: Medium     9/25/12 NIL  11/25/15 NIL pap, neg HPV  4/24/24 NIL pap, neg HPV. On immunosuppressants. Cotest in 3 years      Chronic insomnia 02/13/2023     Priority: Medium    Trigger finger, right ring finger 01/18/2023     Priority: Medium    Chronic pain of both knees 09/02/2022     Priority: Medium    Morbid obesity (H) 09/23/2021     Priority: Medium    Rheumatoid arthritis (H) 09/19/2017     Priority: Medium     Rheumatoid arthritis      Endometriosis of uterus 09/25/2012     Priority: Medium    CARDIOVASCULAR SCREENING; LDL GOAL LESS THAN 160 10/31/2010     Priority: Medium    Lymphedema 02/12/2023     Priority: Low            Medications (include herbals and vitamins):   Any Plavix use in the last 7 days? No     Current Outpatient Medications   Medication Sig Dispense Refill    adalimumab (HUMIRA *CF*) 40 MG/0.4ML pen kit Inject 0.4 mLs (40 mg) Subcutaneous every 14 days 2.4 mL 2    bisacodyl (DULCOLAX) 5 MG EC tablet Two days prior to exam take two (2) tablets at 4pm. One day prior to exam take two (2) tablets at 4pm 4 tablet 0    polyethylene glycol (GOLYTELY) 236 g suspension Two days before procedure at 5PM fill first container with water. Mix and drink an 8 oz glass every 10-15 minutes until HALF of the container is gone. Place the remainder in the refrigerator. One day before procedure at 5PM drink second half of bowel prep. Drink an 8 oz glass every 10-15 minutes until it is gone. Day  of procedure 6 hours before arrival time fill the 2nd container with water. Mix and drink an 8 oz glass every 10-15 minutes until HALF of the container is gone. Discard the remaining solution. 8000 mL 0    famotidine (PEPCID) 20 MG tablet Take 1 tablet (20 mg) by mouth 2 times daily 60 tablet 1    fluticasone (FLONASE) 50 MCG/ACT nasal spray Spray 1 spray into both nostrils daily 9.9 mL 0    order for DME Equipment being ordered: compression stockings 1 pair, at 30mm Hg, to be worn during the day. For Bilateral leg edema [R60.0] 2 Units 0    sulfaSALAzine (AZULFIDINE) 500 MG tablet Take 2 tablets (1,000 mg) by mouth 2 times daily 360 tablet 2     Current Facility-Administered Medications   Medication Dose Route Frequency Provider Last Rate Last Admin    lidocaine (LMX4) kit   Topical Q1H PRN Afua Klein DO        lidocaine 1 % 0.1-1 mL  0.1-1 mL Other Q1H PRN Afua Klein DO        ondansetron (ZOFRAN) injection 4 mg  4 mg Intravenous Once PRN Afua Klein DO        sodium chloride (PF) 0.9% PF flush 3 mL  3 mL Intracatheter Q8H Afua Klein,         sodium chloride (PF) 0.9% PF flush 3 mL  3 mL Intracatheter q1 min prn Afua Klein DO                 Allergies:      Allergies   Allergen Reactions    Asa [Aspirin] Hives     Allergy to Latex? No  Allergy to tape?   No  Intolerances:             Physical Exam:   All vitals have been reviewed  Patient Vitals for the past 8 hrs:   BP Temp Temp src Pulse Resp SpO2   08/16/24 0954 (!) 154/85 97.4  F (36.3  C) Temporal 69 16 96 %     No intake/output data recorded.  Lungs:   No increased work of breathing, good air exchange, clear to auscultation bilaterally, no crackles or wheezing     Cardiovascular:   normal S1 and S2             Lab / Radiology Results:            Anesthetic risk and/or ASA classification:   2    Afua Klein DO

## 2024-08-20 DIAGNOSIS — K20.0 EOSINOPHILIC ESOPHAGITIS: Primary | ICD-10-CM

## 2024-08-22 NOTE — TELEPHONE ENCOUNTER
No new changes from June 2024 screening questions      Final Scheduling Details     Procedure scheduled  Upper endoscopy (EGD)    Surgeon:  Ernie     Date of procedure:  10/25     Pre-OP / PAC:   No - Not required for this site.    Location  [facility] - ASC - Per order.    Sedation   Moderate Sedation - Per order.      Patient Reminders:   You will receive a call from a Nurse to review instructions and health history.  This assessment must be completed prior to your procedure.  Failure to complete the Nurse assessment may result in the procedure being cancelled.      On the day of your procedure, please designate an adult(s) who can drive you home stay with you for the next 24 hours. The medicines used in the exam will make you sleepy. You will not be able to drive.      You cannot take public transportation, ride share services, or non-medical taxi service without a responsible caregiver.  Medical transport services are allowed with the requirement that a responsible caregiver will receive you at your destination.  We require that drivers and caregivers are confirmed prior to your procedure.

## 2024-08-23 LAB
PATH REPORT.ADDENDUM SPEC: NORMAL
PATH REPORT.ADDENDUM SPEC: NORMAL
PATH REPORT.COMMENTS IMP SPEC: NORMAL
PATH REPORT.FINAL DX SPEC: NORMAL
PATH REPORT.GROSS SPEC: NORMAL
PATH REPORT.MICROSCOPIC SPEC OTHER STN: NORMAL
PATH REPORT.RELEVANT HX SPEC: NORMAL
PHOTO IMAGE: NORMAL

## 2024-08-26 ENCOUNTER — TELEPHONE (OUTPATIENT)
Dept: FAMILY MEDICINE | Facility: CLINIC | Age: 59
End: 2024-08-26
Payer: COMMERCIAL

## 2024-08-26 NOTE — TELEPHONE ENCOUNTER
Patient Quality Outreach    Patient is due for the following:   Breast Cancer Screening - Mammogram    Next Steps:   Patient has upcoming appointment, these items will be addressed at that time.    Type of outreach:    Chart review performed, no outreach needed.    Next Steps:  Reach out within 90 days via  none .    Max number of attempts reached: No. Will try again in 90 days if patient still on fail list.    Questions for provider review:    None           Ralf Arambula MA

## 2024-09-13 ENCOUNTER — VIRTUAL VISIT (OUTPATIENT)
Dept: RHEUMATOLOGY | Facility: CLINIC | Age: 59
End: 2024-09-13
Payer: COMMERCIAL

## 2024-09-13 DIAGNOSIS — Z79.899 HIGH RISK MEDICATIONS (NOT ANTICOAGULANTS) LONG-TERM USE: ICD-10-CM

## 2024-09-13 DIAGNOSIS — M06.09 RHEUMATOID ARTHRITIS OF MULTIPLE SITES WITH NEGATIVE RHEUMATOID FACTOR (H): Primary | ICD-10-CM

## 2024-09-13 PROCEDURE — G2211 COMPLEX E/M VISIT ADD ON: HCPCS | Mod: 95 | Performed by: INTERNAL MEDICINE

## 2024-09-13 PROCEDURE — 99214 OFFICE O/P EST MOD 30 MIN: CPT | Mod: 95 | Performed by: INTERNAL MEDICINE

## 2024-09-13 NOTE — PATIENT INSTRUCTIONS
RHEUMATOLOGY    [facility] [place]  [address] [place] [address]  [place] [address]    Phone number: [phone]  Fax number: [phone]    If you need a medication refill, please contact us as you may need lab work and/or a follow up visit prior to your refill.      Thank you for choosing [facility]!    Jolynn Angulo CMA Rheumatology

## 2024-09-13 NOTE — PROGRESS NOTES
Bria Haddad  is a 59 year old year old who is being evaluated via a billable video visit.      How would you like to obtain your AVS? MyChart  If the video visit is dropped, the invitation should be resent by: Text to cell phone: [phone]   Will anyone else be joining your video visit? No      Rheumatology Video Visit      Bria Haddad MRN# [id number]   YOB: 1965 Age: 59 year old      Date of visit: 9/13/24  PCP: Dr. Holli Marley    Chief Complaint   Patient presents with:  Rheumatoid Arthritis    Assessment and Plan     1.  Rheumatoid arthritis: Ms. Haddad initially presented to this clinic in 2015 with dependent edema of the bilateral lower extremities, fatigue, and a one time episode of right toe pain in the setting of a positive ROSEMARY.  On 9/18/2017 she presented with synovitis of her bilateral PIPs and pain without swelling of her MCPs, persistent fatigue, intermittent rash, and dependent edema. ROSEMARY positive but additional studies negative/normal including C3, C4, beta-2 glycoprotein IgM and IgG, cardiolipin, lupus anticoagulant, RNP, Stuart, SSA, SSB, Scl-70.  Joint exam most consistent with rheumatoid arthritis.  Previously on HCQ (bloating), MTX (LFT elevation, alopecia, headache), leflunomide (LFT elevation). Currently on SSZ and Humira (started 2018).  RA controlled.   Chronic illness, stable.    - Continue sulfasalazine 1000mg twice daily  - Continue Humira 40mg SQ every 14 days  - Labs every 8-12 weeks: CBC, Creatinine, Hepatic Panel    High risk medication requiring intensive toxicity monitoring at least quarterly.     2.  Secondary Sjogren's syndrome: SSA and SSB negative previously.  Dry eye and dry mouth symptoms likely secondary to rheumatoid arthritis.  Preservative-free artificial tears from her eye doctor have been sufficient for dry eye management.  Dry mouth is mild per patient.  She follows with her dentist regularly and reports having good dentition except for recent  mild gingivitis that was treated by her dentist.  Overall stable symptoms at this time    3.  History of bilateral patellofemoral syndrome: Resolves with physical therapy exercises, typically recurring when she stops doing the exercises on her own at home.  Not an issue at this time    4. History of Left shoulder pain, impingement syndrome: Resolved with combination of steroid injection and physical therapy.  Not an issue at this time.     5.  Lymphedema: Bilateral lower extremity edema continues to be an issue and the compression socks she was purchasing were not sufficient.  She then went to lymphedema therapist with significant improvement and now has prescribed compression socks from the lymphedema clinic with significant improvement.  Mild return of symptoms so she plans to follow-up with her vascular surgeon or the lymphedema clinic.    6.  Historical, and not discussed today: Sleep apnea: 2/13/2023 sleep medicine note documents moderate probability of obstructive sleep apnea and the patient reports that the next step was to use a CPAP in office but the next sleep medicine appointment needed to be rescheduled and she was frustrated with that so she canceled the appointment.  We previously discussed the importance of her health and the importance of obstructive sleep apnea treatment if needed, so I previously advised her to reschedule the follow-up sleep medicine appointment.      7.  Vaccinations: Vaccinations reviewed with MsAkash Boo.  - Influenza: refused by patient because of reaction in the past   - Cyygnsz25: up to date   - Shingrix: Up to date  - COVID-19:  advised keeping updated, and to hold sulfasalazine for 1-2 weeks afterward  - RSV: Advised vaccination    Total minutes spent in evaluation with patient, documentation, , and review of pertinent studies and chart notes: 20  The longitudinal plan of care for the rheumatology problem(s) were addressed during this visit.  Due to added complexity  of care, we will continue to support the patient and the subsequent management of this condition with ongoing continuity of care.     Ms. Haddad verbalized agreement with and understanding of the rational for the diagnosis and treatment plan.  All questions were answered to best of my ability and the patient's satisfaction. Ms. Haddad was advised to contact the clinic with any questions that may arise after the clinic visit.      Thank you for involving me in the care of the patient    Return to clinic: 3 months    HPI   Bria Haddad is a 59 year old female with a past medical history significant for endometriosis, s/p carpal tunnel release surgery, trigger finger requiring surgery, and rheumatoid arthritis who present for follow up of rheumatoid arthritis.    5/26/2023: Joints are doing well.  No joint pain.  Morning stiffness for less than 20 minutes.  Trigger finger surgery went well and she has healed well.  Lymphedema of both lower extremities has been an issue; she has been using compression socks but they are not of the same quality as what she had received from the vascular medicine specialist, per patient.  The lymphedema therapist was very effective and she would like to return.  She canceled her follow-up sleep medicine evaluation where a CPAP was going to be used, per patient, because the provider had to reschedule and she was frustrated with that because she had waited so long to be seen in the first place.    9/1/2023: Arthritis is doing well.  No joint pain or swelling.  No morning stiffness or gelling phenomenon.  No active trigger fingers.  Has not yet followed up with sleep medicine for a CPAP.  Does not monitor blood pressure at home.    12/1/2023: RA controlled.  No joint pain or swelling. No morning stiffness or gelling. Arthritis does not limit daily activities.  Has not yet seen the sleep medicine specialist but says that she plans to schedule eventually.  Lymphedema controlled with compression  socks.    3/1/2024: RA controlled.  No joint pain or swelling.  No morning stiffness.  Mild gelling phenomenon that resolves quickly, only occurring if she sits at her desk for several hours.  No side effects from Humira or sulfasalazine.  Arthritis does not limit daily activities.    6/7/2024: Currently doing well.  No joint pain or swelling.  No morning stiffness or gelling phenomenon.  Reports that she does have dry eye and dry mouth.  Dry mouth does not require treatment; follows with a dentist regularly and has good dentition except for mild gingivitis that was recently treated by her dentist.  Dry eyes are treated with artificial tears as needed and this is effective.    Today, 9/13/2024: Currently doing well.  No joint pain or swelling.  No morning stiffness or gelling phenomenon.  While working at the [employer] as a  she started to have mild trigger finger but is now persisting possibly due to overactivity at festival.  Lymphedema slightly worse, and she notices that it tends to worsen during the colder weather; has not yet followed up with her vascular specialist or return to the lymphedema clinic.    No fevers or chills, nausea or vomiting, constipation or diarrhea.  No chest pain/pressure, palpitations, or shortness of breath.  No eye pain or redness.  No black or bloody stools.  No rash.    Tobacco: none  EtOH: none  Drugs: none    ROS   12 point review of system was completed and negative except as noted in the HPI     Active Problem List     Patient Active Problem List   Diagnosis    CARDIOVASCULAR SCREENING; LDL GOAL LESS THAN 160    Endometriosis of uterus    Rheumatoid arthritis (H)    Morbid obesity (H)    Chronic pain of both knees    Trigger finger, right ring finger    Lymphedema    Chronic insomnia    Screening for cervical cancer     Past Medical History     Past Medical History:   Diagnosis Date    Bilateral carpal tunnel syndrome 12/20/2015    Obesity     RA (rheumatoid  arthritis) (H)     inflammatory poly arthritis.    S/P carpal tunnel release 12/29/2016     Past Surgical History     Past Surgical History:   Procedure Laterality Date    ABDOMEN SURGERY  1992    Laparoscopy    BUNIONECTOMY Bilateral 1977    10th grade    COLONOSCOPY N/A 11/09/2015    Procedure: COLONOSCOPY;  Surgeon: Andrew Adamson MD;  Location: [facility] OR    COLONOSCOPY WITH CO2 INSUFFLATION N/A 11/09/2015    Procedure: COLONOSCOPY WITH CO2 INSUFFLATION;  Surgeon: Andrew Adamson MD;  Location: [facility] OR    COLONOSCOPY WITH CO2 INSUFFLATION N/A 8/16/2024    Procedure: Colonoscopy with CO2 insufflation;  Surgeon: Afua Klein DO;  Location: [facility] OR    COMBINED ESOPHAGOSCOPY, GASTROSCOPY, DUODENOSCOPY (EGD) WITH CO2 INSUFFLATION N/A 8/16/2024    Procedure: Combined Esophagoscopy, Gastroscopy, Duodenoscopy (Egd) With Co2 Insufflation;  Surgeon: Afua Klein DO;  Location: [facility] OR    ESOPHAGOSCOPY, GASTROSCOPY, DUODENOSCOPY (EGD), COMBINED N/A 8/16/2024    Procedure: ESOPHAGOGASTRODUODENOSCOPY, WITH BIOPSY;  Surgeon: Afua Klein DO;  Location: [facility] OR    GYN SURGERY  1992    endometryosis    RELEASE CARPAL TUNNEL Right 10/12/2016    Procedure: RELEASE CARPAL TUNNEL;  Surgeon: Colby Nobles MD;  Location: [facility]    RELEASE CARPAL TUNNEL Left 12/16/2016    Procedure: RELEASE CARPAL TUNNEL;  Surgeon: Colby Nobles MD;  Location: [facility] OR    RELEASE TRIGGER FINGER Right 02/09/2023    Procedure: RELEASE, TRIGGER FINGER right ring finger;  Surgeon: Danie Urban MD;  Location: [facility] OR    TONSILLECTOMY & ADENOIDECTOMY  1973     Allergy     Allergies   Allergen Reactions    Asa [Aspirin] Hives     Current Medication List     Current Outpatient Medications   Medication Sig Dispense Refill    adalimumab (HUMIRA *CF*) 40 MG/0.4ML pen kit Inject 0.4 mLs (40 mg) Subcutaneous every 14 days 2.4 mL 2    famotidine (PEPCID) 20 MG tablet Take 1 tablet (20 mg) by mouth 2 times daily 60 tablet 1     fluticasone (FLONASE) 50 MCG/ACT nasal spray Spray 1 spray into both nostrils daily 9.9 mL 0    omeprazole (PRILOSEC) 40 MG DR capsule Take 1 capsule (40 mg) by mouth 2 times daily 60 capsule 1    sulfaSALAzine (AZULFIDINE) 500 MG tablet Take 2 tablets (1,000 mg) by mouth 2 times daily 360 tablet 2    bisacodyl (DULCOLAX) 5 MG EC tablet Two days prior to exam take two (2) tablets at 4pm. One day prior to exam take two (2) tablets at 4pm 4 tablet 0    order for DME Equipment being ordered: compression stockings 1 pair, at 30mm Hg, to be worn during the day. For Bilateral leg edema [R60.0] 2 Units 0    polyethylene glycol (GOLYTELY) 236 g suspension Two days before procedure at 5PM fill first container with water. Mix and drink an 8 oz glass every 10-15 minutes until HALF of the container is gone. Place the remainder in the refrigerator. One day before procedure at 5PM drink second half of bowel prep. Drink an 8 oz glass every 10-15 minutes until it is gone. Day of procedure 6 hours before arrival time fill the 2nd container with water. Mix and drink an 8 oz glass every 10-15 minutes until HALF of the container is gone. Discard the remaining solution. 8000 mL 0     No current facility-administered medications for this visit.     Social History   See HPI    Family History     Family History   Problem Relation Age of Onset    Osteoporosis Mother     Respiratory Mother     Thyroid Disease Mother     Anxiety Disorder Mother     Asthma Mother     Heart Disease Father     Cancer - colorectal Maternal Grandmother     Colon Cancer Maternal Grandmother     Diabetes Maternal Grandfather     Heart Disease Maternal Grandfather     Cancer Paternal Grandmother     Heart Disease Paternal Grandfather     Respiratory Paternal Grandfather     Hypertension Brother     Thyroid Disease Sister     Thyroid Disease Sister     Neurologic Disorder Brother     Hypertension Brother     Thyroid Disease Brother     Other Cancer Sister     Anxiety  "Disorder Sister     Mental Illness Sister     Anesthesia Reaction Sister     Thyroid Disease Sister      Physical Exam     Temp Readings from Last 3 Encounters:   08/16/24 97.4  F (36.3  C) (Temporal)   08/09/24 97.8  F (36.6  C) (Temporal)   04/24/24 98  F (36.7  C) (Tympanic)     BP Readings from Last 5 Encounters:   08/16/24 139/89   08/09/24 134/80   04/24/24 137/78   02/28/23 (!) 147/85   02/23/23 (!) 146/82     Pulse Readings from Last 1 Encounters:   08/16/24 64     Resp Readings from Last 1 Encounters:   08/16/24 18     Estimated body mass index is 40.97 kg/m  as calculated from the following:    Height as of 8/9/24: 1.655 m (5' 5.16\").    Weight as of 8/9/24: 112.2 kg (247 lb 6.4 oz).      GEN: NAD.   HEENT: Anicteric, noninjected sclera. No obvious external lesions of the ear and nose. Hearing intact.  PULM: No increased work of breathing  MSK: Possible mild swelling of the right second MCP without overlying erythema; other joints without swelling.  Able to make a complete fist bilaterally.  SKIN: No rash or jaundice seen  PSYCH: Alert. Appropriate.      Labs / Imaging (select studies)     RF/CCP  Recent Labs   Lab Test 09/18/17  0913   CCPIGG 1   RHF <20     CBC  Recent Labs   Lab Test 08/09/24  0737 05/24/24  0722 02/26/24  0730 10/02/21  1656 07/05/21  1519 04/06/21  0716 12/22/20  1754   WBC 5.3 6.4 6.4   < > 6.3 7.2 8.4   RBC 4.89 4.87 4.79   < > 4.89 4.89 4.83   HGB 14.6 14.6 14.4   < > 14.2 14.4 14.3   HCT 44.1 44.2 43.2   < > 43.6 43.5 43.3   MCV 90 91 90   < > 89 89 90   RDW 12.7 12.7 12.7   < > 13.1 12.8 12.8    297 300   < > 363 302 317   MCH 29.9 30.0 30.1   < > 29.0 29.4 29.6   MCHC 33.1 33.0 33.3   < > 32.6 33.1 33.0   NEUTROPHIL 50 47 48   < > 43.3 45.3 38.0   LYMPH 38 41 38   < > 41.1 43.0 48.6   MONOCYTE 9 7 8   < > 12.4 9.9 11.3   EOSINOPHIL 3 4 5   < > 2.9 1.5 2.0   BASOPHIL 0 0 1   < > 0.3 0.3 0.1   ANEU  --   --   --   --  2.7 3.3 3.2   ALYM  --   --   --   --  2.6 3.1 4.1 "   MIKE  --   --   --   --  0.8 0.7 1.0   AEOS  --   --   --   --  0.2 0.1 0.2   ABAS  --   --   --   --  0.0 0.0 0.0   ANEUTAUTO 2.7 3.0 3.1   < >  --   --   --    ALYMPAUTO 2.0 2.6 2.5   < >  --   --   --    AMONOAUTO 0.5 0.5 0.5   < >  --   --   --    AEOSAUTO 0.2 0.3 0.3   < >  --   --   --    ABSBASO 0.0 0.0 0.0   < >  --   --   --     < > = values in this interval not displayed.     CMP  Recent Labs   Lab Test 08/09/24  0737 05/24/24  0722 02/26/24  0730 05/22/23  0719 02/06/23  0755 10/02/21  1656 07/05/21  1519 04/06/21  0716 12/22/20  1754 09/23/20  0749 12/31/19  0712 05/08/19  0745   NA  --   --   --   --  144  --   --   --   --  141  --  142   POTASSIUM  --   --   --   --  3.8  --   --   --   --  4.1  --  4.0   CHLORIDE  --   --   --   --  110*  --   --   --   --  109  --  109   CO2  --   --   --   --  30  --   --   --   --  29  --  30   ANIONGAP  --   --   --   --  4  --   --   --   --  3  --  3   GLC  --   --   --   --  107*  --   --   --   --  104*  --  99   BUN  --   --   --   --  16  --   --   --   --  16  --  14   CR 0.84 0.77 0.86   < > 0.72   < > 0.78 0.86 0.87 0.77   < > 0.74   GFRESTIMATED 79 87 77   < > >90   < > 84 75 74 85   < > >90   GFRESTBLACK  --   --   --   --   --   --  >90 86 85 >90   < > >90   AGUILAR  --   --   --   --  9.6  --   --   --   --  9.4  --  9.3   BILITOTAL 0.2 0.3 0.3   < > 0.5   < > 0.2 0.4 0.2  --    < > 0.3   ALBUMIN 4.2 4.2 4.1   < > 3.9   < > 3.9 3.8 4.3  --    < > 4.0   PROTTOTAL 7.1 6.8 6.9   < > 7.2   < > 7.5 7.6 7.8  --    < > 7.5   ALKPHOS 67 72 73   < > 70   < > 76 76 72  --    < > 73   AST 29 24 26   < > 14   < > 17 13 17  --    < > 15   ALT 17 32 23   < > 28   < > 32 26 27  --    < > 31    < > = values in this interval not displayed.     Calcium/VitaminD  Recent Labs   Lab Test 02/06/23  0755 09/23/20  0749 05/08/19  0745   AGUILAR 9.6 9.4 9.3     ESR/CRP  Recent Labs   Lab Test 08/09/24  0737 05/24/24  0722 02/26/24  0730 05/22/23  0719 02/06/23  0755 11/10/22  0658    SED 8 7 8 7 8 5   CRP  --   --   --  <2.9 4.8 <2.9   CRPI <3.00 <3.00 3.61  --   --   --        Hepatitis B  Recent Labs   Lab Test 05/24/24 0722   HBCAB Nonreactive   HEPBANG Nonreactive     Hepatitis C  Recent Labs   Lab Test 05/24/24  0722 10/07/16  0912   HCVAB Nonreactive Nonreactive   Assay performance characteristics have not been established for newborns,   infants, and children       Tuberculosis Screening  Recent Labs   Lab Test 05/24/24 0722 01/18/22  0701 12/07/18  0724   TBRES Negative Negative Negative          Chart documentation done in part with Dragon Voice recognition Software. Although reviewed after completion, some word and grammatical error may remain.    Video-Visit Details    Type of service:  Video Visit    Video Start Time: 7:15 AM    Video End Time:7:31 AM    Originating Location (pt. Location): Home, [place]    Distant Location (provider location):  off site, [place]    Platform used for Video Visit: Celestine Resedniz MD

## 2024-09-26 ENCOUNTER — TELEPHONE (OUTPATIENT)
Dept: GASTROENTEROLOGY | Facility: CLINIC | Age: 59
End: 2024-09-26
Payer: COMMERCIAL

## 2024-09-26 NOTE — TELEPHONE ENCOUNTER
Caller: writer to patient    Reason for Reschedule/Cancellation   (please be detailed, any staff messages or encounters to note?): Provider OOO       Prior to reschedule please review:  Ordering Provider: Isauro  Sedation Determined: mod  Does patient have any ASC Exclusions, please identify?: n      Notes on Cancelled Procedure:  Procedure: EGD  Date: 10/25  Location: [facility]; [address], [place] [address]   Surgeon: Ernie      Rescheduled: Yes,   Procedure: Upper Endoscopy [EGD]    Date: 10/24   Location: [facility]; [address], [place] [address]    Surgeon: Ernie   Sedation Level Scheduled  mod ,  Reason for Sedation Level ordered   Instructions updated and sent: y     Does patient need PAC or Pre -Op Rescheduled? : no       Did you cancel or rescheduled an EUS procedure? No.

## 2024-10-02 ENCOUNTER — HOSPITAL ENCOUNTER (OUTPATIENT)
Dept: NUTRITION | Facility: CLINIC | Age: 59
Discharge: HOME | End: 2024-10-02
Attending: PHYSICIAN ASSISTANT | Admitting: PHYSICIAN ASSISTANT
Payer: COMMERCIAL

## 2024-10-02 DIAGNOSIS — E66.01 MORBID OBESITY (H): Chronic | ICD-10-CM

## 2024-10-02 PROCEDURE — 97802 MEDICAL NUTRITION INDIV IN: CPT | Mod: GT,95 | Performed by: DIETITIAN, REGISTERED

## 2024-10-02 NOTE — PROGRESS NOTES
"[facility]  Medical Nutrition Therapy    Visit Type: Initial Assessment    Bria Haddad, 59 year old referred by Hood Yan PA for MNT related to E66.01 (ICD-10-CM) - Morbid obesity (H)     Virtual Visit Details    Type of service:  Video Visit     Originating Location (pt. Location): Home    Distant Location (provider location):  On-site  Platform used for Video Visit: [employer]       Nutrition Assessment:  Anthropometrics  Height:   Ht Readings from Last 1 Encounters:   08/09/24 1.655 m (5' 5.16\")         BMI:  40.9   Weight Status:  Obesity Grade III BMI >40   Weight:   Wt Readings from Last 1 Encounters:   08/09/24 112.2 kg (247 lb 6.4 oz)       UBW: 247 lb lately      IBW:  57 kg (125 lb) IBW %: 198%        Weight History:   Wt Readings from Last 20 Encounters:   08/09/24 112.2 kg (247 lb 6.4 oz)   04/24/24 108.9 kg (240 lb)   02/28/23 131 kg (288 lb 12.8 oz)   02/23/23 103.9 kg (229 lb)   02/13/23 102.5 kg (226 lb)   02/06/23 103.9 kg (229 lb)   01/18/23 102.7 kg (226 lb 6.4 oz)   08/11/22 103.9 kg (229 lb)   05/05/22 102.8 kg (226 lb 9.6 oz)   11/17/21 104.3 kg (230 lb)   09/23/21 106.1 kg (234 lb)   10/23/20 104.6 kg (230 lb 9.6 oz)   01/07/20 101.3 kg (223 lb 6.4 oz)   12/23/19 100.7 kg (222 lb)   12/04/19 100.7 kg (222 lb)   10/08/19 102.5 kg (226 lb)   07/10/19 102 kg (224 lb 12.8 oz)   07/03/19 101.2 kg (223 lb)   05/15/19 102.4 kg (225 lb 12.8 oz)   01/31/19 102.4 kg (225 lb 12.8 oz)     -Wt gain of 18 lb over a 6 month period from 02/2024-08/2024.     Goal Weight:   -Patient would like to get back down to 160 lb. It's been about 11 years since she was at this weight.     Nutrition History    PMH:   Patient Active Problem List   Diagnosis    CARDIOVASCULAR SCREENING; LDL GOAL LESS THAN 160    Endometriosis of uterus    Rheumatoid arthritis (H)    Morbid obesity (H)    Chronic pain of both knees    Trigger finger, right ring finger    Lymphedema    Chronic insomnia    Screening " for cervical cancer      -First dietitian visit.   -Patient feels that poor food choices have contributed to weight gain. Has arthritis and lymphedema. Contributes to decreased activity.     Labs:   Recent Labs   Lab Test 04/24/24  0848 01/18/22  0709   CHOL 288* 270*   HDL 43* 42*   * 190*   TRIG 169* 191*        Meds:   Current Outpatient Medications   Medication Instructions    adalimumab (HUMIRA *CF*) 40 mg, Subcutaneous, EVERY 14 DAYS    bisacodyl (DULCOLAX) 5 MG EC tablet Two days prior to exam take two (2) tablets at 4pm. One day prior to exam take two (2) tablets at 4pm    famotidine (PEPCID) 20 mg, Oral, 2 TIMES DAILY    fluticasone (FLONASE) 50 MCG/ACT nasal spray 1 spray, Both Nostrils, DAILY    omeprazole (PRILOSEC) 40 mg, Oral, 2 TIMES DAILY    order for DME Equipment being ordered: compression stockings 1 pair, at 30mm Hg, to be worn during the day. For Bilateral leg edema [R60.0]    polyethylene glycol (GOLYTELY) 236 g suspension Two days before procedure at 5PM fill first container with water. Mix and drink an 8 oz glass every 10-15 minutes until HALF of the container is gone. Place the remainder in the refrigerator. One day before procedure at 5PM drink second half of bowel prep. Drink an 8 oz glass every 10-15 minutes until it is gone. Day of procedure 6 hours before arrival time fill the 2nd container with water. Mix and drink an 8 oz glass every 10-15 minutes until HALF of the container is gone. Discard the remaining solution.    sulfaSALAzine (AZULFIDINE) 1,000 mg, Oral, 2 TIMES DAILY        Supplements: reviewed      Social/Environmental:   -average sleep per night: Doesn't sleep well.   -level of stress: High stress level lately      Food Record  Breakfast: 6:30-7:30 am: Muffin (store bought) or Ezio Tateritesh trujillo egg and sausage, coffee with vanilla and cream and Truvia, Diet Coke   Snack: 3-4 days per week: Crackers (peanut butter or cheese filled) or nuts (1 oz)  Lunch: 11:30-1:30:  Goes out to eat ([employer]ienstein bagel sandwich) or Teriyaki chicken with rice and veggies OR hamburger or cheeseburger OR BBQ beef sandwich, Diet Coke  Snack: none  Dinner: 5-7 pm: potato, meat, veggie, milk 1%  Snack: Mini candy bars (2-3) or ice cream (1.5 scoops) or cookie   Beverages: Water sometimes, Diet Coke (3 cans), sometimes Sparkling Ice   -Take-out every day: [employer], [employer], Chipotle     Nutritional Details:   -Food allergies: none  -Food intolerances: none  -Food sensitivities: none  -GI concerns:  none  -Appetite: good   -Pace of eating: slow eater   -Role of cooking: self and    -Role of food shopping: self and        Physical Activity:  -No exercise   -Struggles to walk up and down the steps     ASSESSED MALNUTRITION STATUS  % Weight Loss:  None noted  % Intake:  No decreased intake noted  Subcutaneous Fat Loss:  None observed  Loss of Muscle Mass:  None observed  Fluid Retention:  None noted    Malnutrition Diagnosis:  Patient does not meet two of the above criteria necessary for diagnosing malnutrition      Nutrition Diagnosis:  Excessive energy intake related to food and nutrition knowledge deficit as evidenced by usual dietary intake meeting an estimated >130% energy needs, BMI 40.9, elevated cholesterol at 288 (H), and low daily physical activity.         Nutrition Intervention:  Nutrition Prescription Summary: MNT for Weight Management      -Educated pt on using MyPlate for meal planning and discussed portion sizes   -Discussed recommended and not recommended foods (choosing WGs, lean meats, low-fat dairy, fresh fruits & veggies, unsat fats, and limiting high-sodium and refined sugar foods)  -Educated pt on metabolism - talked about the importance of consuming consistent meals/snacks throughout the day   -Educated pt on reading food labels for making heart healthy choices  -Recommended bringing a lunch with to work or choosing a salad at the cafe at work   -Encouraged pt to increase  daily PA- include cardiovascular activities w/ultimate goal of 60 min per day     Emailed/mailed information discussed including nutrition handouts to patient.       Nutrition Prescription: Macronutrient and Micronutrient details   Dosing weight: Current wt (112 kg) for energy and fluids, IBW (57 kg) for protein   Energy: 8418-4967 kcals/day ([place] Jeor)    Justification:  (obesity, to promote a 2 lb wt loss per week)   Protein: 57-68 g Pro/day (1-1.2 g pro/Kg)    Justification:  (obesity guidelines )   Fluid: 7489-6995 mL/day   (1 mL/Kcal)     Justification:  (obese)   Fiber:     Women (>50 years): 21 grams per day         Carbohydrate: 45-65% kcal   <25 g added sugar/day       Fat: 20-35% kcal  <7% kcal from saturated fat   Micronutrient: DRI  <2,300 mg sodium/day            Vitamin and Mineral Supplements: n/a       Patient Engagement:   Assessed learning needs and learning preference: Yes.  Teaching Method(s) used: Video    Nutrition Education (Application):  a)  Discussed current eating plans / recommended alternative food choices    b)  Patient verbalizes understanding of diet by asking questions, goal setting      Anticipate >60% compliance   Stage of Change:  preparation      Nutrition Goals:  1) Increase exercise, aiming for 5,000 steps per day   2) Choose whole grain products (bread, pasta, rice, etc.)  3) Follow My Plate, aiming to make half the plate fruit and veggies  4) Look into tracking calories using calories (My Fitness Pal or Cronometer) and aim for 1400 calories per day   5) Check weight weekly and goal is 1-2 lb weight loss per week     Nutrition Follow Up / Monitoring:   Weight, PO intake, PA, labs (lipid panel)      Nutrition Recommendations:  Patient to follow-up with RD in 12 weeks.  Patient has RD contact information to call/email if needed.      Start time: 11:30  End time: 12:28    Total Time Duration: 58 min      Samreen Kennedy MS, RD, LD, [facility]  Clinical Dietitian  [phone]

## 2024-10-02 NOTE — TELEPHONE ENCOUNTER
Pharmacy still hasnt gotten request for methylprednisolone.  She wants to start taking it this morning.  Please call when done.   
Resent to different pended pharmacy.    Zee Rosas, BSN RN    
2
no

## 2024-10-13 ENCOUNTER — TELEPHONE (OUTPATIENT)
Dept: GASTROENTEROLOGY | Facility: CLINIC | Age: 59
End: 2024-10-13
Payer: COMMERCIAL

## 2024-10-14 NOTE — TELEPHONE ENCOUNTER
Pre visit planning completed.      Procedure details:    Patient scheduled for Upper endoscopy (EGD) on 10.24.24.     Arrival time: 1130. Procedure time 1215    Facility location: [facility]; [address], [place] [address]. Check in location: 2nd Floor at Surgery desk.    Sedation type: Conscious sedation     Pre op exam needed? No.    Indication for procedure: Follow up EoE      Chart review:     Electronic implanted devices? No    Recent diagnosis of diverticulitis within the last 6 weeks? No      Medication review:    Diabetic? No    Anticoagulants? No    Weight loss medication/injectable? No GLP-1 medication per patient's medication list.  RN will verify with pre-assessment call.    Other medication HOLDING recommendations:  N/A      Prep for procedure:       Prep instructions sent via jo ann Monreal RN  Endoscopy Procedure Pre Assessment RN  [phone] option 2

## 2024-10-14 NOTE — TELEPHONE ENCOUNTER
Pre assessment completed for upcoming procedure.   (Please see previous telephone encounter notes for complete details)      Procedure details:    Arrival time and facility location reviewed.    Pre op exam needed? No.    Designated  policy reviewed. Instructed to have someone stay 6  hours post procedure.       Medication review:    Medications reviewed. Please see supporting documentation below. Holding recommendations discussed (if applicable).       Prep for procedure:     Procedure prep instructions reviewed.        Any additional information needed:  N/A      Patient  verbalized understanding and had no questions or concerns at this time.      Henrietta Douglas RN  Endoscopy Procedure Pre Assessment   [phone] option 2

## 2024-10-24 ENCOUNTER — HOSPITAL ENCOUNTER (OUTPATIENT)
Facility: AMBULATORY SURGERY CENTER | Age: 59
Discharge: HOME | End: 2024-10-24
Attending: INTERNAL MEDICINE | Admitting: INTERNAL MEDICINE
Payer: COMMERCIAL

## 2024-10-24 VITALS
SYSTOLIC BLOOD PRESSURE: 161 MMHG | TEMPERATURE: 97.6 F | OXYGEN SATURATION: 95 % | RESPIRATION RATE: 16 BRPM | DIASTOLIC BLOOD PRESSURE: 111 MMHG | HEART RATE: 66 BPM

## 2024-10-24 LAB — UPPER GI ENDOSCOPY: NORMAL

## 2024-10-24 PROCEDURE — G8918 PT W/O PREOP ORDER IV AB PRO: HCPCS

## 2024-10-24 PROCEDURE — G8907 PT DOC NO EVENTS ON DISCHARG: HCPCS

## 2024-10-24 PROCEDURE — 43239 EGD BIOPSY SINGLE/MULTIPLE: CPT

## 2024-10-24 PROCEDURE — 88305 TISSUE EXAM BY PATHOLOGIST: CPT | Performed by: STUDENT IN AN ORGANIZED HEALTH CARE EDUCATION/TRAINING PROGRAM

## 2024-10-24 RX ORDER — LIDOCAINE 40 MG/G
CREAM TOPICAL
Status: DISCONTINUED | OUTPATIENT
Start: 2024-10-24 | End: 2024-10-25 | Stop reason: HOSPADM

## 2024-10-24 RX ORDER — FENTANYL CITRATE 50 UG/ML
INJECTION, SOLUTION INTRAMUSCULAR; INTRAVENOUS PRN
Status: DISCONTINUED | OUTPATIENT
Start: 2024-10-24 | End: 2024-10-24 | Stop reason: HOSPADM

## 2024-10-24 RX ORDER — FLUMAZENIL 0.1 MG/ML
0.2 INJECTION, SOLUTION INTRAVENOUS
Status: DISCONTINUED | OUTPATIENT
Start: 2024-10-24 | End: 2024-10-25 | Stop reason: HOSPADM

## 2024-10-24 RX ORDER — ONDANSETRON 2 MG/ML
4 INJECTION INTRAMUSCULAR; INTRAVENOUS EVERY 6 HOURS PRN
Status: DISCONTINUED | OUTPATIENT
Start: 2024-10-24 | End: 2024-10-25 | Stop reason: HOSPADM

## 2024-10-24 RX ORDER — NALOXONE HYDROCHLORIDE 0.4 MG/ML
0.2 INJECTION, SOLUTION INTRAMUSCULAR; INTRAVENOUS; SUBCUTANEOUS
Status: DISCONTINUED | OUTPATIENT
Start: 2024-10-24 | End: 2024-10-25 | Stop reason: HOSPADM

## 2024-10-24 RX ORDER — ONDANSETRON 2 MG/ML
4 INJECTION INTRAMUSCULAR; INTRAVENOUS
Status: DISCONTINUED | OUTPATIENT
Start: 2024-10-24 | End: 2024-10-25 | Stop reason: HOSPADM

## 2024-10-24 RX ORDER — NALOXONE HYDROCHLORIDE 0.4 MG/ML
0.4 INJECTION, SOLUTION INTRAMUSCULAR; INTRAVENOUS; SUBCUTANEOUS
Status: DISCONTINUED | OUTPATIENT
Start: 2024-10-24 | End: 2024-10-25 | Stop reason: HOSPADM

## 2024-10-24 RX ORDER — PROCHLORPERAZINE MALEATE 10 MG
10 TABLET ORAL EVERY 6 HOURS PRN
Status: DISCONTINUED | OUTPATIENT
Start: 2024-10-24 | End: 2024-10-25 | Stop reason: HOSPADM

## 2024-10-24 RX ORDER — ONDANSETRON 4 MG/1
4 TABLET, ORALLY DISINTEGRATING ORAL EVERY 6 HOURS PRN
Status: DISCONTINUED | OUTPATIENT
Start: 2024-10-24 | End: 2024-10-25 | Stop reason: HOSPADM

## 2024-10-24 NOTE — H&P
[facility] Anesthesia Pre-op History and Physical    Bria Haddad MRN# [id number]   Age: 59 year old YOB: 1965            Date of Exam 10/24/2024         Primary care provider: Holli Marley         Chief Complaint and/or Reason for Procedure:     Follow-up EoE         Active problem list:     Patient Active Problem List    Diagnosis Date Noted    Screening for cervical cancer 04/30/2024     Priority: Medium     9/25/12 NIL  11/25/15 NIL pap, neg HPV  4/24/24 NIL pap, neg HPV. On immunosuppressants. Cotest in 3 years      Chronic insomnia 02/13/2023     Priority: Medium    Trigger finger, right ring finger 01/18/2023     Priority: Medium    Chronic pain of both knees 09/02/2022     Priority: Medium    Morbid obesity (H) 09/23/2021     Priority: Medium    Rheumatoid arthritis (H) 09/19/2017     Priority: Medium     Rheumatoid arthritis      Endometriosis of uterus 09/25/2012     Priority: Medium    CARDIOVASCULAR SCREENING; LDL GOAL LESS THAN 160 10/31/2010     Priority: Medium    Lymphedema 02/12/2023     Priority: Low            Medications (include herbals and vitamins):   Any Plavix use in the last 7 days? No     Current Outpatient Medications   Medication Sig Dispense Refill    adalimumab (HUMIRA *CF*) 40 MG/0.4ML pen kit Inject 0.4 mLs (40 mg) subcutaneously every 14 days. 2.4 mL 2    famotidine (PEPCID) 20 MG tablet Take 1 tablet (20 mg) by mouth 2 times daily 60 tablet 1    omeprazole (PRILOSEC) 40 MG DR capsule Take 1 capsule (40 mg) by mouth 2 times daily 60 capsule 1    sulfaSALAzine (AZULFIDINE) 500 MG tablet Take 2 tablets (1,000 mg) by mouth 2 times daily 360 tablet 2    bisacodyl (DULCOLAX) 5 MG EC tablet Two days prior to exam take two (2) tablets at 4pm. One day prior to exam take two (2) tablets at 4pm 4 tablet 0    fluticasone (FLONASE) 50 MCG/ACT nasal spray Spray 1 spray into both nostrils daily 9.9 mL 0    order for DME Equipment being ordered: compression  stockings 1 pair, at 30mm Hg, to be worn during the day. For Bilateral leg edema [R60.0] 2 Units 0    polyethylene glycol (GOLYTELY) 236 g suspension Two days before procedure at 5PM fill first container with water. Mix and drink an 8 oz glass every 10-15 minutes until HALF of the container is gone. Place the remainder in the refrigerator. One day before procedure at 5PM drink second half of bowel prep. Drink an 8 oz glass every 10-15 minutes until it is gone. Day of procedure 6 hours before arrival time fill the 2nd container with water. Mix and drink an 8 oz glass every 10-15 minutes until HALF of the container is gone. Discard the remaining solution. 8000 mL 0     Current Facility-Administered Medications   Medication Dose Route Frequency Provider Last Rate Last Admin    lidocaine (LMX4) kit   Topical Q1H PRN Afua Klein DO        lidocaine 1 % 0.1-1 mL  0.1-1 mL Other Q1H PRN Afua Klein DO        ondansetron (ZOFRAN) injection 4 mg  4 mg Intravenous Once PRN Afua Klein,         sodium chloride (PF) 0.9% PF flush 3 mL  3 mL Intracatheter Q8H Afua Klein,         sodium chloride (PF) 0.9% PF flush 3 mL  3 mL Intracatheter q1 min prn Afua Klein DO                 Allergies:      Allergies   Allergen Reactions    Asa [Aspirin] Hives     Allergy to Latex? No  Allergy to tape?   No  Intolerances:             Physical Exam:   All vitals have been reviewed  Patient Vitals for the past 8 hrs:   BP Temp Temp src Pulse Resp SpO2   10/24/24 1100 (!) 186/98 97.6  F (36.4  C) Temporal 61 18 98 %     No intake/output data recorded.  Lungs:   no increased work of breathing     Cardiovascular:   RRR             Lab / Radiology Results:            Anesthetic risk and/or ASA classification:   2    Afua Klein DO

## 2024-10-28 LAB
PATH REPORT.COMMENTS IMP SPEC: NORMAL
PATH REPORT.COMMENTS IMP SPEC: NORMAL
PATH REPORT.FINAL DX SPEC: NORMAL
PATH REPORT.GROSS SPEC: NORMAL
PATH REPORT.MICROSCOPIC SPEC OTHER STN: NORMAL
PATH REPORT.RELEVANT HX SPEC: NORMAL
PHOTO IMAGE: NORMAL

## 2024-12-20 ENCOUNTER — VIRTUAL VISIT (OUTPATIENT)
Dept: RHEUMATOLOGY | Facility: CLINIC | Age: 59
End: 2024-12-20
Payer: COMMERCIAL

## 2024-12-20 DIAGNOSIS — M06.09 RHEUMATOID ARTHRITIS OF MULTIPLE SITES WITH NEGATIVE RHEUMATOID FACTOR (H): Primary | ICD-10-CM

## 2024-12-20 DIAGNOSIS — Z79.899 HIGH RISK MEDICATIONS (NOT ANTICOAGULANTS) LONG-TERM USE: ICD-10-CM

## 2024-12-20 PROCEDURE — 99214 OFFICE O/P EST MOD 30 MIN: CPT | Mod: 95 | Performed by: INTERNAL MEDICINE

## 2024-12-20 PROCEDURE — G2211 COMPLEX E/M VISIT ADD ON: HCPCS | Mod: 95 | Performed by: INTERNAL MEDICINE

## 2024-12-20 RX ORDER — ADALIMUMAB-RYVK 40MG/0.4ML
40 KIT SUBCUTANEOUS
Qty: 2.4 ML | Refills: 2 | Status: SHIPPED | OUTPATIENT
Start: 2024-12-20

## 2024-12-20 NOTE — PATIENT INSTRUCTIONS
RHEUMATOLOGY    [facility]  [address] [place] [address]  [place] [address]    Phone number: [phone]  Fax number: [phone]    If you need a medication refill, please contact us as you may need lab work and/or a follow up visit prior to your refill.      Thank you for choosing [facility]!    Jolynn Angulo CMA Rheumatology

## 2024-12-20 NOTE — PROGRESS NOTES
Bria Haddad  is a 59 year old year old who is being evaluated via a billable video visit.      How would you like to obtain your AVS? MyChart  If the video visit is dropped, the invitation should be resent by: Text to cell phone: [phone]   Will anyone else be joining your video visit? No      Rheumatology Video Visit      Bria Haddad MRN# [id number]   YOB: 1965 Age: 59 year old      Date of visit: 12/20/24  PCP: Dr. Holli Marley    Chief Complaint   Patient presents with:  Rheumatoid Arthritis    Assessment and Plan     1.  Rheumatoid arthritis: Ms. Haddad initially presented to this clinic in 2015 with dependent edema of the bilateral lower extremities, fatigue, and a one time episode of right toe pain in the setting of a positive ROSEMARY.  On 9/18/2017 she presented with synovitis of her bilateral PIPs and pain without swelling of her MCPs, persistent fatigue, intermittent rash, and dependent edema. ROSEMARY positive but additional studies negative/normal including C3, C4, beta-2 glycoprotein IgM and IgG, cardiolipin, lupus anticoagulant, RNP, Stuart, SSA, SSB, Scl-70.  Joint exam most consistent with rheumatoid arthritis.  Previously on HCQ (bloating), MTX (LFT elevation, alopecia, headache), leflunomide (LFT elevation). Currently on SSZ and Humira (started 2018).  RA controlled.   Chronic illness, stable.    - Continue sulfasalazine 1000mg twice daily  - Continue Humira 40mg SQ every 14 days  - Labs every 8-12 weeks: CBC, Creatinine, Hepatic Panel    High risk medication requiring intensive toxicity monitoring at least quarterly.     2.  Secondary Sjogren's syndrome: SSA and SSB negative previously.  Dry eye and dry mouth symptoms likely secondary to rheumatoid arthritis.  Preservative-free artificial tears from her eye doctor have been sufficient for dry eye management.  Dry mouth is mild per patient.  She follows with her dentist regularly and reports having good dentition except for recent  mild gingivitis that was treated by her dentist.  Overall stable symptoms at this time    3.  History of bilateral patellofemoral syndrome: Resolves with physical therapy exercises, typically recurring when she stops doing the exercises on her own at home.  Not an issue at this time    4. History of Left shoulder pain, impingement syndrome: Resolved with combination of steroid injection and physical therapy.  Not an issue at this time.     5.  Lymphedema: Bilateral lower extremity edema continues to be an issue and the compression socks she was purchasing were not sufficient.  She then went to lymphedema therapist with significant improvement and now has prescribed compression socks from the lymphedema clinic with significant improvement.  Mild return of symptoms so she plans to follow-up with her vascular surgeon or the lymphedema clinic.    6.  Historical, and not discussed today: Sleep apnea: 2/13/2023 sleep medicine note documents moderate probability of obstructive sleep apnea and the patient reports that the next step was to use a CPAP in office but the next sleep medicine appointment needed to be rescheduled and she was frustrated with that so she canceled the appointment.  We previously discussed the importance of her health and the importance of obstructive sleep apnea treatment if needed, so I previously advised her to reschedule the follow-up sleep medicine appointment.      7.  Vaccinations: Vaccinations reviewed with MsAkash Boo.  - Influenza: refused by patient because of reaction in the past   - [id number]: up to date   - Shingrix: Up to date  - COVID-19:  advised keeping updated, and to hold sulfasalazine for 1-2 weeks afterward  - RSV: Advised vaccination    Total minutes spent in evaluation with patient, documentation, , and review of pertinent studies and chart notes: 20  The longitudinal plan of care for the rheumatology problem(s) were addressed during this visit.  Due to added complexity  of care, we will continue to support the patient and the subsequent management of this condition with ongoing continuity of care.     Ms. Haddad verbalized agreement with and understanding of the rational for the diagnosis and treatment plan.  All questions were answered to best of my ability and the patient's satisfaction. Ms. Haddad was advised to contact the clinic with any questions that may arise after the clinic visit.      Thank you for involving me in the care of the patient    Return to clinic: 3 months    HPI   Bria Haddad is a 59 year old female with a past medical history significant for endometriosis, s/p carpal tunnel release surgery, trigger finger requiring surgery, and rheumatoid arthritis who present for follow up of rheumatoid arthritis.    5/26/2023: Joints are doing well.  No joint pain.  Morning stiffness for less than 20 minutes.  Trigger finger surgery went well and she has healed well.  Lymphedema of both lower extremities has been an issue; she has been using compression socks but they are not of the same quality as what she had received from the vascular medicine specialist, per patient.  The lymphedema therapist was very effective and she would like to return.  She canceled her follow-up sleep medicine evaluation where a CPAP was going to be used, per patient, because the provider had to reschedule and she was frustrated with that because she had waited so long to be seen in the first place.    9/1/2023: Arthritis is doing well.  No joint pain or swelling.  No morning stiffness or gelling phenomenon.  No active trigger fingers.  Has not yet followed up with sleep medicine for a CPAP.  Does not monitor blood pressure at home.    12/1/2023: RA controlled.  No joint pain or swelling. No morning stiffness or gelling. Arthritis does not limit daily activities.  Has not yet seen the sleep medicine specialist but says that she plans to schedule eventually.  Lymphedema controlled with compression  socks.    3/1/2024: RA controlled.  No joint pain or swelling.  No morning stiffness.  Mild gelling phenomenon that resolves quickly, only occurring if she sits at her desk for several hours.  No side effects from Humira or sulfasalazine.  Arthritis does not limit daily activities.    6/7/2024: Currently doing well.  No joint pain or swelling.  No morning stiffness or gelling phenomenon.  Reports that she does have dry eye and dry mouth.  Dry mouth does not require treatment; follows with a dentist regularly and has good dentition except for mild gingivitis that was recently treated by her dentist.  Dry eyes are treated with artificial tears as needed and this is effective.    Today, 9/13/2024: Currently doing well.  No joint pain or swelling.  No morning stiffness or gelling phenomenon.  While working at the [employer] as a  she started to have mild trigger finger but is now persisting possibly due to overactivity at festival.  Lymphedema slightly worse, and she notices that it tends to worsen during the colder weather; has not yet followed up with her vascular specialist or return to the lymphedema clinic.    No fevers or chills, nausea or vomiting, constipation or diarrhea.  No chest pain/pressure, palpitations, or shortness of breath.  No eye pain or redness.  No black or bloody stools.  No rash.    Tobacco: none  EtOH: none  Drugs: none    ROS   12 point review of system was completed and negative except as noted in the HPI     Active Problem List     Patient Active Problem List   Diagnosis    CARDIOVASCULAR SCREENING; LDL GOAL LESS THAN 160    Endometriosis of uterus    Rheumatoid arthritis (H)    Morbid obesity (H)    Chronic pain of both knees    Trigger finger, right ring finger    Lymphedema    Chronic insomnia    Screening for cervical cancer     Past Medical History     Past Medical History:   Diagnosis Date    Bilateral carpal tunnel syndrome 12/20/2015    Obesity     RA (rheumatoid  arthritis) (H)     inflammatory poly arthritis.    S/P carpal tunnel release 12/29/2016     Past Surgical History     Past Surgical History:   Procedure Laterality Date    ABDOMEN SURGERY  1992    Laparoscopy    BUNIONECTOMY Bilateral 1977    10th grade    COLONOSCOPY N/A 11/09/2015    Procedure: COLONOSCOPY;  Surgeon: Andrew Adamson MD;  Location: [facility]    COLONOSCOPY WITH CO2 INSUFFLATION N/A 11/09/2015    Procedure: COLONOSCOPY WITH CO2 INSUFFLATION;  Surgeon: Andrew Adamson MD;  Location: [facility]    COLONOSCOPY WITH CO2 INSUFFLATION N/A 8/16/2024    Procedure: Colonoscopy with CO2 insufflation;  Surgeon: Afua Klein DO;  Location: [facility]    COMBINED ESOPHAGOSCOPY, GASTROSCOPY, DUODENOSCOPY (EGD) WITH CO2 INSUFFLATION N/A 8/16/2024    Procedure: Combined Esophagoscopy, Gastroscopy, Duodenoscopy (Egd) With Co2 Insufflation;  Surgeon: Afua Klein DO;  Location: [facility]    COMBINED ESOPHAGOSCOPY, GASTROSCOPY, DUODENOSCOPY (EGD) WITH CO2 INSUFFLATION N/A 10/24/2024    Procedure: Combined Esophagoscopy, Gastroscopy, Duodenoscopy (Egd) With Co2 Insufflation;  Surgeon: Afua Klein DO;  Location: [facility]    ESOPHAGOSCOPY, GASTROSCOPY, DUODENOSCOPY (EGD), COMBINED N/A 8/16/2024    Procedure: ESOPHAGOGASTRODUODENOSCOPY, WITH BIOPSY;  Surgeon: Afua Klein DO;  [facility]: [facility]    ESOPHAGOSCOPY, GASTROSCOPY, DUODENOSCOPY (EGD), COMBINED N/A 10/24/2024    Procedure: ESOPHAGOGASTRODUODENOSCOPY, WITH BIOPSY;  Surgeon: Afua Klein DO;  Location: [facility]    GYN SURGERY  1992    endometryosis    RELEASE CARPAL TUNNEL Right 10/12/2016    Procedure: RELEASE CARPAL TUNNEL;  Surgeon: Colby Nobles MD;  Location: [facility]    RELEASE CARPAL TUNNEL Left 12/16/2016    Procedure: RELEASE CARPAL TUNNEL;  Surgeon: Colby Nobles MD;  Location: [facility]    RELEASE TRIGGER FINGER Right 02/09/2023    Procedure: RELEASE, TRIGGER FINGER right ring finger;  Surgeon: Danie Urban MD;  Location:  [facility]    TONSILLECTOMY & ADENOIDECTOMY  1973     Allergy     Allergies   Allergen Reactions    Asa [Aspirin] Hives     Current Medication List     Current Outpatient Medications   Medication Sig Dispense Refill    adalimumab (HUMIRA *CF*) 40 MG/0.4ML pen kit Inject 0.4 mLs (40 mg) subcutaneously every 14 days. 2.4 mL 2    famotidine (PEPCID) 20 MG tablet Take 1 tablet (20 mg) by mouth 2 times daily 60 tablet 1    fluticasone (FLONASE) 50 MCG/ACT nasal spray Spray 1 spray into both nostrils daily 9.9 mL 0    omeprazole (PRILOSEC) 40 MG DR capsule Take 1 capsule (40 mg) by mouth 2 times daily 60 capsule 1    sulfaSALAzine (AZULFIDINE) 500 MG tablet Take 2 tablets (1,000 mg) by mouth 2 times daily 360 tablet 2    bisacodyl (DULCOLAX) 5 MG EC tablet Two days prior to exam take two (2) tablets at 4pm. One day prior to exam take two (2) tablets at 4pm 4 tablet 0    order for DME Equipment being ordered: compression stockings 1 pair, at 30mm Hg, to be worn during the day. For Bilateral leg edema [R60.0] 2 Units 0    polyethylene glycol (GOLYTELY) 236 g suspension Two days before procedure at 5PM fill first container with water. Mix and drink an 8 oz glass every 10-15 minutes until HALF of the container is gone. Place the remainder in the refrigerator. One day before procedure at 5PM drink second half of bowel prep. Drink an 8 oz glass every 10-15 minutes until it is gone. Day of procedure 6 hours before arrival time fill the 2nd container with water. Mix and drink an 8 oz glass every 10-15 minutes until HALF of the container is gone. Discard the remaining solution. 8000 mL 0     No current facility-administered medications for this visit.     Social History   See HPI    Family History     Family History   Problem Relation Age of Onset    Osteoporosis Mother     Respiratory Mother     Thyroid Disease Mother     Anxiety Disorder Mother     Asthma Mother     Heart Disease Father     Cancer - colorectal Maternal Grandmother   "   Colon Cancer Maternal Grandmother     Diabetes Maternal Grandfather     Heart Disease Maternal Grandfather     Cancer Paternal Grandmother     Heart Disease Paternal Grandfather     Respiratory Paternal Grandfather     Hypertension Brother     Thyroid Disease Sister     Thyroid Disease Sister     Neurologic Disorder Brother     Hypertension Brother     Thyroid Disease Brother     Other Cancer Sister     Anxiety Disorder Sister     Mental Illness Sister     Anesthesia Reaction Sister     Thyroid Disease Sister      Physical Exam     Temp Readings from Last 3 Encounters:   10/24/24 97.6  F (36.4  C) (Temporal)   08/16/24 97.4  F (36.3  C) (Temporal)   08/09/24 97.8  F (36.6  C) (Temporal)     BP Readings from Last 5 Encounters:   10/24/24 (!) 161/111   08/16/24 139/89   08/09/24 134/80   04/24/24 137/78   02/28/23 (!) 147/85     Pulse Readings from Last 1 Encounters:   10/24/24 66     Resp Readings from Last 1 Encounters:   10/24/24 16     Estimated body mass index is 40.97 kg/m  as calculated from the following:    Height as of 8/9/24: 1.655 m (5' 5.16\").    Weight as of 8/9/24: 112.2 kg (247 lb 6.4 oz).      GEN: NAD.   HEENT: Anicteric, noninjected sclera. No obvious external lesions of the ear and nose. Hearing intact.  PULM: No increased work of breathing  MSK: Possible mild swelling of the right second MCP without overlying erythema; other joints without swelling.  Able to make a complete fist bilaterally.  SKIN: No rash or jaundice seen  PSYCH: Alert. Appropriate.      Labs / Imaging (select studies)     RF/CCP  Recent Labs   Lab Test 09/18/17  0913   CCPIGG 1   RHF <20     CBC  Recent Labs   Lab Test 08/09/24  0737 05/24/24  0722 02/26/24  0730 10/02/21  1656 07/05/21  1519 04/06/21  0716 12/22/20  1754   WBC 5.3 6.4 6.4   < > 6.3 7.2 8.4   RBC 4.89 4.87 4.79   < > 4.89 4.89 4.83   HGB 14.6 14.6 14.4   < > 14.2 14.4 14.3   HCT 44.1 44.2 43.2   < > 43.6 43.5 43.3   MCV 90 91 90   < > 89 89 90   RDW 12.7 12.7 " 12.7   < > 13.1 12.8 12.8    297 300   < > 363 302 317   MCH 29.9 30.0 30.1   < > 29.0 29.4 29.6   MCHC 33.1 33.0 33.3   < > 32.6 33.1 33.0   NEUTROPHIL 50 47 48   < > 43.3 45.3 38.0   LYMPH 38 41 38   < > 41.1 43.0 48.6   MONOCYTE 9 7 8   < > 12.4 9.9 11.3   EOSINOPHIL 3 4 5   < > 2.9 1.5 2.0   BASOPHIL 0 0 1   < > 0.3 0.3 0.1   ANEU  --   --   --   --  2.7 3.3 3.2   ALYM  --   --   --   --  2.6 3.1 4.1   MIKE  --   --   --   --  0.8 0.7 1.0   AEOS  --   --   --   --  0.2 0.1 0.2   ABAS  --   --   --   --  0.0 0.0 0.0   ANEUTAUTO 2.7 3.0 3.1   < >  --   --   --    ALYMPAUTO 2.0 2.6 2.5   < >  --   --   --    AMONOAUTO 0.5 0.5 0.5   < >  --   --   --    AEOSAUTO 0.2 0.3 0.3   < >  --   --   --    ABSBASO 0.0 0.0 0.0   < >  --   --   --     < > = values in this interval not displayed.     CMP  Recent Labs   Lab Test 08/09/24  0737 05/24/24  0722 02/26/24  0730 05/22/23  0719 02/06/23  0755 10/02/21  1656 07/05/21  1519 04/06/21  0716 12/22/20  1754 09/23/20  0749 12/31/19  0712 05/08/19  0745   NA  --   --   --   --  144  --   --   --   --  141  --  142   POTASSIUM  --   --   --   --  3.8  --   --   --   --  4.1  --  4.0   CHLORIDE  --   --   --   --  110*  --   --   --   --  109  --  109   CO2  --   --   --   --  30  --   --   --   --  29  --  30   ANIONGAP  --   --   --   --  4  --   --   --   --  3  --  3   GLC  --   --   --   --  107*  --   --   --   --  104*  --  99   BUN  --   --   --   --  16  --   --   --   --  16  --  14   CR 0.84 0.77 0.86   < > 0.72   < > 0.78 0.86 0.87 0.77   < > 0.74   GFRESTIMATED 79 87 77   < > >90   < > 84 75 74 85   < > >90   GFRESTBLACK  --   --   --   --   --   --  >90 86 85 >90   < > >90   AGUILAR  --   --   --   --  9.6  --   --   --   --  9.4  --  9.3   BILITOTAL 0.2 0.3 0.3   < > 0.5   < > 0.2 0.4 0.2  --    < > 0.3   ALBUMIN 4.2 4.2 4.1   < > 3.9   < > 3.9 3.8 4.3  --    < > 4.0   PROTTOTAL 7.1 6.8 6.9   < > 7.2   < > 7.5 7.6 7.8  --    < > 7.5   ALKPHOS 67 72 73   < > 70    < > 76 76 72  --    < > 73   AST 29 24 26   < > 14   < > 17 13 17  --    < > 15   ALT 17 32 23   < > 28   < > 32 26 27  --    < > 31    < > = values in this interval not displayed.     Calcium/VitaminD  Recent Labs   Lab Test 02/06/23  0755 09/23/20  0749 05/08/19  0745   AGUILAR 9.6 9.4 9.3     ESR/CRP  Recent Labs   Lab Test 08/09/24  0737 05/24/24  0722 02/26/24  0730 05/22/23  0719 02/06/23  0755 11/10/22  0658   SED 8 7 8 7 8 5   CRP  --   --   --  <2.9 4.8 <2.9   CRPI <3.00 <3.00 3.61  --   --   --        Hepatitis B  Recent Labs   Lab Test 05/24/24  0722   HBCAB Nonreactive   HEPBANG Nonreactive     Hepatitis C  Recent Labs   Lab Test 05/24/24  0722 10/07/16  0912   HCVAB Nonreactive Nonreactive   Assay performance characteristics have not been established for newborns,   infants, and children       Tuberculosis Screening  Recent Labs   Lab Test 05/24/24  0722 01/18/22  0701 12/07/18  0724   TBRES Negative Negative Negative            Chart documentation done in part with Dragon Voice recognition Software. Although reviewed after completion, some word and grammatical error may remain.      Video-Visit Details    Type of service:  Video Visit    Video Start Time: 7:38 AM    Video End Time:{video visit start/end time for provider to select:[id number]}    Originating Location (pt. Location): [place], [place]    Distant Location (provider location):  on site, [place], [place]    Platform used for Video Visit: Celestine Resendiz MD

## 2024-12-26 ENCOUNTER — MYC REFILL (OUTPATIENT)
Dept: FAMILY MEDICINE | Facility: CLINIC | Age: 59
End: 2024-12-26
Payer: COMMERCIAL

## 2024-12-26 DIAGNOSIS — K21.00 GASTROESOPHAGEAL REFLUX DISEASE WITH ESOPHAGITIS WITHOUT HEMORRHAGE: ICD-10-CM

## 2024-12-26 RX ORDER — OMEPRAZOLE 40 MG/1
40 CAPSULE, DELAYED RELEASE ORAL 2 TIMES DAILY
Qty: 60 CAPSULE | Refills: 1 | Status: SHIPPED | OUTPATIENT
Start: 2024-12-26

## 2024-12-26 RX ORDER — OMEPRAZOLE 40 MG/1
40 CAPSULE, DELAYED RELEASE ORAL 2 TIMES DAILY
Qty: 60 CAPSULE | Refills: 1 | OUTPATIENT
Start: 2024-12-26

## 2024-12-29 RX ORDER — SULFASALAZINE 500 MG/1
1000 TABLET ORAL 2 TIMES DAILY
Qty: 360 TABLET | Refills: 1 | Status: SHIPPED | OUTPATIENT
Start: 2024-12-29

## 2024-12-30 ENCOUNTER — TELEPHONE (OUTPATIENT)
Dept: FAMILY MEDICINE | Facility: CLINIC | Age: 59
End: 2024-12-30
Payer: COMMERCIAL

## 2024-12-30 NOTE — TELEPHONE ENCOUNTER
Message from [facility]-in regards to omeprazole. Insurance requires a 90 days supply. Please resend RX with 90 days supply.Trisha Hatfield [facility]

## 2024-12-30 NOTE — TELEPHONE ENCOUNTER
Left detailed message on patient's cell phone stating an appointment is needed before prescription can be refilled.  Provider has not seen patient since 2019, and is unsure who patient's PCP currently is.  Ling MATA    [facility]

## 2025-01-02 ENCOUNTER — E-VISIT (OUTPATIENT)
Dept: URGENT CARE | Facility: CLINIC | Age: 60
End: 2025-01-02
Payer: COMMERCIAL

## 2025-01-02 DIAGNOSIS — U07.1 INFECTION DUE TO COVID-19 VIRUS VARIANT OF CONCERN: Primary | ICD-10-CM

## 2025-01-02 RX ORDER — BENZONATATE 100 MG/1
100 CAPSULE ORAL 3 TIMES DAILY PRN
Qty: 30 CAPSULE | Refills: 0 | Status: SHIPPED | OUTPATIENT
Start: 2025-01-02

## 2025-01-13 ENCOUNTER — TELEPHONE (OUTPATIENT)
Dept: FAMILY MEDICINE | Facility: CLINIC | Age: 60
End: 2025-01-13
Payer: COMMERCIAL

## 2025-01-13 NOTE — TELEPHONE ENCOUNTER
Patient Quality Outreach    Patient is due for the following:   Breast Cancer Screening - Mammogram    Action(s) Taken:   Patient has upcoming appointment, these items will be addressed at that time.    Type of outreach:    Chart review performed, no outreach needed.    Questions for provider review:    None           Ralf Arambula MA

## 2025-01-15 ENCOUNTER — MYC REFILL (OUTPATIENT)
Dept: FAMILY MEDICINE | Facility: CLINIC | Age: 60
End: 2025-01-15
Payer: COMMERCIAL

## 2025-01-15 DIAGNOSIS — K21.00 GASTROESOPHAGEAL REFLUX DISEASE WITH ESOPHAGITIS WITHOUT HEMORRHAGE: ICD-10-CM

## 2025-01-15 RX ORDER — OMEPRAZOLE 40 MG/1
40 CAPSULE, DELAYED RELEASE ORAL 2 TIMES DAILY
Qty: 60 CAPSULE | Refills: 1 | OUTPATIENT
Start: 2025-01-15

## 2025-02-03 ENCOUNTER — TELEPHONE (OUTPATIENT)
Dept: RHEUMATOLOGY | Facility: CLINIC | Age: 60
End: 2025-02-03
Payer: COMMERCIAL

## 2025-02-03 NOTE — TELEPHONE ENCOUNTER
PA Initiation    Medication: ADALIMUMAB-RYVK (2 PEN) 40 MG/0.4ML SC AJKT  Insurance Company: [employer] - Phone [phone] Fax [phone]  Pharmacy Filling the Rx: [facility] [place] - [address]  Filling Pharmacy Phone: [phone]  Filling Pharmacy Fax:    Start Date: 2/3/2025  PASCUAL (Porter: [id number])    Question set requested.    Thank You,     Trisha Norman CPhT  Specialty Pharmacy Clinic [facility]  [email or web address]  [email or web address]  Phone: [phone]  Fax: [phone]

## 2025-02-04 NOTE — TELEPHONE ENCOUNTER
Prior Authorization Not Needed per Insurance    Medication: ADALIMUMAB-RYVK (2 PEN) 40 MG/0.4ML SC AJKT  Insurance Company: [employer] - Phone [phone] Fax [phone]  Expected CoPay: $    Pharmacy Filling the Rx: [facility] [place] [place] - [address] [place] [address]  Pharmacy Notified:   Patient Notified:         Thank You,     Sonny Norman [facility]  Specialty Pharmacy Clinic [facility]  [email or web address]  [email or web address]  Phone: [phone]  Fax: [phone]

## 2025-02-17 ENCOUNTER — OFFICE VISIT (OUTPATIENT)
Dept: FAMILY MEDICINE | Facility: CLINIC | Age: 60
End: 2025-02-17
Payer: COMMERCIAL

## 2025-02-17 VITALS
HEIGHT: 65 IN | SYSTOLIC BLOOD PRESSURE: 148 MMHG | OXYGEN SATURATION: 98 % | WEIGHT: 243.6 LBS | TEMPERATURE: 97 F | BODY MASS INDEX: 40.59 KG/M2 | DIASTOLIC BLOOD PRESSURE: 88 MMHG | RESPIRATION RATE: 18 BRPM | HEART RATE: 85 BPM

## 2025-02-17 DIAGNOSIS — I10 BENIGN ESSENTIAL HYPERTENSION: ICD-10-CM

## 2025-02-17 DIAGNOSIS — Z13.220 SCREENING, LIPID: ICD-10-CM

## 2025-02-17 DIAGNOSIS — Z00.00 ROUTINE GENERAL MEDICAL EXAMINATION AT A HEALTH CARE FACILITY: Primary | ICD-10-CM

## 2025-02-17 DIAGNOSIS — Z12.31 ENCOUNTER FOR SCREENING MAMMOGRAM FOR BREAST CANCER: ICD-10-CM

## 2025-02-17 DIAGNOSIS — Z79.899 HIGH RISK MEDICATIONS (NOT ANTICOAGULANTS) LONG-TERM USE: ICD-10-CM

## 2025-02-17 DIAGNOSIS — E66.01 MORBID OBESITY (H): Chronic | ICD-10-CM

## 2025-02-17 DIAGNOSIS — I89.0 LYMPHEDEMA: ICD-10-CM

## 2025-02-17 DIAGNOSIS — M06.9 RHEUMATOID ARTHRITIS, INVOLVING UNSPECIFIED SITE, UNSPECIFIED WHETHER RHEUMATOID FACTOR PRESENT (H): Chronic | ICD-10-CM

## 2025-02-17 DIAGNOSIS — M06.09 RHEUMATOID ARTHRITIS OF MULTIPLE SITES WITH NEGATIVE RHEUMATOID FACTOR (H): ICD-10-CM

## 2025-02-17 DIAGNOSIS — Z13.1 SCREENING FOR DIABETES MELLITUS: ICD-10-CM

## 2025-02-17 LAB
ALBUMIN SERPL BCG-MCNC: 4.2 G/DL (ref 3.5–5.2)
ALP SERPL-CCNC: 71 U/L (ref 40–150)
ALT SERPL W P-5'-P-CCNC: 26 U/L (ref 0–50)
ANION GAP SERPL CALCULATED.3IONS-SCNC: 10 MMOL/L (ref 7–15)
AST SERPL W P-5'-P-CCNC: 24 U/L (ref 0–45)
BASOPHILS # BLD AUTO: 0 10E3/UL (ref 0–0.2)
BASOPHILS NFR BLD AUTO: 0 %
BILIRUB DIRECT SERPL-MCNC: <0.2 MG/DL (ref 0–0.3)
BILIRUB SERPL-MCNC: 0.4 MG/DL
BUN SERPL-MCNC: 14.6 MG/DL (ref 8–23)
CALCIUM SERPL-MCNC: 9.5 MG/DL (ref 8.8–10.4)
CHLORIDE SERPL-SCNC: 106 MMOL/L (ref 98–107)
CHOLEST SERPL-MCNC: 257 MG/DL
CREAT SERPL-MCNC: 0.8 MG/DL (ref 0.51–0.95)
EGFRCR SERPLBLD CKD-EPI 2021: 83 ML/MIN/1.73M2
EOSINOPHIL # BLD AUTO: 0.2 10E3/UL (ref 0–0.7)
EOSINOPHIL NFR BLD AUTO: 3 %
ERYTHROCYTE [DISTWIDTH] IN BLOOD BY AUTOMATED COUNT: 12.6 % (ref 10–15)
EST. AVERAGE GLUCOSE BLD GHB EST-MCNC: 128 MG/DL
FASTING STATUS PATIENT QL REPORTED: YES
FASTING STATUS PATIENT QL REPORTED: YES
GLUCOSE SERPL-MCNC: 121 MG/DL (ref 70–99)
HBA1C MFR BLD: 6.1 % (ref 0–5.6)
HCO3 SERPL-SCNC: 26 MMOL/L (ref 22–29)
HCT VFR BLD AUTO: 42.8 % (ref 35–47)
HDLC SERPL-MCNC: 39 MG/DL
HGB BLD-MCNC: 14.1 G/DL (ref 11.7–15.7)
IMM GRANULOCYTES # BLD: 0 10E3/UL
IMM GRANULOCYTES NFR BLD: 0 %
LDLC SERPL CALC-MCNC: 181 MG/DL
LYMPHOCYTES # BLD AUTO: 2.2 10E3/UL (ref 0.8–5.3)
LYMPHOCYTES NFR BLD AUTO: 39 %
MCH RBC QN AUTO: 29.6 PG (ref 26.5–33)
MCHC RBC AUTO-ENTMCNC: 32.9 G/DL (ref 31.5–36.5)
MCV RBC AUTO: 90 FL (ref 78–100)
MONOCYTES # BLD AUTO: 0.5 10E3/UL (ref 0–1.3)
MONOCYTES NFR BLD AUTO: 9 %
NEUTROPHILS # BLD AUTO: 2.8 10E3/UL (ref 1.6–8.3)
NEUTROPHILS NFR BLD AUTO: 49 %
NONHDLC SERPL-MCNC: 218 MG/DL
PLATELET # BLD AUTO: 307 10E3/UL (ref 150–450)
POTASSIUM SERPL-SCNC: 4 MMOL/L (ref 3.4–5.3)
PROT SERPL-MCNC: 7.2 G/DL (ref 6.4–8.3)
RBC # BLD AUTO: 4.76 10E6/UL (ref 3.8–5.2)
SODIUM SERPL-SCNC: 142 MMOL/L (ref 135–145)
TRIGL SERPL-MCNC: 186 MG/DL
WBC # BLD AUTO: 5.6 10E3/UL (ref 4–11)

## 2025-02-17 PROCEDURE — 82248 BILIRUBIN DIRECT: CPT | Performed by: PHYSICIAN ASSISTANT

## 2025-02-17 PROCEDURE — 99214 OFFICE O/P EST MOD 30 MIN: CPT | Mod: 25 | Performed by: PHYSICIAN ASSISTANT

## 2025-02-17 PROCEDURE — 80061 LIPID PANEL: CPT | Performed by: PHYSICIAN ASSISTANT

## 2025-02-17 PROCEDURE — 36415 COLL VENOUS BLD VENIPUNCTURE: CPT | Performed by: PHYSICIAN ASSISTANT

## 2025-02-17 PROCEDURE — 80053 COMPREHEN METABOLIC PANEL: CPT | Performed by: PHYSICIAN ASSISTANT

## 2025-02-17 PROCEDURE — 85025 COMPLETE CBC W/AUTO DIFF WBC: CPT | Performed by: PHYSICIAN ASSISTANT

## 2025-02-17 PROCEDURE — 83036 HEMOGLOBIN GLYCOSYLATED A1C: CPT | Performed by: PHYSICIAN ASSISTANT

## 2025-02-17 PROCEDURE — 99396 PREV VISIT EST AGE 40-64: CPT | Performed by: PHYSICIAN ASSISTANT

## 2025-02-17 RX ORDER — LOSARTAN POTASSIUM 25 MG/1
25 TABLET ORAL DAILY
Qty: 90 TABLET | Refills: 0 | Status: SHIPPED | OUTPATIENT
Start: 2025-02-17

## 2025-02-17 SDOH — HEALTH STABILITY: PHYSICAL HEALTH: ON AVERAGE, HOW MANY DAYS PER WEEK DO YOU ENGAGE IN MODERATE TO STRENUOUS EXERCISE (LIKE A BRISK WALK)?: 0 DAYS

## 2025-02-17 SDOH — HEALTH STABILITY: PHYSICAL HEALTH: ON AVERAGE, HOW MANY MINUTES DO YOU ENGAGE IN EXERCISE AT THIS LEVEL?: 0 MIN

## 2025-02-17 ASSESSMENT — SOCIAL DETERMINANTS OF HEALTH (SDOH): HOW OFTEN DO YOU GET TOGETHER WITH FRIENDS OR RELATIVES?: ONCE A WEEK

## 2025-02-17 ASSESSMENT — PAIN SCALES - GENERAL: PAINLEVEL_OUTOF10: NO PAIN (0)

## 2025-02-17 NOTE — PATIENT INSTRUCTIONS
Recheck blood pressure once you have insurance again in clinic        Preventive Care Advice   This is general advice given by our system to help you stay healthy. However, your care team may have specific advice just for you. Please talk to your care team about your preventive care needs.  Nutrition  Eat 5 or more servings of fruits and vegetables each day.  Try wheat bread, brown rice and whole grain pasta (instead of white bread, rice, and pasta).  Get enough calcium and vitamin D. Check the label on foods and aim for 100% of the RDA (recommended daily allowance).  Lifestyle  Exercise at least 150 minutes each week  (30 minutes a day, 5 days a week).  Do muscle strengthening activities 2 days a week. These help control your weight and prevent disease.  No smoking.  Wear sunscreen to prevent skin cancer.  Have a dental exam and cleaning every 6 months.  Yearly exams  See your health care team every year to talk about:  Any changes in your health.  Any medicines your care team has prescribed.  Preventive care, family planning, and ways to prevent chronic diseases.  Shots (vaccines)   HPV shots (up to age 26), if you've never had them before.  Hepatitis B shots (up to age 59), if you've never had them before.  COVID-19 shot: Get this shot when it's due.  Flu shot: Get a flu shot every year.  Tetanus shot: Get a tetanus shot every 10 years.  Pneumococcal, hepatitis A, and RSV shots: Ask your care team if you need these based on your risk.  Shingles shot (for age 50 and up)  General health tests  Diabetes screening:  Starting at age 35, Get screened for diabetes at least every 3 years.  If you are younger than age 35, ask your care team if you should be screened for diabetes.  Cholesterol test: At age 39, start having a cholesterol test every 5 years, or more often if advised.  Bone density scan (DEXA): At age 50, ask your care team if you should have this scan for osteoporosis (brittle bones).  Hepatitis C: Get  tested at least once in your life.  STIs (sexually transmitted infections)  Before age 24: Ask your care team if you should be screened for STIs.  After age 24: Get screened for STIs if you're at risk. You are at risk for STIs (including HIV) if:  You are sexually active with more than one person.  You don't use condoms every time.  You or a partner was diagnosed with a sexually transmitted infection.  If you are at risk for HIV, ask about PrEP medicine to prevent HIV.  Get tested for HIV at least once in your life, whether you are at risk for HIV or not.  Cancer screening tests  Cervical cancer screening: If you have a cervix, begin getting regular cervical cancer screening tests starting at age 21.  Breast cancer scan (mammogram): If you've ever had breasts, begin having regular mammograms starting at age 40. This is a scan to check for breast cancer.  Colon cancer screening: It is important to start screening for colon cancer at age 45.  Have a colonoscopy test every 10 years (or more often if you're at risk) Or, ask your provider about stool tests like a FIT test every year or Cologuard test every 3 years.  To learn more about your testing options, visit:   .  For help making a decision, visit:   https://bit.ly/gb17350.  Prostate cancer screening test: If you have a prostate, ask your care team if a prostate cancer screening test (PSA) at age 55 is right for you.  Lung cancer screening: If you are a current or former smoker ages 50 to 80, ask your care team if ongoing lung cancer screenings are right for you.  For informational purposes only. Not to replace the advice of your health care provider. Copyright   2023 [employer]. All rights reserved. Clinically reviewed by the [facility] Program. [employer] 625535 - REV 01/24.

## 2025-02-17 NOTE — PROGRESS NOTES
"Preventive Care Visit  [facility] MILTON Yee PA-C, Family Medicine  Feb 17, 2025      Assessment & Plan     Routine general medical examination at a health care facility      Rheumatoid arthritis, involving unspecified site, unspecified whether rheumatoid factor present (H)  Continue with rheum    Morbid obesity (H)  Work on lifestyle    Benign essential hypertension  See hpi  Side effects of medication and expected course of condition discussed.    - losartan (COZAAR) 25 MG tablet; Take 1 tablet (25 mg) by mouth daily.    Screening for diabetes mellitus    - Hemoglobin A1c; Future  - Basic metabolic panel  (Ca, Cl, CO2, Creat, Gluc, K, Na, BUN); Future    Lymphedema    - Compression Sleeve/Stocking Order for DME - ONLY FOR DME    Encounter for screening mammogram for breast cancer    - MA Screen Bilateral w/Ricky; Future    Screening, lipid    - Lipid panel reflex to direct LDL Fasting; Future     30  additional minutes not on preventative spent on patient today including chart review, going over dmv paperwork, exam, and explaining treatment plan and follow-up.           BMI  Estimated body mass index is 40.34 kg/m  as calculated from the following:    Height as of this encounter: 1.655 m (5' 5.16\").    Weight as of this encounter: 110.5 kg (243 lb 9.6 oz).   Weight management plan: Discussed healthy diet and exercise guidelines    Counseling  Appropriate preventive services were addressed with this patient via screening, questionnaire, or discussion as appropriate for fall prevention, nutrition, physical activity, Tobacco-use cessation, social engagement, weight loss and cognition.  Checklist reviewing preventive services available has been given to the patient.  Reviewed patient's diet, addressing concerns and/or questions.       Patient Instructions   Recheck blood pressure once you have insurance again in clinic        Preventive Care Advice   This is general advice given by " our system to help you stay healthy. However, your care team may have specific advice just for you. Please talk to your care team about your preventive care needs.  Nutrition  Eat 5 or more servings of fruits and vegetables each day.  Try wheat bread, brown rice and whole grain pasta (instead of white bread, rice, and pasta).  Get enough calcium and vitamin D. Check the label on foods and aim for 100% of the RDA (recommended daily allowance).  Lifestyle  Exercise at least 150 minutes each week  (30 minutes a day, 5 days a week).  Do muscle strengthening activities 2 days a week. These help control your weight and prevent disease.  No smoking.  Wear sunscreen to prevent skin cancer.  Have a dental exam and cleaning every 6 months.  Yearly exams  See your health care team every year to talk about:  Any changes in your health.  Any medicines your care team has prescribed.  Preventive care, family planning, and ways to prevent chronic diseases.  Shots (vaccines)   HPV shots (up to age 26), if you've never had them before.  Hepatitis B shots (up to age 59), if you've never had them before.  COVID-19 shot: Get this shot when it's due.  Flu shot: Get a flu shot every year.  Tetanus shot: Get a tetanus shot every 10 years.  Pneumococcal, hepatitis A, and RSV shots: Ask your care team if you need these based on your risk.  Shingles shot (for age 50 and up)  General health tests  Diabetes screening:  Starting at age 35, Get screened for diabetes at least every 3 years.  If you are younger than age 35, ask your care team if you should be screened for diabetes.  Cholesterol test: At age 39, start having a cholesterol test every 5 years, or more often if advised.  Bone density scan (DEXA): At age 50, ask your care team if you should have this scan for osteoporosis (brittle bones).  Hepatitis C: Get tested at least once in your life.  STIs (sexually transmitted infections)  Before age 24: Ask your care team if you should be  screened for STIs.  After age 24: Get screened for STIs if you're at risk. You are at risk for STIs (including HIV) if:  You are sexually active with more than one person.  You don't use condoms every time.  You or a partner was diagnosed with a sexually transmitted infection.  If you are at risk for HIV, ask about PrEP medicine to prevent HIV.  Get tested for HIV at least once in your life, whether you are at risk for HIV or not.  Cancer screening tests  Cervical cancer screening: If you have a cervix, begin getting regular cervical cancer screening tests starting at age 21.  Breast cancer scan (mammogram): If you've ever had breasts, begin having regular mammograms starting at age 40. This is a scan to check for breast cancer.  Colon cancer screening: It is important to start screening for colon cancer at age 45.  Have a colonoscopy test every 10 years (or more often if you're at risk) Or, ask your provider about stool tests like a FIT test every year or Cologuard test every 3 years.  To learn more about your testing options, visit:   .  For help making a decision, visit:   https://bit.ly/ml46245.  Prostate cancer screening test: If you have a prostate, ask your care team if a prostate cancer screening test (PSA) at age 55 is right for you.  Lung cancer screening: If you are a current or former smoker ages 50 to 80, ask your care team if ongoing lung cancer screenings are right for you.  For informational purposes only. Not to replace the advice of your health care provider. Copyright   2023 [facility]. All rights reserved. Clinically reviewed by the [facility]. [employer] 149477 - REV 01/24.         Masoud Fraser is a 59 year old, presenting for the following:  Physical (Pt is not fasting)    Declines vaccines and mammo for now.       2/17/2025     7:13 AM   Additional Questions   Roomed by Kelly HAMM MA   Accompanied by n/a          HPI  - Would like to renew  "handicapped stickers-pain with walking long distances from [place].   - Would like labs for rheumatology (in order review, just need to be released).   - Would like a \"refill\" for the compression socks. There is an order in medications. Knee high. [facility]    New patient to me:  Patient h/o morbid obesity bmi 38 , RA, and lymphedema. Sees Dr. Resendiz for RA.   Blood pressure elevated and has been in person for the past several readings per patient they have not talked about htn meds yet. We discussed today and reasons for treating.  She will be out of insurance for awhile therefore 90 days will be sent and she will monitor at home and come back asap.         Health Care Directive  Patient does not have a Health Care Directive:       2/17/2025   General Health   How would you rate your overall physical health? (!) FAIR   Feel stress (tense, anxious, or unable to sleep) Not at all         2/17/2025   Nutrition   Three or more servings of calcium each day? Yes   Diet: Regular (no restrictions)   How many servings of fruit and vegetables per day? (!) 2-3   How many sweetened beverages each day? 0-1         2/17/2025   Exercise   Days per week of moderate/strenous exercise 0 days   Average minutes spent exercising at this level 0 min   (!) EXERCISE CONCERN      2/17/2025   Social Factors   Frequency of gathering with friends or relatives Once a week   Worry food won't last until get money to buy more No   Food not last or not have enough money for food? No   Do you have housing? (Housing is defined as stable permanent housing and does not include staying ouside in a car, in a tent, in an abandoned building, in an overnight shelter, or couch-surfing.) Yes   Are you worried about losing your housing? No   Lack of transportation? No   Unable to get utilities (heat,electricity)? No         2/17/2025   Fall Risk   Fallen 2 or more times in the past year? No   Trouble with walking or balance? No          " 2/17/2025   Dental   Dentist two times every year? Yes            Today's PHQ-2 Score:       2/17/2025    12:05 AM   PHQ-2 ( 1999 [employer])   Q1: Little interest or pleasure in doing things 0   Q2: Feeling down, depressed or hopeless 0   PHQ-2 Score 0    Q1: Little interest or pleasure in doing things Not at all   Q2: Feeling down, depressed or hopeless Not at all   PHQ-2 Score 0       Patient-reported           2/17/2025   Substance Use   Alcohol more than 3/day or more than 7/wk No   Do you use any other substances recreationally? No     Social History     Tobacco Use    Smoking status: Never     Passive exposure: Past    Smokeless tobacco: Never   Vaping Use    Vaping status: Never Used   Substance Use Topics    Alcohol use: No    Drug use: No           2/6/2023   LAST FHS-7 RESULTS   1st degree relative breast or ovarian cancer No    Any relative bilateral breast cancer No    Any male have breast cancer No        Proxy-reported             2/17/2025   STI Screening   New sexual partner(s) since last STI/HIV test? No     History of abnormal Pap smear: No - age 30-64 HPV with reflex Pap every 5 years recommended        Latest Ref Rng & Units 4/24/2024     8:15 AM 11/25/2015     8:56 AM 11/25/2015    12:00 AM   PAP / HPV   PAP  Negative for Intraepithelial Lesion or Malignancy (NILM)      PAP (Historical)    NIL    HPV 16 DNA Negative Negative  Negative     HPV 18 DNA Negative Negative  Negative     Other HR HPV Negative Negative  Negative       ASCVD Risk   The 10-year ASCVD risk score (Jero GABRIEL, et al., 2019) is: 9.3%    Values used to calculate the score:      Age: 59 years      Sex: Female      Is Non- : No      Diabetic: No      Tobacco smoker: No      Systolic Blood Pressure: 168 mmHg      Is BP treated: No      HDL Cholesterol: 43 mg/dL      Total Cholesterol: 288 mg/dL           Reviewed and updated as needed this visit by Provider                    Past Medical History:    Diagnosis Date    Benign essential hypertension 2/17/2025    Bilateral carpal tunnel syndrome 12/20/2015    Obesity     RA (rheumatoid arthritis) (H)     inflammatory poly arthritis.    S/P carpal tunnel release 12/29/2016     Past Surgical History:   Procedure Laterality Date    ABDOMEN SURGERY  1992    Laparoscopy    BUNIONECTOMY Bilateral 1977    10th grade    COLONOSCOPY N/A 11/09/2015    Procedure: COLONOSCOPY;  Surgeon: Andrew Adamson MD;  Location: [facility] OR    COLONOSCOPY WITH CO2 INSUFFLATION N/A 11/09/2015    Procedure: COLONOSCOPY WITH CO2 INSUFFLATION;  Surgeon: Andrew Adamson MD;  Location: [facility]    COLONOSCOPY WITH CO2 INSUFFLATION N/A 8/16/2024    Procedure: Colonoscopy with CO2 insufflation;  Surgeon: Afua Klein DO;  Location: [facility] OR    COMBINED ESOPHAGOSCOPY, GASTROSCOPY, DUODENOSCOPY (EGD) WITH CO2 INSUFFLATION N/A 8/16/2024    Procedure: Combined Esophagoscopy, Gastroscopy, Duodenoscopy (Egd) With Co2 Insufflation;  Surgeon: Afua Klein DO;  Location: [facility]    COMBINED ESOPHAGOSCOPY, GASTROSCOPY, DUODENOSCOPY (EGD) WITH CO2 INSUFFLATION N/A 10/24/2024    Procedure: Combined Esophagoscopy, Gastroscopy, Duodenoscopy (Egd) With Co2 Insufflation;  Surgeon: Afua Klein DO;  Location: [facility]    ESOPHAGOSCOPY, GASTROSCOPY, DUODENOSCOPY (EGD), COMBINED N/A 8/16/2024    Procedure: ESOPHAGOGASTRODUODENOSCOPY, WITH BIOPSY;  Surgeon: Afua Klein DO;  Location: [facility]    ESOPHAGOSCOPY, GASTROSCOPY, DUODENOSCOPY (EGD), COMBINED N/A 10/24/2024    Procedure: ESOPHAGOGASTRODUODENOSCOPY, WITH BIOPSY;  Surgeon: Afua Klein DO;  Location: [facility]    GYN SURGERY  1992    endometryosis    RELEASE CARPAL TUNNEL Right 10/12/2016    Procedure: RELEASE CARPAL TUNNEL;  Surgeon: Colby Nobles MD;  Location: [facility]    RELEASE CARPAL TUNNEL Left 12/16/2016    Procedure: RELEASE CARPAL TUNNEL;  Surgeon: Colby Nobles MD;  Location: [facility] OR    RELEASE TRIGGER FINGER Right  "02/09/2023    Procedure: RELEASE, TRIGGER FINGER right ring finger;  Surgeon: Danie Urban MD;  Location:  OR    TONSILLECTOMY & ADENOIDECTOMY  1973            Objective    Exam  BP (!) 168/102   Pulse 85   Temp 97  F (36.1  C) (Temporal)   Resp 18   Ht 1.655 m (5' 5.16\")   Wt 110.5 kg (243 lb 9.6 oz)   LMP 08/03/2018   SpO2 98%   BMI 40.34 kg/m     Estimated body mass index is 40.34 kg/m  as calculated from the following:    Height as of this encounter: 1.655 m (5' 5.16\").    Weight as of this encounter: 110.5 kg (243 lb 9.6 oz).    Physical Exam  GENERAL: alert and no distress  EYES: Eyes grossly normal to inspection, PERRL and conjunctivae and sclerae normal  HENT: ear canals and TM's normal, nose and mouth without ulcers or lesions  NECK: no adenopathy, no asymmetry, masses, or scars  RESP: lungs clear to auscultation - no rales, rhonchi or wheezes  BREAST: normal without masses, tenderness or nipple discharge and no palpable axillary masses or adenopathy  CV: regular rates and rhythm, normal S1 S2, no S3 or S4, no murmur, click or rub, peripheral pulses strong, and bilateral LE edema  ABDOMEN: soft, nontender, no hepatosplenomegaly, no masses and bowel sounds normal  SKIN: no suspicious lesions or rashes  NEURO: Normal strength and tone, mentation intact and speech normal  PSYCH: mentation appears normal, affect normal/bright        Signed Electronically by: Mary Anne Yee PA-C    "

## 2025-02-17 NOTE — RESULT ENCOUNTER NOTE
Sandra Fraser,       Your recent test results are attached, if you have any questions or concerns please feel free to contact me via e-mail or call [phone].  Your cholesterol was elevated to the point where your risk calculation does recommend cholesterol lowering medications. Let's discuss this at your blood pressure follow up appointment when you come back. A1C was 6.1 which is consistent with the diagnosis of pre-diabetes. This means your blood sugars have been above the normal range but not yet in the diabetic range. This means you are at increased risk of developing diabetes over time.  Lifestyle changes can help with this. Increasing your activity, even walking more every day can help.  Decreasing your sugar and carbohydrate intake can also help.  For example consuming less white bread, pasta, chips/pretzels, soda, juice and eating more vegetables and lean protein. When you do have carbohydrates choosing 100 percent whole wheat is better than white bread for example.  We should recheck your A1C in 6 months.          It was a pleasure to see you at your recent office visit.      Sincerely,  Mary Anne Yee PA-C

## 2025-02-18 ENCOUNTER — PATIENT OUTREACH (OUTPATIENT)
Dept: CARE COORDINATION | Facility: CLINIC | Age: 60
End: 2025-02-18
Payer: COMMERCIAL

## 2025-02-25 ENCOUNTER — TELEPHONE (OUTPATIENT)
Dept: RHEUMATOLOGY | Facility: CLINIC | Age: 60
End: 2025-02-25

## 2025-02-25 ENCOUNTER — OFFICE VISIT (OUTPATIENT)
Dept: RHEUMATOLOGY | Facility: CLINIC | Age: 60
End: 2025-02-25
Payer: COMMERCIAL

## 2025-02-25 VITALS
HEIGHT: 65 IN | DIASTOLIC BLOOD PRESSURE: 81 MMHG | SYSTOLIC BLOOD PRESSURE: 180 MMHG | BODY MASS INDEX: 40.79 KG/M2 | WEIGHT: 244.8 LBS | OXYGEN SATURATION: 100 % | HEART RATE: 91 BPM

## 2025-02-25 DIAGNOSIS — M06.09 RHEUMATOID ARTHRITIS OF MULTIPLE SITES WITH NEGATIVE RHEUMATOID FACTOR (H): ICD-10-CM

## 2025-02-25 RX ORDER — ADALIMUMAB-RYVK 40MG/0.4ML
40 KIT SUBCUTANEOUS
Qty: 2.4 ML | Refills: 1 | Status: SHIPPED | OUTPATIENT
Start: 2025-02-25

## 2025-02-25 ASSESSMENT — PAIN SCALES - GENERAL: PAINLEVEL_OUTOF10: MILD PAIN (2)

## 2025-02-25 NOTE — PROGRESS NOTES
Rheumatology Clinic Visit      Bria Haddad MRN# [id number]   YOB: 1965 Age: 59 year old      Date of visit: 2/25/25  PCP: Dr. Holli Marley    Chief Complaint   Patient presents with:  RECHECK: RA.     Assessment and Plan     1.  Rheumatoid arthritis: Ms. Haddad initially presented to this clinic in 2015 with dependent edema of the bilateral lower extremities, fatigue, and a one time episode of right toe pain in the setting of a positive ROSEMARY.  On 9/18/2017 she presented with synovitis of her bilateral PIPs and pain without swelling of her MCPs, persistent fatigue, intermittent rash, and dependent edema. ROSEMARY positive but additional studies negative/normal including C3, C4, beta-2 glycoprotein IgM and IgG, cardiolipin, lupus anticoagulant, RNP, Stuart, SSA, SSB, Scl-70.  Joint exam most consistent with rheumatoid arthritis.  Previously on HCQ (bloating), MTX (LFT elevation, alopecia, headache), leflunomide (LFT elevation). Currently on SSZ and adalimumab (Humira started 2018; insurance required change to Simlandi in 2024).  RA controlled.  Labs every 8-12 weeks.  Patient notes that she will be changing insurance on 3/1/2025; advised that she update our clinic with the new insurance information so that the Simlandi prior authorization can be completed with the new insurance.  Chronic illness, stable.    - Continue sulfasalazine 1000mg twice daily  - Continue adalimumab (simlandi) 40mg SQ every 14 days  - Labs every 8-12 weeks: CBC, Creatinine, Hepatic Panel    High risk medication requiring intensive toxicity monitoring at least quarterly.     2.  Secondary Sjogren's syndrome: SSA and SSB negative previously.  Dry eye and dry mouth symptoms likely secondary to rheumatoid arthritis.  Preservative-free artificial tears from her eye doctor have been sufficient for dry eye management.  Dry mouth is mild per patient.  She follows with her dentist regularly and reports having good dentition except for  recent mild gingivitis that was treated by her dentist.  Overall stable symptoms at this time    3.  History of bilateral patellofemoral syndrome: Resolves with physical therapy exercises, typically recurring when she stops doing the exercises on her own at home.  Not an issue at this time    4. History of Left shoulder pain, impingement syndrome: Resolved with combination of steroid injection and physical therapy.  Not an issue at this time.     5.  Lymphedema: Bilateral lower extremity edema continues to be an issue and the compression socks she was purchasing were not sufficient.  She then went to lymphedema therapist with significant improvement and now has prescribed compression socks from the lymphedema clinic with significant improvement.  Mild return of symptoms so she plans to follow-up with her vascular surgeon or the lymphedema clinic.  Not discussed again today.    6.  Historical, and not discussed today: Sleep apnea: 2/13/2023 sleep medicine note documents moderate probability of obstructive sleep apnea and the patient reports that the next step was to use a CPAP in office but the next sleep medicine appointment needed to be rescheduled and she was frustrated with that so she canceled the appointment.  We previously discussed the importance of her health and the importance of obstructive sleep apnea treatment if needed, so I previously advised her to reschedule the follow-up sleep medicine appointment.  Not discussed again today.    7.  Vaccinations: Vaccinations reviewed with Ms. Haddad.  - Influenza: refused by patient because of reaction in the past   - [id number]: up to date   - Shingrix: Up to date  - COVID-19:  advised keeping updated, and to hold sulfasalazine for 1-2 weeks afterward  - RSV: Advised vaccination    8. Elevated blood pressure: Bria to follow up with Primary Care provider regarding elevated blood pressure.     Total minutes spent in evaluation with patient, documentation, ,  and review of pertinent studies and chart notes: 20  The longitudinal plan of care for the rheumatology problem(s) were addressed during this visit.  Due to added complexity of care, we will continue to support the patient and the subsequent management of this condition with ongoing continuity of care.     Ms. Haddad verbalized agreement with and understanding of the rational for the diagnosis and treatment plan.  All questions were answered to best of my ability and the patient's satisfaction. Ms. Haddad was advised to contact the clinic with any questions that may arise after the clinic visit.      Thank you for involving me in the care of the patient    Return to clinic: 3 months    HPI   Bria Haddad is a 59 year old female with a past medical history significant for endometriosis, s/p carpal tunnel release surgery, trigger finger requiring surgery, and rheumatoid arthritis who present for follow up of rheumatoid arthritis.    5/26/2023: Joints are doing well.  No joint pain.  Morning stiffness for less than 20 minutes.  Trigger finger surgery went well and she has healed well.  Lymphedema of both lower extremities has been an issue; she has been using compression socks but they are not of the same quality as what she had received from the vascular medicine specialist, per patient.  The lymphedema therapist was very effective and she would like to return.  She canceled her follow-up sleep medicine evaluation where a CPAP was going to be used, per patient, because the provider had to reschedule and she was frustrated with that because she had waited so long to be seen in the first place.    9/1/2023: Arthritis is doing well.  No joint pain or swelling.  No morning stiffness or gelling phenomenon.  No active trigger fingers.  Has not yet followed up with sleep medicine for a CPAP.  Does not monitor blood pressure at home.    12/1/2023: RA controlled.  No joint pain or swelling. No morning stiffness or gelling.  Arthritis does not limit daily activities.  Has not yet seen the sleep medicine specialist but says that she plans to schedule eventually.  Lymphedema controlled with compression socks.    3/1/2024: RA controlled.  No joint pain or swelling.  No morning stiffness.  Mild gelling phenomenon that resolves quickly, only occurring if she sits at her desk for several hours.  No side effects from Humira or sulfasalazine.  Arthritis does not limit daily activities.    6/7/2024: Currently doing well.  No joint pain or swelling.  No morning stiffness or gelling phenomenon.  Reports that she does have dry eye and dry mouth.  Dry mouth does not require treatment; follows with a dentist regularly and has good dentition except for mild gingivitis that was recently treated by her dentist.  Dry eyes are treated with artificial tears as needed and this is effective.    9/13/2024: Currently doing well.  No joint pain or swelling.  No morning stiffness or gelling phenomenon.  While working at the [employer] as a  she started to have mild trigger finger but is now persisting possibly due to overactivity at festival.  Lymphedema slightly worse, and she notices that it tends to worsen during the colder weather; has not yet followed up with her vascular specialist or return to the lymphedema clinic.    12/20/2024: doing well. No joint pain/swelling/morning stiffness. No gelling. Arthritis not limiting daily activities.  Tolerating medications well.     Today, 2/25/2025: Doing well with regard to rheumatoid arthritis.  Sulfasalazine and Simlandi are effective and she has no side effects from the treatment.  No joint pain or swelling.  No morning stiffness or gelling phenomenon.  Plans to change insurance on 3/1/2025, as her current work insurance will end on 2/28/2025 when she retires.    No fevers or chills, nausea or vomiting, constipation or diarrhea.  No chest pain/pressure, palpitations, or shortness of breath.  No  eye pain or redness.  No black or bloody stools.  No rash.    Tobacco: none  EtOH: none  Drugs: none    ROS   12 point review of system was completed and negative except as noted in the HPI     Active Problem List     Patient Active Problem List   Diagnosis    Endometriosis of uterus    Rheumatoid arthritis (H)    Morbid obesity (H)    Chronic pain of both knees    Trigger finger, right ring finger    Lymphedema    Chronic insomnia    Screening for cervical cancer    Benign essential hypertension     Past Medical History     Past Medical History:   Diagnosis Date    Benign essential hypertension 2/17/2025    Bilateral carpal tunnel syndrome 12/20/2015    Obesity     RA (rheumatoid arthritis) (H)     inflammatory poly arthritis.    S/P carpal tunnel release 12/29/2016     Past Surgical History     Past Surgical History:   Procedure Laterality Date    ABDOMEN SURGERY  1992    Laparoscopy    BUNIONECTOMY Bilateral 1977    10th grade    COLONOSCOPY N/A 11/09/2015    Procedure: COLONOSCOPY;  Surgeon: Andrew Adamson MD;  Location: [facility]    COLONOSCOPY WITH CO2 INSUFFLATION N/A 11/09/2015    Procedure: COLONOSCOPY WITH CO2 INSUFFLATION;  Surgeon: Andrew Adamson MD;  Location: [facility]    COLONOSCOPY WITH CO2 INSUFFLATION N/A 8/16/2024    Procedure: Colonoscopy with CO2 insufflation;  Surgeon: Afua Klein DO;  Location: [facility]    COMBINED ESOPHAGOSCOPY, GASTROSCOPY, DUODENOSCOPY (EGD) WITH CO2 INSUFFLATION N/A 8/16/2024    Procedure: Combined Esophagoscopy, Gastroscopy, Duodenoscopy (Egd) With Co2 Insufflation;  Surgeon: Afua Klein DO;  Location: [facility]    COMBINED ESOPHAGOSCOPY, GASTROSCOPY, DUODENOSCOPY (EGD) WITH CO2 INSUFFLATION N/A 10/24/2024    Procedure: Combined Esophagoscopy, Gastroscopy, Duodenoscopy (Egd) With Co2 Insufflation;  Surgeon: Afua Klein DO;  Location: [facility]    ESOPHAGOSCOPY, GASTROSCOPY, DUODENOSCOPY (EGD), COMBINED N/A 8/16/2024    Procedure:  ESOPHAGOGASTRODUODENOSCOPY, WITH BIOPSY;  Surgeon: Afua Klein DO;  Location: [facility]    ESOPHAGOSCOPY, GASTROSCOPY, DUODENOSCOPY (EGD), COMBINED N/A 10/24/2024    Procedure: ESOPHAGOGASTRODUODENOSCOPY, WITH BIOPSY;  Surgeon: Afua Klein DO;  Location: [facility]    GYN SURGERY  1992    endometryosis    RELEASE CARPAL TUNNEL Right 10/12/2016    Procedure: RELEASE CARPAL TUNNEL;  Surgeon: Colby Nobles MD;  Location: [facility]    RELEASE CARPAL TUNNEL Left 12/16/2016    Procedure: RELEASE CARPAL TUNNEL;  Surgeon: Colby Nobles MD;  Location: [facility]    RELEASE TRIGGER FINGER Right 02/09/2023    Procedure: RELEASE, TRIGGER FINGER right ring finger;  Surgeon: Danie Urban MD;  Location: [facility]    TONSILLECTOMY & ADENOIDECTOMY  1973     Allergy     Allergies   Allergen Reactions    Asa [Aspirin] Hives     Current Medication List     Current Outpatient Medications   Medication Sig Dispense Refill    adalimumab-ryvk (SIMLANDI, 2 PEN,) 40 MG/0.4ML auto-injector kit Inject 0.4 mLs (40 mg) subcutaneously every 14 days. Hold for infection and seek medical attention. 2.4 mL 2    famotidine (PEPCID) 20 MG tablet Take 1 tablet (20 mg) by mouth 2 times daily 60 tablet 1    fluticasone (FLONASE) 50 MCG/ACT nasal spray Spray 1 spray into both nostrils daily 9.9 mL 0    losartan (COZAAR) 25 MG tablet Take 1 tablet (25 mg) by mouth daily. 90 tablet 0    omeprazole (PRILOSEC) 40 MG DR capsule Take 1 capsule (40 mg) by mouth 2 times daily. 60 capsule 1    order for DME Equipment being ordered: compression stockings 1 pair, at 30mm Hg, to be worn during the day. For Bilateral leg edema [R60.0] 2 Units 0    sulfaSALAzine (AZULFIDINE) 500 MG tablet Take 2 tablets (1,000 mg) by mouth 2 times daily. Labs required every 8-12 weeks. 360 tablet 1     No current facility-administered medications for this visit.     Social History   See HPI    Family History     Family History   Problem Relation Age of Onset    Osteoporosis  "Mother     Respiratory Mother     Thyroid Disease Mother     Anxiety Disorder Mother     Asthma Mother     Heart Disease Father     Cancer - colorectal Maternal Grandmother     Colon Cancer Maternal Grandmother     Diabetes Maternal Grandfather     Heart Disease Maternal Grandfather     Cancer Paternal Grandmother     Heart Disease Paternal Grandfather     Respiratory Paternal Grandfather     Hypertension Brother     Thyroid Disease Sister     Thyroid Disease Sister     Neurologic Disorder Brother     Hypertension Brother     Thyroid Disease Brother     Other Cancer Sister     Anxiety Disorder Sister     Mental Illness Sister     Anesthesia Reaction Sister     Thyroid Disease Sister      Physical Exam     Temp Readings from Last 3 Encounters:   02/17/25 97  F (36.1  C) (Temporal)   10/24/24 97.6  F (36.4  C) (Temporal)   08/16/24 97.4  F (36.3  C) (Temporal)     BP Readings from Last 5 Encounters:   02/17/25 (!) 148/88   10/24/24 (!) 161/111   08/16/24 139/89   08/09/24 134/80   04/24/24 137/78     Pulse Readings from Last 1 Encounters:   02/17/25 85     Resp Readings from Last 1 Encounters:   02/17/25 18     Estimated body mass index is 40.34 kg/m  as calculated from the following:    Height as of this encounter: 1.655 m (5' 5.16\").    Weight as of 2/17/25: 110.5 kg (243 lb 9.6 oz).    GEN: NAD. Healthy appearing adult.   HEENT:  Anicteric, noninjected sclera. No obvious external lesions of the ear and nose. Hearing intact.  CV: S1, S2. RRR. No m/r/g  PULM: No increased work of breathing. CTA bilaterally   MSK: MCPs, PIPs, DIPs without swelling or tenderness to palpation.  Wrists without swelling or tenderness to palpation.  Elbows and shoulders without swelling or tenderness to palpation.   Knees and ankles without swelling or tenderness to palpation.  Negative MTP squeeze bilaterally  SKIN: No rash or jaundice seen  PSYCH: Alert. Appropriate.       Labs / Imaging (select studies)     RF/CCP  Recent Labs   Lab " Test 09/18/17  0913   CCPIGG 1   RHF <20     CBC  Recent Labs   Lab Test 02/17/25  0815 12/20/24  1219 08/09/24  0737 10/02/21  1656 07/05/21  1519 04/06/21  0716 12/22/20  1754   WBC 5.6 7.5 5.3   < > 6.3 7.2 8.4   RBC 4.76 4.97 4.89   < > 4.89 4.89 4.83   HGB 14.1 14.6 14.6   < > 14.2 14.4 14.3   HCT 42.8 43.7 44.1   < > 43.6 43.5 43.3   MCV 90 88 90   < > 89 89 90   RDW 12.6 12.9 12.7   < > 13.1 12.8 12.8    335 330   < > 363 302 317   MCH 29.6 29.4 29.9   < > 29.0 29.4 29.6   MCHC 32.9 33.4 33.1   < > 32.6 33.1 33.0   NEUTROPHIL 49 48 50   < > 43.3 45.3 38.0   LYMPH 39 41 38   < > 41.1 43.0 48.6   MONOCYTE 9 9 9   < > 12.4 9.9 11.3   EOSINOPHIL 3 2 3   < > 2.9 1.5 2.0   BASOPHIL 0 0 0   < > 0.3 0.3 0.1   ANEU  --   --   --   --  2.7 3.3 3.2   ALYM  --   --   --   --  2.6 3.1 4.1   MIKE  --   --   --   --  0.8 0.7 1.0   AEOS  --   --   --   --  0.2 0.1 0.2   ABAS  --   --   --   --  0.0 0.0 0.0   ANEUTAUTO 2.8 3.6 2.7   < >  --   --   --    ALYMPAUTO 2.2 3.1 2.0   < >  --   --   --    AMONOAUTO 0.5 0.7 0.5   < >  --   --   --    AEOSAUTO 0.2 0.1 0.2   < >  --   --   --    ABSBASO 0.0 0.0 0.0   < >  --   --   --     < > = values in this interval not displayed.     CMP  Recent Labs   Lab Test 02/17/25  0815 12/20/24  1219 08/09/24  0737 05/22/23  0719 02/06/23  0755 10/02/21  1656 07/05/21  1519 04/06/21  0716 12/22/20  1754 09/23/20  0749     --   --   --  144  --   --   --   --  141   POTASSIUM 4.0  --   --   --  3.8  --   --   --   --  4.1   CHLORIDE 106  --   --   --  110*  --   --   --   --  109   CO2 26  --   --   --  30  --   --   --   --  29   ANIONGAP 10  --   --   --  4  --   --   --   --  3   *  --   --   --  107*  --   --   --   --  104*   BUN 14.6  --   --   --  16  --   --   --   --  16   CR 0.80 0.81 0.84   < > 0.72   < > 0.78 0.86 0.87 0.77   GFRESTIMATED 83 82 79   < > >90   < > 84 75 74 85   GFRESTBLACK  --   --   --   --   --   --  >90 86 85 >90   AGUILAR 9.5  --   --   --  9.6   --   --   --   --  9.4   BILITOTAL 0.4 0.2 0.2   < > 0.5   < > 0.2 0.4 0.2  --    ALBUMIN 4.2 4.3 4.2   < > 3.9   < > 3.9 3.8 4.3  --    PROTTOTAL 7.2 7.3 7.1   < > 7.2   < > 7.5 7.6 7.8  --    ALKPHOS 71 74 67   < > 70   < > 76 76 72  --    AST 24 20 29   < > 14   < > 17 13 17  --    ALT 26 21 17   < > 28   < > 32 26 27  --     < > = values in this interval not displayed.     Calcium/VitaminD  Recent Labs   Lab Test 02/17/25  0815 02/06/23  0755 09/23/20  0749   AGUILAR 9.5 9.6 9.4     ESR/CRP  Recent Labs   Lab Test 08/09/24  0737 05/24/24  0722 02/26/24  0730 05/22/23  0719 02/06/23  0755 11/10/22  0658   SED 8 7 8 7 8 5   CRP  --   --   --  <2.9 4.8 <2.9   CRPI <3.00 <3.00 3.61  --   --   --      Hepatitis B  Recent Labs   Lab Test 05/24/24  0722   HBCAB Nonreactive   HEPBANG Nonreactive     Hepatitis C  Recent Labs   Lab Test 05/24/24  0722   HCVAB Nonreactive     Tuberculosis Screening  Recent Labs   Lab Test 05/24/24  0722 01/18/22  0701 12/07/18  0724   TBRES Negative Negative Negative          Chart documentation done in part with Dragon Voice recognition Software. Although reviewed after completion, some word and grammatical error may remain.    Bill Resendiz MD

## 2025-02-25 NOTE — NURSING NOTE
RAPID3 (0-30) Cumulative Score  4.7          RAPID3 Weighted Score (divide #4 by 3 and that is the weighted score)  1.57     Arcelia Arriaga Certified Medical Assistant

## 2025-02-25 NOTE — TELEPHONE ENCOUNTER
Prior Authorization Approval    Medication: SIMLANDI (2 PEN) 40 MG/0.4ML SC AJKT  Authorization Effective Date: 1/26/2025  Authorization Expiration Date: 2/25/2026  Approved Dose/Quantity: 2 pen kit per 28-day supply  Reference #: [id number] (Porter: [id number])   Insurance Company: [employer] - Phone [phone] Fax [phone]  Expected CoPay: $    CoPay Card Available: Other (see comments)    Financial Assistance Needed: [email or web address]  Which Pharmacy is filling the prescription: [address]  Pharmacy Notified: renewal  Patient Notified: renewal        Thank You,     Trisha Norman [facility]  [email or web address]  [email or web address]  Phone: [phone]  Fax: [phone]

## 2025-03-19 NOTE — NURSING NOTE
Bria to follow up with Primary Care provider regarding elevated blood pressure.  Blood pressure rechecked after visit    140/84  Jolynn Angulo CMA Rheumatology  10/8/2019 11:14 AM                              RAPID3 (0-30) Cumulative Score  3.7          RAPID3 Weighted Score (divide #4 by 3 and that is the weighted score)  1.2     Jolynn Angulo CMA Rheumatology  10/8/2019 10:43 AM         Price (Use Numbers Only, No Special Characters Or $): 150 Total Number Of Lesions Treated: 5 Anesthesia Volume In Cc: 0.5 Consent: The patient's consent was obtained including but not limited to risks of crusting, scabbing, blistering, scarring, darker or lighter pigmentary change, recurrence, incomplete removal and infection. Post-Care Instructions: I reviewed with the patient in detail post-care instructions. Patient is to wear sunprotection, and avoid picking at any of the treated lesions. Pt may apply Vaseline to crusted or scabbing areas. Detail Level: Detailed Anesthesia Type: 1% lidocaine with epinephrine

## 2025-06-12 ENCOUNTER — LAB (OUTPATIENT)
Dept: LAB | Facility: CLINIC | Age: 60
End: 2025-06-12
Payer: COMMERCIAL

## 2025-06-12 DIAGNOSIS — M06.09 RHEUMATOID ARTHRITIS OF MULTIPLE SITES WITH NEGATIVE RHEUMATOID FACTOR (H): ICD-10-CM

## 2025-06-12 LAB
ALBUMIN SERPL BCG-MCNC: 4.2 G/DL (ref 3.5–5.2)
ALP SERPL-CCNC: 70 U/L (ref 40–150)
ALT SERPL W P-5'-P-CCNC: 29 U/L (ref 0–50)
AST SERPL W P-5'-P-CCNC: 27 U/L (ref 0–45)
BASOPHILS # BLD AUTO: 0 10E3/UL (ref 0–0.2)
BASOPHILS NFR BLD AUTO: 1 %
BILIRUB SERPL-MCNC: 0.4 MG/DL
BILIRUBIN DIRECT (ROCHE PRO & PURE): 0.13 MG/DL (ref 0–0.45)
CREAT SERPL-MCNC: 0.76 MG/DL (ref 0.51–0.95)
EGFRCR SERPLBLD CKD-EPI 2021: 88 ML/MIN/1.73M2
EOSINOPHIL # BLD AUTO: 0.2 10E3/UL (ref 0–0.7)
EOSINOPHIL NFR BLD AUTO: 3 %
ERYTHROCYTE [DISTWIDTH] IN BLOOD BY AUTOMATED COUNT: 12.8 % (ref 10–15)
HCT VFR BLD AUTO: 43.3 % (ref 35–47)
HGB BLD-MCNC: 14.4 G/DL (ref 11.7–15.7)
IMM GRANULOCYTES # BLD: 0 10E3/UL
IMM GRANULOCYTES NFR BLD: 0 %
LYMPHOCYTES # BLD AUTO: 2.3 10E3/UL (ref 0.8–5.3)
LYMPHOCYTES NFR BLD AUTO: 39 %
MCH RBC QN AUTO: 29.1 PG (ref 26.5–33)
MCHC RBC AUTO-ENTMCNC: 33.3 G/DL (ref 31.5–36.5)
MCV RBC AUTO: 88 FL (ref 78–100)
MONOCYTES # BLD AUTO: 0.6 10E3/UL (ref 0–1.3)
MONOCYTES NFR BLD AUTO: 10 %
NEUTROPHILS # BLD AUTO: 2.8 10E3/UL (ref 1.6–8.3)
NEUTROPHILS NFR BLD AUTO: 48 %
PLATELET # BLD AUTO: 314 10E3/UL (ref 150–450)
PROT SERPL-MCNC: 7 G/DL (ref 6.4–8.3)
RBC # BLD AUTO: 4.94 10E6/UL (ref 3.8–5.2)
WBC # BLD AUTO: 5.8 10E3/UL (ref 4–11)

## 2025-06-16 ENCOUNTER — TELEPHONE (OUTPATIENT)
Dept: RHEUMATOLOGY | Facility: CLINIC | Age: 60
End: 2025-06-16

## 2025-06-16 ENCOUNTER — OFFICE VISIT (OUTPATIENT)
Dept: RHEUMATOLOGY | Facility: CLINIC | Age: 60
End: 2025-06-16
Payer: COMMERCIAL

## 2025-06-16 VITALS
SYSTOLIC BLOOD PRESSURE: 138 MMHG | BODY MASS INDEX: 41.23 KG/M2 | DIASTOLIC BLOOD PRESSURE: 88 MMHG | HEART RATE: 78 BPM | WEIGHT: 249 LBS

## 2025-06-16 DIAGNOSIS — M06.09 RHEUMATOID ARTHRITIS OF MULTIPLE SITES WITH NEGATIVE RHEUMATOID FACTOR (H): Primary | ICD-10-CM

## 2025-06-16 DIAGNOSIS — Z79.899 HIGH RISK MEDICATIONS (NOT ANTICOAGULANTS) LONG-TERM USE: ICD-10-CM

## 2025-06-16 PROCEDURE — G2211 COMPLEX E/M VISIT ADD ON: HCPCS | Performed by: INTERNAL MEDICINE

## 2025-06-16 PROCEDURE — 3079F DIAST BP 80-89 MM HG: CPT | Performed by: INTERNAL MEDICINE

## 2025-06-16 PROCEDURE — 3075F SYST BP GE 130 - 139MM HG: CPT | Performed by: INTERNAL MEDICINE

## 2025-06-16 PROCEDURE — 99214 OFFICE O/P EST MOD 30 MIN: CPT | Performed by: INTERNAL MEDICINE

## 2025-06-16 RX ORDER — SULFASALAZINE 500 MG/1
1000 TABLET ORAL 2 TIMES DAILY
Qty: 360 TABLET | Refills: 2 | Status: SHIPPED | OUTPATIENT
Start: 2025-06-16

## 2025-06-16 RX ORDER — ADALIMUMAB-RYVK 40MG/0.4ML
40 KIT SUBCUTANEOUS
Qty: 2.4 ML | Refills: 2 | Status: SHIPPED | OUTPATIENT
Start: 2025-06-16

## 2025-06-16 NOTE — NURSING NOTE
RAPID3 (0-30) Cumulative Score  10.3          RAPID3 Weighted Score (divide #4 by 3 and that is the weighted score)  3.4

## 2025-06-16 NOTE — PROGRESS NOTES
Rheumatology Clinic Visit      Bria Haddad MRN# [id number]   YOB: 1965 Age: 60 year old      Date of visit: 6/16/25  PCP: Dr. Holli Marley    Chief Complaint   Patient presents with:  RECHECK    Assessment and Plan     1.  Rheumatoid arthritis: Ms. Haddad initially presented to this clinic in 2015 with dependent edema of the bilateral lower extremities, fatigue, and a one time episode of right toe pain in the setting of a positive ROSEMARY.  On 9/18/2017 she presented with synovitis of her bilateral PIPs and pain without swelling of her MCPs, persistent fatigue, intermittent rash, and dependent edema. ROSEMARY positive but additional studies negative/normal including C3, C4, beta-2 glycoprotein IgM and IgG, cardiolipin, lupus anticoagulant, RNP, Stuart, SSA, SSB, Scl-70.  Joint exam most consistent with rheumatoid arthritis.  Previously on HCQ (bloating), MTX (LFT elevation, alopecia, headache), leflunomide (LFT elevation). Currently on SSZ and adalimumab (Humira started 2018; insurance required change to Simlandi in 2024).  RA controlled previously but has been off of Simlandi for the past 2.5-3 months with worsening arthritis symptoms and morning stiffness.  She would like to restart Simlandi. Chronic illness.    - Continue sulfasalazine 1000mg twice daily  - Restart adalimumab (simlandi) 40mg SQ every 14 days  - Labs in 3 months: CBC, Creatinine, Hepatic Panel, ESR, CRP, QuantiFERON-TB gold plus    High risk medication requiring intensive toxicity monitoring at least quarterly.     2.  Secondary Sjogren's syndrome: SSA and SSB negative previously.  Dry eye and dry mouth symptoms likely secondary to rheumatoid arthritis.  Preservative-free artificial tears from her eye doctor have been sufficient for dry eye management.  Dry mouth is mild per patient.  She follows with her dentist regularly and reports having good dentition except for recent mild gingivitis that was treated by her dentist.  Overall  stable symptoms at this time    3.  History of bilateral patellofemoral syndrome: Resolves with physical therapy exercises, typically recurring when she stops doing the exercises on her own at home.  Not an issue at this time    4. History of Left shoulder pain, impingement syndrome: Resolved with combination of steroid injection and physical therapy.  Not an issue at this time.     5.  Lymphedema: Bilateral lower extremity edema continues to be an issue and the compression socks she was purchasing were not sufficient.  She then went to lymphedema therapist with significant improvement and now has prescribed compression socks from the lymphedema clinic with significant improvement.  Mild return of symptoms so she plans to follow-up with her vascular surgeon or the lymphedema clinic.  Not discussed again today.    6.  Historical, and not discussed today: Sleep apnea: 2/13/2023 sleep medicine note documents moderate probability of obstructive sleep apnea and the patient reports that the next step was to use a CPAP in office but the next sleep medicine appointment needed to be rescheduled and she was frustrated with that so she canceled the appointment.  We previously discussed the importance of her health and the importance of obstructive sleep apnea treatment if needed, so I previously advised her to reschedule the follow-up sleep medicine appointment.  Not discussed again today.    7.  Vaccinations: Vaccinations reviewed with Ms. Haddad.  - Influenza: refused by patient because of reaction in the past   - [id number]: up to date   - Shingrix: Up to date  - COVID-19:  advised keeping updated, and to hold sulfasalazine for 1-2 weeks afterward  - RSV: Advised vaccination      Total minutes spent in evaluation with patient, documentation, , and review of pertinent studies and chart notes: 14  The longitudinal plan of care for the rheumatology problem(s) were addressed during this visit.  Due to added complexity  of care, we will continue to support the patient and the subsequent management of this condition with ongoing continuity of care.     Ms. Haddad verbalized agreement with and understanding of the rational for the diagnosis and treatment plan.  All questions were answered to best of my ability and the patient's satisfaction. Ms. Haddad was advised to contact the clinic with any questions that may arise after the clinic visit.      Thank you for involving me in the care of the patient    Return to clinic: 3 months    HPI   Bria Haddad is a 60 year old female with a past medical history significant for endometriosis, s/p carpal tunnel release surgery, trigger finger requiring surgery, and rheumatoid arthritis who present for follow up of rheumatoid arthritis.    5/26/2023: Joints are doing well.  No joint pain.  Morning stiffness for less than 20 minutes.  Trigger finger surgery went well and she has healed well.  Lymphedema of both lower extremities has been an issue; she has been using compression socks but they are not of the same quality as what she had received from the vascular medicine specialist, per patient.  The lymphedema therapist was very effective and she would like to return.  She canceled her follow-up sleep medicine evaluation where a CPAP was going to be used, per patient, because the provider had to reschedule and she was frustrated with that because she had waited so long to be seen in the first place.    9/1/2023: Arthritis is doing well.  No joint pain or swelling.  No morning stiffness or gelling phenomenon.  No active trigger fingers.  Has not yet followed up with sleep medicine for a CPAP.  Does not monitor blood pressure at home.    12/1/2023: RA controlled.  No joint pain or swelling. No morning stiffness or gelling. Arthritis does not limit daily activities.  Has not yet seen the sleep medicine specialist but says that she plans to schedule eventually.  Lymphedema controlled with compression  socks.    3/1/2024: RA controlled.  No joint pain or swelling.  No morning stiffness.  Mild gelling phenomenon that resolves quickly, only occurring if she sits at her desk for several hours.  No side effects from Humira or sulfasalazine.  Arthritis does not limit daily activities.    6/7/2024: Currently doing well.  No joint pain or swelling.  No morning stiffness or gelling phenomenon.  Reports that she does have dry eye and dry mouth.  Dry mouth does not require treatment; follows with a dentist regularly and has good dentition except for mild gingivitis that was recently treated by her dentist.  Dry eyes are treated with artificial tears as needed and this is effective.    9/13/2024: Currently doing well.  No joint pain or swelling.  No morning stiffness or gelling phenomenon.  While working at the [employer] as a  she started to have mild trigger finger but is now persisting possibly due to overactivity at festival.  Lymphedema slightly worse, and she notices that it tends to worsen during the colder weather; has not yet followed up with her vascular specialist or return to the lymphedema clinic.    12/20/2024: doing well. No joint pain/swelling/morning stiffness. No gelling. Arthritis not limiting daily activities.  Tolerating medications well.     2/25/2025: Doing well with regard to rheumatoid arthritis.  Sulfasalazine and Simlandi are effective and she has no side effects from the treatment.  No joint pain or swelling.  No morning stiffness or gelling phenomenon.  Plans to change insurance on 3/1/2025, as her current work insurance will end on 2/28/2025 when she retires.    Today, 6/16/2025: Increased pain and stiffness of the MCPs, PIPs, and wrists since she has been off of Simlandi for the past 2.5-3 months.  She reports that she has new insurance and would like to restart Simlandi because it was effective in the past.  Morning stiffness currently for 1-2 hours.    No fevers or chills,  nausea or vomiting, constipation or diarrhea.  No chest pain/pressure, palpitations, or shortness of breath.  No eye pain or redness.  No black or bloody stools.  No rash.    Tobacco: none  EtOH: none  Drugs: none    ROS   12 point review of system was completed and negative except as noted in the HPI     Active Problem List     Patient Active Problem List   Diagnosis    Endometriosis of uterus    Rheumatoid arthritis (H)    Morbid obesity (H)    Chronic pain of both knees    Trigger finger, right ring finger    Lymphedema    Chronic insomnia    Screening for cervical cancer    Benign essential hypertension     Past Medical History     Past Medical History:   Diagnosis Date    Benign essential hypertension 2/17/2025    Bilateral carpal tunnel syndrome 12/20/2015    Obesity     RA (rheumatoid arthritis) (H)     inflammatory poly arthritis.    S/P carpal tunnel release 12/29/2016     Past Surgical History     Past Surgical History:   Procedure Laterality Date    ABDOMEN SURGERY  1992    Laparoscopy    BUNIONECTOMY Bilateral 1977    10th grade    COLONOSCOPY N/A 11/09/2015    Procedure: COLONOSCOPY;  Surgeon: Andrew Adamson MD;  Location: [facility]    COLONOSCOPY WITH CO2 INSUFFLATION N/A 11/09/2015    Procedure: COLONOSCOPY WITH CO2 INSUFFLATION;  Surgeon: Andrew Adamson MD;  Location: [facility]    COLONOSCOPY WITH CO2 INSUFFLATION N/A 8/16/2024    Procedure: Colonoscopy with CO2 insufflation;  Surgeon: Afua Klein DO;  Location: [facility]    COMBINED ESOPHAGOSCOPY, GASTROSCOPY, DUODENOSCOPY (EGD) WITH CO2 INSUFFLATION N/A 8/16/2024    Procedure: Combined Esophagoscopy, Gastroscopy, Duodenoscopy (Egd) With Co2 Insufflation;  Surgeon: Afua Klein DO;  Location: [facility]    COMBINED ESOPHAGOSCOPY, GASTROSCOPY, DUODENOSCOPY (EGD) WITH CO2 INSUFFLATION N/A 10/24/2024    Procedure: Combined Esophagoscopy, Gastroscopy, Duodenoscopy (Egd) With Co2 Insufflation;  Surgeon: Afua Klein DO;  Location: [facility]     ESOPHAGOSCOPY, GASTROSCOPY, DUODENOSCOPY (EGD), COMBINED N/A 8/16/2024    Procedure: ESOPHAGOGASTRODUODENOSCOPY, WITH BIOPSY;  Surgeon: Afua Klein DO;  Location: [facility]    ESOPHAGOSCOPY, GASTROSCOPY, DUODENOSCOPY (EGD), COMBINED N/A 10/24/2024    Procedure: ESOPHAGOGASTRODUODENOSCOPY, WITH BIOPSY;  Surgeon: Afua Klein DO;  Location: [facility]    GYN SURGERY  1992    endometryosis    RELEASE CARPAL TUNNEL Right 10/12/2016    Procedure: RELEASE CARPAL TUNNEL;  Surgeon: Colby Nobles MD;  Location: [facility]    RELEASE CARPAL TUNNEL Left 12/16/2016    Procedure: RELEASE CARPAL TUNNEL;  Surgeon: Colby Nobles MD;  Location: [facility]    RELEASE TRIGGER FINGER Right 02/09/2023    Procedure: RELEASE, TRIGGER FINGER right ring finger;  Surgeon: Danie Urban MD;  Location: [facility]    TONSILLECTOMY & ADENOIDECTOMY  1973     Allergy     Allergies   Allergen Reactions    Asa [Aspirin] Hives     Current Medication List     Current Outpatient Medications   Medication Sig Dispense Refill    adalimumab-ryvk (SIMLANDI, 2 PEN,) 40 MG/0.4ML auto-injector kit Inject 0.4 mLs (40 mg) subcutaneously every 14 days. Hold for infection and seek medical attention. 2.4 mL 1    famotidine (PEPCID) 20 MG tablet Take 1 tablet (20 mg) by mouth 2 times daily (Patient not taking: Reported on 2/25/2025) 60 tablet 1    fluticasone (FLONASE) 50 MCG/ACT nasal spray Spray 1 spray into both nostrils daily (Patient not taking: Reported on 2/25/2025) 9.9 mL 0    losartan (COZAAR) 25 MG tablet Take 1 tablet (25 mg) by mouth daily. 90 tablet 0    omeprazole (PRILOSEC) 40 MG DR capsule Take 1 capsule (40 mg) by mouth 2 times daily. 60 capsule 1    order for DME Equipment being ordered: compression stockings 1 pair, at 30mm Hg, to be worn during the day. For Bilateral leg edema [R60.0] 2 Units 0    sulfaSALAzine (AZULFIDINE) 500 MG tablet Take 2 tablets (1,000 mg) by mouth 2 times daily. Labs required every 8-12 weeks. 360 tablet 1  "    No current facility-administered medications for this visit.     Social History   See HPI    Family History     Family History   Problem Relation Age of Onset    Osteoporosis Mother     Respiratory Mother     Thyroid Disease Mother     Anxiety Disorder Mother     Asthma Mother     Heart Disease Father     Cancer - colorectal Maternal Grandmother     Colon Cancer Maternal Grandmother     Diabetes Maternal Grandfather     Heart Disease Maternal Grandfather     Cancer Paternal Grandmother     Heart Disease Paternal Grandfather     Respiratory Paternal Grandfather     Hypertension Brother     Thyroid Disease Sister     Thyroid Disease Sister     Neurologic Disorder Brother     Hypertension Brother     Thyroid Disease Brother     Other Cancer Sister     Anxiety Disorder Sister     Mental Illness Sister     Anesthesia Reaction Sister     Thyroid Disease Sister      Physical Exam     Temp Readings from Last 3 Encounters:   02/17/25 97  F (36.1  C) (Temporal)   10/24/24 97.6  F (36.4  C) (Temporal)   08/16/24 97.4  F (36.3  C) (Temporal)     BP Readings from Last 5 Encounters:   06/16/25 (!) 166/88   02/25/25 (!) 180/81   02/17/25 (!) 148/88   10/24/24 (!) 161/111   08/16/24 139/89     Pulse Readings from Last 1 Encounters:   06/16/25 78     Resp Readings from Last 1 Encounters:   02/17/25 18     Estimated body mass index is 41.23 kg/m  as calculated from the following:    Height as of 2/25/25: 1.655 m (5' 5.16\").    Weight as of this encounter: 112.9 kg (249 lb).    GEN: NAD.   HEENT:  Anicteric, noninjected sclera. No obvious external lesions of the ear and nose. Hearing intact.  CV: S1, S2. RRR. No m/r/g  PULM: No increased work of breathing. CTA bilaterally   MSK: MCPs, PIPs, DIPs without swelling or tenderness to palpation.  Wrists without swelling or tenderness to palpation.  Elbows and shoulders without swelling or tenderness to palpation.   Knees and ankles without swelling or tenderness to palpation.  Negative " MTP squeeze bilaterally  SKIN: No rash or jaundice seen  PSYCH: Alert. Appropriate.       Labs / Imaging (select studies)     RF/CCP  Recent Labs   Lab Test 09/18/17  0913   CCPIGG 1   RHF <20     CBC  Recent Labs   Lab Test 06/12/25  0826 02/17/25  0815 12/20/24  1219   WBC 5.8 5.6 7.5   RBC 4.94 4.76 4.97   HGB 14.4 14.1 14.6   HCT 43.3 42.8 43.7   MCV 88 90 88   RDW 12.8 12.6 12.9    307 335   MCH 29.1 29.6 29.4   MCHC 33.3 32.9 33.4   NEUTROPHIL 48 49 48   LYMPH 39 39 41   MONOCYTE 10 9 9   EOSINOPHIL 3 3 2   BASOPHIL 1 0 0   ANEU 2.8 2.8 3.6   ALYM 2.3 2.2 3.1   MIKE 0.6 0.5 0.7   AEOS 0.2 0.2 0.1   ABAS 0.0 0.0 0.0     CMP  Recent Labs   Lab Test 06/12/25  0826 02/17/25  0815 12/20/24  1219 05/22/23  0719 02/06/23  0755 10/02/21  1656 07/05/21  1519 04/06/21  0716 12/22/20  1754 09/23/20  0749   NA  --  142  --   --  144  --   --   --   --  141   POTASSIUM  --  4.0  --   --  3.8  --   --   --   --  4.1   CHLORIDE  --  106  --   --  110*  --   --   --   --  109   CO2  --  26  --   --  30  --   --   --   --  29   ANIONGAP  --  10  --   --  4  --   --   --   --  3   GLC  --  121*  --   --  107*  --   --   --   --  104*   BUN  --  14.6  --   --  16  --   --   --   --  16   CR 0.76 0.80 0.81   < > 0.72   < > 0.78 0.86 0.87 0.77   GFRESTIMATED 88 83 82   < > >90   < > 84 75 74 85   GFRESTBLACK  --   --   --   --   --   --  >90 86 85 >90   AGUILAR  --  9.5  --   --  9.6  --   --   --   --  9.4   BILITOTAL 0.4 0.4 0.2   < > 0.5   < > 0.2 0.4 0.2  --    ALBUMIN 4.2 4.2 4.3   < > 3.9   < > 3.9 3.8 4.3  --    PROTTOTAL 7.0 7.2 7.3   < > 7.2   < > 7.5 7.6 7.8  --    ALKPHOS 70 71 74   < > 70   < > 76 76 72  --    AST 27 24 20   < > 14   < > 17 13 17  --    ALT 29 26 21   < > 28   < > 32 26 27  --     < > = values in this interval not displayed.     Calcium/VitaminD  Recent Labs   Lab Test 02/17/25  0815 02/06/23  0755 09/23/20  0749   AGUILAR 9.5 9.6 9.4     ESR/CRP  Recent Labs   Lab Test 08/09/24  0737 05/24/24  0722  02/26/24  0730 05/22/23  0719 02/06/23  0755 11/10/22  0658   SED 8 7 8 7 8 5   CRP  --   --   --  <2.9 4.8 <2.9   CRPI <3.00 <3.00 3.61  --   --   --      Hepatitis B  Recent Labs   Lab Test 05/24/24  0722   HBCAB Nonreactive   HEPBANG Nonreactive     Hepatitis C  Recent Labs   Lab Test 05/24/24  0722   HCVAB Nonreactive     Tuberculosis Screening  Recent Labs   Lab Test 05/24/24  0722 01/18/22  0701 12/07/18  0724   TBRES Negative Negative Negative          Chart documentation done in part with Dragon Voice recognition Software. Although reviewed after completion, some word and grammatical error may remain.    Bill Resendiz MD

## 2025-06-16 NOTE — TELEPHONE ENCOUNTER
Prior Authorization Approval    Medication: SIMLANDI (2 PEN) 40 MG/0.4ML SC AJKT  Authorization Effective Date: 5/17/2025  Authorization Expiration Date: 6/16/2026  Approved Dose/Quantity: 2 pens per 28-day supply  Reference #: [id number] (Porter: [id number])   Insurance Company: MEDICA - Phone [phone] Fax [phone]:  6MEDICA]  Expected CoPay: $    CoPay Card Available: Other (see comments)    Financial Assistance Needed: [email or web address]  [facility] [place] [facility] [address] [place] [address]  Pharmacy Notified: restart  Patient Notified: restart        Thank You,     Sonny Norman [facility]  [facility] Specialty  [email or web address]  [email or web address]  Phone: [phone]  Fax: [phone]

## 2025-08-04 ENCOUNTER — MYC MEDICAL ADVICE (OUTPATIENT)
Dept: FAMILY MEDICINE | Facility: CLINIC | Age: 60
End: 2025-08-04
Payer: COMMERCIAL

## 2025-08-05 ENCOUNTER — E-VISIT (OUTPATIENT)
Dept: FAMILY MEDICINE | Facility: CLINIC | Age: 60
End: 2025-08-05
Payer: COMMERCIAL

## 2025-08-05 DIAGNOSIS — I89.0 LYMPHEDEMA: Primary | ICD-10-CM

## 2025-08-31 DIAGNOSIS — I10 BENIGN ESSENTIAL HYPERTENSION: ICD-10-CM

## 2025-09-01 RX ORDER — LOSARTAN POTASSIUM 25 MG/1
25 TABLET ORAL DAILY
Qty: 90 TABLET | Refills: 0 | Status: SHIPPED | OUTPATIENT
Start: 2025-09-01

## 2025-09-02 DIAGNOSIS — R05.3 CHRONIC COUGH: ICD-10-CM

## 2025-09-03 RX ORDER — FAMOTIDINE 20 MG/1
20 TABLET, FILM COATED ORAL 2 TIMES DAILY
Qty: 60 TABLET | Refills: 1 | OUTPATIENT
Start: 2025-09-03

## 2025-09-03 RX ORDER — FAMOTIDINE 20 MG/1
20 TABLET, FILM COATED ORAL 2 TIMES DAILY
Qty: 60 TABLET | Refills: 1 | Status: SHIPPED | OUTPATIENT
Start: 2025-09-03

## (undated) DEVICE — DRAPE STERI TOWEL LG 1010

## (undated) DEVICE — PREP CHLORAPREP 26ML TINTED ORANGE  260815

## (undated) DEVICE — SOL PRP PVP IOD .75OZ PCH PKT CNTNR STRL DYNDA2232A

## (undated) DEVICE — PAD CHUX UNDERPAD 23X24" 7136

## (undated) DEVICE — KIT ENDO FIRST STEP DISINFECTANT 200ML W/POUCH EP-4

## (undated) DEVICE — GLOVE PROTEXIS W/NEU-THERA 7.5  2D73TE75

## (undated) DEVICE — GLOVE PROTEXIS W/NEU-THERA 8.0  2D73TE80

## (undated) DEVICE — SU ETHILON 4-0 FS-2 18" 662H

## (undated) DEVICE — NDL 19GA 1.5"

## (undated) DEVICE — SOL WATER IRRIG 1000ML BOTTLE 07139-09

## (undated) DEVICE — BNDG KLING 3" 2232

## (undated) DEVICE — GLOVE PROTEXIS BLUE W/NEU-THERA 7.5  2D73EB75

## (undated) DEVICE — PACK HAND WRIST SOP15HWFSP

## (undated) RX ORDER — ACETAMINOPHEN 325 MG/1
TABLET ORAL
Status: DISPENSED
Start: 2023-02-09

## (undated) RX ORDER — FENTANYL CITRATE 50 UG/ML
INJECTION, SOLUTION INTRAMUSCULAR; INTRAVENOUS
Status: DISPENSED
Start: 2024-10-24

## (undated) RX ORDER — CEFAZOLIN SODIUM 1 G/3ML
INJECTION, POWDER, FOR SOLUTION INTRAMUSCULAR; INTRAVENOUS
Status: DISPENSED
Start: 2023-02-09

## (undated) RX ORDER — FENTANYL CITRATE 50 UG/ML
INJECTION, SOLUTION INTRAMUSCULAR; INTRAVENOUS
Status: DISPENSED
Start: 2024-08-16

## (undated) RX ORDER — FENTANYL CITRATE 50 UG/ML
INJECTION, SOLUTION INTRAMUSCULAR; INTRAVENOUS
Status: DISPENSED
Start: 2023-02-09